# Patient Record
Sex: FEMALE | Race: WHITE | NOT HISPANIC OR LATINO | Employment: FULL TIME | URBAN - METROPOLITAN AREA
[De-identification: names, ages, dates, MRNs, and addresses within clinical notes are randomized per-mention and may not be internally consistent; named-entity substitution may affect disease eponyms.]

---

## 2017-01-09 ENCOUNTER — ANESTHESIA EVENT (OUTPATIENT)
Dept: GASTROENTEROLOGY | Facility: MEDICAL CENTER | Age: 22
End: 2017-01-09
Payer: COMMERCIAL

## 2017-01-09 ENCOUNTER — GENERIC CONVERSION - ENCOUNTER (OUTPATIENT)
Dept: OTHER | Facility: OTHER | Age: 22
End: 2017-01-09

## 2017-01-09 RX ORDER — ONDANSETRON 2 MG/ML
4 INJECTION INTRAMUSCULAR; INTRAVENOUS EVERY 6 HOURS PRN
Status: CANCELLED | OUTPATIENT
Start: 2017-01-09

## 2017-01-09 RX ORDER — SODIUM CHLORIDE 9 MG/ML
125 INJECTION, SOLUTION INTRAVENOUS CONTINUOUS
Status: CANCELLED | OUTPATIENT
Start: 2017-01-09

## 2017-01-10 ENCOUNTER — HOSPITAL ENCOUNTER (OUTPATIENT)
Facility: MEDICAL CENTER | Age: 22
Setting detail: OUTPATIENT SURGERY
Discharge: HOME | End: 2017-01-10
Attending: INTERNAL MEDICINE | Admitting: INTERNAL MEDICINE
Payer: COMMERCIAL

## 2017-01-10 ENCOUNTER — GENERIC CONVERSION - ENCOUNTER (OUTPATIENT)
Dept: GASTROENTEROLOGY | Facility: MEDICAL CENTER | Age: 22
End: 2017-01-10

## 2017-01-10 ENCOUNTER — ANESTHESIA (OUTPATIENT)
Dept: GASTROENTEROLOGY | Facility: MEDICAL CENTER | Age: 22
End: 2017-01-10
Payer: COMMERCIAL

## 2017-01-10 VITALS
HEART RATE: 71 BPM | RESPIRATION RATE: 16 BRPM | SYSTOLIC BLOOD PRESSURE: 102 MMHG | WEIGHT: 224 LBS | BODY MASS INDEX: 38.24 KG/M2 | OXYGEN SATURATION: 100 % | HEIGHT: 64 IN | TEMPERATURE: 98.9 F | DIASTOLIC BLOOD PRESSURE: 66 MMHG

## 2017-01-10 DIAGNOSIS — R11.0 NAUSEA: ICD-10-CM

## 2017-01-10 DIAGNOSIS — R10.13 EPIGASTRIC PAIN: ICD-10-CM

## 2017-01-10 DIAGNOSIS — D64.9 ANEMIA: ICD-10-CM

## 2017-01-10 DIAGNOSIS — R19.8 OTHER SPECIFIED SYMPTOMS AND SIGNS INVOLVING THE DIGESTIVE SYSTEM AND ABDOMEN: ICD-10-CM

## 2017-01-10 DIAGNOSIS — R63.4 ABNORMAL WEIGHT LOSS: ICD-10-CM

## 2017-01-10 LAB — EXT PREGNANCY TEST URINE: NEGATIVE

## 2017-01-10 PROCEDURE — 88312 SPECIAL STAINS GROUP 1: CPT | Performed by: INTERNAL MEDICINE

## 2017-01-10 PROCEDURE — 88305 TISSUE EXAM BY PATHOLOGIST: CPT | Performed by: INTERNAL MEDICINE

## 2017-01-10 RX ORDER — SODIUM CHLORIDE 9 MG/ML
125 INJECTION, SOLUTION INTRAVENOUS CONTINUOUS
Status: DISCONTINUED | OUTPATIENT
Start: 2017-01-10 | End: 2017-01-10 | Stop reason: HOSPADM

## 2017-01-10 RX ORDER — PROPOFOL 10 MG/ML
INJECTION, EMULSION INTRAVENOUS AS NEEDED
Status: DISCONTINUED | OUTPATIENT
Start: 2017-01-10 | End: 2017-01-10 | Stop reason: SURG

## 2017-01-10 RX ADMIN — LIDOCAINE HYDROCHLORIDE 50 MG: 20 INJECTION, SOLUTION INTRAVENOUS at 08:47

## 2017-01-10 RX ADMIN — PROPOFOL 100 MG: 10 INJECTION, EMULSION INTRAVENOUS at 08:56

## 2017-01-10 RX ADMIN — PROPOFOL 50 MG: 10 INJECTION, EMULSION INTRAVENOUS at 09:00

## 2017-01-10 RX ADMIN — PROPOFOL 100 MG: 10 INJECTION, EMULSION INTRAVENOUS at 08:47

## 2017-01-10 RX ADMIN — SODIUM CHLORIDE 125 ML/HR: 0.9 INJECTION, SOLUTION INTRAVENOUS at 08:38

## 2017-01-10 RX ADMIN — PROPOFOL 50 MG: 10 INJECTION, EMULSION INTRAVENOUS at 08:54

## 2017-01-10 RX ADMIN — PROPOFOL 50 MG: 10 INJECTION, EMULSION INTRAVENOUS at 08:51

## 2017-01-10 RX ADMIN — PROPOFOL 75 MG: 10 INJECTION, EMULSION INTRAVENOUS at 09:10

## 2017-01-10 RX ADMIN — PROPOFOL 25 MG: 10 INJECTION, EMULSION INTRAVENOUS at 09:04

## 2017-01-10 RX ADMIN — SODIUM CHLORIDE: 0.9 INJECTION, SOLUTION INTRAVENOUS at 08:34

## 2017-01-15 ENCOUNTER — GENERIC CONVERSION - ENCOUNTER (OUTPATIENT)
Dept: OTHER | Facility: OTHER | Age: 22
End: 2017-01-15

## 2017-01-17 ENCOUNTER — GENERIC CONVERSION - ENCOUNTER (OUTPATIENT)
Dept: OTHER | Facility: OTHER | Age: 22
End: 2017-01-17

## 2017-01-19 ENCOUNTER — GENERIC CONVERSION - ENCOUNTER (OUTPATIENT)
Dept: OTHER | Facility: OTHER | Age: 22
End: 2017-01-19

## 2017-01-25 ENCOUNTER — ALLSCRIPTS OFFICE VISIT (OUTPATIENT)
Dept: OTHER | Facility: OTHER | Age: 22
End: 2017-01-25

## 2017-01-25 DIAGNOSIS — R10.13 EPIGASTRIC PAIN: ICD-10-CM

## 2017-01-25 DIAGNOSIS — R63.5 ABNORMAL WEIGHT GAIN: ICD-10-CM

## 2017-01-25 DIAGNOSIS — D64.9 ANEMIA: ICD-10-CM

## 2017-01-25 DIAGNOSIS — R19.8 OTHER SPECIFIED SYMPTOMS AND SIGNS INVOLVING THE DIGESTIVE SYSTEM AND ABDOMEN: ICD-10-CM

## 2017-01-25 DIAGNOSIS — R11.0 NAUSEA: ICD-10-CM

## 2017-01-26 ENCOUNTER — GENERIC CONVERSION - ENCOUNTER (OUTPATIENT)
Dept: OTHER | Facility: OTHER | Age: 22
End: 2017-01-26

## 2017-01-31 ENCOUNTER — GENERIC CONVERSION - ENCOUNTER (OUTPATIENT)
Dept: OTHER | Facility: OTHER | Age: 22
End: 2017-01-31

## 2017-02-02 ENCOUNTER — ALLSCRIPTS OFFICE VISIT (OUTPATIENT)
Dept: OTHER | Facility: OTHER | Age: 22
End: 2017-02-02

## 2017-02-23 ENCOUNTER — GENERIC CONVERSION - ENCOUNTER (OUTPATIENT)
Dept: OTHER | Facility: OTHER | Age: 22
End: 2017-02-23

## 2017-03-02 ENCOUNTER — GENERIC CONVERSION - ENCOUNTER (OUTPATIENT)
Dept: OTHER | Facility: OTHER | Age: 22
End: 2017-03-02

## 2017-03-15 DIAGNOSIS — E55.9 VITAMIN D DEFICIENCY: ICD-10-CM

## 2017-03-15 DIAGNOSIS — R79.89 OTHER SPECIFIED ABNORMAL FINDINGS OF BLOOD CHEMISTRY: ICD-10-CM

## 2017-03-15 DIAGNOSIS — R94.6 ABNORMAL RESULTS OF THYROID FUNCTION STUDIES: ICD-10-CM

## 2017-03-15 DIAGNOSIS — E11.65 TYPE 2 DIABETES MELLITUS WITH HYPERGLYCEMIA (HCC): ICD-10-CM

## 2017-03-16 ENCOUNTER — GENERIC CONVERSION - ENCOUNTER (OUTPATIENT)
Dept: OTHER | Facility: OTHER | Age: 22
End: 2017-03-16

## 2017-03-22 ENCOUNTER — GENERIC CONVERSION - ENCOUNTER (OUTPATIENT)
Dept: OTHER | Facility: OTHER | Age: 22
End: 2017-03-22

## 2017-03-23 ENCOUNTER — GENERIC CONVERSION - ENCOUNTER (OUTPATIENT)
Dept: OTHER | Facility: OTHER | Age: 22
End: 2017-03-23

## 2017-03-23 ENCOUNTER — APPOINTMENT (OUTPATIENT)
Dept: LAB | Facility: HOSPITAL | Age: 22
End: 2017-03-23
Attending: INTERNAL MEDICINE
Payer: COMMERCIAL

## 2017-03-23 DIAGNOSIS — E55.9 VITAMIN D DEFICIENCY: ICD-10-CM

## 2017-03-23 DIAGNOSIS — R79.89 OTHER SPECIFIED ABNORMAL FINDINGS OF BLOOD CHEMISTRY: ICD-10-CM

## 2017-03-23 DIAGNOSIS — R94.6 ABNORMAL RESULTS OF THYROID FUNCTION STUDIES: ICD-10-CM

## 2017-03-23 DIAGNOSIS — E11.65 TYPE 2 DIABETES MELLITUS WITH HYPERGLYCEMIA (HCC): ICD-10-CM

## 2017-03-23 LAB
25(OH)D3 SERPL-MCNC: 18.8 NG/ML (ref 30–100)
ALBUMIN SERPL BCP-MCNC: 4.4 G/DL (ref 3.5–5)
ALP SERPL-CCNC: 110 U/L (ref 46–116)
ALT SERPL W P-5'-P-CCNC: 49 U/L (ref 12–78)
ANION GAP SERPL CALCULATED.3IONS-SCNC: 9 MMOL/L (ref 4–13)
AST SERPL W P-5'-P-CCNC: 23 U/L (ref 5–45)
BILIRUB SERPL-MCNC: 0.7 MG/DL (ref 0.2–1)
BUN SERPL-MCNC: 11 MG/DL (ref 5–25)
CALCIUM SERPL-MCNC: 9.9 MG/DL (ref 8.3–10.1)
CHLORIDE SERPL-SCNC: 101 MMOL/L (ref 100–108)
CO2 SERPL-SCNC: 28 MMOL/L (ref 21–32)
CREAT SERPL-MCNC: 0.69 MG/DL (ref 0.6–1.3)
EST. AVERAGE GLUCOSE BLD GHB EST-MCNC: 157 MG/DL
GFR SERPL CREATININE-BSD FRML MDRD: >60 ML/MIN/1.73SQ M
GLUCOSE P FAST SERPL-MCNC: 190 MG/DL (ref 65–99)
HBA1C MFR BLD: 7.1 % (ref 4.2–6.3)
POTASSIUM SERPL-SCNC: 4.4 MMOL/L (ref 3.5–5.3)
PROT SERPL-MCNC: 8.2 G/DL (ref 6.4–8.2)
SODIUM SERPL-SCNC: 138 MMOL/L (ref 136–145)
T4 FREE SERPL-MCNC: 1.01 NG/DL (ref 0.76–1.46)
TSH SERPL DL<=0.05 MIU/L-ACNC: 1.16 UIU/ML (ref 0.36–3.74)

## 2017-03-23 PROCEDURE — 84439 ASSAY OF FREE THYROXINE: CPT

## 2017-03-23 PROCEDURE — 83036 HEMOGLOBIN GLYCOSYLATED A1C: CPT

## 2017-03-23 PROCEDURE — 84443 ASSAY THYROID STIM HORMONE: CPT

## 2017-03-23 PROCEDURE — 36415 COLL VENOUS BLD VENIPUNCTURE: CPT

## 2017-03-23 PROCEDURE — 82306 VITAMIN D 25 HYDROXY: CPT

## 2017-03-23 PROCEDURE — 80053 COMPREHEN METABOLIC PANEL: CPT

## 2017-04-13 ENCOUNTER — HOSPITAL ENCOUNTER (EMERGENCY)
Facility: HOSPITAL | Age: 22
Discharge: HOME | End: 2017-04-13
Attending: EMERGENCY MEDICINE | Admitting: EMERGENCY MEDICINE
Payer: COMMERCIAL

## 2017-04-13 DIAGNOSIS — R10.9 RIGHT FLANK PAIN: ICD-10-CM
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
## 2017-04-13 LAB
BACTERIA UR QL AUTO: ABNORMAL /HPF
BILIRUB UR QL STRIP: NEGATIVE
CLARITY UR: CLEAR
COLOR UR: YELLOW
COLOR, POC: YELLOW
GLUCOSE UR STRIP-MCNC: ABNORMAL MG/DL
HCG UR QL: NORMAL
HGB UR QL STRIP.AUTO: ABNORMAL
KETONES UR STRIP-MCNC: NEGATIVE MG/DL
LEUKOCYTE ESTERASE UR QL STRIP: NEGATIVE
NITRITE UR QL STRIP: NEGATIVE
NON-SQ EPI CELLS URNS QL MICRO: ABNORMAL /HPF
PH UR STRIP.AUTO: 6.5 [PH] (ref 4.5–8)
PROT UR STRIP-MCNC: NEGATIVE MG/DL
RBC #/AREA URNS AUTO: ABNORMAL /HPF
SP GR UR STRIP.AUTO: 1.01 (ref 1–1.03)
UROBILINOGEN UR QL STRIP.AUTO: 0.2 E.U./DL
WBC #/AREA URNS AUTO: ABNORMAL /HPF

## 2017-04-13 PROCEDURE — 81001 URINALYSIS AUTO W/SCOPE: CPT

## 2017-04-13 PROCEDURE — 81025 URINE PREGNANCY TEST: CPT

## 2017-04-13 PROCEDURE — 87086 URINE CULTURE/COLONY COUNT: CPT

## 2017-04-13 PROCEDURE — 99284 EMERGENCY DEPT VISIT MOD MDM: CPT

## 2017-04-13 PROCEDURE — 81002 URINALYSIS NONAUTO W/O SCOPE: CPT

## 2017-04-14 LAB — BACTERIA UR CULT: NORMAL

## 2017-04-26 ENCOUNTER — ALLSCRIPTS OFFICE VISIT (OUTPATIENT)
Dept: OTHER | Facility: OTHER | Age: 22
End: 2017-04-26

## 2017-05-25 ENCOUNTER — TRANSCRIBE ORDERS (OUTPATIENT)
Dept: ADMINISTRATIVE | Facility: HOSPITAL | Age: 22
End: 2017-05-25

## 2017-05-26 ENCOUNTER — HOSPITAL ENCOUNTER (OUTPATIENT)
Dept: ULTRASOUND IMAGING | Facility: HOSPITAL | Age: 22
Discharge: HOME | End: 2017-05-26
Payer: COMMERCIAL

## 2017-05-26 DIAGNOSIS — E28.2 POLYCYSTIC OVARIAN SYNDROME: ICD-10-CM

## 2017-05-26 PROCEDURE — 76830 TRANSVAGINAL US NON-OB: CPT

## 2017-05-26 PROCEDURE — 76856 US EXAM PELVIC COMPLETE: CPT

## 2017-06-01 ENCOUNTER — GENERIC CONVERSION - ENCOUNTER (OUTPATIENT)
Dept: OTHER | Facility: OTHER | Age: 22
End: 2017-06-01

## 2017-06-05 ENCOUNTER — GENERIC CONVERSION - ENCOUNTER (OUTPATIENT)
Dept: OTHER | Facility: OTHER | Age: 22
End: 2017-06-05

## 2017-11-16 ENCOUNTER — TRANSCRIBE ORDERS (OUTPATIENT)
Dept: ADMINISTRATIVE | Facility: HOSPITAL | Age: 22
End: 2017-11-16

## 2017-11-16 ENCOUNTER — APPOINTMENT (OUTPATIENT)
Dept: LAB | Facility: MEDICAL CENTER | Age: 22
End: 2017-11-16
Payer: COMMERCIAL

## 2017-11-16 DIAGNOSIS — Z11.1 SCREENING EXAMINATION FOR PULMONARY TUBERCULOSIS: ICD-10-CM

## 2017-11-16 DIAGNOSIS — Z11.1 SCREENING EXAMINATION FOR PULMONARY TUBERCULOSIS: Primary | ICD-10-CM

## 2017-11-16 PROCEDURE — 86480 TB TEST CELL IMMUN MEASURE: CPT

## 2017-11-16 PROCEDURE — 36415 COLL VENOUS BLD VENIPUNCTURE: CPT

## 2017-11-18 LAB
ANNOTATION COMMENT IMP: NORMAL
GAMMA INTERFERON BACKGROUND BLD IA-ACNC: 0.07 IU/ML
M TB IFN-G BLD-IMP: NEGATIVE
M TB IFN-G CD4+ BCKGRND COR BLD-ACNC: 0 IU/ML
M TB IFN-G CD4+ T-CELLS BLD-ACNC: 0.07 IU/ML
MITOGEN IGNF BLD-ACNC: 9.75 IU/ML
QUANTIFERON-TB GOLD IN TUBE: NORMAL
SERVICE CMNT-IMP: NORMAL

## 2018-01-09 NOTE — MISCELLANEOUS
Message  pt had Mirena inserted 6/2016 had an episode of bad cramps Sat and then again Monday did get relief and has subsided now Spoke to Crystal Casas she wants her to observe for now called pt will observe      Active Problems    1  Abnormal glucose (790 29) (R73 09)   2  Abnormal liver function test (790 6) (R79 89)   3  Abnormal weight gain (783 1) (R63 5)   4  Alternating constipation and diarrhea (787 99) (R19 8)   5  Anemia (285 9) (D64 9)   6  Contraceptive, surveillance, intrauterine device (V25 42) (Z30 431)   7  Dysmenorrhea (625 3) (N94 6)   8  Elevated TSH (794 5) (R94 6)   9  Encounter for annual routine gynecological examination (V72 31) (Z01 419)   10  Encounter for gynecological examination with Papanicolaou smear of cervix    (V72 31,V76 2) (Z01 419,Z12 4)   11  Encounter for IUD insertion (V25 11) (Z30 430)   12  Encounter for screening examination for sexually transmitted disease (V74 5) (Z11 3)   13  Epigastric pain (789 06) (R10 13)   14  Fatigue (780 79) (R53 83)   15  Hyperglycemia (790 29) (R73 9)   16  Hypokalemia (276 8) (E87 6)   17  IUD contraception (V45 51) (Z97 5)   18  Kidney injury (866 00) (S37 009A)   19  Menorrhagia (626 2) (N92 0)   20  Myalgia (729 1) (M79 1)   21  Nausea (787 02) (R11 0)   22  Obesity (BMI 30-39 9) (278 00) (E66 9)   23  Recent weight loss (783 21) (R63 4)   24  Type 2 diabetes mellitus with hyperglycemia (250 00) (E11 65)   25  Vaginal candidiasis (112 1) (B37 3)   26  Vitamin D deficiency (268 9) (E55 9)    Current Meds   1  Mirena IUD; Therapy: (Recorded:17Aug2016) to Recorded   2  MoviPrep 100 GM Oral Solution Reconstituted; USE AS DIRECTED; Therapy: 23JZH8864 to (Last Rx:67Kfh2241)  Requested for: 86AOM4505 Ordered   3  Sudafed TABS; Therapy: (Recorded:98Yrt7628) to Recorded   4  Vitamin D (Ergocalciferol) 49791 UNIT Oral Capsule; TAKE 1 CAPSULE WEEKLY for 6   weeks;    Therapy: 79Ldh0001 to (Evaluate:18Feb2017)  Requested for: 79Vmy2351; Last Rx:13Lhs7402 Ordered    Allergies    1  Dilaudid   2   Bactrim TABS    Signatures   Electronically signed by : Kenney Miranda, ; Jan 17 2017  4:14PM EST                       (Author)

## 2018-01-09 NOTE — MISCELLANEOUS
Message  Pt called she is having vaginal itching again has been having some spotting and wearing panti liners a lot and has her period now will give her 1 more course of Diflucan if keeps happening will need OV      Active Problems    1  Anemia (285 9) (D64 9)   2  Contraceptive, surveillance, intrauterine device (V25 42) (Z30 431)   3  Dysmenorrhea (625 3) (N94 6)   4  Encounter for gynecological examination with Papanicolaou smear of cervix   (V72 31,V76 2) (Z01 419,Z12 4)   5  Encounter for IUD insertion (V25 11) (Z30 430)   6  Encounter for screening examination for sexually transmitted disease (V74 5) (Z11 3)   7  IUD contraception (V45 51) (Z97 5)   8  Menorrhagia (626 2) (N92 0)   9  Vaginal candidiasis (112 1) (B37 3)    Current Meds   1  Etodolac 400 MG Oral Tablet; Therapy: (Recorded:86Oks4166) to Recorded   2  Fluconazole 150 MG Oral Tablet; tAKE ONE TABLET NOW AND THEN REPEAT IN 3   DAYS; Therapy: 99FZY3937 to (Evaluate:05Prr7731)  Requested for: 64Waz7077; Last   Rx:14Cqo8190 Ordered   3  Hypercare 20 % SOLN;   Therapy: 58Yjg2292 to (Last Rx:78Tkl2536)  Requested for: 12Wss1310 Ordered   4  Mirena IUD; Therapy: (Recorded:42Bod6078) to Recorded    Allergies    1  Dilaudid   2   Bactrim TABS    Plan  Vaginal candidiasis    · Fluconazole 150 MG Oral Tablet; tAKE ONE TABLET NOW AND THEN REPEAT  IN 3 DAYS    Signatures   Electronically signed by : Rishi Magaña, ; Oct 11 2016  2:10PM EST                       (Author)
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
## 2018-01-11 NOTE — MISCELLANEOUS
Message  GI Reminder Recall [address] [place] [address] [address]:   Date: 03/22/2017   Dear Elizabeth Nieto:     Review of our records shows you are due for the following: Follow Up Visit  Please call the following office to schedule your appointment:   [address], [address], [place], [place], [address] [facility] [phone]  We look forward to hearing from you!      Sincerely,     [facility]      Signatures   Electronically signed by : Denzel Alvarado, ; Mar 22 2017  4:07PM EST                       (Author)

## 2018-01-11 NOTE — RESULT NOTES
Verified Results  * US PELVIS COMPLETE [facility] AND TRANSVAGINAL) [id number] 93:65WF Kalpana Guerrero Order Number: [id number]    - Patient Instructions: To schedule this appointment, please contact Central Scheduling at [phone]  Test Name Result Flag Reference   US PELVIS COMPLETE (TRANSABDOMINAL AND TRANSVAGINAL) (Report)     PELVIC ULTRASOUND, COMPLETE     INDICATION: 14-year-old female with intermittent right lower quadrant abdominal pain  Patient has a Mirena IUD since July 2016  LMP was about 3 weeks ago  COMPARISON: Abdomen and pelvic CT from 11/20/2016  TECHNIQUE:  Transabdominal pelvic ultrasound was performed in sagittal and transverse planes with a curvilinear transducer  Additional transvaginal imaging was performed to better evaluate the endometrium and ovaries  Imaging included volumetric    sweeps as well as traditional still imaging technique  FINDINGS:     UTERUS:   The uterus is anteverted in position, measuring 8 7 x 2 7 x 4 9 cm  Contour and echotexture appear normal      The cervix shows no suspicious abnormality  ENDOMETRIUM:    Normal caliber of 6 mm  Homogenous and normal in appearance  OVARIES/ADNEXA:   Right ovary: 2 5 x 2 8 x 1 9 cm  No suspicious right ovarian abnormality  Doppler flow within normal limits  Left ovary: 2 1 x 1 0 x 1 6 cm  No suspicious left ovarian abnormality  Doppler flow within normal limits  No suspicious adnexal mass or loculated collections  There is small amount of simple free fluid in the pelvis, likely physiologic in this premenopausal female  IMPRESSION:      Normal pelvic ultrasound, with IUD in proper position            Workstation performed: [id number]     Signed by:   Gregor Eisenmenger, MD   5/30/17
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
## 2018-01-12 VITALS
OXYGEN SATURATION: 95 % | DIASTOLIC BLOOD PRESSURE: 68 MMHG | HEART RATE: 102 BPM | SYSTOLIC BLOOD PRESSURE: 114 MMHG | TEMPERATURE: 98 F | BODY MASS INDEX: 38.28 KG/M2 | WEIGHT: 223 LBS

## 2018-01-12 VITALS — WEIGHT: 216.8 LBS | BODY MASS INDEX: 37.21 KG/M2
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
## 2018-01-12 NOTE — MISCELLANEOUS
Provider Comments  Provider Comments:   Dear Joe Michelle,    You missed your new patient appointment with Dr Francia Mxa on 12/12/2016; please contact our office to reschedule at [phone]  We understand that many situations arise that occasionally prevent patients from keeping scheduled appointments  It is the policy of [facility] that patients notify us 24 hours in advance if unable to keep a scheduled appointment  Missed appointments jeopardize strong physician-patient relationships  The appointment you missed could have easily been made available to another patient if you had contacted us to cancel  We like to accommodate all of our patients, but when patients miss an appointment it prevents us from being able to help everyone  In the future, we request at least 24 hours' notice of cancellation so we can make your appointment available to someone else in need       Sincerely,  The Physicians and Staff at [facility]        Signatures   Electronically signed by : Crystal Robertson, ; Dec 12 2016 11:41AM EST                       (Author)

## 2018-01-12 NOTE — RESULT NOTES
Verified Results  (1) TISSUE EXAM [id number] 08:54AM Radha Edwards     Test Name Result Flag Reference   LAB AP CASE REPORT (Report)     Surgical Pathology Report             Case: [id number]                   Authorizing Provider: Aneta Underwood MD      Collected:      01/10/2017 0854        Ordering Location:   [facility]    Received:      01/11/2017 [facility]                            Pathologist:      Aide Ly MD                              Specimens:  A) - Duodenum, Small bowel biopsy r/o celiac                              B) - Stomach, Gastric biopsy r/o H  pylori                               C) - Colon, Random colon polyp   LAB AP FINAL DIAGNOSIS (Report)     A  Small bowel, duodenum, biopsy:        - Small bowel mucosa with mildly increased intraepithelial   lymphocytes in a few villous tips  - No dysplasia or malignancy is identified  B  Stomach, biopsy:        - Antral and oxyntic mucosa with with chronic inactive   gastritis  - No Helicobacter pylori organisms are identified on the   Warthin-Starry special stain, performed with appropriate control         - No intestinal metaplasia, dysplasia or malignancy is   identified  C  Colon, random, polypectomy:        - Polypoid colonic mucosa with no significant pathologic   alteration         - No dysplasia or malignancy is identified  Interpretation performed at , [address]   Electronically signed by Aide Ly MD on 1/12/2017 at 4:23 PM   LAB AP NOTE      (Part A) Mildly increased intraepithelial lymphocytes are identified in a   few villous tips  This may be a nonspecific finding, but serologic studies   to rule out celiac disease are suggested clinically indicated  LAB AP SURGICAL ADDITIONAL INFORMATION (Report)     These tests were developed and their performance characteristics   determined by [facility] or [facility]  They may not be cleared or approved by the [place]  Food and   Drug Administration  The FDA has determined that such clearance or   approval is not necessary  These tests are used for clinical purposes  They should not be regarded as investigational or for research  This   laboratory has been approved by CLIA 88, designated as a high-complexity   laboratory and is qualified to perform these tests  LAB AP GROSS DESCRIPTION (Report)     A  The specimen is received in formalin, labeled with the patient's name   and hospital number, and is designated small bowel biopsy rule out   celiac, are two irregularly shaped fragments of tan soft tissue measuring   0 3 and 0 2 cm in greatest dimension  Entirely submitted  One cassette  B  The specimen is received in formalin, labeled with the patient's name   and hospital number, and is designated gastric biopsy rule out H    pylori, are three irregularly shaped fragments of tan soft tissue each   measuring 03 cm in greatest dimension  Entirely submitted  One cassette  C  The specimen is received in formalin, labeled with the patient's name   and hospital number, and is designated random colon polyp, are three   irregularly shaped fragments of tan soft tissue each measuring 0 3 cm in   greatest dimension  Entirely submitted  One cassette  Note: The estimated total formalin fixation time based upon information   provided by the submitting clinician and the standard processing schedule   is 28 75 hours        RLR   LAB AP CLINICAL INFORMATION      small bowel biopsy r/o celiac   small bowel biopsy r/o celiac
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
## 2018-01-13 VITALS — BODY MASS INDEX: 38.11 KG/M2 | WEIGHT: 222 LBS

## 2018-01-13 VITALS — WEIGHT: 214.4 LBS | BODY MASS INDEX: 36.8 KG/M2

## 2018-01-13 NOTE — RESULT NOTES
Verified Results  Urine HCG- POC 65KHC6996 18:45JG Katrin Salazar     Test Name Result Flag Reference   Urine HCG Negative       (1) CHLAMYDIA/GC AMPLIFIED DNA, PCR 41LHG3584 12:99DK Magalys Adam Order Number: [id number]     Test Name Result Flag Reference   CHLAMYDIA,AMPLIFIED DNA PROBE   C  trachomatis Amplified DNA Negative   C  trachomatis Amplified DNA Negative   For optimal microbe detection, urine samples should be a first catch specimen (20-60 ml of urine)  Patient should not have urinated for at least 1 hour prior to collection  A specimen not collected in this manner may have falsely negative results     N  GONORRHOEAE AMPLIFIED DNA   N  gonorrhoeae Amplified DNA Negative   N  gonorrhoeae Amplified DNA Negative

## 2018-01-13 NOTE — MISCELLANEOUS
Message  Ot called she was given 1 Diflucan from PCP for yeast infection from an antibiotic did not take symptoms away now has itching again, very irritated will give both pills Pt will call if no better   Joyce Clemons Active Problems    1  Anemia (285 9) (D64 9)   2  Dysmenorrhea (625 3) (N94 6)   3  Encounter for gynecological examination with Papanicolaou smear of cervix   (V72 31,V76 2) (Z01 419,Z12 4)   4  Menorrhagia (626 2) (N92 0)   5  Vaginal candidiasis (112 1) (B37 3)    Current Meds   1  Etodolac 400 MG Oral Tablet; Therapy: (Recorded:42Oun3527) to Recorded   2  Hypercare 20 % SOLN;   Therapy: 04Pso3017 to (Last Rx:63Phg7148)  Requested for: 52Tez8200 Ordered   3  Loestrin Fe 1 5/30 1 5-30 MG-MCG Oral Tablet; Take one tablet daily; Therapy: 59Plj5118 to (Evaluate:85Xto1048)  Requested for: 93Nfb8832; Last   Rx:99Wwa1342 Ordered    Allergies    1  Dilaudid   2   Bactrim TABS    Plan  Vaginal candidiasis    · Fluconazole 150 MG Oral Tablet; tAKE ONE TABLET NOW AND THEN REPEAT IN  3 DAYS    Signatures   Electronically signed by : Sergio Julio, ; Feb 24 2016  2:02PM EST                       (Author)
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
## 2018-01-14 VITALS
HEART RATE: 88 BPM | BODY MASS INDEX: 37.23 KG/M2 | HEIGHT: 65 IN | DIASTOLIC BLOOD PRESSURE: 80 MMHG | SYSTOLIC BLOOD PRESSURE: 134 MMHG | WEIGHT: 223.44 LBS

## 2018-01-14 VITALS — BODY MASS INDEX: 37.48 KG/M2 | WEIGHT: 221.8 LBS

## 2018-01-14 VITALS — WEIGHT: 225.6 LBS | BODY MASS INDEX: 38.72 KG/M2

## 2018-01-14 VITALS — WEIGHT: 214.6 LBS | BODY MASS INDEX: 36.27 KG/M2

## 2018-01-14 NOTE — MISCELLANEOUS
Message   Date: 28 Jun 2016 11:29 AM EST, Recorded By: Demi Salgado For: Lindsay Capellan   Caller: Jarrett Ross   Phone: [phone] (Home), [phone] (Work)   Reason: Medical Complaint   pt just finished antibiotic and now has yeast inf, wants diflucan sent to [facility]  Pt often gets yeast inf after taking meds  pt was told to call office if meds do not help or if symptoms get worse  Active Problems    1  Anemia (285 9) (D64 9)   2  Dysmenorrhea (625 3) (N94 6)   3  Encounter for gynecological examination with Papanicolaou smear of cervix   (V72 31,V76 2) (Z01 419,Z12 4)   4  Encounter for IUD insertion (V25 11) (Z30 430)   5  Encounter for screening examination for sexually transmitted disease (V74 5) (Z11 3)   6  IUD contraception (V45 51) (Z97 5)   7  Menorrhagia (626 2) (N92 0)   8  Vaginal candidiasis (112 1) (B37 3)    Current Meds   1  Etodolac 400 MG Oral Tablet; Therapy: (Recorded:53Foq2892) to Recorded   2  Fluconazole 150 MG Oral Tablet; tAKE ONE TABLET NOW AND THEN REPEAT IN 3   DAYS; Therapy: 57JCS5943 to (Evaluate:49Otl3006)  Requested for: 11Kls8688; Last   Rx:02Uuz8416 Ordered   3  Hypercare 20 % SOLN;   Therapy: 80Zgx4335 to (Last Rx:46Gah8521)  Requested for: 66Mrl0232 Ordered   4  Loestrin Fe 1 5/30 1 5-30 MG-MCG Oral Tablet; Take one tablet daily; Therapy: 79Pah1489 to (Evaluate:96Wdg6581)  Requested for: 62Jec4865; Last   Rx:09Vot0902 Ordered   5  Misoprostol 200 MCG Oral Tablet; TAKE 2 TABLETS 4-10 HOURS BEFORE MIRENA   INSERTION; Therapy: 43CLN5633 to (Last Rx:06Jun2016)  Requested for: 07Jun2016 Ordered    Allergies    1  Dilaudid   2   Bactrim TABS    Plan  Vaginal candidiasis    · Fluconazole 150 MG Oral Tablet (Diflucan); TAKE 1 TABLET Now AND ANOTHER  IN 3 DAYS    Signatures   Electronically signed by : Peg Gagnon OM; Jun 28 2016 11:30AM EST                       (Author)
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
## 2018-01-14 NOTE — MISCELLANEOUS
Message  Pt called she was diagnosed with PCOS wanted to know if we can order U/S to confirm l/m to call and set up in office if she can or if not we can order Pelvic U/S for her      Active Problems    1  Abnormal glucose (790 29) (R73 09)   2  Abnormal liver function test (790 6) (R79 89)   3  Abnormal weight gain (783 1) (R63 5)   4  Alternating constipation and diarrhea (787 99) (R19 8)   5  Anemia (285 9) (D64 9)   6  Contraceptive, surveillance, intrauterine device (V25 42) (Z30 431)   7  Dysmenorrhea (625 3) (N94 6)   8  Elevated TSH (794 5) (R94 6)   9  Encounter for annual routine gynecological examination (V72 31) (Z01 419)   10  Encounter for gynecological examination with Papanicolaou smear of cervix    (V72 31,V76 2) (Z01 419,Z12 4)   11  Encounter for IUD insertion (V25 11) (Z30 430)   12  Encounter for screening examination for sexually transmitted disease (V74 5) (Z11 3)   13  Epigastric pain (789 06) (R10 13)   14  Fatigue (780 79) (R53 83)   15  Hyperglycemia (790 29) (R73 9)   16  Hypokalemia (276 8) (E87 6)   17  IUD contraception (V45 51) (Z97 5)   18  Kidney injury (866 00) (S37 009A)   19  Menorrhagia (626 2) (N92 0)   20  Myalgia (729 1) (M79 1)   21  Nausea (787 02) (R11 0)   22  Obesity (BMI 30-39 9) (278 00) (E66 9)   23  Recent weight loss (783 21) (R63 4)   24  Type 2 diabetes mellitus with hyperglycemia (250 00) (E11 65)   25  Vaginal candidiasis (112 1) (B37 3)   26  Vitamin D deficiency (268 9) (E55 9)    Current Meds   1  Dexamethasone 1 MG Oral Tablet; take 1 tab at 11 pm the night before labs; Therapy: 86TDB5353 to (Evaluate:26Jan2017)  Requested for: 23NNF9813; Last   Rx:25Jan2017 Ordered   2  Mirena IUD; Therapy: (Recorded:17Aug2016) to Recorded   3  Vitamin D (Ergocalciferol) 88094 UNIT Oral Capsule; TAKE 1 CAPSULE WEEKLY for 6   weeks; Therapy: 14Vbw0839 to (Evaluate:18Feb2017)  Requested for: 91Ejv9323; Last   Rx:04Qrx6987 Ordered    Allergies    1  Dilaudid   2  Bactrim TABS    Signatures   Electronically signed by : Seth Cat, ; Jan 26 2017  9:10AM EST                       (Author)

## 2018-01-14 NOTE — MISCELLANEOUS
Message  Pt called she has 100 percent coverage for Mirena will order had STD tests done today has appt for Thurs for insertion will use spare if not arrived yet  did call in cytotec and advised Advil      Active Problems    1  Anemia (285 9) (D64 9)   2  Dysmenorrhea (625 3) (N94 6)   3  Encounter for gynecological examination with Papanicolaou smear of cervix   (V72 31,V76 2) (Z01 419,Z12 4)   4  Encounter for screening examination for sexually transmitted disease (V74 5) (Z11 3)   5  Menorrhagia (626 2) (N92 0)   6  Vaginal candidiasis (112 1) (B37 3)    Current Meds   1  Etodolac 400 MG Oral Tablet; Therapy: (Recorded:86Dzs8062) to Recorded   2  Fluconazole 150 MG Oral Tablet; tAKE ONE TABLET NOW AND THEN REPEAT IN 3   DAYS; Therapy: 25GAW2152 to (Evaluate:53Snk5714)  Requested for: 52Yyn2184; Last   Rx:62Ueh3252 Ordered   3  Hypercare 20 % SOLN;   Therapy: 17Fcq6059 to (Last Rx:78Pro5829)  Requested for: 32Xfx9964 Ordered   4  Loestrin Fe 1 5/30 1 5-30 MG-MCG Oral Tablet; Take one tablet daily; Therapy: 27Ifo6352 to (Evaluate:38Jsz8686)  Requested for: 12Tra8769; Last   Rx:29Eag7559 Ordered    Allergies    1  Dilaudid   2   Bactrim TABS    Plan  Menorrhagia    · Misoprostol 200 MCG Oral Tablet; TAKE 2 TABLETS 4-10 HOURS BEFORE  MIRENA INSERTION    Signatures   Electronically signed by : Nathalia Kebede, ; Jun 6 2016  8:36AM EST                       (Author)

## 2018-01-15 NOTE — MISCELLANEOUS
Provider Comments  Provider Comments:   PATIENT NO SHOWED FOR 4- APPT  Signatures   Electronically signed by : Mike Marroquin MA;  Apr 26 2017  9:15AM EST                       (Author)

## 2018-01-15 NOTE — MISCELLANEOUS
Message  GI Reminder Recall [address] [place] [address] [address]:   Date: 06/05/2017   Dear Howie Ruggiero:     Review of our records shows you are due for the following: Follow Up Visit  Please call the following office to schedule your appointment:   [address], [address], [place], [facility], [address] [facility] [phone]  We look forward to hearing from you!      Sincerely,     [address] Gastroenterology      Signatures   Electronically signed by : Berkley Falcon, ; Jun 5 2017 11:30AM EST                       (Author)

## 2018-01-15 NOTE — MISCELLANEOUS
Message  pt called was treated for strep last week starting with vaginal itching, irritation, feels sore no d/c Will treat with Flucanozole and she will call if no better [facility] is where Rx was sent      Active Problems    1  Anemia (285 9) (D64 9)   2  Contraceptive, surveillance, intrauterine device (V25 42) (Z30 431)   3  Dysmenorrhea (625 3) (N94 6)   4  Encounter for gynecological examination with Papanicolaou smear of cervix   (V72 31,V76 2) (Z01 419,Z12 4)   5  Encounter for IUD insertion (V25 11) (Z30 430)   6  Encounter for screening examination for sexually transmitted disease (V74 5) (Z11 3)   7  IUD contraception (V45 51) (Z97 5)   8  Menorrhagia (626 2) (N92 0)   9  Vaginal candidiasis (112 1) (B37 3)    Current Meds   1  Etodolac 400 MG Oral Tablet; Therapy: (Recorded:23Rax6011) to Recorded   2  Hypercare 20 % SOLN;   Therapy: 83Lok2487 to (Last Rx:12Apr2011)  Requested for: 06Kfz4500 Ordered   3  Mirena IUD; Therapy: (Recorded:15Odn2407) to Recorded    Allergies    1  Dilaudid   2   Bactrim TABS    Plan  Vaginal candidiasis    · Fluconazole 150 MG Oral Tablet; tAKE ONE TABLET NOW AND THEN REPEAT  IN 3 DAYS    Signatures   Electronically signed by : Deanne Ramirez, ; Sep 14 2016 11:14AM EST                       (Author)

## 2018-01-15 NOTE — RESULT NOTES
Message   A1C high 8 3 - consistent with type 2 DM - referal for HNT ,if she decidess not to go with weight managment program let us know so can refer to see nutritionist     low vitamin d start drisdol 50,000 iu once a week for 6 weeks and then take vitamin d3 5000 iu daily      Verified Results  (1) GLUTAMIC ACID DECARBOXYLASE 34Xmy5182 04:45PM Guerda Sin     Test Name Result Flag Reference   GLUTAMIC ACID DECARBOXYLASE <5 0 U/mL  0 0 - 5 0   Performed at:  [address]  [id number]  : Jose Duarte MD, Phone:  [phone]     (1) LIPID PANEL, FASTING 60HVT3810 04:43PM Rosa Garcia Order Number: [id number]     Test Name Result Flag Reference   CHOLESTEROL 192 mg/dL     HDL,DIRECT 45 mg/dL  40-60   Specimen collection should occur prior to Metamizole administration due to the potential for falsely depressed results  LDL CHOLESTEROL CALCULATED 106 mg/dL H 0-100   - Patient Instructions: This is a fasting blood test  Water,black tea or black  coffee only after 9:00pm the night before test   Drink 2 glasses of water the morning of test     - Patient Instructions: This is a fasting blood test  Water,black tea or black  coffee only after 9:00pm the night before test Drink 2 glasses of water the morning of test - Patient Instructions: This bloodwork is non-fasting  Please drink two glasses of   water morning of bloodwork  Triglyceride:         Normal              <150 mg/dl       Borderline High    150-199 mg/dl       High               200-499 mg/dl       Very High          >499 mg/dl  Cholesterol:         Desirable        <200 mg/dl      Borderline High  200-239 mg/dl      High             >239 mg/dl  HDL Cholesterol:        High    >59 mg/dL      Low     <41 mg/dL  LDL CALCULATED:    This screening LDL is a calculated result    It does not have the accuracy of the Direct Measured LDL in the monitoring of patients with hyperlipidemia and/or statin therapy  Direct Measure LDL (VLL499) must be ordered separately in these patients  TRIGLYCERIDES 203 mg/dL H <=150   Specimen collection should occur prior to N-Acetylcysteine or Metamizole administration due to the potential for falsely depressed results  (1) COMPREHENSIVE METABOLIC PANEL 98HGJ4214 83:54SU [employer] Order Number: [id number]     Test Name Result Flag Reference   GLUCOSE,RANDM 200 mg/dL H    If the patient is fasting, the ADA then defines impaired fasting glucose as > 100 mg/dL and diabetes as > or equal to 123 mg/dL  SODIUM 137 mmol/L  136-145   POTASSIUM 4 0 mmol/L  3 5-5 3   CHLORIDE 104 mmol/L  100-108   CARBON DIOXIDE 22 mmol/L  21-32   ANION GAP (CALC) 11 mmol/L  4-13   BLOOD UREA NITROGEN 11 mg/dL  5-25   CREATININE 0 62 mg/dL  0 60-1 30   Standardized to IDMS reference method   CALCIUM 9 3 mg/dL  8 3-10 1   BILI, TOTAL 0 56 mg/dL  0 20-1 00   ALK PHOSPHATAS 90 U/L     ALT (SGPT) 63 U/L  12-78   AST(SGOT) 25 U/L  5-45   ALBUMIN 4 1 g/dL  3 5-5 0   TOTAL PROTEIN 7 4 g/dL  6 4-8 2   eGFR Non-African American      >60 0 ml/min/1 73sq m   - Patient Instructions: This is a fasting blood test  Water,black tea or black  coffee only after 9:00pm the night before test Drink 2 glasses of water the morning of test - Patient Instructions: This bloodwork is non-fasting  Please drink two glasses of   water morning of bloodwork  National Kidney Disease Education Program recommendations are as follows:  GFR calculation is accurate only with a steady state creatinine  Chronic Kidney disease less than 60 ml/min/1 73 sq  meters  Kidney failure less than 15 ml/min/1 73 sq  meters  (1) HEMOGLOBIN A1C 64Ree1685 04:43PM [facility] Order Number: [id number]     Test Name Result Flag Reference   HEMOGLOBIN A1C 8 3 % H 4 2-6 3   EST  AVG   GLUCOSE 192 mg/dl       (1) C-PEPTIDE 84GIT1024 04:43PM [employer]   TW Order Number: [id number]     Test Name Result Flag Reference   C PEPTIDE 5 8 ng/mL H 1 1 - 4 4   C-Peptide reference interval is for fasting patients  Performed at:  [address] [place] [address]  [id number]  : Raisa Mendoza MD, Phone:  [phone]     (1) TSH 33YUI3008 04:43PM [employer] Order Number: [id number]     Test Name Result Flag Reference   TSH 2 090 uIU/mL  0 358-3 740   - Patient Instructions: This bloodwork is non-fasting  Please drink two glasses of water morning of bloodwork  - Patient Instructions: This is a fasting blood test  Water,black tea or black  coffee only after 9:00pm the night before test Drink 2 glasses of water the morning of test - Patient Instructions: This bloodwork is non-fasting  Please drink two glasses of   water morning of bloodwork  Patients undergoing fluorescein dye angiography may retain small amounts of fluorescein in the body for 48-72 hours post procedure  Samples containing fluorescein can produce falsely depressed TSH values  If the patient had this procedure,a specimen should be resubmitted post fluorescein clearance  The recommended reference ranges for TSH during pregnancy are as follows:  First trimester 0 1 to 2 5 uIU/mL  Second trimester  0 2 to 3 0 uIU/mL  Third trimester 0 3 to 3 0 uIU/m     (1) T4, FREE 84Isr2764 04:43PM Kualapuuzaheer Skinner    Order Number: [id number]     Test Name Result Flag Reference   T4,FREE 1 09 ng/dL  0 76-1 46   - Patient Instructions: This is a fasting blood test  Water,black tea or black  coffee only after 9:00pm the night before test Drink 2 glasses of water the morning of test - Patient Instructions: This bloodwork is non-fasting  Please drink two glasses of   water morning of bloodwork       (1) VITAMIN D 25-HYDROXY 91Sbs6107 04:43PM [employer] Order Number: [id number]     Test Name Result Flag Reference   VIT D 25-HYDROX 15 2 ng/mL L 30 0-100 0 This assay is a certified procedure of the CDC Vitamin D Standardization Certification Program (VDSCP)     Deficiency <20ng/ml   Insufficiency 20-30ng/ml   Sufficient  ng/ml     *Patients undergoing fluorescein dye angiography may retain small amounts of fluorescein in the body for 48-72 hours post procedure  Samples containing fluorescein can produce falsely elevated Vitamin D values  If the patient had this procedure, a specimen should be resubmitted post fluorescein clearance  (1) TESTOSTERONE, FREE (DIRECT) AND TOTAL 07Ibt2578 04:43PM Tanisha Grand Order Number: [id number]     Test Name Result Flag Reference   FREE TESTOSTERONE, DIRECT 1 2 pg/mL  0 0 - 4 2   TESTOSTERONE (TOTAL) 7 ng/dL L 8 - 48   Performed at:  [employer] [address]  [id number]  : Andrey De León MD, Phone:  [phone]       Plan  Obesity (BMI 30-39 9), Type 2 diabetes mellitus with hyperglycemia    · *1 - 73 Anisha LagunasLuzma Physician Referral  Consult Only: the  expectation is that the referring provider will communicate back to the patient on  treatment options  Evaluation and Treatment: the expectation is that the referred to  provider will communicate back to the patient on treatment options    Status: Hold For  - Scheduling  Requested for: 34OEG7956  Care Summary provided  : Yes

## 2018-01-17 NOTE — RESULT NOTES
Message   A1C improved from 8 3 to 7 1 - goal less than 6 5    vitamin d is low - how much is she taking - needs increase in dose      Verified Results  (1) COMPREHENSIVE METABOLIC PANEL 90HFY6035 78:47KQ [employer] Order Number: [id number]     Test Name Result Flag Reference   SODIUM 138 mmol/L  136-145   POTASSIUM 4 4 mmol/L  3 5-5 3   CHLORIDE 101 mmol/L  100-108   CARBON DIOXIDE 28 mmol/L  21-32   ANION GAP (CALC) 9 mmol/L  4-13   BLOOD UREA NITROGEN 11 mg/dL  5-25   CREATININE 0 69 mg/dL  0 60-1 30   Standardized to IDMS reference method   CALCIUM 9 9 mg/dL  8 3-10 1   BILI, TOTAL 0 70 mg/dL  0 20-1 00   ALK PHOSPHATAS 110 U/L     ALT (SGPT) 49 U/L  12-78   AST(SGOT) 23 U/L  5-45   ALBUMIN 4 4 g/dL  3 5-5 0   TOTAL PROTEIN 8 2 g/dL  6 4-8 2   eGFR Non-African American      >60 0 ml/min/1 73sq m   - Patient Instructions: This bloodwork is non-fasting  Please drink two glasses of water morning of bloodwork  National Kidney Disease Education Program recommendations are as follows:  GFR calculation is accurate only with a steady state creatinine  Chronic Kidney disease less than 60 ml/min/1 73 sq  meters  Kidney failure less than 15 ml/min/1 73 sq  meters  GLUCOSE FASTING 190 mg/dL H 65-99     (1) HEMOGLOBIN A1C 23Mar2017 05:06PM [employer] Order Number: [id number]     Test Name Result Flag Reference   HEMOGLOBIN A1C 7 1 % H 4 2-6 3   EST  AVG  GLUCOSE 157 mg/dl       (1) TSH 89JZI0560 05:06PM [employer] Order Number: [id number]     Test Name Result Flag Reference   TSH 1 160 uIU/mL  0 358-3 740   - Patient Instructions: This bloodwork is non-fasting  Please drink two glasses of water morning of bloodwork  - Patient Instructions: This bloodwork is non-fasting  Please drink two glasses of water morning of bloodwork  Patients undergoing fluorescein dye angiography may retain small amounts of fluorescein in the body for 48-72 hours post procedure  Samples containing fluorescein can produce falsely depressed TSH values  If the patient had this procedure,a specimen should be resubmitted post fluorescein clearance  The recommended reference ranges for TSH during pregnancy are as follows:  First trimester 0 1 to 2 5 uIU/mL  Second trimester  0 2 to 3 0 uIU/mL  Third trimester 0 3 to 3 0 uIU/m     (1) T4, FREE 23Mar2017 05:06PM Swetha Marquez    Order Number: [id number]     Test Name Result Flag Reference   T4,FREE 1 01 ng/dL  0 76-1 46   - Patient Instructions: This bloodwork is non-fasting  Please drink two glasses of water morning of bloodwork  (1) VITAMIN D 25-HYDROXY 42WER4914 05:06PM Swetha Marquez    Order Number: [id number]     Test Name Result Flag Reference   VIT D 25-HYDROX 18 8 ng/mL L 30 0-100 0   This assay is a certified procedure of the CDC Vitamin D Standardization Certification Program (VDSCP)     Deficiency <20ng/ml   Insufficiency 20-30ng/ml   Sufficient  ng/ml     *Patients undergoing fluorescein dye angiography may retain small amounts of fluorescein in the body for 48-72 hours post procedure  Samples containing fluorescein can produce falsely elevated Vitamin D values  If the patient had this procedure, a specimen should be resubmitted post fluorescein clearance
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
## 2018-01-17 NOTE — MISCELLANEOUS
Message  Message Free Text Note Form: I spoke to Sylwia Pandeyshayy today regarding the referral she received from Dr Ester Prather for the [facility]  She will attend the 12/28/16 information session  Active Problems    1  Abnormal glucose (790 29) (R73 09)   2  Abnormal liver function test (790 6) (R79 89)   3  Abnormal weight gain (783 1) (R63 5)   4  Alternating constipation and diarrhea (787 99) (R19 8)   5  Anemia (285 9) (D64 9)   6  Contraceptive, surveillance, intrauterine device (V25 42) (Z30 431)   7  Dysmenorrhea (625 3) (N94 6)   8  Elevated TSH (794 5) (R94 6)   9  Encounter for gynecological examination with Papanicolaou smear of cervix   (V72 31,V76 2) (Z01 419,Z12 4)   10  Encounter for IUD insertion (V25 11) (Z30 430)   11  Encounter for screening examination for sexually transmitted disease (V74 5) (Z11 3)   12  Epigastric pain (789 06) (R10 13)   13  Fatigue (780 79) (R53 83)   14  Hyperglycemia (790 29) (R73 9)   15  Hypokalemia (276 8) (E87 6)   16  IUD contraception (V45 51) (Z97 5)   17  Kidney injury (866 00) (S37 009A)   18  Menorrhagia (626 2) (N92 0)   19  Myalgia (729 1) (M79 1)   20  Nausea (787 02) (R11 0)   21  Obesity (BMI 30-39 9) (278 00) (E66 9)   22  Recent weight loss (783 21) (R63 4)   23  Type 2 diabetes mellitus with hyperglycemia (250 00) (E11 65)   24  Vaginal candidiasis (112 1) (B37 3)    Current Meds   1  Mirena IUD; Therapy: (Recorded:72Kyi2675) to Recorded   2  MoviPrep 100 GM Oral Solution Reconstituted; USE AS DIRECTED; Therapy: 17GFA0721 to (Last Rx:21Eyt3242)  Requested for: 87GHJ6337 Ordered   3  Sudafed TABS; Therapy: (Recorded:51Ecg9468) to Recorded    Allergies    1  Dilaudid   2   Bactrim TABS    Signatures   Electronically signed by : Boston Fine, ; Dec 20 2016  9:52AM EST                       (Author)
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
## 2018-02-01 DIAGNOSIS — B37.9 YEAST INFECTION: Primary | ICD-10-CM

## 2018-02-01 RX ORDER — OMEPRAZOLE 10 MG/1
1 CAPSULE, DELAYED RELEASE ORAL DAILY
COMMUNITY
Start: 2017-02-02 | End: 2018-04-05 | Stop reason: SDUPTHER

## 2018-02-01 RX ORDER — CYCLOBENZAPRINE HCL 10 MG
10 TABLET ORAL DAILY PRN
COMMUNITY
Start: 2016-11-29 | End: 2020-06-03

## 2018-02-01 RX ORDER — FLUCONAZOLE 150 MG/1
150 TABLET ORAL
Qty: 1 TABLET | Refills: 0 | Status: SHIPPED | OUTPATIENT
Start: 2018-02-01 | End: 2018-02-05

## 2018-02-01 RX ORDER — OMEPRAZOLE 10 MG/1
10 CAPSULE, DELAYED RELEASE ORAL
COMMUNITY
End: 2018-04-05 | Stop reason: SDUPTHER

## 2018-04-05 ENCOUNTER — APPOINTMENT (OUTPATIENT)
Dept: LAB | Facility: CLINIC | Age: 23
End: 2018-04-05
Payer: COMMERCIAL

## 2018-04-05 ENCOUNTER — OFFICE VISIT (OUTPATIENT)
Dept: GASTROENTEROLOGY | Facility: MEDICAL CENTER | Age: 23
End: 2018-04-05
Payer: COMMERCIAL

## 2018-04-05 ENCOUNTER — TRANSCRIBE ORDERS (OUTPATIENT)
Dept: LAB | Facility: CLINIC | Age: 23
End: 2018-04-05

## 2018-04-05 VITALS
WEIGHT: 227 LBS | TEMPERATURE: 97.6 F | HEIGHT: 64 IN | BODY MASS INDEX: 38.76 KG/M2 | HEART RATE: 79 BPM | DIASTOLIC BLOOD PRESSURE: 70 MMHG | SYSTOLIC BLOOD PRESSURE: 128 MMHG

## 2018-04-05 DIAGNOSIS — K21.00 GASTROESOPHAGEAL REFLUX DISEASE WITH ESOPHAGITIS: ICD-10-CM

## 2018-04-05 DIAGNOSIS — R19.7 DIARRHEA, UNSPECIFIED TYPE: Primary | ICD-10-CM

## 2018-04-05 DIAGNOSIS — K58.0 IRRITABLE BOWEL SYNDROME WITH DIARRHEA: ICD-10-CM

## 2018-04-05 DIAGNOSIS — R19.7 DIARRHEA, UNSPECIFIED TYPE: ICD-10-CM

## 2018-04-05 DIAGNOSIS — R11.0 NAUSEA: ICD-10-CM

## 2018-04-05 PROCEDURE — 83516 IMMUNOASSAY NONANTIBODY: CPT

## 2018-04-05 PROCEDURE — 99214 OFFICE O/P EST MOD 30 MIN: CPT | Performed by: PHYSICIAN ASSISTANT

## 2018-04-05 RX ORDER — ONDANSETRON 4 MG/1
4 TABLET, FILM COATED ORAL 4 TIMES DAILY PRN
Qty: 60 TABLET | Refills: 0 | Status: SHIPPED | OUTPATIENT
Start: 2018-04-05 | End: 2021-07-22 | Stop reason: ALTCHOICE

## 2018-04-05 RX ORDER — MONTELUKAST SODIUM 4 MG/1
1 TABLET, CHEWABLE ORAL 2 TIMES DAILY
Qty: 60 TABLET | Refills: 0 | Status: SHIPPED | OUTPATIENT
Start: 2018-04-05 | End: 2020-06-03

## 2018-04-05 RX ORDER — OMEPRAZOLE 10 MG/1
20 CAPSULE, DELAYED RELEASE ORAL 2 TIMES DAILY
Qty: 60 CAPSULE | Refills: 0 | Status: SHIPPED | OUTPATIENT
Start: 2018-04-05 | End: 2020-03-18 | Stop reason: ALTCHOICE

## 2018-04-05 RX ORDER — DICYCLOMINE HCL 20 MG
20 TABLET ORAL EVERY 6 HOURS
Qty: 60 TABLET | Refills: 2 | Status: SHIPPED | OUTPATIENT
Start: 2018-04-05 | End: 2020-06-03

## 2018-04-05 NOTE — PROGRESS NOTES
Assessment/Plan:     Diagnoses and all orders for this visit:    Gastroesophageal reflux disease with esophagitis/nausea  She has been complaining of increased nausea and periumbilical abdominal pain  She does have a history of esophagitis and small hiatal hernia on EGD done last year  This is likely related to her NSAID usage from her dental pain  I suggested increasing her omeprazole to twice a day for short period time to see if this helps get her symptoms under better control  I told her it is unlikely that she has an ulcer however if she did this would be the treatment  Hopefully now that she is off her NSAIDs her symptoms will improve  Will also try Zofran as needed  -     omeprazole (PriLOSEC) 10 mg delayed release capsule; Take 2 capsules (20 mg total) by mouth 2 (two) times a day  -     ondansetron (ZOFRAN) 4 mg tablet; Take 1 tablet (4 mg total) by mouth 4 (four) times a day as needed for nausea or vomiting    Irritable bowel syndrome with diarrhea  Her abdominal pain and diarrhea have recently worsened  This is likely related to irritable bowel as her colonoscopy last year was within normal limits  She has been under increased stress with her new job  Her diarrhea may also be related to the fact that she had her gallbladder removed  She also had increased lymphocytes on EGD and it was recommended that she have celiac testing  In the meantime would recommend trial of Bentyl and Colestid to see if this helps with her symptoms  We also discussed low FODMAP diet  -     Tissue Transglutaminase, IgG,IgA; Future  -     dicyclomine (BENTYL) 20 mg tablet; Take 1 tablet (20 mg total) by mouth every 6 (six) hours  -     colestipol (COLESTID) 1 g tablet; Take 1 tablet (1 g total) by mouth 2 (two) times a day    I asked her to call us in the next few weeks if her symptoms did not improve and we can discuss this further  Subjective:      Patient ID: Maxime Meeks is a 20 y o  female      HPI This is a follow-up for GERD, nausea, abdominal pain & irritable bowel  Patient was last seen 1 year ago  It was felt her symptoms are related to functional dyspepsia as well as irritable bowels  It was recommended that she have EGD and colonoscopy to rule out peptic ulcer disease and inflammatory bowel disease  EGD was done January 2017 which showed class a erosive reflux induced esophagitis, small sliding hiatal hernia  Biopsies were negative for H pylori  She did have some increased lymphocytes was recommended to have celiac testing  Her colonoscopy was within normal limits  She states she was feeling well up until about 2 weeks ago when she started with periumbilical abdominal pain, nausea and diarrhea  She states she was on NSAIDs for dental pain  She states she is concerned she may have an ulcer  She is taking a PPI daily  She was having nausea most days and felt she was unable to eat  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  She is status post cholecystectomy      Patient Active Problem List   Diagnosis    Acute right flank pain    Right lower quadrant abdominal pain    Musculoskeletal neck pain    Left-sided chest wall pain    Kidney injury    Irritable bowel syndrome with diarrhea     Allergies   Allergen Reactions    Bactrim [Sulfamethoxazole-Trimethoprim]     Dilaudid [Hydromorphone Hcl] Itching     Current Outpatient Prescriptions on File Prior to Visit   Medication Sig    cyclobenzaprine (FLEXERIL) 10 mg tablet Take 10 mg by mouth    levonorgestrel (MIRENA) 20 MCG/24HR IUD 1 each by Intrauterine route    levonorgestrel (MIRENA, 52 MG,) 20 MCG/24HR IUD 1 each by Intrauterine route once    [DISCONTINUED] omeprazole (PriLOSEC) 10 mg delayed release capsule Take 1 tablet by mouth daily    [DISCONTINUED] Cholecalciferol (VITAMIN D PO) Take by mouth    [DISCONTINUED] Nutritional Supplements (VITAMIN D MAINTENANCE PO) Take 1 capsule by mouth once a week    [DISCONTINUED] omeprazole (PriLOSEC) 10 mg delayed release capsule Take 10 mg by mouth     No current facility-administered medications on file prior to visit  No family history on file  Past Medical History:   Diagnosis Date    Anemia     Clostridium difficile infection     Hyperglycemia     Hypokalemia     Kidney injury     L2 vertebral fracture (HCC)      Social History     Social History    Marital status: Single     Spouse name: N/A    Number of children: N/A    Years of education: N/A     Social History Main Topics    Smoking status: Never Smoker    Smokeless tobacco: Never Used    Alcohol use Yes      Comment: occasional     Drug use: No    Sexual activity: Not Asked     Other Topics Concern    None     Social History Narrative    None     Past Surgical History:   Procedure Laterality Date    CHOLECYSTECTOMY      [place] COLONOSCOPY FLX DX W/COLLJ SPEC WHEN PFRMD N/A 1/10/2017    Procedure: EGD AND COLONOSCOPY;  Surgeon: Cory Gandhi MD;  Location: [facility]; Service: Gastroenterology         Review of Systems   All other systems reviewed and are negative  Objective:      /70 (BP Location: Right arm, Patient Position: Sitting, Cuff Size: Adult)   Pulse 79   Temp 97 6 °F (36 4 °C) (Tympanic)   Ht 5' 4" (1 626 m)   Wt 103 kg (227 lb)   BMI 38 96 kg/m²          Physical Exam   Constitutional: She is oriented to person, place, and time  She appears well-developed and well-nourished  No distress  Overweight   HENT:   Head: Normocephalic and atraumatic  Eyes: Conjunctivae and EOM are normal    Neck: Normal range of motion  Cardiovascular: Normal rate and regular rhythm  Pulmonary/Chest: Effort normal and breath sounds normal    Abdominal: Soft  Bowel sounds are normal  There is tenderness (Periumbilical)  Musculoskeletal: Normal range of motion  Neurological: She is alert and oriented to person, place, and time  Skin: Skin is warm and dry  Psychiatric: She has a normal mood and affect   Her behavior is normal

## 2018-04-05 NOTE — LETTER
April 5, 2018     Charles Wright, [address] [address]  [address] [place] [address]    Patient: Onesimo Richards   YOB: 1995   Date of Visit: 4/5/2018       Dear Dr Allyson Strauss: Thank you for referring Robert Barajas to me for evaluation  Below are my notes for this consultation  If you have questions, please do not hesitate to call me  I look forward to following your patient along with you  Sincerely,        Curt Ren PA-C        CC: No Recipients  Roseann Brock  4/5/2018  9:01 AM  Sign at close encounter  Assessment/Plan:     Diagnoses and all orders for this visit:    Gastroesophageal reflux disease with esophagitis/nausea  She has been complaining of increased nausea and periumbilical abdominal pain  She does have a history of esophagitis and small hiatal hernia on EGD done last year  This is likely related to her NSAID usage from her dental pain  I suggested increasing her omeprazole to twice a day for short period time to see if this helps get her symptoms under better control  I told her it is unlikely that she has an ulcer however if she did this would be the treatment  Hopefully now that she is off her NSAIDs her symptoms will improve  Will also try Zofran as needed  -     omeprazole (PriLOSEC) 10 mg delayed release capsule; Take 2 capsules (20 mg total) by mouth 2 (two) times a day  -     ondansetron (ZOFRAN) 4 mg tablet; Take 1 tablet (4 mg total) by mouth 4 (four) times a day as needed for nausea or vomiting    Irritable bowel syndrome with diarrhea  Her abdominal pain and diarrhea have recently worsened  This is likely related to irritable bowel as her colonoscopy last year was within normal limits  She has been under increased stress with her new job  Her diarrhea may also be related to the fact that she had her gallbladder removed  She also had increased lymphocytes on EGD and it was recommended that she have celiac testing    In the meantime would recommend trial of Bentyl and Colestid to see if this helps with her symptoms  We also discussed low FODMAP diet  -     Tissue Transglutaminase, IgG,IgA; Future  -     dicyclomine (BENTYL) 20 mg tablet; Take 1 tablet (20 mg total) by mouth every 6 (six) hours  -     colestipol (COLESTID) 1 g tablet; Take 1 tablet (1 g total) by mouth 2 (two) times a day    I asked her to call us in the next few weeks if her symptoms did not improve and we can discuss this further  Subjective:      Patient ID: Klaus Martinez is a 20 y o  female  HPI     This is a follow-up for GERD, nausea, abdominal pain & irritable bowel  Patient was last seen 1 year ago  It was felt her symptoms are related to functional dyspepsia as well as irritable bowels  It was recommended that she have EGD and colonoscopy to rule out peptic ulcer disease and inflammatory bowel disease  EGD was done January 2017 which showed class a erosive reflux induced esophagitis, small sliding hiatal hernia  Biopsies were negative for H pylori  She did have some increased lymphocytes was recommended to have celiac testing  Her colonoscopy was within normal limits  She states she was feeling well up until about 2 weeks ago when she started with periumbilical abdominal pain, nausea and diarrhea  She states she was on NSAIDs for dental pain  She states she is concerned she may have an ulcer  She is taking a PPI daily  She was having nausea most days and felt she was unable to eat  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  She is status post cholecystectomy      Patient Active Problem List   Diagnosis    Acute right flank pain    Right lower quadrant abdominal pain    Musculoskeletal neck pain    Left-sided chest wall pain    Kidney injury    Irritable bowel syndrome with diarrhea     Allergies   Allergen Reactions    Bactrim [Sulfamethoxazole-Trimethoprim]     Dilaudid [Hydromorphone Hcl] Itching     Current Outpatient Prescriptions on File Prior to Visit   Medication Sig    cyclobenzaprine (FLEXERIL) 10 mg tablet Take 10 mg by mouth    levonorgestrel (MIRENA) 20 MCG/24HR IUD 1 each by Intrauterine route    levonorgestrel (MIRENA, 52 MG,) 20 MCG/24HR IUD 1 each by Intrauterine route once    [DISCONTINUED] omeprazole (PriLOSEC) 10 mg delayed release capsule Take 1 tablet by mouth daily    [DISCONTINUED] Cholecalciferol (VITAMIN D PO) Take by mouth    [DISCONTINUED] Nutritional Supplements (VITAMIN D MAINTENANCE PO) Take 1 capsule by mouth once a week    [DISCONTINUED] omeprazole (PriLOSEC) 10 mg delayed release capsule Take 10 mg by mouth     No current facility-administered medications on file prior to visit  No family history on file  Past Medical History:   Diagnosis Date    Anemia     Clostridium difficile infection     Hyperglycemia     Hypokalemia     Kidney injury     L2 vertebral fracture (HCC)      Social History     Social History    Marital status: Single     Spouse name: N/A    Number of children: N/A    Years of education: N/A     Social History Main Topics    Smoking status: Never Smoker    Smokeless tobacco: Never Used    Alcohol use Yes      Comment: occasional     Drug use: No    Sexual activity: Not Asked     Other Topics Concern    None     Social History Narrative    None     Past Surgical History:   Procedure Laterality Date    CHOLECYSTECTOMY      [place] COLONOSCOPY FLX DX W/COLLJ SPEC WHEN PFRMD N/A 1/10/2017    Procedure: EGD AND COLONOSCOPY;  Surgeon: Roselyn Durham MD;  Location: [facility]; Service: Gastroenterology         Review of Systems   All other systems reviewed and are negative          Objective:      /70 (BP Location: Right arm, Patient Position: Sitting, Cuff Size: Adult)   Pulse 79   Temp 97 6 °F (36 4 °C) (Tympanic)   Ht 5' 4" (1 626 m)   Wt 103 kg (227 lb)   BMI 38 96 kg/m²           Physical Exam   Constitutional: She is oriented to person, place, and time  She appears well-developed and well-nourished  No distress  Overweight   HENT:   Head: Normocephalic and atraumatic  Eyes: Conjunctivae and EOM are normal    Neck: Normal range of motion  Cardiovascular: Normal rate and regular rhythm  Pulmonary/Chest: Effort normal and breath sounds normal    Abdominal: Soft  Bowel sounds are normal  There is tenderness (Periumbilical)  Musculoskeletal: Normal range of motion  Neurological: She is alert and oriented to person, place, and time  Skin: Skin is warm and dry  Psychiatric: She has a normal mood and affect   Her behavior is normal

## 2018-04-07 LAB
TTG IGA SER-ACNC: <2 U/ML (ref 0–3)
TTG IGG SER-ACNC: <2 U/ML (ref 0–5)

## 2018-04-10 ENCOUNTER — TELEPHONE (OUTPATIENT)
Dept: GASTROENTEROLOGY | Facility: MEDICAL CENTER | Age: 23
End: 2018-04-10

## 2018-08-15 ENCOUNTER — APPOINTMENT (OUTPATIENT)
Dept: LAB | Facility: CLINIC | Age: 23
End: 2018-08-15

## 2018-08-15 ENCOUNTER — TRANSCRIBE ORDERS (OUTPATIENT)
Dept: LAB | Facility: CLINIC | Age: 23
End: 2018-08-15

## 2018-08-15 DIAGNOSIS — Z00.8 HEALTH EXAMINATION IN POPULATION SURVEY: ICD-10-CM

## 2018-08-15 DIAGNOSIS — Z00.8 HEALTH EXAMINATION IN POPULATION SURVEY: Primary | ICD-10-CM

## 2018-08-15 LAB
CHOLEST SERPL-MCNC: 211 MG/DL (ref 50–200)
EST. AVERAGE GLUCOSE BLD GHB EST-MCNC: 171 MG/DL
HBA1C MFR BLD: 7.6 % (ref 4.2–6.3)
HDLC SERPL-MCNC: 48 MG/DL (ref 40–60)
LDLC SERPL CALC-MCNC: 111 MG/DL (ref 0–100)
NONHDLC SERPL-MCNC: 163 MG/DL
TRIGL SERPL-MCNC: 258 MG/DL

## 2018-08-15 PROCEDURE — 80061 LIPID PANEL: CPT

## 2018-08-15 PROCEDURE — 83036 HEMOGLOBIN GLYCOSYLATED A1C: CPT

## 2018-08-15 PROCEDURE — 36415 COLL VENOUS BLD VENIPUNCTURE: CPT

## 2018-10-19 ENCOUNTER — OFFICE VISIT (OUTPATIENT)
Dept: URGENT CARE | Age: 23
End: 2018-10-19
Payer: COMMERCIAL

## 2018-10-19 VITALS
TEMPERATURE: 97.9 F | DIASTOLIC BLOOD PRESSURE: 80 MMHG | HEIGHT: 64 IN | HEART RATE: 94 BPM | BODY MASS INDEX: 38.24 KG/M2 | OXYGEN SATURATION: 99 % | SYSTOLIC BLOOD PRESSURE: 144 MMHG | WEIGHT: 224 LBS | RESPIRATION RATE: 16 BRPM

## 2018-10-19 DIAGNOSIS — J20.8 ACUTE BRONCHITIS DUE TO OTHER SPECIFIED ORGANISMS: Primary | ICD-10-CM

## 2018-10-19 PROCEDURE — 99213 OFFICE O/P EST LOW 20 MIN: CPT | Performed by: PHYSICIAN ASSISTANT

## 2018-10-19 PROCEDURE — S9088 SERVICES PROVIDED IN URGENT: HCPCS | Performed by: PHYSICIAN ASSISTANT

## 2018-10-19 RX ORDER — AZITHROMYCIN 250 MG/1
TABLET, FILM COATED ORAL
Qty: 6 TABLET | Refills: 0 | Status: SHIPPED | OUTPATIENT
Start: 2018-10-19 | End: 2018-10-23

## 2018-10-19 RX ORDER — ALBUTEROL SULFATE 90 UG/1
2 AEROSOL, METERED RESPIRATORY (INHALATION) EVERY 6 HOURS PRN
Qty: 1 INHALER | Refills: 0 | Status: SHIPPED | OUTPATIENT
Start: 2018-10-19

## 2018-10-19 RX ORDER — BENZONATATE 100 MG/1
100 CAPSULE ORAL 3 TIMES DAILY PRN
Qty: 15 CAPSULE | Refills: 0 | Status: SHIPPED | OUTPATIENT
Start: 2018-10-19 | End: 2019-11-11 | Stop reason: ALTCHOICE

## 2018-10-19 NOTE — LETTER
October 19, 2018     Patient: Jefferson Nash   YOB: 1995   Date of Visit: 10/19/2018       To Whom it May Concern:    Sosa Charlton was seen in my clinic on 10/19/2018  She may return to work on 10/21/2018  If you have any questions or concerns, please don't hesitate to call  Sincerely,          Heaven Abel PA-C        CC: No Recipients

## 2018-10-19 NOTE — PATIENT INSTRUCTIONS
Continue using daily medications as directed  Motrin and/or Tylenol as needed for fever or pain control  Use medications as directed  Follow up with PCP in 3-5 days  Proceed to  ER if symptoms worsen  Acute Bronchitis   AMBULATORY CARE:   Acute bronchitis  is swelling and irritation in the air passages of your lungs  This irritation may cause you to cough or have other breathing problems  Acute bronchitis often starts because of another illness, such as a cold or the flu  The illness spreads from your nose and throat to your windpipe and airways  Bronchitis is often called a chest cold  Acute bronchitis lasts about 3 to 6 weeks and is usually not a serious illness  Your cough can last for several weeks  You may have any of the following symptoms:   · A cough with sputum that may be clear, yellow, or green    · Feeling more tired than usual, and body aches    · A fever and chills    · Wheezing when you breathe    · A tight chest or pain when you breathe or cough  Seek care immediately if:   · You cough up blood  · Your lips or fingernails turn blue  · You feel like you are not getting enough air when you breathe  Contact your healthcare provider if:   · You have a fever  · Your breathing problems do not go away or get worse  · Your cough does not get better within 4 weeks  · You have questions or concerns about your condition or care  Self-care:   · Get more rest   Rest helps your body to heal  Slowly start to do more each day  Rest when you feel it is needed  · Avoid irritants in the air  Avoid chemicals, fumes, and dust  Wear a face mask if you must work around dust or fumes  Stay inside on days when air pollution levels are high  If you have allergies, stay inside when pollen counts are high  Do not use aerosol products, such as spray-on deodorant, bug spray, and hair spray  · Do not smoke or be around others who smoke    Nicotine and other chemicals in cigarettes and cigars damages the cilia that move mucus out of your lungs  Ask your healthcare provider for information if you currently smoke and need help to quit  E-cigarettes or smokeless tobacco still contain nicotine  Talk to your healthcare provider before you use these products  · Drink liquids as directed  Liquids help keep your air passages moist and help you cough up mucus  You may need to drink more liquids when you have acute bronchitis  Ask how much liquid to drink each day and which liquids are best for you  · Use a humidifier or vaporizer  Use a cool mist humidifier or a vaporizer to increase air moisture in your home  This may make it easier for you to breathe and help decrease your cough  Prevent acute bronchitis by doing the following:   · Get the vaccinations you need  Ask your healthcare provider if you should get vaccinated against the flu or pneumonia  · Prevent the spread of germs  You can decrease your risk of acute bronchitis and other illnesses by doing the following:     [facility] your hands often with soap and water  Carry germ-killing hand lotion or gel with you  You can use the lotion or gel to clean your hands when soap and water are not available  ¨ Do not touch your eyes, nose, or mouth unless you have washed your hands first     ¨ Always cover your mouth when you cough to prevent the spread of germs  It is best to cough into a tissue or your shirt sleeve instead of into your hand  Ask those around you cover their mouths when they cough  ¨ Try to avoid people who have a cold or the flu  If you are sick, stay away from others as much as possible  Medicines: Your healthcare provider may  give you any of the following:  · Ibuprofen or acetaminophen  are medicines that help lower your fever  They are available without a doctor's order  Ask your healthcare provider which medicine is right for you  Ask how much to take and how often to take it  Follow directions   These medicines can cause stomach bleeding if not taken correctly  Ibuprofen can cause kidney damage  Do not take ibuprofen if you have kidney disease, an ulcer, or allergies to aspirin  Acetaminophen can cause liver damage  Do not take more than 4,000 milligrams in 24 hours  · Decongestants  help loosen mucus in your lungs and make it easier to cough up  This can help you breathe easier  · Cough suppressants  decrease your urge to cough  If your cough produces mucus, do not take a cough suppressant unless your healthcare provider tells you to  Your healthcare provider may suggest that you take a cough suppressant at night so you can rest     · Inhalers  may be given  Your healthcare provider may give you one or more inhalers to help you breathe easier and cough less  An inhaler gives your medicine to open your airways  Ask your healthcare provider to show you how to use your inhaler correctly  Follow up with your healthcare provider as directed:  Write down questions you have so you will remember to ask them during your follow-up visits  © 2017 [employer]  Information is for End User's use only and may not be sold, redistributed or otherwise used for commercial purposes  All illustrations and images included in CareNotes® are the copyrighted property of [employer]  or [employer]  The above information is an  only  It is not intended as medical advice for individual conditions or treatments  Talk to your doctor, nurse or pharmacist before following any medical regimen to see if it is safe and effective for you

## 2018-10-19 NOTE — PROGRESS NOTES
[facility]        NAME: Adria De Anda is a 24 y o  female  : 1995    MRN: [id number]  DATE: 2018  TIME: 7:48 PM    Assessment and Plan   Acute bronchitis due to other specified organisms [J20 8]  1  Acute bronchitis due to other specified organisms  albuterol (PROVENTIL HFA,VENTOLIN HFA) 90 mcg/act inhaler    benzonatate (TESSALON PERLES) 100 mg capsule    azithromycin (ZITHROMAX) 250 mg tablet         Patient Instructions     Continue using daily medications as directed  Motrin and/or Tylenol as needed for fever or pain control  Use medications as directed  Follow up with PCP in 3-5 days  Proceed to  ER if symptoms worsen  Chief Complaint     Chief Complaint   Patient presents with    Cough     Pt c/o sore throat, b/l ear pain, feeling hot/cold, post-nasal drip, cough for the past week  Pt has been taking Nyquil, Advil, & Zyzal for symptoms  History of Present Illness       25-year-old female presents with one-week history of URI symptoms  Patient reports that initially started off is some mild sore throat and runny nose but has progressively worsened over the course of the week where she now feels short of breath chest congestion coughing a lot time abdominal pain from all the coughing and 1 bout of vomiting reports she has been having some fevers that have been intermittent over the past week  Denies any diarrhea  Continues to have sore throat and runny nose      URI    This is a new problem  The current episode started in the past 7 days  The problem has been gradually worsening  Maximum temperature: Subjective  The fever has been present for 1 to 2 days  Associated symptoms include abdominal pain, congestion, coughing, rhinorrhea, a sore throat and vomiting  Pertinent negatives include no chest pain, diarrhea, dysuria, nausea, swollen glands or wheezing  She has tried antihistamine and increased fluids for the symptoms  The treatment provided no relief  Review of Systems   Review of Systems   Constitutional: Negative  HENT: Positive for congestion, rhinorrhea and sore throat  Eyes: Negative  Respiratory: Positive for cough  Negative for wheezing  Cardiovascular: Negative  Negative for chest pain  Gastrointestinal: Positive for abdominal pain and vomiting  Negative for diarrhea and nausea  Genitourinary: Negative for dysuria  Musculoskeletal: Negative  Skin: Negative  Neurological: Negative            Current Medications       Current Outpatient Prescriptions:     albuterol (PROVENTIL HFA,VENTOLIN HFA) 90 mcg/act inhaler, Inhale 2 puffs every 6 (six) hours as needed for wheezing, Disp: 1 Inhaler, Rfl: 0    azithromycin (ZITHROMAX) 250 mg tablet, Take 2 tablets today then 1 tablet daily x 4 days, Disp: 6 tablet, Rfl: 0    benzonatate (TESSALON PERLES) 100 mg capsule, Take 1 capsule (100 mg total) by mouth 3 (three) times a day as needed for cough, Disp: 15 capsule, Rfl: 0    colestipol (COLESTID) 1 g tablet, Take 1 tablet (1 g total) by mouth 2 (two) times a day, Disp: 60 tablet, Rfl: 0    cyclobenzaprine (FLEXERIL) 10 mg tablet, Take 10 mg by mouth, Disp: , Rfl:     dicyclomine (BENTYL) 20 mg tablet, Take 1 tablet (20 mg total) by mouth every 6 (six) hours, Disp: 60 tablet, Rfl: 2    levonorgestrel (MIRENA, 52 MG,) 20 MCG/24HR IUD, 1 each by Intrauterine route once, Disp: , Rfl:     omeprazole (PriLOSEC) 10 mg delayed release capsule, Take 2 capsules (20 mg total) by mouth 2 (two) times a day, Disp: 60 capsule, Rfl: 0    ondansetron (ZOFRAN) 4 mg tablet, Take 1 tablet (4 mg total) by mouth 4 (four) times a day as needed for nausea or vomiting, Disp: 60 tablet, Rfl: 0    Current Allergies     Allergies as of 10/19/2018 - Reviewed 10/19/2018   Allergen Reaction Noted    Bactrim [sulfamethoxazole-trimethoprim]  11/20/2016    Dilaudid [hydromorphone hcl] Itching 11/20/2016            The following portions of the patient's history were reviewed and updated as appropriate: allergies, current medications, past family history, past medical history, past social history, past surgical history and problem list      Past Medical History:   Diagnosis Date    Anemia     Clostridium difficile infection     Hyperglycemia     Hypokalemia     Kidney injury     L2 vertebral fracture ([address] )        Past Surgical History:   Procedure Laterality Date    CHOLECYSTECTOMY      [place] COLONOSCOPY FLX DX W/COLLJ SPEC WHEN PFRMD N/A 1/10/2017    Procedure: EGD AND COLONOSCOPY;  Surgeon: Dk Green MD;  Location: [facility]; Service: Gastroenterology       History reviewed  No pertinent family history  Medications have been verified  Objective   /80   Pulse 94   Temp 97 9 °F (36 6 °C)   Resp 16   Ht 5' 4" (1 626 m)   Wt 102 kg (224 lb)   SpO2 99%   BMI 38 45 kg/m²        Physical Exam     Physical Exam   Constitutional: She is oriented to person, place, and time  She appears well-developed and well-nourished  No distress  HENT:   Head: Normocephalic and atraumatic  Right Ear: External ear normal    Left Ear: External ear normal    Nose: Rhinorrhea (Clear) present  Mouth/Throat: Oropharynx is clear and moist  No oropharyngeal exudate  Eyes: Conjunctivae are normal  Right eye exhibits no discharge  Left eye exhibits no discharge  Neck: Normal range of motion  Neck supple  Cardiovascular: Normal rate, regular rhythm, normal heart sounds and intact distal pulses  No murmur heard  Pulmonary/Chest: Effort normal  No respiratory distress  She has decreased breath sounds  She has no wheezes  She has no rhonchi  She has no rales  Abdominal: Soft  Bowel sounds are normal  There is no tenderness  Musculoskeletal: Normal range of motion  Lymphadenopathy:     She has no cervical adenopathy  Neurological: She is alert and oriented to person, place, and time  Skin: Skin is warm and dry     Psychiatric: She has a normal mood and affect  Nursing note and vitals reviewed

## 2018-11-02 DIAGNOSIS — B37.9 CANDIDIASIS: Primary | ICD-10-CM

## 2018-11-02 RX ORDER — FLUCONAZOLE 150 MG/1
150 TABLET ORAL
Qty: 2 TABLET | Refills: 0 | Status: SHIPPED | OUTPATIENT
Start: 2018-11-02 | End: 2018-11-05

## 2018-11-28 ENCOUNTER — ANNUAL EXAM (OUTPATIENT)
Dept: GYNECOLOGY | Facility: CLINIC | Age: 23
End: 2018-11-28
Payer: COMMERCIAL

## 2018-11-28 VITALS
BODY MASS INDEX: 38.14 KG/M2 | HEIGHT: 64 IN | DIASTOLIC BLOOD PRESSURE: 68 MMHG | SYSTOLIC BLOOD PRESSURE: 118 MMHG | WEIGHT: 223.4 LBS

## 2018-11-28 DIAGNOSIS — Z12.4 ENCOUNTER FOR PAPANICOLAOU SMEAR FOR CERVICAL CANCER SCREENING: ICD-10-CM

## 2018-11-28 DIAGNOSIS — Z30.431 IUD CHECK UP: ICD-10-CM

## 2018-11-28 DIAGNOSIS — Z01.419 ENCOUNTER FOR ROUTINE GYNECOLOGICAL EXAMINATION WITH PAPANICOLAOU SMEAR OF CERVIX: Primary | ICD-10-CM

## 2018-11-28 PROCEDURE — 99395 PREV VISIT EST AGE 18-39: CPT | Performed by: NURSE PRACTITIONER

## 2018-11-28 PROCEDURE — G0145 SCR C/V CYTO,THINLAYER,RESCR: HCPCS | Performed by: NURSE PRACTITIONER

## 2018-11-28 NOTE — PROGRESS NOTES
Mallory Araujo is a 24 y o  female who presents for her annual exam  Periods are rare with occasion spotting due to the IUD use, lasting 1 days  Dysmenorrhea:none  Cyclic symptoms include none  No intermenstrual bleeding, spotting, or discharge  The pt  has had her IUD in place for about 2 1/2 yrs  now and overall is happy with this method  Current contraception: IUD  History of abnormal Pap smear: no  Family history of breast cancer: no  Past Medical History:   Diagnosis Date    Anemia     Clostridium difficile infection     Hyperglycemia     Hypokalemia     IUD (intrauterine device) in place     Kidney injury     L2 vertebral fracture ([facility] [address] )      Family History   Problem Relation Age of Onset    Diabetes Other     Thyroid disease Other      Past Surgical History:   Procedure Laterality Date    CHOLECYSTECTOMY      [place] COLONOSCOPY FLX DX W/COLLJ SPEC WHEN PFRMD N/A 1/10/2017    Procedure: EGD AND COLONOSCOPY;  Surgeon: Salas Bragg MD;  Location: [facility] GI [facility]; Service: Gastroenterology     Social History     Social History    Marital status: Single     Spouse name: N/A    Number of children: N/A    Years of education: N/A     Occupational History    Not on file       Social History Main Topics    Smoking status: Never Smoker    Smokeless tobacco: Never Used    Alcohol use Yes      Comment: occasional     Drug use: No    Sexual activity: Yes     Partners: Male     Birth control/ protection: IUD      Comment: iud     Other Topics Concern    Not on file     Social History Narrative    No narrative on file       Current Outpatient Prescriptions:     albuterol (PROVENTIL HFA,VENTOLIN HFA) 90 mcg/act inhaler, Inhale 2 puffs every 6 (six) hours as needed for wheezing, Disp: 1 Inhaler, Rfl: 0    colestipol (COLESTID) 1 g tablet, Take 1 tablet (1 g total) by mouth 2 (two) times a day, Disp: 60 tablet, Rfl: 0    cyclobenzaprine (FLEXERIL) 10 mg tablet, Take 10 mg by mouth, Disp: , Rfl:     dicyclomine (BENTYL) 20 mg tablet, Take 1 tablet (20 mg total) by mouth every 6 (six) hours, Disp: 60 tablet, Rfl: 2    levonorgestrel (MIRENA, 52 MG,) 20 MCG/24HR IUD, 1 each by Intrauterine route once, Disp: , Rfl:     omeprazole (PriLOSEC) 10 mg delayed release capsule, Take 2 capsules (20 mg total) by mouth 2 (two) times a day, Disp: 60 capsule, Rfl: 0    ondansetron (ZOFRAN) 4 mg tablet, Take 1 tablet (4 mg total) by mouth 4 (four) times a day as needed for nausea or vomiting, Disp: 60 tablet, Rfl: 0    benzonatate (TESSALON PERLES) 100 mg capsule, Take 1 capsule (100 mg total) by mouth 3 (three) times a day as needed for cough (Patient not taking: Reported on 11/28/2018 ), Disp: 15 capsule, Rfl: 0      Review of Systems  Constitutional :no fever, feels well, no tiredness, no recent weight gain or loss  Cardiovascular: no complaints of slow or fast heart beat, no chest pain, no palpitations  Respiratory: no complaints of shortness of shortness of breath, no MALONE  Breasts:no complaints of breast pain, breast lump, or nipple discharge  Gastrointestinal: no complaints of abdominal pain, constipation,nausea, vomiting, or diarrhea or bloody stools  Genitourinary : no complaints of dysuria, incontinence, pelvic pain, dysmenorrhea,vaginal discharge or abnormal vaginal bleeding  Integumentary: no complaints of skin rash or lesion,itching or dry skin  Neurological: no complaints of headache,numbness, tingling, dizziness or fainting       Objective      /68 (BP Location: Left arm)   Ht 5' 4" (1 626 m)   Wt 101 kg (223 lb 6 4 oz)   BMI 38 35 kg/m²     General:   appears stated age","cooperative","alert" normal mood and affect   Neck: Neck: normal, supple,trachea midline, no masses   Heart: regular rate and rhythm, S1, S2 normal, no murmur, click, rub or gallop   Lungs: clear to auscultation bilaterally   Breasts: Breast exam :normal, no dimpling or skin changes noted   Abdomen: soft, non-tender, without masses or organomegaly   Vulva: Normal , no lesions   Vagina: normal , no lesions or dryness   Urethra: normal   Cervix: Normal, no palpable masses A pap smear was done  Pt denies need for STD testing  IUD string noted at os  Uterus: Normal , non-tender,not enlarged,no palpable masses   Adnexa: Normal, non-tender without fullness or masses                         Assessment   Normal GYN exam  IUD contraception     Plan      All questions answered  Await pap smear results  Breast self exam technique reviewed and patient encouraged to perform self-exam monthly  Follow up as needed  Thin prep Pap smear

## 2018-12-04 LAB
LAB AP GYN PRIMARY INTERPRETATION: NORMAL
Lab: NORMAL

## 2018-12-28 ENCOUNTER — OFFICE VISIT (OUTPATIENT)
Dept: URGENT CARE | Age: 23
End: 2018-12-28
Payer: COMMERCIAL

## 2018-12-28 VITALS
SYSTOLIC BLOOD PRESSURE: 131 MMHG | HEART RATE: 99 BPM | DIASTOLIC BLOOD PRESSURE: 86 MMHG | WEIGHT: 220 LBS | HEIGHT: 64 IN | RESPIRATION RATE: 16 BRPM | OXYGEN SATURATION: 100 % | TEMPERATURE: 99.1 F | BODY MASS INDEX: 37.56 KG/M2

## 2018-12-28 DIAGNOSIS — J06.9 VIRAL URI: Primary | ICD-10-CM

## 2018-12-28 DIAGNOSIS — R19.7 DIARRHEA, UNSPECIFIED TYPE: ICD-10-CM

## 2018-12-28 PROCEDURE — 99213 OFFICE O/P EST LOW 20 MIN: CPT | Performed by: PHYSICIAN ASSISTANT

## 2018-12-28 PROCEDURE — S9088 SERVICES PROVIDED IN URGENT: HCPCS | Performed by: PHYSICIAN ASSISTANT

## 2018-12-28 RX ORDER — PREDNISONE 20 MG/1
40 TABLET ORAL DAILY
Qty: 10 TABLET | Refills: 0 | Status: SHIPPED | OUTPATIENT
Start: 2018-12-28 | End: 2019-01-02

## 2018-12-28 NOTE — LETTER
December 28, 2018     Patient: Jhon Rincon   YOB: 1995   Date of Visit: 12/28/2018       To Whom It May Concern: It is my medical opinion that [employer] Stroud may return to work on 12/30/2018  If you have any questions or concerns, please don't hesitate to call           Sincerely,        Bel Chandler PA-C    CC: No Recipients

## 2018-12-29 NOTE — PROGRESS NOTES
[facility]        NAME: Shad Alvarado is a 24 y o  female  : 1995    MRN: [id number]  DATE: 2018  TIME: 7:44 PM    Assessment and Plan   Viral URI [J06 9]  1  Viral URI  predniSONE 20 mg tablet   2  Diarrhea, unspecified type       Patient already has Zofran at home  Patient already has Tessalon Perles at home  Patient already has acute all at home  I will prescribe prednisone  Patient Instructions       Use Zofran, over-the-counter Robitussin, prednisone he has directed  Continue monitor symptoms  Drink plenty of fluids  If any new or concerning symptoms occur, go immediately to ER  Follow up with family doctor this week as needed  Chief Complaint     Chief Complaint   Patient presents with    Cold Like Symptoms     Pt states she woke up yesterday with productive, chest tightness, feeling SOB, headache, nausea, diarrhea, and fever  Pt has been taking ibuprofen  History of Present Illness       Three days ago patient developed diarrhea  For episodes of loose watery stools daily  Patient states that the diarrhea continues into today  Patient has no abdominal pain  Patient has nausea but no vomiting  Patient is able to tolerate p o  fluids  Patient has not tried p o  foods because whenever she puts anything in her stomach she feels sick  Patient states that 2 days ago she developed sinus congestion, runny nose, sore throat, nonproductive cough  Patient states that she is prone to bronchitis  Patient states she had bronchitis back in October  Patient states that her mom and her grandma both have a cold  Patient states that her brother has a stomach bug  They all resolved on their own  Patient states that she has Zofran at home from previous stomach issues  Patient has Tessalon Perles and albuterol at home from previous bronchitis  Patient has not been taking these medications  No fevers or chills  No other symptoms          Review of Systems Review of Systems   Constitutional: Negative  Negative for chills, diaphoresis, fatigue and fever  HENT: Positive for postnasal drip, sinus pain, sinus pressure, sneezing and sore throat  Negative for congestion, ear pain, rhinorrhea, trouble swallowing and voice change  Eyes: Negative  Respiratory: Positive for cough  Negative for chest tightness, shortness of breath and wheezing  Cardiovascular: Negative for chest pain and palpitations  Gastrointestinal: Positive for diarrhea and nausea  Negative for abdominal pain, blood in stool, constipation and vomiting  Endocrine: Negative  Genitourinary: Negative for dysuria, pelvic pain, vaginal bleeding, vaginal discharge and vaginal pain  Musculoskeletal: Negative for back pain, myalgias and neck pain  Skin: Negative for pallor and rash  Allergic/Immunologic: Negative  Neurological: Negative for dizziness, syncope, weakness, light-headedness and headaches  Hematological: Negative  Psychiatric/Behavioral: Negative            Current Medications       Current Outpatient Prescriptions:     albuterol (PROVENTIL HFA,VENTOLIN HFA) 90 mcg/act inhaler, Inhale 2 puffs every 6 (six) hours as needed for wheezing, Disp: 1 Inhaler, Rfl: 0    benzonatate (TESSALON PERLES) 100 mg capsule, Take 1 capsule (100 mg total) by mouth 3 (three) times a day as needed for cough (Patient not taking: Reported on 11/28/2018 ), Disp: 15 capsule, Rfl: 0    colestipol (COLESTID) 1 g tablet, Take 1 tablet (1 g total) by mouth 2 (two) times a day, Disp: 60 tablet, Rfl: 0    cyclobenzaprine (FLEXERIL) 10 mg tablet, Take 10 mg by mouth, Disp: , Rfl:     dicyclomine (BENTYL) 20 mg tablet, Take 1 tablet (20 mg total) by mouth every 6 (six) hours, Disp: 60 tablet, Rfl: 2    levonorgestrel (MIRENA, 52 MG,) 20 MCG/24HR IUD, 1 each by Intrauterine route once, Disp: , Rfl:     omeprazole (PriLOSEC) 10 mg delayed release capsule, Take 2 capsules (20 mg total) by mouth 2 (two) times a day, Disp: 60 capsule, Rfl: 0    ondansetron (ZOFRAN) 4 mg tablet, Take 1 tablet (4 mg total) by mouth 4 (four) times a day as needed for nausea or vomiting, Disp: 60 tablet, Rfl: 0    predniSONE 20 mg tablet, Take 2 tablets (40 mg total) by mouth daily for 5 days, Disp: 10 tablet, Rfl: 0    Current Allergies     Allergies as of 12/28/2018 - Reviewed 12/28/2018   Allergen Reaction Noted    Bactrim [sulfamethoxazole-trimethoprim]  11/20/2016    Dilaudid [hydromorphone hcl] Itching 11/20/2016            The following portions of the patient's history were reviewed and updated as appropriate: allergies, current medications, past family history, past medical history, past social history, past surgical history and problem list      Past Medical History:   Diagnosis Date    Anemia     Clostridium difficile infection     Hyperglycemia     Hypokalemia     IUD (intrauterine device) in place     Kidney injury     L2 vertebral fracture ([facility] [address] )        Past Surgical History:   Procedure Laterality Date    CHOLECYSTECTOMY      [place] COLONOSCOPY FLX DX W/COLLJ SPEC WHEN PFRMD N/A 1/10/2017    Procedure: EGD AND COLONOSCOPY;  Surgeon: Jason Perez MD;  Location: [facility]; Service: Gastroenterology       Family History   Problem Relation Age of Onset    Diabetes Other     Thyroid disease Other          Medications have been verified  Objective   /86   Pulse 99   Temp 99 1 °F (37 3 °C)   Resp 16   Ht 5' 4" (1 626 m)   Wt 99 8 kg (220 lb)   SpO2 100%   BMI 37 76 kg/m²        Physical Exam     Physical Exam   Constitutional: She appears well-developed and well-nourished  No distress  HENT:   Head: Normocephalic and atraumatic  Right Ear: External ear normal    Left Ear: External ear normal    TM intact and pearly bilaterally  Clear nasal discharge bilaterally  Swollen turbinates  Postnasal discharge and erythematous posterior pharynx       Eyes: Conjunctivae are normal  Right eye exhibits no discharge  Left eye exhibits no discharge  Neck: Normal range of motion  Neck supple  Cardiovascular: Normal rate, regular rhythm, normal heart sounds and intact distal pulses  Pulmonary/Chest: Effort normal and breath sounds normal  No respiratory distress  She has no wheezes  She has no rales  Abdominal: Soft  She exhibits no distension and no mass  There is no tenderness  There is no rebound and no guarding  Hyperactive bowel sounds   Lymphadenopathy:     She has no cervical adenopathy  Skin: Skin is warm  No rash noted  She is not diaphoretic  No pallor  Nursing note and vitals reviewed

## 2018-12-29 NOTE — PATIENT INSTRUCTIONS
Use Zofran, over-the-counter Robitussin, prednisone he has directed  Continue monitor symptoms  Drink plenty of fluids  If any new or concerning symptoms occur, go immediately to ER  Follow up with family doctor this week as needed  Upper Respiratory Infection   WHAT YOU NEED TO KNOW:   An upper respiratory infection is also called the common cold  It is an infection that can affect your nose, throat, ears, and sinuses  For healthy people, the common cold is usually not serious and does not need special treatment  Cold symptoms are usually worst for the first 3 to 5 days  Most people get better in 7 to 14 days  You may continue to cough for 2 to 3 weeks  Colds are caused by viruses and do not get better with antibiotics  DISCHARGE INSTRUCTIONS:   Return to the emergency department if:   · You have chest pain or trouble breathing  Contact your healthcare provider if:   · You have a fever over 102ºF (39°C)  · Your sore throat gets worse or you see white or yellow spots in your throat  · Your symptoms get worse after 3 to 5 days or your cold is not better in 14 days  · You have a rash anywhere on your skin  · You have large, tender lumps in your neck  · You have thick, green or yellow drainage from your nose  · You cough up thick yellow, green, or bloody mucus  · You have vomiting for more than 24 hours and cannot keep fluids down  · You have a bad earache  · You have questions or concerns about your condition or care  Medicines: You may need any of the following:  · Decongestants  help reduce nasal congestion and help you breathe more easily  If you take decongestant pills, they may make you feel restless or cause problems with your sleep  Do not use decongestant sprays for more than a few days  · Cough suppressants  help reduce coughing  Ask your healthcare provider which type of cough medicine is best for you       · NSAIDs , such as ibuprofen, help decrease swelling, pain, and fever  NSAIDs can cause stomach bleeding or kidney problems in certain people  If you take blood thinner medicine, always ask your healthcare provider if NSAIDs are safe for you  Always read the medicine label and follow directions  · Acetaminophen  decreases pain and fever  It is available without a doctor's order  Ask how much to take and how often to take it  Follow directions  Read the labels of all other medicines you are using to see if they also contain acetaminophen, or ask your doctor or pharmacist  Acetaminophen can cause liver damage if not taken correctly  Do not use more than 4 grams (4,000 milligrams) total of acetaminophen in one day  · Take your medicine as directed  Contact your healthcare provider if you think your medicine is not helping or if you have side effects  Tell him or her if you are allergic to any medicine  Keep a list of the medicines, vitamins, and herbs you take  Include the amounts, and when and why you take them  Bring the list or the pill bottles to follow-up visits  Carry your medicine list with you in case of an emergency  Follow up with your healthcare provider as directed:  Write down your questions so you remember to ask them during your visits  Self-care:   · Rest as much as possible  Slowly start to do more each day  · Drink more liquids as directed  Liquids will help thin and loosen mucus so you can cough it up  Liquids will also help prevent dehydration  Liquids that help prevent dehydration include water, fruit juice, and broth  Do not drink liquids that contain caffeine  Caffeine can increase your risk for dehydration  Ask your healthcare provider how much liquid to drink each day  · Soothe a sore throat  Gargle with warm salt water  This helps your sore throat feel better  Make salt water by dissolving ¼ teaspoon salt in 1 cup warm water  You may also suck on hard candy or throat lozenges  You may use a sore throat spray      · Use a humidifier or vaporizer  Use a cool mist humidifier or a vaporizer to increase air moisture in your home  This may make it easier for you to breathe and help decrease your cough  · Use saline nasal drops as directed  These help relieve congestion  · Apply petroleum-based jelly around the outside of your nostrils  This can decrease irritation from blowing your nose  · Do not smoke  Nicotine and other chemicals in cigarettes and cigars can make your symptoms worse  They can also cause infections such as bronchitis or pneumonia  Ask your healthcare provider for information if you currently smoke and need help to quit  E-cigarettes or smokeless tobacco still contain nicotine  Talk to your healthcare provider before you use these products  Prevent spreading your cold to others:   · Try to stay away from other people during the first 2 to 3 days of your cold when it is more easily spread  · Do not share food or drinks  · Do not share hand towels with household members  · Wash your hands often, especially after you blow your nose  Turn away from other people and cover your mouth and nose with a tissue when you sneeze or cough  © 2017 [address] [facility] Information is for End User's use only and may not be sold, redistributed or otherwise used for commercial purposes  All illustrations and images included in CareNotes® are the copyrighted property of [employer] M [employer]  or [employer]  The above information is an  only  It is not intended as medical advice for individual conditions or treatments  Talk to your doctor, nurse or pharmacist before following any medical regimen to see if it is safe and effective for you  Acute Diarrhea   WHAT YOU NEED TO KNOW:   Acute diarrhea starts quickly and lasts a short time, usually 1 to 3 days  It can last up to 2 weeks  You may not be able to control your diarrhea  Acute diarrhea usually stops on its own     DISCHARGE INSTRUCTIONS: Return to the emergency department if:   · You feel confused  · Your heartbeat is faster than normal      · Your eyes look deeply sunken, or you have no tears when you cry  · You urinate less than usual, or your urine is dark yellow  · You have blood or mucus in your stools  · You have severe abdominal pain  · You are unable to drink any liquids  Contact your healthcare provider if:   · Your symptoms do not get better with treatment  · You have a fever higher than 101 3°F (38 5°C)  · You have trouble eating and drinking because you are vomiting  · You are thirsty or have a dry mouth  · Your diarrhea does not get better in 7 days  · You have questions or concerns about your condition or care  Follow up with your healthcare provider as directed:  Write down your questions so you remember to ask them during your visits  Medicines:  · Diarrhea medicine  is an over-the-counter medicine that helps slow or stop your diarrhea  If you take other medicines, talk to your healthcare provider before you take diarrhea medicine  · Antibiotics  may be given to help treat an infection caused by bacteria  · Antiparasitics  may be given to treat an infection caused by parasites  · Take your medicine as directed  Contact your healthcare provider if you think your medicine is not helping or if you have side effects  Tell him of her if you are allergic to any medicine  Keep a list of the medicines, vitamins, and herbs you take  Include the amounts, and when and why you take them  Bring the list or the pill bottles to follow-up visits  Carry your medicine list with you in case of an emergency  Self-care:   · Drink liquids as directed  Liquids will help prevent dehydration caused by diarrhea  Ask your healthcare provider how much liquid to drink each day and which liquids are best for you  You may need to drink an oral rehydration solution (ORS)   An ORS has the right amounts of water, salts, and sugar you need to replace body fluids  You can buy an ORS at most grocery stores and pharmacies  · Eat foods that are easy to digest   Examples include rice, lentils, cereal, bananas, potatoes, and bread  It also includes some fruits (bananas, melon), well-cooked vegetables, and lean meats  Avoid foods high in fiber, fat, and sugar  Also avoid caffeine, alcohol, dairy, and red meat until your diarrhea is gone  Prevent acute diarrhea:   · Wash your hands often  Use soap and water  Wash your hands before you eat or prepare food  Also wash your hands after you use the bathroom  Use an alcohol-based hand gel when soap and water are not available  · Keep bathroom surfaces clean  This helps prevent the spread of germs that cause acute diarrhea  · Wash fruits and vegetables well before you eat them  This can help remove germs that cause diarrhea  If possible, remove the skin from fruits and vegetables, or cook them well before you eat them  · Cook meat as directed  ¨ Cook ground meat  to 160°F      ¨ Cook ground poultry, whole poultry, or cuts of poultry  to at least 165°F  Remove the meat from heat  Let it stand for 3 minutes before you eat it  ¨ Cook whole cuts of meat other than poultry  to at least 145°F  Remove the meat from heat  Let it stand for 3 minutes before you eat it  · Wash dishes that have touched raw meat with hot water and soap  This includes cutting boards, utensils, dishes, and serving containers  · Place raw or cooked meat in the refrigerator as soon as possible  Bacteria can grow in meat that is left at room temperature too long  · Do not eat raw or undercooked oysters, clams, or mussels  These foods may be contaminated and cause infection  · Drink filtered or treated water only when you travel  Do not put ice in your drinks  Drink bottled water whenever possible    © 2017 [employer] Information is for End User's use only and may not be sold, redistributed or otherwise used for commercial purposes  All illustrations and images included in CareNotes® are the copyrighted property of A D [employer] M [employer] Inc  or Arley Marquez  The above information is an  only  It is not intended as medical advice for individual conditions or treatments  Talk to your doctor, nurse or pharmacist before following any medical regimen to see if it is safe and effective for you

## 2019-07-18 DIAGNOSIS — B37.9 YEAST INFECTION: Primary | ICD-10-CM

## 2019-08-16 ENCOUNTER — OFFICE VISIT (OUTPATIENT)
Dept: URGENT CARE | Age: 24
End: 2019-08-16
Payer: COMMERCIAL

## 2019-08-16 ENCOUNTER — APPOINTMENT (OUTPATIENT)
Dept: RADIOLOGY | Age: 24
End: 2019-08-16
Payer: COMMERCIAL

## 2019-08-16 VITALS
BODY MASS INDEX: 38.76 KG/M2 | OXYGEN SATURATION: 100 % | WEIGHT: 227 LBS | SYSTOLIC BLOOD PRESSURE: 131 MMHG | DIASTOLIC BLOOD PRESSURE: 71 MMHG | HEART RATE: 74 BPM | HEIGHT: 64 IN | RESPIRATION RATE: 18 BRPM | TEMPERATURE: 99.3 F

## 2019-08-16 DIAGNOSIS — M79.672 LEFT FOOT PAIN: ICD-10-CM

## 2019-08-16 DIAGNOSIS — M79.672 LEFT FOOT PAIN: Primary | ICD-10-CM

## 2019-08-16 DIAGNOSIS — S92.512A CLOSED FRACTURE OF PROXIMAL PHALANX OF LESSER TOE OF LEFT FOOT, PHYSEAL INVOLVEMENT UNSPECIFIED, INITIAL ENCOUNTER: ICD-10-CM

## 2019-08-16 PROCEDURE — 99213 OFFICE O/P EST LOW 20 MIN: CPT | Performed by: FAMILY MEDICINE

## 2019-08-16 PROCEDURE — S9088 SERVICES PROVIDED IN URGENT: HCPCS | Performed by: FAMILY MEDICINE

## 2019-08-16 PROCEDURE — 73630 X-RAY EXAM OF FOOT: CPT

## 2019-08-16 NOTE — PATIENT INSTRUCTIONS
X-ray of left foot reveals fracture of the proximal left 5th phalanx  I buddy tape to 4th and 5th toe, patient placed in postop shoe  She was given crutches to ambulate  Also given outpatient orthopedic referral   Advised to apply ice as needed, consider NSAIDs as needed for pain and swelling  Toe Fracture   WHAT YOU NEED TO KNOW:   A toe fracture is a break in 1 or more of the bones in your toe  It is most commonly caused by a direct blow to the toe  The bones in your toe may just be broken, or they may be out of place or   DISCHARGE INSTRUCTIONS:   Return to the emergency department if:   · Blood soaks through your bandage  · You have severe pain in your toe  · Your toe is cold or numb  Contact your healthcare provider if:   · You have a fever  · Your pain does not go away, even after treatment  · Your toe continues to hurt even after it has healed  · You have questions or concerns about your condition or care  Medicines: You may need any of the following:  · NSAIDs , such as ibuprofen, help decrease swelling, pain, and fever  This medicine is available with or without a doctor's order  NSAIDs can cause stomach bleeding or kidney problems in certain people  If you take blood thinner medicine, always ask your healthcare provider if NSAIDs are safe for you  Always read the medicine label and follow directions  · Prescription pain medicine  may be given  Ask your healthcare provider how to take this medicine safely  Some prescription pain medicines contain acetaminophen  Do not take other medicines that contain acetaminophen without talking to your healthcare provider  Too much acetaminophen may cause liver damage  Prescription pain medicine may cause constipation  Ask your healthcare provider how to prevent or treat constipation  · Antibiotics  treat a bacterial infection  You may need antibiotics if you have an open wound  · Take your medicine as directed    Contact your healthcare provider if you think your medicine is not helping or if you have side effects  Tell him of her if you are allergic to any medicine  Keep a list of the medicines, vitamins, and herbs you take  Include the amounts, and when and why you take them  Bring the list or the pill bottles to follow-up visits  Carry your medicine list with you in case of an emergency  Self-care:   · Use vivienne tape, an elastic bandage, or a splint as directed  These help keep your toe in its correct position as it heals  Vivienne tape is when your fractured toe and the toe next to it are taped together  · Use support devices  including a cane, crutches, walking boot, or hard soled shoe as directed  These help protect your broken toe and limit movement so it can heal     · Rest  your toe so that it can heal  Return to normal activities as directed  · Apply ice  on your toe for 15 to 20 minutes every hour or as directed  Use an ice pack, or put crushed ice in a plastic bag  Cover it with a towel  Ice helps prevent tissue damage and decreases swelling and pain  · Elevate  your toe above the level of your heart as often as you can  This will help decrease swelling and pain  Prop your toe on pillows or blankets to keep it elevated comfortably  Follow up with your healthcare provider as directed: You may need to see a podiatrist in 1 to 2 weeks  Write down your questions so you remember to ask them during your visits  © 2017 [employer] Information is for End User's use only and may not be sold, redistributed or otherwise used for commercial purposes  All illustrations and images included in CareNotes® are the copyrighted property of [employer] , [employer]  or Arley Marquez  The above information is an  only  It is not intended as medical advice for individual conditions or treatments   Talk to your doctor, nurse or pharmacist before following any medical regimen to see if it is safe and effective for you
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
## 2019-08-16 NOTE — LETTER
August 16, 2019     Patient: Jude Marrero   YOB: 1995   Date of Visit: 8/16/2019       To Whom It May Concern: It is my medical opinion that Allyson Ramos may return to work on 8/19/2019  Abdulkadir Flores should be allowed to wear postop shoe for duration 6 weeks or as instructed by orthopedic specialist    If you have any questions or concerns, please don't hesitate to call           Sincerely,        Audra Sebastian MD    CC: No Recipients

## 2019-08-19 ENCOUNTER — OFFICE VISIT (OUTPATIENT)
Dept: OBGYN CLINIC | Facility: OTHER | Age: 24
End: 2019-08-19
Payer: COMMERCIAL

## 2019-08-19 VITALS
SYSTOLIC BLOOD PRESSURE: 131 MMHG | BODY MASS INDEX: 38.07 KG/M2 | HEART RATE: 102 BPM | HEIGHT: 64 IN | DIASTOLIC BLOOD PRESSURE: 88 MMHG | WEIGHT: 223 LBS

## 2019-08-19 DIAGNOSIS — S92.512A CLOSED FRACTURE OF PROXIMAL PHALANX OF LESSER TOE OF LEFT FOOT, PHYSEAL INVOLVEMENT UNSPECIFIED, INITIAL ENCOUNTER: ICD-10-CM

## 2019-08-19 PROCEDURE — 99203 OFFICE O/P NEW LOW 30 MIN: CPT | Performed by: ORTHOPAEDIC SURGERY
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
## 2019-08-19 NOTE — LETTER
August 19, 2019     Patient: Nikita Taveras   YOB: 1995   Date of Visit: 8/19/2019       To Whom it May Concern:    Mara Porter is under my professional care  She was seen in my office on 8/19/2019  She has a fracture of her left foot and is currently in a shoe with taping  She cannot stand or do her regular job for 2-3 weeks  I will re-evaluate her in 2 weeks with repeat x-ray and clinical exam and determine return to work date based on her clinical exam and x-ray findings at that time  If you have any questions or concerns, please don't hesitate to call           Sincerely,          Hortensia Castillo MD        CC: No Recipients

## 2019-08-19 NOTE — PATIENT INSTRUCTIONS
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

## 2019-09-04 ENCOUNTER — OFFICE VISIT (OUTPATIENT)
Dept: OBGYN CLINIC | Facility: MEDICAL CENTER | Age: 24
End: 2019-09-04
Payer: COMMERCIAL

## 2019-09-04 ENCOUNTER — APPOINTMENT (OUTPATIENT)
Dept: RADIOLOGY | Facility: MEDICAL CENTER | Age: 24
End: 2019-09-04
Payer: COMMERCIAL

## 2019-09-04 VITALS — WEIGHT: 223 LBS | HEIGHT: 64 IN | BODY MASS INDEX: 38.07 KG/M2

## 2019-09-04 DIAGNOSIS — S92.512A CLOSED NON-PHYSEAL FRACTURE OF PROXIMAL PHALANX OF LESSER TOE OF LEFT FOOT, INITIAL ENCOUNTER: ICD-10-CM

## 2019-09-04 DIAGNOSIS — S92.512D: Primary | ICD-10-CM

## 2019-09-04 PROCEDURE — 99213 OFFICE O/P EST LOW 20 MIN: CPT | Performed by: ORTHOPAEDIC SURGERY

## 2019-09-04 PROCEDURE — 73630 X-RAY EXAM OF FOOT: CPT

## 2019-09-04 NOTE — LETTER
September 4, 2019     Patient: Alyx Blake   YOB: 1995   Date of Visit: 9/4/2019       To Whom it May Concern:    Mary Main is under my professional care  She was seen in my office on 9/4/2019  She has sufficiently recovered from her broken left little toe that she can return to full work duties on 09/06/2019       If you have any questions or concerns, please don't hesitate to call           Sincerely,          Luis Alfredo Vnetura MD        CC: Alyx Blake
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
## 2019-09-04 NOTE — PATIENT INSTRUCTIONS
Plan:  I am pleased with her clinical improvement, but the x-rays showed the fracture is not yet totally healed  She is doing well enough that she can return to full work duties on 09/06/2019  She may continue with vivienne taping for comfort as desired  I did tell her that some soreness after standing or working for 10-12 hours can be expected and if this occurs, then I want to put ice on the area for 10 minutes and take Advil, Aleve, or Tylenol if needed for pain    I want to see her back again in 7 weeks for repeat x-ray of her foot and routine follow-up

## 2019-11-12 ENCOUNTER — APPOINTMENT (OUTPATIENT)
Dept: LAB | Facility: CLINIC | Age: 24
End: 2019-11-12
Payer: COMMERCIAL

## 2019-11-12 ENCOUNTER — OFFICE VISIT (OUTPATIENT)
Dept: ENDOCRINOLOGY | Facility: CLINIC | Age: 24
End: 2019-11-12
Payer: COMMERCIAL

## 2019-11-12 VITALS
DIASTOLIC BLOOD PRESSURE: 78 MMHG | WEIGHT: 221.2 LBS | HEIGHT: 64 IN | SYSTOLIC BLOOD PRESSURE: 112 MMHG | BODY MASS INDEX: 37.76 KG/M2 | HEART RATE: 98 BPM

## 2019-11-12 DIAGNOSIS — E11.9 TYPE 2 DIABETES MELLITUS WITHOUT COMPLICATION, WITHOUT LONG-TERM CURRENT USE OF INSULIN (HCC): Primary | ICD-10-CM

## 2019-11-12 DIAGNOSIS — E55.9 VITAMIN D DEFICIENCY: ICD-10-CM

## 2019-11-12 DIAGNOSIS — E11.9 TYPE 2 DIABETES MELLITUS WITHOUT COMPLICATION, WITHOUT LONG-TERM CURRENT USE OF INSULIN (HCC): ICD-10-CM

## 2019-11-12 DIAGNOSIS — R94.6 ABNORMAL THYROID FUNCTION TEST: ICD-10-CM

## 2019-11-12 PROBLEM — Z30.431 IUD CHECK UP: Status: RESOLVED | Noted: 2018-11-28 | Resolved: 2019-11-12

## 2019-11-12 LAB
25(OH)D3 SERPL-MCNC: 23.6 NG/ML (ref 30–100)
ALBUMIN SERPL BCP-MCNC: 4.4 G/DL (ref 3.5–5)
ALP SERPL-CCNC: 93 U/L (ref 46–116)
ALT SERPL W P-5'-P-CCNC: 86 U/L (ref 12–78)
ANION GAP SERPL CALCULATED.3IONS-SCNC: 11 MMOL/L (ref 4–13)
AST SERPL W P-5'-P-CCNC: 29 U/L (ref 5–45)
BILIRUB SERPL-MCNC: 0.41 MG/DL (ref 0.2–1)
BUN SERPL-MCNC: 9 MG/DL (ref 5–25)
CALCIUM SERPL-MCNC: 9.7 MG/DL (ref 8.3–10.1)
CHLORIDE SERPL-SCNC: 104 MMOL/L (ref 100–108)
CHOLEST SERPL-MCNC: 223 MG/DL (ref 50–200)
CO2 SERPL-SCNC: 23 MMOL/L (ref 21–32)
CREAT SERPL-MCNC: 0.62 MG/DL (ref 0.6–1.3)
CREAT UR-MCNC: 144 MG/DL
EST. AVERAGE GLUCOSE BLD GHB EST-MCNC: 174 MG/DL
GFR SERPL CREATININE-BSD FRML MDRD: 126 ML/MIN/1.73SQ M
GLUCOSE P FAST SERPL-MCNC: 207 MG/DL (ref 65–99)
HBA1C MFR BLD: 7.7 % (ref 4.2–6.3)
HDLC SERPL-MCNC: 44 MG/DL
LDLC SERPL CALC-MCNC: 139 MG/DL (ref 0–100)
MICROALBUMIN UR-MCNC: 29.3 MG/L (ref 0–20)
MICROALBUMIN/CREAT 24H UR: 20 MG/G CREATININE (ref 0–30)
POTASSIUM SERPL-SCNC: 4.5 MMOL/L (ref 3.5–5.3)
PROT SERPL-MCNC: 8.3 G/DL (ref 6.4–8.2)
SODIUM SERPL-SCNC: 138 MMOL/L (ref 136–145)
T4 FREE SERPL-MCNC: 0.91 NG/DL (ref 0.76–1.46)
TRIGL SERPL-MCNC: 200 MG/DL
TSH SERPL DL<=0.05 MIU/L-ACNC: 4.51 UIU/ML (ref 0.36–3.74)

## 2019-11-12 PROCEDURE — 80053 COMPREHEN METABOLIC PANEL: CPT

## 2019-11-12 PROCEDURE — 99214 OFFICE O/P EST MOD 30 MIN: CPT | Performed by: PHYSICIAN ASSISTANT

## 2019-11-12 PROCEDURE — 84443 ASSAY THYROID STIM HORMONE: CPT

## 2019-11-12 PROCEDURE — 82043 UR ALBUMIN QUANTITATIVE: CPT

## 2019-11-12 PROCEDURE — 36415 COLL VENOUS BLD VENIPUNCTURE: CPT

## 2019-11-12 PROCEDURE — 83036 HEMOGLOBIN GLYCOSYLATED A1C: CPT

## 2019-11-12 PROCEDURE — 84439 ASSAY OF FREE THYROXINE: CPT

## 2019-11-12 PROCEDURE — 82570 ASSAY OF URINE CREATININE: CPT

## 2019-11-12 PROCEDURE — 80061 LIPID PANEL: CPT

## 2019-11-12 PROCEDURE — 82306 VITAMIN D 25 HYDROXY: CPT

## 2019-11-12 RX ORDER — BLOOD-GLUCOSE METER
EACH MISCELLANEOUS
Qty: 1 KIT | Refills: 1 | Status: SHIPPED | OUTPATIENT
Start: 2019-11-12

## 2019-11-12 RX ORDER — BLOOD SUGAR DIAGNOSTIC
STRIP MISCELLANEOUS
Qty: 100 EACH | Refills: 1 | Status: SHIPPED | OUTPATIENT
Start: 2019-11-12 | End: 2019-12-06 | Stop reason: SDUPTHER

## 2019-11-12 NOTE — ASSESSMENT & PLAN NOTE
Control Uncertain with no recent lab testing or glucose monitoring  For now, complete lab testing as ordered and start monitoring glucose at least once per day  Refer to dietician for Medical nutrition therapy  Based on last A1C of 7 6 from 2018 Diabetes is poorly controlled- she should continue to use contraception until A1C <6 5  Depending on review of labs may need to start medication- will use either metformin or insulin as she is hoping to conceive in 2020  Will refer to  center for preconception visit to establish care

## 2019-11-12 NOTE — PROGRESS NOTES
Established Patient Progress Note       Chief Complaint   Patient presents with    Diabetes Type 2        History of Present Illness:     Chicho Carreon is a 21 y o  female with a history of Type 2 Diabetes diagnosed in 2016  Currently she is not taking medications and has never taken meds for Diabetes  She has difficulty losing weight despite diet and exercise-- weight has been stable over the past few years  She did HNT weight loss program in the past but meal replacements made her sick  She has a lot of GI issues so the idea of starting metformin made her nervous  She has had mild tsh abnormality in the past, was normal on repeat testing  Periods have been regular  Denies hirsutism/acne  She has gotten  and planning pregnancy later this winter in 2020 she is here to re-establish care prior to conceiving  There has been no prior pregnancy  Works night shift at [employer]  UTD with OBGYN, IUD has been removed but is currently using bnirth control  Needs eye exam- has an eye doctor  Foot exam today at visit  Hx vitamin D Deficiency- not on supplements       Patient Active Problem List   Diagnosis    Acute right flank pain    Right lower quadrant abdominal pain    Musculoskeletal neck pain    Left-sided chest wall pain    Kidney injury    Irritable bowel syndrome with diarrhea    Encounter for routine gynecological examination with Papanicolaou smear of cervix    Closed fracture of proximal phalanx of lesser toe of left foot    Type 2 diabetes mellitus without complication, without long-term current use of insulin (HCC)    Vitamin D deficiency    Abnormal thyroid function test      Past Medical History:   Diagnosis Date    Anemia     Clostridium difficile infection     Hyperglycemia     Hypokalemia     IUD (intrauterine device) in place     Kidney injury     L2 vertebral fracture (HCC)       Past Surgical History:   Procedure Laterality Date    CHOLECYSTECTOMY      FL COLONOSCOPY FLX DX W/COLLJ SPEC WHEN PFRMD N/A 1/10/2017    Procedure: EGD AND COLONOSCOPY;  Surgeon: Serina Agudelo MD;  Location: [facility];   Service: Gastroenterology      Family History   Problem Relation Age of Onset    Diabetes Other     Thyroid disease Other     Diabetes type II Mother     No Known Problems Father      Social History     Tobacco Use    Smoking status: Never Smoker    Smokeless tobacco: Never Used   Substance Use Topics    Alcohol use: Yes     Frequency: Monthly or less     Drinks per session: 1 or 2     Allergies   Allergen Reactions    Bactrim [Sulfamethoxazole-Trimethoprim] Hives    Dilaudid [Hydromorphone Hcl] Itching       Current Outpatient Medications:     albuterol (PROVENTIL HFA,VENTOLIN HFA) 90 mcg/act inhaler, Inhale 2 puffs every 6 (six) hours as needed for wheezing, Disp: 1 Inhaler, Rfl: 0    colestipol (COLESTID) 1 g tablet, Take 1 tablet (1 g total) by mouth 2 (two) times a day (Patient taking differently: Take 1 g by mouth 2 (two) times a day as needed ), Disp: 60 tablet, Rfl: 0    cyclobenzaprine (FLEXERIL) 10 mg tablet, Take 10 mg by mouth daily as needed , Disp: , Rfl:     dicyclomine (BENTYL) 20 mg tablet, Take 1 tablet (20 mg total) by mouth every 6 (six) hours (Patient taking differently: Take 20 mg by mouth every 6 (six) hours as needed ), Disp: 60 tablet, Rfl: 2    omeprazole (PriLOSEC) 10 mg delayed release capsule, Take 2 capsules (20 mg total) by mouth 2 (two) times a day (Patient taking differently: Take 20 mg by mouth 2 (two) times a day as needed ), Disp: 60 capsule, Rfl: 0    ondansetron (ZOFRAN) 4 mg tablet, Take 1 tablet (4 mg total) by mouth 4 (four) times a day as needed for nausea or vomiting, Disp: 60 tablet, Rfl: 0    Blood Glucose Monitoring Suppl (ONETOUCH VERIO) w/Device KIT, Disp 1 meter, Disp: 1 kit, Rfl: 1    ONETOUCH DELICA LANCETS FINE MISC, Check BG daily, Disp: 100 each, Rfl: 1    ONETOUCH VERIO test strip, Check BG daily, Disp: 100 each, Rfl: 1    Review of Systems   Constitutional: Negative for activity change, appetite change, chills, diaphoresis, fatigue, fever and unexpected weight change  HENT: Negative for trouble swallowing and voice change  Eyes: Negative for visual disturbance  Respiratory: Negative for shortness of breath  Cardiovascular: Negative for chest pain and palpitations  Gastrointestinal: Negative for abdominal pain, constipation and diarrhea  Endocrine: Negative for cold intolerance, heat intolerance, polydipsia, polyphagia and polyuria  Genitourinary: Negative for frequency and menstrual problem  Musculoskeletal: Negative for arthralgias and myalgias  Skin: Negative for rash  Allergic/Immunologic: Negative for food allergies  Neurological: Negative for dizziness and tremors  Hematological: Negative for adenopathy  Psychiatric/Behavioral: Negative for sleep disturbance  All other systems reviewed and are negative  Physical Exam:  Body mass index is 37 97 kg/m²  /78 (BP Location: Left arm, Patient Position: Sitting, Cuff Size: Large)   Pulse 98   Ht 5' 4" (1 626 m)   Wt 100 kg (221 lb 3 2 oz)   BMI 37 97 kg/m²    Wt Readings from Last 3 Encounters:   11/12/19 100 kg (221 lb 3 2 oz)   09/04/19 101 kg (223 lb)   08/19/19 101 kg (223 lb)       Physical Exam   Constitutional: She is oriented to person, place, and time  She appears well-developed and well-nourished  No distress  HENT:   Head: Normocephalic and atraumatic  Eyes: Pupils are equal, round, and reactive to light  Conjunctivae are normal    Neck: Normal range of motion  Neck supple  No thyromegaly present  Cardiovascular: Normal rate, regular rhythm and normal heart sounds  Pulmonary/Chest: Effort normal and breath sounds normal  No respiratory distress  She has no wheezes  She has no rales  Abdominal: Soft  Bowel sounds are normal  She exhibits no distension  There is no tenderness     Musculoskeletal: Normal range of motion  She exhibits no edema  Neurological: She is alert and oriented to person, place, and time  Skin: Skin is warm and dry  Psychiatric: She has a normal mood and affect  Vitals reviewed        Labs:     Component      Latest Ref Rng & Units 12/13/2016 3/23/2017 8/15/2018   Sodium      136 - 145 mmol/L 137 138    Potassium      3 5 - 5 3 mmol/L 4 0 4 4    Chloride      100 - 108 mmol/L 104 101    CO2      21 - 32 mmol/L 22 28    Anion Gap      4 - 13 mmol/L 11 9    BUN      5 - 25 mg/dL 11 11    Creatinine      0 60 - 1 30 mg/dL 0 62 0 69    Glucose, Random      65 - 140 mg/dL 200 (H)     Calcium      8 3 - 10 1 mg/dL 9 3 9 9    AST      5 - 45 U/L 25 23    ALT      12 - 78 U/L 63 49    Alkaline Phosphatase      46 - 116 U/L 90 110    Total Protein      6 4 - 8 2 g/dL 7 4 8 2    Albumin      3 5 - 5 0 g/dL 4 1 4 4    TOTAL BILIRUBIN      0 20 - 1 00 mg/dL 0 56 0 70    eGFR      ml/min/1 73sq m >60 0 >60 0    GLUCOSE FASTING      65 - 99 mg/dL  190 (H)    Cholesterol      50 - 200 mg/dL 192  211 (H)   Triglycerides      <=150 mg/dL 203 (H)  258 (H)   HDL      40 - 60 mg/dL 45  48   LDL Direct      0 - 100 mg/dL 106 (H)  111 (H)   Non-HDL Cholesterol      mg/dl   163   Hemoglobin A1C      4 2 - 6 3 % 8 3 (H) 7 1 (H) 7 6 (H)   EAG      mg/dl 192 157 171   TESTOSTERONE FREE      0 0 - 4 2 pg/mL 1 2     Testosterone, Total, LC/MS      8 - 48 ng/dL 7 (L)     C-PEPTIDE      1 1 - 4 4 ng/mL 5 8 (H)     TSH 3RD GENERATON      0 358 - 3 740 uIU/mL 2 090 1 160    Free T4      0 76 - 1 46 ng/dL 1 09 1 01    Vit D, 25-Hydroxy      30 0 - 100 0 ng/mL 15 2 (L) 18 8 (L)    Glutaminc Acid Decarboxylase 65 Ab      0 0 - 5 0 U/mL <5 0         Impression & Plan:    Problem List Items Addressed This Visit        Endocrine    Type 2 diabetes mellitus without complication, without long-term current use of insulin (HCC) - Primary     Control Uncertain with no recent lab testing or glucose monitoring  For now, complete lab testing as ordered and start monitoring glucose at least once per day  Refer to dietician for Medical nutrition therapy  Based on last A1C of 7 6 from 2018 Diabetes is poorly controlled- she should continue to use contraception until A1C <6 5  Depending on review of labs may need to start medication- will use either metformin or insulin as she is hoping to conceive in   Will refer to  center for preconception visit to establish care  Relevant Medications    Blood Glucose Monitoring Suppl (Katerin Salazar) w/Device KIT    ONETOUCH VERIO test strip    ONETOUCH DELICA LANCETS FINE MISC    Other Relevant Orders    Hemoglobin A1C    Comprehensive metabolic panel    Lipid Panel with Direct LDL reflex    Microalbumin / creatinine urine ratio    Ambulatory referral to medical nutrition therapy for diabetes    Ambulatory Referral to Maternal Fetal Medicine       Other    Vitamin D deficiency     Check Vitamin D level          Relevant Orders    Vitamin D 25 hydroxy    Abnormal thyroid function test     Check TSH/Free T4  Relevant Orders    TSH, 3rd generation    T4, free          Orders Placed This Encounter   Procedures    TSH, 3rd generation     This is a patient instruction: This test is non-fasting  Please drink two glasses of water morning of bloodwork  Standing Status:   Future     Number of Occurrences:   1     Standing Expiration Date:   2020    T4, free     Standing Status:   Future     Number of Occurrences:   1     Standing Expiration Date:   2020    Vitamin D 25 hydroxy     Standing Status:   Future     Number of Occurrences:   1     Standing Expiration Date:   2020    Hemoglobin A1C     Standing Status:   Future     Number of Occurrences:   1     Standing Expiration Date:   2020    Comprehensive metabolic panel     This is a patient instruction: Patient fasting for 8 hours or longer recommended       Standing Status:   Future     Number of Occurrences:   1     Standing Expiration Date:   5/12/2020    Lipid Panel with Direct LDL reflex     This is a patient instruction: This test requires patient fasting for 10-12 hours or longer  Drinking of black coffee or black tea is acceptable  Standing Status:   Future     Number of Occurrences:   1     Standing Expiration Date:   5/12/2020    Microalbumin / creatinine urine ratio     Standing Status:   Future     Number of Occurrences:   1     Standing Expiration Date:   5/12/2020    Ambulatory referral to medical nutrition therapy for diabetes     Standing Status:   Future     Standing Expiration Date:   11/12/2020     Referral Priority:   Routine     Referral Type:   Consult - AMB     Referral Reason:   Specialty Services Required     Requested Specialty:   Endocrinology     Number of Visits Requested:   1     Expiration Date:   11/12/2020    Ambulatory Referral to Maternal Fetal Medicine     Standing Status:   Future     Standing Expiration Date:   11/12/2020     Referral Priority:   Routine     Referral Type:   Consult - AMB     Referral Reason:   Specialty Services Required     Requested Specialty:   Maternal and Fetal Medicine     Number of Visits Requested:   1     Expiration Date:   11/12/2020       There are no Patient Instructions on file for this visit  Discussed with the patient and all questioned fully answered  She will call me if any problems arise  Follow-up appointment in 1-2 months       Counseled patient on diagnostic results, prognosis, risk and benefit of treatment options, instruction for management, importance of treatment compliance, Risk  factor reduction and impressions      Buddy Goodwin PA-C

## 2019-11-14 DIAGNOSIS — R94.6 ABNORMAL THYROID FUNCTION TEST: Primary | ICD-10-CM

## 2019-11-14 DIAGNOSIS — E11.9 TYPE 2 DIABETES MELLITUS WITHOUT COMPLICATION, WITHOUT LONG-TERM CURRENT USE OF INSULIN (HCC): ICD-10-CM

## 2019-11-14 RX ORDER — CHOLECALCIFEROL (VITAMIN D3) 50 MCG
2000 TABLET ORAL DAILY
COMMUNITY

## 2019-11-14 RX ORDER — METFORMIN HYDROCHLORIDE 500 MG/1
TABLET, EXTENDED RELEASE ORAL
Qty: 120 TABLET | Refills: 5 | Status: SHIPPED | OUTPATIENT
Start: 2019-11-14 | End: 2020-05-07

## 2019-11-14 RX ORDER — LEVOTHYROXINE SODIUM 0.03 MG/1
25 TABLET ORAL DAILY
Qty: 30 TABLET | Refills: 5 | Status: SHIPPED | OUTPATIENT
Start: 2019-11-14 | End: 2020-02-21 | Stop reason: DRUGHIGH

## 2019-11-18 ENCOUNTER — OFFICE VISIT (OUTPATIENT)
Dept: DIABETES SERVICES | Facility: CLINIC | Age: 24
End: 2019-11-18
Payer: COMMERCIAL

## 2019-11-18 VITALS — BODY MASS INDEX: 37.8 KG/M2 | WEIGHT: 220.2 LBS

## 2019-11-18 DIAGNOSIS — E11.9 TYPE 2 DIABETES MELLITUS WITHOUT COMPLICATION, WITHOUT LONG-TERM CURRENT USE OF INSULIN (HCC): Primary | ICD-10-CM

## 2019-11-18 PROCEDURE — 97802 MEDICAL NUTRITION INDIV IN: CPT | Performed by: DIETITIAN, REGISTERED

## 2019-11-18 NOTE — PATIENT INSTRUCTIONS
1   Consume 3 meals a day about 6 hours apart with between snack  2   45 grams of carbs per meal and 15 grams per between meal snack  3   Increase aerobic exercise to 150 minutes a week

## 2019-11-18 NOTE — PROGRESS NOTES
Medical Nutrition Therapy        Assessment    Visit Type: Initial visit  Chief complaint/Medical Diagnosis/reason for visit E11 9 (I3IS without complications, without insulin)    HPI Shankar Stinson was seen today for her initial MNT visit  Patient reports weighing 150 pounds at age 24  She states, "I broke my back when I was 17 and my weight went up  I haven't been able to get it to come back down"  Shankar Stinson admits to having an eating disorder in her teens (anorexia) and describes her self as obsessive-compulsive  Patient works night shift  Problems identified in food recall include inconsistent carbohydrate intake at meals, meal skipping, poorly timed meals, low intake of fruit, low fat dairy and whole grains and general lack of balance  Provided patient with a 1600 calorie meal plan to assist with consistency, balance and portion control  Encouraged the consumption of regular meals at regular times about 5 hours apart  Advised patient to keep carbohydrate intake to 45 grams per meal and 15 grams per snack to assist with glycemic control  Suggested keeping protein intake to 6 ounces a day and fat to 5 servings daily to assist with lipid management and calorie control  Shankar Stinson would benefit from increasing aerobic exercise to 150 minutes a week  Patient agreed to keep daily food logs  Will remain available for further dietary questions/concerns      Ht Readings from Last 1 Encounters:   11/12/19 5' 4" (1 626 m)     Wt Readings from Last 3 Encounters:   11/18/19 99 9 kg (220 lb 3 2 oz)   11/12/19 100 kg (221 lb 3 2 oz)   09/04/19 101 kg (223 lb)     Weight Change: No (weight was 13 at 24years old prior to breaking back)    Barriers to Learning: no barriers    Do you follow any special diet presently?: Yes - higher protein with veggies, no processed food, lots of water  Who shops: patient and spouse   Who cooks: spouse    Food Log: Completed via the method of food recall    Breakfast:5:00PM 1 liter, 3 eggs, 3 slices turkey britt, 3/6-0 whole avocado OR 4 5 oz chicken with non-starchy veggies (cooked or raw with Ranch dressing) or beef stew with vegetables OR salad mix and adds 4 5 oz protein  Morning Snack: 2 days a week: small piece of fruit or dehydrated green beans or 1/2-1 cup goldfish crackers or light yogurt  Lunch:1:00AM 4 5 oz chicken with non-starchy veggies (cooked or raw with Ranch dressing) or beef stew with vegetables OR salad mix and adds 4 5 oz protein  Afternoon Snack: none  Dinner:none  Evening Snack:none  Beverages: water  Eating out/Take out:1-2 times a week  Exercise nothing since broke toe in July; recently joined a gym and is doing station work 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--nothing regular    Calorie needs 1600 kcals/day Carbs: 45g/meal, 15g/snack     Fat: 5 servings/day    Protein:6 ounces/day    Nutrition Diagnosis:  Food and nutrition related knowledge deficit  related to Lack of prior exposure to accurate nutrition related information as evidenced by No prior knowledge of need for food and nutrition related recommendations    Intervention: label reading, behavior modification strategies, carbohydrate counting, increased plant based foods, meal timing, meal planning, individualized meal plan, exercise guidelines and food diary     Treatment Goals: Patient will consume 3 meals a day, Patient will count carbohydrates and Patient will exercise    Monitoring and evaluation:    Term code indicator  FH 1 3 2 Food Intake Criteria: Consume 3 meals a day about 6 hours apart with between snack  Term code indicator  FH 1 6 3 Carbohydrate Intake Criteria: 45 grams of carbs per meal and 15 grams per between meal snack    Term code indicator  CH 2 2 Treatments/Therapy/Alternative Medicine Criteria: Increase aerobic exercise to 150 minutes a week    Materials Provided: Portions Booklet, individualized meal plan, food logs    Patients Response to Instruction:  Comprehensiongood  Motivationgood  Expected Compliancegood    Start- Stop: 8:45-9:45  Total Minutes: 60 Minutes  Group or Individual Instruction: MNT-I  Other: Lissa Sena PA-C    Thank you for coming to the [facility] for education today  Please feel free to call with any questions or concerns      Lennox Gloss  [address] [facility] [place] [address]

## 2019-12-04 PROCEDURE — G0145 SCR C/V CYTO,THINLAYER,RESCR: HCPCS | Performed by: OBSTETRICS & GYNECOLOGY

## 2019-12-05 ENCOUNTER — LAB REQUISITION (OUTPATIENT)
Dept: LAB | Facility: HOSPITAL | Age: 24
End: 2019-12-05
Payer: COMMERCIAL

## 2019-12-05 DIAGNOSIS — Z01.419 ENCOUNTER FOR GYNECOLOGICAL EXAMINATION (GENERAL) (ROUTINE) WITHOUT ABNORMAL FINDINGS: ICD-10-CM

## 2019-12-06 DIAGNOSIS — E11.9 TYPE 2 DIABETES MELLITUS WITHOUT COMPLICATION, WITHOUT LONG-TERM CURRENT USE OF INSULIN (HCC): ICD-10-CM

## 2019-12-06 RX ORDER — BLOOD SUGAR DIAGNOSTIC
1 STRIP MISCELLANEOUS 2 TIMES DAILY
Qty: 200 EACH | Refills: 1 | Status: SHIPPED | OUTPATIENT
Start: 2019-12-06 | End: 2019-12-18 | Stop reason: SDUPTHER

## 2019-12-10 ENCOUNTER — OFFICE VISIT (OUTPATIENT)
Dept: ENDOCRINOLOGY | Facility: CLINIC | Age: 24
End: 2019-12-10
Payer: COMMERCIAL

## 2019-12-10 VITALS
DIASTOLIC BLOOD PRESSURE: 90 MMHG | HEART RATE: 77 BPM | SYSTOLIC BLOOD PRESSURE: 120 MMHG | BODY MASS INDEX: 36.91 KG/M2 | HEIGHT: 64 IN | WEIGHT: 216.2 LBS

## 2019-12-10 DIAGNOSIS — E03.9 HYPOTHYROIDISM, UNSPECIFIED TYPE: ICD-10-CM

## 2019-12-10 DIAGNOSIS — E11.65 TYPE 2 DIABETES MELLITUS WITH HYPERGLYCEMIA, WITHOUT LONG-TERM CURRENT USE OF INSULIN (HCC): Primary | ICD-10-CM

## 2019-12-10 DIAGNOSIS — E78.5 HYPERLIPIDEMIA, UNSPECIFIED HYPERLIPIDEMIA TYPE: ICD-10-CM

## 2019-12-10 DIAGNOSIS — E55.9 VITAMIN D DEFICIENCY: ICD-10-CM

## 2019-12-10 DIAGNOSIS — E11.9 TYPE 2 DIABETES MELLITUS WITHOUT COMPLICATION, WITHOUT LONG-TERM CURRENT USE OF INSULIN (HCC): ICD-10-CM

## 2019-12-10 PROCEDURE — 99214 OFFICE O/P EST MOD 30 MIN: CPT | Performed by: INTERNAL MEDICINE

## 2019-12-10 NOTE — PROGRESS NOTES
Shala Robertson 21 y o  female MRN: [id number]    Encounter: [id number]      Assessment/Plan     Problem List Items Addressed This Visit        Endocrine    Hypothyroidism     Continue levothyroxine and check thyroid function tests          Relevant Orders    TSH, 3rd generation Lab Collect    T4, free Lab Collect    Type 2 diabetes mellitus with hyperglycemia, without long-term current use of insulin (HCC) - Primary       Lab Results   Component Value Date    HGBA1C 7 7 (H) 11/12/2019   improving , continue metformin at current dose , watch diet         Relevant Orders    Comprehensive metabolic panel Lab Collect    HEMOGLOBIN A1C W/ EAG ESTIMATION Lab Collect    Type 2 diabetes mellitus without complication, without long-term current use of insulin (HCC)       Other    Vitamin D deficiency     Continue vitamin D3 2000 iu daily            Other Visit Diagnoses     Hyperlipidemia, unspecified hyperlipidemia type        Relevant Orders    Lipid Panel with Direct LDL reflex Lab Collect        CC: Diabetes    History of Present Illness     HPI:  24-year-old female with  type 2 diabetes here for follow-up, vitamin D deficiency  And hypothyroidism here for follow up  Metformin 2 tabs twice a day with meals -  Checks f s 3/day - fasting 90-100s  2 hours post breakfast - 130-150s   Bedtime -110-130s    Some GI upset     Lost 7 lbs since last visit     For hypothyroidism she is on LT4 25 mcg daily and has been taking it regularly     IUD removed last month       Last eye exam - last month -    C/o blurry vision , no polyuria , polydipsia , no numbness and tingling in feet          Review of Systems   Constitutional: Positive for fatigue  Negative for unexpected weight change  Eyes: Positive for visual disturbance  Respiratory: Negative for cough and shortness of breath  Cardiovascular: Negative for palpitations and leg swelling  Gastrointestinal: Negative for constipation, nausea and vomiting     Endocrine: Negative for polydipsia and polyuria  Musculoskeletal: Negative for gait problem  Skin: Negative for pallor, rash and wound  Psychiatric/Behavioral: Negative for sleep disturbance  All other systems reviewed and are negative  Historical Information   Past Medical History:   Diagnosis Date    Anemia     Clostridium difficile infection     Hyperglycemia     Hypokalemia     IUD (intrauterine device) in place     Kidney injury     L2 vertebral fracture (HCC)      Past Surgical History:   Procedure Laterality Date    CHOLECYSTECTOMY      NC COLONOSCOPY FLX DX W/COLLJ SPEC WHEN PFRMD N/A 1/10/2017    Procedure: EGD AND COLONOSCOPY;  Surgeon: Doris Keyes MD;  Location: [facility];   Service: Gastroenterology     Social History   Social History     Substance and Sexual Activity   Alcohol Use Yes    Frequency: Monthly or less    Drinks per session: 1 or 2     Social History     Substance and Sexual Activity   Drug Use No     Social History     Tobacco Use   Smoking Status Never Smoker   Smokeless Tobacco Never Used     Family History:   Family History   Problem Relation Age of Onset    Diabetes Other     Thyroid disease Other     Diabetes type II Mother     No Known Problems Father        Meds/Allergies   Current Outpatient Medications   Medication Sig Dispense Refill    albuterol (PROVENTIL HFA,VENTOLIN HFA) 90 mcg/act inhaler Inhale 2 puffs every 6 (six) hours as needed for wheezing 1 Inhaler 0    Blood Glucose Monitoring Suppl (ONETOUCH VERIO) w/Device KIT Disp 1 meter 1 kit 1    Cholecalciferol (VITAMIN D) 50 MCG (2000 UT) tablet Take 2,000 Units by mouth daily      colestipol (COLESTID) 1 g tablet Take 1 tablet (1 g total) by mouth 2 (two) times a day (Patient taking differently: Take 1 g by mouth 2 (two) times a day as needed ) 60 tablet 0    cyclobenzaprine (FLEXERIL) 10 mg tablet Take 10 mg by mouth daily as needed       dicyclomine (BENTYL) 20 mg tablet Take 1 tablet (20 mg total) by mouth every 6 (six) hours (Patient taking differently: Take 20 mg by mouth every 6 (six) hours as needed ) 60 tablet 2    levothyroxine 25 mcg tablet Take 1 tablet (25 mcg total) by mouth daily 30 tablet 5    metFORMIN (GLUCOPHAGE-XR) 500 mg 24 hr tablet Titrate as directed to 4 tabs per day with food 120 tablet 5    omeprazole (PriLOSEC) 10 mg delayed release capsule Take 2 capsules (20 mg total) by mouth 2 (two) times a day (Patient taking differently: Take 20 mg by mouth 2 (two) times a day as needed ) 60 capsule 0    ondansetron (ZOFRAN) 4 mg tablet Take 1 tablet (4 mg total) by mouth 4 (four) times a day as needed for nausea or vomiting 60 tablet 0    ONETOUCH DELICA LANCETS FINE MISC Check BG daily 100 each 1    ONETOUCH VERIO test strip 1 each by Other route 2 (two) times a day Check BG daily 200 each 1    Prenatal Vit-Iron Carbonyl-FA (PRENATAL MULTIVITAMIN) TABS Take 1 tablet by mouth daily       No current facility-administered medications for this visit  Allergies   Allergen Reactions    Bactrim [Sulfamethoxazole-Trimethoprim] Hives    Dilaudid [Hydromorphone Hcl] Itching       Objective   Vitals: Blood pressure 120/90, pulse 77, height 5' 4" (1 626 m), weight 98 1 kg (216 lb 3 2 oz)  Physical Exam   Constitutional: She is oriented to person, place, and time  She appears well-developed and well-nourished  No distress  HENT:   Head: Normocephalic and atraumatic  Eyes: EOM are normal    Neck: Normal range of motion  Neck supple  No thyromegaly present  Cardiovascular: Normal rate, regular rhythm and normal heart sounds  No murmur heard  Pulmonary/Chest: Effort normal and breath sounds normal  No respiratory distress  Abdominal: Soft  Bowel sounds are normal  She exhibits no distension and no mass  There is no tenderness  Musculoskeletal: Normal range of motion  She exhibits no edema or deformity  Neurological: She is alert and oriented to person, place, and time  Skin: Skin is warm and dry  Psychiatric: She has a normal mood and affect  Her behavior is normal  Judgment and thought content normal    Vitals reviewed  The history was obtained from the review of the chart, patient  Lab Results:   Lab Results   Component Value Date/Time    Hemoglobin A1C 7 7 (H) 11/12/2019 12:24 PM    BUN 9 11/12/2019 12:24 PM    Potassium 4 5 11/12/2019 12:24 PM    Chloride 104 11/12/2019 12:24 PM    CO2 23 11/12/2019 12:24 PM    Creatinine 0 62 11/12/2019 12:24 PM    AST 29 11/12/2019 12:24 PM    ALT 86 (H) 11/12/2019 12:24 PM    Albumin 4 4 11/12/2019 12:24 PM    HDL, Direct 44 11/12/2019 12:24 PM    Triglycerides 200 (H) 11/12/2019 12:24 PM         Portions of the record may have been created with voice recognition software  Occasional wrong word or "sound a like" substitutions may have occurred due to the inherent limitations of voice recognition software  Read the chart carefully and recognize, using context, where substitutions have occurred

## 2019-12-11 NOTE — ASSESSMENT & PLAN NOTE
Lab Results   Component Value Date    HGBA1C 7 7 (H) 11/12/2019   improving , continue metformin at current dose , watch diet

## 2019-12-12 LAB
LAB AP GYN PRIMARY INTERPRETATION: NORMAL
LAB AP LMP: NORMAL
Lab: NORMAL
PATH INTERP SPEC-IMP: NORMAL

## 2019-12-18 DIAGNOSIS — E11.9 TYPE 2 DIABETES MELLITUS WITHOUT COMPLICATION, WITHOUT LONG-TERM CURRENT USE OF INSULIN (HCC): ICD-10-CM

## 2019-12-18 RX ORDER — BLOOD SUGAR DIAGNOSTIC
1 STRIP MISCELLANEOUS 3 TIMES DAILY
Qty: 270 EACH | Refills: 1 | Status: SHIPPED | OUTPATIENT
Start: 2019-12-18 | End: 2020-01-13 | Stop reason: SDUPTHER

## 2019-12-27 ENCOUNTER — APPOINTMENT (OUTPATIENT)
Dept: LAB | Age: 24
End: 2019-12-27
Payer: COMMERCIAL

## 2019-12-27 DIAGNOSIS — R94.6 ABNORMAL THYROID FUNCTION TEST: ICD-10-CM

## 2019-12-27 LAB
T4 FREE SERPL-MCNC: 0.97 NG/DL (ref 0.76–1.46)
TSH SERPL DL<=0.05 MIU/L-ACNC: 1.55 UIU/ML (ref 0.36–3.74)

## 2019-12-27 PROCEDURE — 84443 ASSAY THYROID STIM HORMONE: CPT

## 2019-12-27 PROCEDURE — 86376 MICROSOMAL ANTIBODY EACH: CPT

## 2019-12-27 PROCEDURE — 86800 THYROGLOBULIN ANTIBODY: CPT

## 2019-12-27 PROCEDURE — 84439 ASSAY OF FREE THYROXINE: CPT

## 2019-12-27 PROCEDURE — 36415 COLL VENOUS BLD VENIPUNCTURE: CPT

## 2019-12-29 LAB — THYROPEROXIDASE AB SERPL-ACNC: 7 IU/ML (ref 0–34)

## 2019-12-31 LAB — THYROGLOB AB SERPL-ACNC: <1 IU/ML (ref 0–0.9)

## 2020-01-10 ENCOUNTER — OFFICE VISIT (OUTPATIENT)
Dept: PERINATAL CARE | Facility: CLINIC | Age: 25
End: 2020-01-10

## 2020-01-10 ENCOUNTER — CONSULT (OUTPATIENT)
Dept: PERINATAL CARE | Facility: CLINIC | Age: 25
End: 2020-01-10
Payer: COMMERCIAL

## 2020-01-10 VITALS
DIASTOLIC BLOOD PRESSURE: 79 MMHG | BODY MASS INDEX: 36.4 KG/M2 | HEIGHT: 64 IN | HEART RATE: 86 BPM | SYSTOLIC BLOOD PRESSURE: 133 MMHG | WEIGHT: 213.2 LBS

## 2020-01-10 DIAGNOSIS — E11.65 TYPE 2 DIABETES MELLITUS WITH HYPERGLYCEMIA, WITHOUT LONG-TERM CURRENT USE OF INSULIN (HCC): Primary | ICD-10-CM

## 2020-01-10 DIAGNOSIS — E11.9 TYPE 2 DIABETES MELLITUS WITHOUT COMPLICATION, WITHOUT LONG-TERM CURRENT USE OF INSULIN (HCC): ICD-10-CM

## 2020-01-10 DIAGNOSIS — E55.9 VITAMIN D DEFICIENCY: ICD-10-CM

## 2020-01-10 DIAGNOSIS — Z31.69 PRE-CONCEPTION COUNSELING: ICD-10-CM

## 2020-01-10 DIAGNOSIS — E03.9 HYPOTHYROIDISM, UNSPECIFIED TYPE: ICD-10-CM

## 2020-01-10 DIAGNOSIS — E11.9 TYPE 2 DIABETES MELLITUS WITH HEMOGLOBIN A1C GOAL OF LESS THAN 7.0% (HCC): ICD-10-CM

## 2020-01-10 PROBLEM — R94.6 ABNORMAL THYROID FUNCTION TEST: Status: RESOLVED | Noted: 2019-11-12 | Resolved: 2020-01-10

## 2020-01-10 PROBLEM — Z01.419 ENCOUNTER FOR ROUTINE GYNECOLOGICAL EXAMINATION WITH PAPANICOLAOU SMEAR OF CERVIX: Status: RESOLVED | Noted: 2018-11-28 | Resolved: 2020-01-10

## 2020-01-10 PROBLEM — S92.512A: Status: RESOLVED | Noted: 2019-08-19 | Resolved: 2020-01-10

## 2020-01-10 PROCEDURE — 99243 OFF/OP CNSLTJ NEW/EST LOW 30: CPT | Performed by: OBSTETRICS & GYNECOLOGY

## 2020-01-10 PROCEDURE — NC001 PR NO CHARGE: Performed by: NURSE PRACTITIONER

## 2020-01-10 RX ORDER — INSULIN GLARGINE 100 [IU]/ML
INJECTION, SOLUTION SUBCUTANEOUS
Qty: 15 ML | Refills: 2 | Status: SHIPPED | OUTPATIENT
Start: 2020-01-10 | End: 2020-03-18 | Stop reason: SDUPTHER

## 2020-01-10 NOTE — PATIENT INSTRUCTIONS
1  Due to FBS>100, start Lantus 20 units at 4 PM daily  To be titrated to goal   2  Continue Metformin  3  Continue diet 3 meals and 2 snacks with recommended with combination of carbohydrates, protein and fat per meal/snack  Follow up with dietitian as scheduled  4  Self monitor blood glucose fasting, 2 hours post meal and with hypoglycemia  5  Report via [employer] glucose flowsheet, send message on Tuesday, 1/14/20 to review glucose readings for possible insulin adjustments  Then report as recommended and please always document regimen with current Lantus dose  5  Glucose goals fasting 90 or less, 1 hour post start of meal 135 or less and 2 hours post start of meal 120 or less  6  Continue exercise regimen and gradually increase to 5 times a week  7  Always have glucose for hypoglycemia, use 15 by 15 rule  8  A1c goal prior to pregnancy 6 5% or less  Repeat A1c    9  Continue vitamin D3, prenatal vitamin and make sure to intake folic acid 1 mg daily  10  Last TSH at goal, continue Levothyroxine and follow up with Dr Juliana Villa  11  Use contraception until A1c at goal   12  Schedule follow up with diabetes and pregnancy program once pregnant

## 2020-01-10 NOTE — PROGRESS NOTES
Assessment/Plan:     Diagnoses and all orders for this visit:    Type 2 diabetes mellitus with hyperglycemia, without long-term current use of insulin (HCC)  -     LANTUS SOLOSTAR 100 units/mL injection pen; Inject SC 22 units at 4 PM daily  To be titrated  -     Insulin Pen Needle 31G X 5 MM MISC; Inject under the skin daily at bedtime Use one a day or as directed  -     Hemoglobin A1C; Standing    Hypothyroidism, unspecified type  -     LANTUS SOLOSTAR 100 units/mL injection pen; Inject SC 22 units at 4 PM daily  To be titrated  -     Insulin Pen Needle 31G X 5 MM MISC; Inject under the skin daily at bedtime Use one a day or as directed  -     Hemoglobin A1C; Standing    Vitamin D deficiency  -     LANTUS SOLOSTAR 100 units/mL injection pen; Inject SC 22 units at 4 PM daily  To be titrated  -     Insulin Pen Needle 31G X 5 MM MISC; Inject under the skin daily at bedtime Use one a day or as directed  -     Hemoglobin A1C; Standing    Type 2 diabetes mellitus with hemoglobin A1c goal of less than 7 0% (HCC)        1  Due to FBS>100, start Lantus 20 units at 4 PM daily  To be titrated to goal   2  Continue Metformin  3  Continue diet 3 meals and 2 snacks with recommended with combination of carbohydrates, protein and fat per meal/snack  Follow up with dietitian as scheduled  4  Self monitor blood glucose fasting, 2 hours post meal and with hypoglycemia  5  Report via [employer] glucose flowsheet, send message on Tuesday, 1/14/20 to review glucose readings for possible insulin adjustments  Then report as recommended and please always document regimen with current Lantus dose  5  Glucose goals fasting 90 or less, 1 hour post start of meal 135 or less and 2 hours post start of meal 120 or less  6  Continue exercise regimen and gradually increase to 5 times a week  7  Always have glucose for hypoglycemia, use 15 by 15 rule  8  A1c goal prior to pregnancy 6 5% or less   Repeat A1c    9  Continue vitamin D3, prenatal vitamin and make sure to intake folic acid 1 mg daily  10  Last TSH at goal, continue Levothyroxine and follow up with Dr Jese Hollingsworth  11  Use contraception until A1c at goal   12  Schedule follow up with diabetes and pregnancy program once pregnant  Insulin pen education with returned demonstration without difficulty  · Insulin administration times, insulin action  · Hypoglycemia signs, symptoms and treatment          Subjective:      Patient ID: Salima Meeks is a [place] 38 y o  female    Newly diagnosed T2DM 2 months ago with A1c 7 7%, on Metformin XR 1000 mg twice a day with meals  Testing fasting, 2 hours post breakfast and at bedtime  Fasting ranging from 92 to 137, 2 hours post breakfast 107 to 204 and bedtime 93 to 160  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  Seen today by Dr Farrah Calhoun for pre-conception counseling  Due to elevated glucose readings and desire for pregnancy, Dr Farrah Calhoun would like Rebeca Fe to start on basal insulin  Has had DM diet education and on 1600 calories with 11 lbs weight loss over 2 months, has follow up scheduled with dietitian  Started on Levothyroxine, takes correctly and repeat TSH normal  Low vitamin D level and on supplements  Taking prenatal vitamin         The following portions of the patient's history were reviewed and updated as appropriate: allergies, current medications, past family history, past medical history, past social history, past surgical history and problem list     Allergies   Allergen Reactions    Bactrim [Sulfamethoxazole-Trimethoprim] Hives    Dilaudid [Hydromorphone Hcl] Itching     Current Outpatient Medications on File Prior to Visit   Medication Sig Dispense Refill    albuterol (PROVENTIL HFA,VENTOLIN HFA) 90 mcg/act inhaler Inhale 2 puffs every 6 (six) hours as needed for wheezing 1 Inhaler 0    Blood Glucose Monitoring Suppl (ONETOUCH VERIO) w/Device KIT Disp 1 meter 1 kit 1    Cholecalciferol (VITAMIN D) 50 MCG ( UT) tablet Take 2,000 Units by mouth daily      colestipol (COLESTID) 1 g tablet Take 1 tablet (1 g total) by mouth 2 (two) times a day (Patient taking differently: Take 1 g by mouth 2 (two) times a day as needed ) 60 tablet 0    cyclobenzaprine (FLEXERIL) 10 mg tablet Take 10 mg by mouth daily as needed       dicyclomine (BENTYL) 20 mg tablet Take 1 tablet (20 mg total) by mouth every 6 (six) hours (Patient taking differently: Take 20 mg by mouth every 6 (six) hours as needed ) 60 tablet 2    levothyroxine 25 mcg tablet Take 1 tablet (25 mcg total) by mouth daily 30 tablet 5    metFORMIN (GLUCOPHAGE-XR) 500 mg 24 hr tablet Titrate as directed to 4 tabs per day with food 120 tablet 5    ONETOUCH DELICA LANCETS FINE MISC Check BG daily 100 each 1    ONETOUCH VERIO test strip 1 each by Other route 3 (three) times a day Check BG daily 270 each 1    Prenatal Vit-Iron Carbonyl-FA (PRENATAL MULTIVITAMIN) TABS Take 1 tablet by mouth daily      omeprazole (PriLOSEC) 10 mg delayed release capsule Take 2 capsules (20 mg total) by mouth 2 (two) times a day (Patient not taking: Reported on 1/10/2020) 60 capsule 0    ondansetron (ZOFRAN) 4 mg tablet Take 1 tablet (4 mg total) by mouth 4 (four) times a day as needed for nausea or vomiting (Patient not taking: Reported on 1/10/2020) 60 tablet 0     No current facility-administered medications on file prior to visit  Review of Systems not completed  Objective:        Component Value Date/Time    HGBA1C 7 7 (H) 11/12/2019 1224    HGBA1C 7 6 (H) 08/15/2018 1151      /79 (BP Location: Right arm, Patient Position: Sitting, Cuff Size: Standard)   Pulse 86   Ht 5' 4" (1 626 m)   Wt 96 7 kg (213 lb 3 2 oz)   BMI 36 60 kg/m²        Physical Exam  not completed       Time in:2:20 PM  Time out:2:20 PM

## 2020-01-10 NOTE — PROGRESS NOTES
PRECONCEPTION CONSULTATION: MATERNAL-FETAL MEDICINE    Referring physician:   Ledy Cadena, [address], [place] [id number]  [address] [place] [address], [address]    Reason for consultation: No chief complaint on file  Thank you for referring patient Geoff Connors for preconception Maternal-Fetal Medicine consultation regarding type 2 diabetes with hemoglobin A1c of 7 7%  As you know, Ms Luca Haskins is a 21y o  year-old para   Her history is as follows:    Past Medical History:   Diagnosis Date    Anemia     Clostridium difficile infection     Hyperglycemia     Hypokalemia     IUD (intrauterine device) in place     Kidney injury     L2 vertebral fracture ([facility] [address] )      Past Surgical History:   Procedure Laterality Date    CHOLECYSTECTOMY      CT COLONOSCOPY FLX DX W/COLLJ SPEC WHEN PFRMD N/A 1/10/2017    Procedure: EGD AND COLONOSCOPY;  Surgeon: Juan Bacon MD;  Location: [facility] GI LAB; Service: Gastroenterology       OB History    Para Term  AB Living   1             SAB TAB Ectopic Multiple Live Births                  # Outcome Date GA Lbr Raymundo/2nd Weight Sex Delivery Anes PTL Lv   1 Current                Gynecologic history: No LMP recorded  Patient is pregnant  Patient is not pregnant and has never had a pregnancy      Social History     Socioeconomic History    Marital status: /Civil Union     Spouse name: Not on file    Number of children: Not on file    Years of education: Not on file    Highest education level: Not on file   Occupational History    Not on file   Social Needs    Financial resource strain: Not on file    Food insecurity:     Worry: Not on file     Inability: Not on file    Transportation needs:     Medical: Not on file     Non-medical: Not on file   Tobacco Use    Smoking status: Never Smoker    Smokeless tobacco: Never Used   Substance and Sexual Activity    Alcohol use: Yes     Frequency: Monthly or less     Drinks per session: 1 or 2    Drug use: No    Sexual activity: Yes     Partners: Male     Birth control/protection: IUD     Comment: iud   Lifestyle    Physical activity:     Days per week: Not on file     Minutes per session: Not on file    Stress: Not on file   Relationships    Social connections:     Talks on phone: Not on file     Gets together: Not on file     Attends Christianity service: Not on file     Active member of club or organization: Not on file     Attends meetings of clubs or organizations: Not on file     Relationship status: Not on file    Intimate partner violence:     Fear of current or ex partner: Not on file     Emotionally abused: Not on file     Physically abused: Not on file     Forced sexual activity: Not on file   Other Topics Concern    Not on file   Social History Narrative    Not on file     Family History   Problem Relation Age of Onset    Diabetes Other     Thyroid disease Other     Diabetes type II Mother     No Known Problems Father      Family history is negative for Ashkenazi Christianity ancestry, birth defects, blindness or deafness from birth, blood clot in legs or lungs, early-onset breast ovarian or colon cancer; death of child within first year of life, diabetes, Down syndrome or other chromosome problem, genetic condition or carrier state for cystic fibrosis, sickle cell, thalassemia, Jw-Sachs, or spinal muscular atrophy; hemophilia or von Willebrand's disease; preeclampsia or hypertension, intellectual disabilities, mental illness, opiate use disorder/addiction or overdose, and stroke        Current Outpatient Medications:     albuterol (PROVENTIL HFA,VENTOLIN HFA) 90 mcg/act inhaler, Inhale 2 puffs every 6 (six) hours as needed for wheezing, Disp: 1 Inhaler, Rfl: 0    Blood Glucose Monitoring Suppl (ONETOUCH VERIO) w/Device KIT, Disp 1 meter, Disp: 1 kit, Rfl: 1    Cholecalciferol (VITAMIN D) 50 MCG (2000 UT) tablet, Take 2,000 Units by mouth daily, Disp: , Rfl:     colestipol (COLESTID) 1 g tablet, Take 1 tablet (1 g total) by mouth 2 (two) times a day (Patient taking differently: Take 1 g by mouth 2 (two) times a day as needed ), Disp: 60 tablet, Rfl: 0    cyclobenzaprine (FLEXERIL) 10 mg tablet, Take 10 mg by mouth daily as needed , Disp: , Rfl:     dicyclomine (BENTYL) 20 mg tablet, Take 1 tablet (20 mg total) by mouth every 6 (six) hours (Patient taking differently: Take 20 mg by mouth every 6 (six) hours as needed ), Disp: 60 tablet, Rfl: 2    levothyroxine 25 mcg tablet, Take 1 tablet (25 mcg total) by mouth daily, Disp: 30 tablet, Rfl: 5    metFORMIN (GLUCOPHAGE-XR) 500 mg 24 hr tablet, Titrate as directed to 4 tabs per day with food, Disp: 120 tablet, Rfl: 5    omeprazole (PriLOSEC) 10 mg delayed release capsule, Take 2 capsules (20 mg total) by mouth 2 (two) times a day (Patient taking differently: Take 20 mg by mouth 2 (two) times a day as needed ), Disp: 60 capsule, Rfl: 0    ondansetron (ZOFRAN) 4 mg tablet, Take 1 tablet (4 mg total) by mouth 4 (four) times a day as needed for nausea or vomiting, Disp: 60 tablet, Rfl: 0    ONETOUCH DELICA LANCETS FINE MISC, Check BG daily, Disp: 100 each, Rfl: 1    ONETOUCH VERIO test strip, 1 each by Other route 3 (three) times a day Check BG daily, Disp: 270 each, Rfl: 1    Prenatal Vit-Iron Carbonyl-FA (PRENATAL MULTIVITAMIN) TABS, Take 1 tablet by mouth daily, Disp: , Rfl:     Allergies   Allergen Reactions    Bactrim [Sulfamethoxazole-Trimethoprim] Hives    Dilaudid [Hydromorphone Hcl] Itching       A comprehensive review of systems was negative  Exam:  Vitals: There were no vitals taken for this visit  No exam performed today, Talking only     General appearance: alert, well appearing, and in no distress  The remainder of her physical examination was deferred as she was here today for consultation and discussion  Assessment and Plan: Ms Laura Woodall is a 21y o  year-old para  here for preconception counseling    By issue:    Problem List Items Addressed This Visit        Endocrine    Type 2 diabetes mellitus without complication, without long-term current use of insulin (HCC)    Type 2 diabetes mellitus with hyperglycemia, without long-term current use of insulin (HCC) - Primary    Relevant Orders    [facility] glucose flowsheet            We discussed that pregnancies complicated by DM are associated with increased  and maternal morbidity  Fetal risks include structural birth defects, macrosomia, shoulder dystocia, birth injuries, hypoglycemia, hyperbilirubinemia, stillbirth, and potential long-term sequelae such as obesity and impaired intellectual achievement  Maternal risks include preeclampsia and hypertensive disorders of pregnancy, and operative delivery  Aggressive treatment of DM with diet, exercise, and medications including metformin and insulin have been shown to decrease fetal and  morbidity and mortality  We have a diabetes in pregnancy program where we follow our patients closely to help achieve optimal maternal and  outcomes  Recommend the patient be referred to this program for management  Summary of Recommendations:  1  Start insulin today to achieve a hemoglobin A1c of 6 less than 6 5%  2  Enter the diabetes in pregnancy program in order to make adjustments to insulin in order to achieve goal of A1c of less than 6 5% and ideally less than 6%  3  Patient is taking prenatal vitamins  Will provide additional folic acid if not taking 1 mg daily  4  Discussed options for Universal carrier screening for genetic conditions  Patient not interested  5  Discussed continuing weight loss to achieve healthier weight  6  Discussed importance of utilizing contraception until hemoglobin A1c of less than 6 5%    7  After today's visit, the patient met with Zaki Phoenix from our diabetes in pregnancy program to initiate insulin and enter our program   I provided the patient with an order for my chart glucose monitoring  A total of 40 minutes were spent in this encounter with >50% of the time spent in face-to-face counseling and in coordination of care  At the conclusion of today's encounter, all questions were answered to her satisfaction  Thank you very much for this kind referral and please do not hesitate to contact me with any further questions or concerns      Sincerely,    Ca Rutherford MD  Attending Physician, [facility]

## 2020-01-13 DIAGNOSIS — E11.9 TYPE 2 DIABETES MELLITUS WITHOUT COMPLICATION, WITHOUT LONG-TERM CURRENT USE OF INSULIN (HCC): ICD-10-CM

## 2020-01-13 RX ORDER — BLOOD SUGAR DIAGNOSTIC
STRIP MISCELLANEOUS
Qty: 400 EACH | Refills: 1 | Status: SHIPPED | OUTPATIENT
Start: 2020-01-13 | End: 2020-04-03 | Stop reason: SDUPTHER

## 2020-01-15 ENCOUNTER — APPOINTMENT (OUTPATIENT)
Dept: LAB | Age: 25
End: 2020-01-15
Payer: COMMERCIAL

## 2020-01-15 DIAGNOSIS — E11.65 TYPE 2 DIABETES MELLITUS WITH HYPERGLYCEMIA, WITHOUT LONG-TERM CURRENT USE OF INSULIN (HCC): ICD-10-CM

## 2020-01-15 DIAGNOSIS — E55.9 VITAMIN D DEFICIENCY: ICD-10-CM

## 2020-01-15 DIAGNOSIS — E03.9 HYPOTHYROIDISM, UNSPECIFIED TYPE: ICD-10-CM

## 2020-01-15 LAB
EST. AVERAGE GLUCOSE BLD GHB EST-MCNC: 140 MG/DL
HBA1C MFR BLD: 6.5 % (ref 4.2–6.3)

## 2020-01-15 PROCEDURE — 36415 COLL VENOUS BLD VENIPUNCTURE: CPT

## 2020-01-15 PROCEDURE — 83036 HEMOGLOBIN GLYCOSYLATED A1C: CPT

## 2020-01-31 ENCOUNTER — DOCUMENTATION (OUTPATIENT)
Dept: PERINATAL CARE | Facility: CLINIC | Age: 25
End: 2020-01-31

## 2020-01-31 DIAGNOSIS — E03.9 HYPOTHYROIDISM, UNSPECIFIED TYPE: ICD-10-CM

## 2020-01-31 DIAGNOSIS — E11.65 TYPE 2 DIABETES MELLITUS WITH HYPERGLYCEMIA, WITHOUT LONG-TERM CURRENT USE OF INSULIN (HCC): Primary | ICD-10-CM

## 2020-01-31 DIAGNOSIS — E55.9 VITAMIN D DEFICIENCY: ICD-10-CM

## 2020-01-31 RX ORDER — INSULIN ASPART 100 [IU]/ML
INJECTION, SOLUTION INTRAVENOUS; SUBCUTANEOUS
Qty: 5 PEN | Refills: 0 | Status: SHIPPED | OUTPATIENT
Start: 2020-01-31 | End: 2020-05-12

## 2020-01-31 NOTE — PROGRESS NOTES
Date:  20  RE: Biju Magallanes    : 1995  Pre-conception     Am- 98, meal 1- 196, meal 2-152    Am-98, meal 1-203, meal 2-86    Am-101, meal 1- 174, meal 2- 124    Am- 101, meal 1- 171, meal 2- 138    Am- 110, meal 1-192, meal 2- 121      Current regimen:  Lantus 22 units at 4 PM daily  Works night shift  Metformin XR 1000 mg with meals twice a day  Followed by dietitian  Plan:  Increase Lantus from 22 to 26 units at 4 PM daily  Add Novolog 4 units before meal 1 and meal 2, be sure carbohydrates and protein included in meal  Try to eat 3 meals a day and 2 snacks a day as recommended by your dietitian  Continue Metformin twice a day with meals  Be sure to be testing fasting and 2 hours after start of each meal  Try to inject Novolog 10 to 15 minutes before eating and remember it will last for up to 4 hours post injection  Always have glucose available to treat hypoglycemia, use 15 by 15 rule  1/15/20 A1c 6 5%  Continue follow up with endocrinology  Date due to report next:  Monday, 2/3/20       CLAUDINE Ross, CDE  Diabetes Educator   Diabetes and Pregnancy Program

## 2020-02-04 ENCOUNTER — DOCUMENTATION (OUTPATIENT)
Dept: PERINATAL CARE | Facility: CLINIC | Age: 25
End: 2020-02-04

## 2020-02-04 DIAGNOSIS — E11.65 TYPE 2 DIABETES MELLITUS WITH HYPERGLYCEMIA, WITHOUT LONG-TERM CURRENT USE OF INSULIN (HCC): Primary | ICD-10-CM

## 2020-02-04 DIAGNOSIS — E03.9 HYPOTHYROIDISM, UNSPECIFIED TYPE: ICD-10-CM

## 2020-02-04 NOTE — PROGRESS NOTES
Date:  20  RE: Jonnathan Jackson    : 1995  Pre-conception     Am-96, post meal 183    AM - 98, meal 1- 116, meal 2- 144   2 AAm- 100, meal 1- 149, meal 2- 146   2/3 AM- 109       Mychart       Current regimen:  Lantus 26 units at 4 PM daily  Works night shift  Currently not interested in starting bolus insulin before meals but requested a sliding scale for post meals  Metformin XR 1000 mg with meals twice a day  Followed by dietitian  Plan:  Informed Ralph Colbert that insulin sliding scale for post meal is not advisable, best to try to mimic what the body does  Encouraged to keep a 3 day diet log and follow up with endocrinology to discuss treatment  Continue Lantus 26 units at 4 PM daily  Try to eat 3 meals a day and 2 snacks a day as recommended by your dietitian  Continue Metformin twice a day with meals  Always have glucose available to treat hypoglycemia, use 15 by 15 rule  1/15/20 A1c 6 5%  Date due to report next:  Follow up with endocrinology       CLAUDINE Barriga, CDE  Diabetes Educator   Diabetes and Pregnancy Program

## 2020-02-10 ENCOUNTER — APPOINTMENT (OUTPATIENT)
Dept: LAB | Age: 25
End: 2020-02-10
Payer: COMMERCIAL

## 2020-02-10 DIAGNOSIS — E03.9 HYPOTHYROIDISM, UNSPECIFIED TYPE: ICD-10-CM

## 2020-02-10 DIAGNOSIS — E55.9 VITAMIN D DEFICIENCY: ICD-10-CM

## 2020-02-10 DIAGNOSIS — E11.65 TYPE 2 DIABETES MELLITUS WITH HYPERGLYCEMIA, WITHOUT LONG-TERM CURRENT USE OF INSULIN (HCC): ICD-10-CM

## 2020-02-10 DIAGNOSIS — E78.5 HYPERLIPIDEMIA, UNSPECIFIED HYPERLIPIDEMIA TYPE: ICD-10-CM

## 2020-02-10 LAB
ALBUMIN SERPL BCP-MCNC: 4.2 G/DL (ref 3.5–5)
ALP SERPL-CCNC: 61 U/L (ref 46–116)
ALT SERPL W P-5'-P-CCNC: 32 U/L (ref 12–78)
ANION GAP SERPL CALCULATED.3IONS-SCNC: 5 MMOL/L (ref 4–13)
AST SERPL W P-5'-P-CCNC: 13 U/L (ref 5–45)
BILIRUB SERPL-MCNC: 0.48 MG/DL (ref 0.2–1)
BUN SERPL-MCNC: 12 MG/DL (ref 5–25)
CALCIUM SERPL-MCNC: 9.4 MG/DL (ref 8.3–10.1)
CHLORIDE SERPL-SCNC: 106 MMOL/L (ref 100–108)
CHOLEST SERPL-MCNC: 184 MG/DL (ref 50–200)
CO2 SERPL-SCNC: 26 MMOL/L (ref 21–32)
CREAT SERPL-MCNC: 0.59 MG/DL (ref 0.6–1.3)
EST. AVERAGE GLUCOSE BLD GHB EST-MCNC: 131 MG/DL
GFR SERPL CREATININE-BSD FRML MDRD: 128 ML/MIN/1.73SQ M
GLUCOSE P FAST SERPL-MCNC: 97 MG/DL (ref 65–99)
HBA1C MFR BLD: 6.2 % (ref 4.2–6.3)
HDLC SERPL-MCNC: 37 MG/DL
LDLC SERPL CALC-MCNC: 120 MG/DL (ref 0–100)
POTASSIUM SERPL-SCNC: 3.6 MMOL/L (ref 3.5–5.3)
PROT SERPL-MCNC: 7.3 G/DL (ref 6.4–8.2)
SODIUM SERPL-SCNC: 137 MMOL/L (ref 136–145)
T4 FREE SERPL-MCNC: 1.02 NG/DL (ref 0.76–1.46)
TRIGL SERPL-MCNC: 137 MG/DL
TSH SERPL DL<=0.05 MIU/L-ACNC: 4.13 UIU/ML (ref 0.36–3.74)

## 2020-02-10 PROCEDURE — 36415 COLL VENOUS BLD VENIPUNCTURE: CPT

## 2020-02-10 PROCEDURE — 80061 LIPID PANEL: CPT

## 2020-02-10 PROCEDURE — 80053 COMPREHEN METABOLIC PANEL: CPT

## 2020-02-10 PROCEDURE — 84443 ASSAY THYROID STIM HORMONE: CPT

## 2020-02-10 PROCEDURE — 83036 HEMOGLOBIN GLYCOSYLATED A1C: CPT

## 2020-02-10 PROCEDURE — 84439 ASSAY OF FREE THYROXINE: CPT

## 2020-02-13 ENCOUNTER — DOCUMENTATION (OUTPATIENT)
Dept: PERINATAL CARE | Facility: CLINIC | Age: 25
End: 2020-02-13

## 2020-02-13 DIAGNOSIS — E11.65 TYPE 2 DIABETES MELLITUS WITH HYPERGLYCEMIA, WITHOUT LONG-TERM CURRENT USE OF INSULIN (HCC): Primary | ICD-10-CM

## 2020-02-13 DIAGNOSIS — E03.9 HYPOTHYROIDISM, UNSPECIFIED TYPE: ICD-10-CM

## 2020-02-13 NOTE — PROGRESS NOTES
Date:  20  RE: Kalina Wahl    : 1995  Pre-conception    Here are my blood sugars (morning, 1st meal, 2nd meal) after starting 2 units of humalog with 2 meals    - 111, 169, 142   - 97, 118, 114   - 96, 147   - 91, 123, 109   - 100, 145, 102(forgot to take humalog)   2/10- 95, 157   - 92, 152   - 102, 108, 101     2/10 and  I had the stomach flu and could barely eat so I only checked twice   Fransisca Luke       Current regimen:  Lantus 26 units at 4 PM daily  Works night shift  Humalog 2 units before meal 1 and 2 per Dr Cindy Danielson recommendation  Metformin XR 1000 mg with meals twice a day  Followed by dietitian  Self monitoring fasting and 2 hours after start of 2 meals  Plan:  Increase Lantus to 28 units at 4 PM daily  Continue Humalog and hold if not eating  Try to eat 3 meals a day and 2 snacks a day as recommended by your dietitian  Continue Metformin twice a day with meals  Always have glucose available to treat hypoglycemia, use 15 by 15 rule  2/10/20 A1c 6 2%, goal is <6% prior to pregnancy  Follow up with endocrinology  Date due to report next:  Monday, 20        CLAUDINE Joseph, CDE  Diabetes Educator   Diabetes and Pregnancy Program

## 2020-02-21 DIAGNOSIS — E03.9 HYPOTHYROIDISM, UNSPECIFIED TYPE: Primary | ICD-10-CM

## 2020-02-21 RX ORDER — LEVOTHYROXINE SODIUM 0.05 MG/1
50 TABLET ORAL DAILY
Qty: 30 TABLET | Refills: 2 | Status: SHIPPED | OUTPATIENT
Start: 2020-02-21 | End: 2020-02-28 | Stop reason: SDUPTHER

## 2020-02-21 NOTE — TELEPHONE ENCOUNTER
----- Message from Vasyl Ferreira sent at 2/21/2020  2:11 AM EST -----  Regarding: Prescription Question  Contact: [phone]  Hi,  I started taking the 50mcg of levothyroxine  I'm almost out since I have to take 2 pills at a time  Could you please call in a script with the new dosage  The pharmacy will not fill more  I do not have enough to get me through until I see you in a week     Thank you,  Adele Kohler

## 2020-02-27 ENCOUNTER — TELEPHONE (OUTPATIENT)
Dept: ENDOCRINOLOGY | Facility: CLINIC | Age: 25
End: 2020-02-27

## 2020-02-27 NOTE — TELEPHONE ENCOUNTER
Needs refill [facility]othyroxine [facility] mcg qd [facility]  Last seen 12- and fu 3-     Thank you

## 2020-02-28 DIAGNOSIS — E03.9 HYPOTHYROIDISM, UNSPECIFIED TYPE: ICD-10-CM

## 2020-02-28 RX ORDER — LEVOTHYROXINE SODIUM 0.05 MG/1
50 TABLET ORAL DAILY
Qty: 30 TABLET | Refills: 2 | Status: SHIPPED | OUTPATIENT
Start: 2020-02-28 | End: 2020-07-27

## 2020-03-02 ENCOUNTER — DOCUMENTATION (OUTPATIENT)
Dept: PERINATAL CARE | Facility: CLINIC | Age: 25
End: 2020-03-02

## 2020-03-02 DIAGNOSIS — E11.65 TYPE 2 DIABETES MELLITUS WITH HYPERGLYCEMIA, WITHOUT LONG-TERM CURRENT USE OF INSULIN (HCC): Primary | ICD-10-CM

## 2020-03-02 DIAGNOSIS — E03.9 HYPOTHYROIDISM, UNSPECIFIED TYPE: ICD-10-CM

## 2020-03-02 NOTE — PROGRESS NOTES
Date:  20  RE: Tyrell Fuchs    : 1995  Pre-conception    - 95, 114, 129   - 109, 159, 85   - 138, 118   - 101, 135, 148   - 92, 130, 148   - 106, 145, 131   - 91, 157, 143   - 99, 175   - 104, 112   3/1- 89, 109, 97       Mychart       Current regimen:  Lantus 28 units at 4 PM daily  Works night shift  Humalog 2 units before meal 1 and 2 per Dr Philip Graham recommendation  Metformin XR 1000 mg with meals twice a day  Followed by dietitian  Self monitoring fasting and 2 hours after start of 2 meals  Plan:  Continue Lantus 28 units at 4 PM daily  Increase Humalog to 4 units before meal 1 and 2, hold if not eating  Try to eat 3 meals a day and 2 snacks a day as recommended by your dietitian  Continue Metformin twice a day with meals and continue testing  Always have glucose available to treat hypoglycemia, use 15 by 15 rule  2/10/20 A1c 6 2%, goal is <6% prior to pregnancy per Dr Nathen Yu  Follow up with endocrinology  Date due to report next:  Monday, 3/9/20        CLAUDINE Alba, CDE, BC-ADM  Diabetes Educator   Diabetes and Pregnancy Program

## 2020-03-11 ENCOUNTER — DOCUMENTATION (OUTPATIENT)
Dept: PERINATAL CARE | Facility: CLINIC | Age: 25
End: 2020-03-11

## 2020-03-11 NOTE — PROGRESS NOTES
Date:  20  RE: Fidel Dangelo    : 1995  Pre-conception    3/1- 89, 109, 97   3- 86, 123, 105   3/3- 81, 103, 118   3/4- 88, 144, 108   3/5- 82, 141, 94   3/6- 91, 124   3/7- 105, 155, 141   3/8- 94, 144, 129   3/9- 95, 115   3/10- 90, 142, 132    Mychart       Current regimen:  Lantus 28 units at 4 PM daily  Works night shift  Humalog 2 units before meal 1 and 2 per Dr Samm Ortega recommendation  Reports not increasing to 4 units before meals  Metformin XR 1000 mg with meals twice a day  Followed by dietitian  Self monitoring fasting and 2 hours after start of 2 meals  Plan:  Continue Lantus 28 units at 4 PM daily  Increase Humalog to 4 units before meal 1 and 2, hold if not eating  Try to eat 3 meals a day and 2 snacks a day as recommended by your dietitian  Continue Metformin twice a day with meals  Always have glucose available to treat hypoglycemia, use 15 by 15 rule  2/10/20 A1c 6 2%, goal is <6% prior to pregnancy  Follow up with endocrinology  Date due to report next:  Monday, 3/16/20        CLAUDINE Onofre, CDE, BC-ADM  Diabetes Educator   Diabetes and Pregnancy Program

## 2020-03-15 DIAGNOSIS — E11.9 TYPE 2 DIABETES MELLITUS WITHOUT COMPLICATION, WITHOUT LONG-TERM CURRENT USE OF INSULIN (HCC): ICD-10-CM

## 2020-03-16 RX ORDER — LANCETS 30 GAUGE
EACH MISCELLANEOUS
Qty: 100 EACH | Refills: 0 | Status: SHIPPED | OUTPATIENT
Start: 2020-03-16 | End: 2020-03-18 | Stop reason: SDUPTHER

## 2020-03-18 ENCOUNTER — TELEMEDICINE (OUTPATIENT)
Dept: ENDOCRINOLOGY | Facility: CLINIC | Age: 25
End: 2020-03-18
Payer: COMMERCIAL

## 2020-03-18 DIAGNOSIS — E55.9 VITAMIN D DEFICIENCY: ICD-10-CM

## 2020-03-18 DIAGNOSIS — E03.9 HYPOTHYROIDISM, UNSPECIFIED TYPE: ICD-10-CM

## 2020-03-18 DIAGNOSIS — E11.65 TYPE 2 DIABETES MELLITUS WITH HYPERGLYCEMIA, WITHOUT LONG-TERM CURRENT USE OF INSULIN (HCC): ICD-10-CM

## 2020-03-18 DIAGNOSIS — E11.9 TYPE 2 DIABETES MELLITUS WITHOUT COMPLICATION, WITHOUT LONG-TERM CURRENT USE OF INSULIN (HCC): ICD-10-CM

## 2020-03-18 DIAGNOSIS — R53.83 FATIGUE, UNSPECIFIED TYPE: Primary | ICD-10-CM

## 2020-03-18 PROCEDURE — 99214 OFFICE O/P EST MOD 30 MIN: CPT | Performed by: PHYSICIAN ASSISTANT

## 2020-03-18 RX ORDER — INSULIN GLARGINE 100 [IU]/ML
INJECTION, SOLUTION SUBCUTANEOUS
Qty: 15 ML | Refills: 2
Start: 2020-03-18 | End: 2020-06-15 | Stop reason: SDUPTHER

## 2020-03-18 RX ORDER — LANCETS 30 GAUGE
EACH MISCELLANEOUS
Qty: 400 EACH | Refills: 1 | Status: SHIPPED | OUTPATIENT
Start: 2020-03-18 | End: 2020-11-03 | Stop reason: ALTCHOICE

## 2020-03-18 NOTE — PROGRESS NOTES
Virtual Brief Visit    Reason for visit is Type 2 Diabetes, Hypothyroidism    This virtual check-in was done via telephone  Encounter provider Mart Mendez PA-C    Provider located at [address] [place] [id number]      Recent Visits  No visits were found meeting these conditions  Showing recent visits within past 7 days and meeting all other requirements     Future Appointments  No visits were found meeting these conditions  Showing future appointments within next 150 days and meeting all other requirements        Patient agrees to participate in a virtual check in via telephone or video visit instead of presenting to the office to address urgent/immediate medical needs  Patient is aware this is a billable service  After connecting through telephone, the patient was identified by name and date of birth  Meghan Mckenzie was informed that this was a telemedicine visit and that the exam was being conducted confidentially over secure lines  My office door was closed  No one else was in the room  She acknowledged consent and understanding of privacy and security of the virtual check-in visit  I informed the patient that I have reviewed her record in Epic and presented the opportunity for her to ask any questions regarding the visit today  The patient initiated communication and agreed to participate  Isaiah De La Rosa is a 21 y o  female with Type 2 Diabetes ,Hypothyroidism, and Vitamin D Deficiency  For the Type 2 diabetes, she has seen the  center for pre-conception visit for Type 2 Diabetes  She has been started on basal bolus regimen and is taking lantus 28 units daily and Novolog 4 units before two meals per day    Blood sugars were doing great 80s fasting and 120-130 post meal however over the past week have been a little "wonky" with no change in diet/activity level  Following dietician's dietary plan  For hypothyroidism, taking levothyroxine 50mcg daily which was increased after feb labs  For Vitamin D Deficiency, taking supplements  Was feeling great after first starting on levothyroxine but now reports more fatigue overall- feels similar to many years ago when she had iron deficiency  No chance of pregnancy- just finished her period  Past Medical History:   Diagnosis Date    Anemia     Clostridium difficile infection     Hyperglycemia     Hypokalemia     IUD (intrauterine device) in place     Kidney injury     L2 vertebral fracture ([address] )        Past Surgical History:   Procedure Laterality Date    CHOLECYSTECTOMY      [place] COLONOSCOPY FLX DX W/COLLJ SPEC WHEN PFRMD N/A 1/10/2017    Procedure: EGD AND COLONOSCOPY;  Surgeon: Domonique Diaz MD;  Location: [facility] GI LAB; Service: Gastroenterology       Current Outpatient Medications   Medication Sig Dispense Refill    albuterol (PROVENTIL HFA,VENTOLIN HFA) 90 mcg/act inhaler Inhale 2 puffs every 6 (six) hours as needed for wheezing 1 Inhaler 0    Blood Glucose Monitoring Suppl (ONETOUCH VERIO) w/Device KIT Disp 1 meter 1 kit 1    Cholecalciferol (VITAMIN D) 50 MCG (2000 UT) tablet Take 2,000 Units by mouth daily      colestipol (COLESTID) 1 g tablet Take 1 tablet (1 g total) by mouth 2 (two) times a day (Patient taking differently: Take 1 g by mouth 2 (two) times a day as needed ) 60 tablet 0    cyclobenzaprine (FLEXERIL) 10 mg tablet Take 10 mg by mouth daily as needed       dicyclomine (BENTYL) 20 mg tablet Take 1 tablet (20 mg total) by mouth every 6 (six) hours (Patient taking differently: Take 20 mg by mouth every 6 (six) hours as needed ) 60 tablet 2    Insulin Aspart FlexPen (NOVOLOG FLEXPEN) 100 UNIT/ML SOPN Inject SC 4 units before meal 1 and meal 2  5 pen 0    Insulin Pen Needle 31G X 5 MM MISC Inject under the skin daily at bedtime Use one a day or as directed   100 each 1    LANTUS SOLOSTAR 100 units/mL injection pen Inject SC 28 units at 4 PM daily  To be titrated  15 mL 2    levothyroxine 50 mcg tablet Take 1 tablet (50 mcg total) by mouth daily 30 tablet 2    metFORMIN (GLUCOPHAGE-XR) 500 mg 24 hr tablet Titrate as directed to 4 tabs per day with food 120 tablet 5    ondansetron (ZOFRAN) 4 mg tablet Take 1 tablet (4 mg total) by mouth 4 (four) times a day as needed for nausea or vomiting (Patient not taking: Reported on 1/10/2020) 60 tablet 0    OneTouch Delica Lancets 40M MISC CHECK BLOOD GLUCOSE 4x per day 400 each 1    ONETOUCH VERIO test strip Tests 4 times a day or as directed  90 day supply  400 each 1    Prenatal Vit-Iron Carbonyl-FA (PRENATAL MULTIVITAMIN) TABS Take 1 tablet by mouth daily       No current facility-administered medications for this visit  Allergies   Allergen Reactions    Bactrim [Sulfamethoxazole-Trimethoprim] Hives    Dilaudid [Hydromorphone Hcl] Itching       Assessment    Rabia telephone assessment is stable, but TSH/A1C not at goal for pregnancy   Disposition:    Continue current Diabetes regimen- send BG log for review and adjustment if needed  Continue lifestyle modifications  Discussed option of DexCom G6 CGM but she declines, will reconsider in future  Reviewed  notes, Goal A1C <6 prior to conception  Levothyroxine has been increased after last TSH above goal  Check TSH/Free T4 next week goal TSH<2 5 prior to pregnancy  Complete labs as ordered  Follow up in 3 months  I spent 10 minutes with the patient during this virtual check-in visit

## 2020-03-26 ENCOUNTER — APPOINTMENT (OUTPATIENT)
Dept: LAB | Age: 25
End: 2020-03-26
Payer: COMMERCIAL

## 2020-03-26 DIAGNOSIS — E55.9 VITAMIN D DEFICIENCY: ICD-10-CM

## 2020-03-26 DIAGNOSIS — R53.83 FATIGUE, UNSPECIFIED TYPE: ICD-10-CM

## 2020-03-26 DIAGNOSIS — E11.65 TYPE 2 DIABETES MELLITUS WITH HYPERGLYCEMIA, WITHOUT LONG-TERM CURRENT USE OF INSULIN (HCC): Primary | ICD-10-CM

## 2020-03-26 DIAGNOSIS — E03.9 HYPOTHYROIDISM, UNSPECIFIED TYPE: ICD-10-CM

## 2020-03-26 LAB
25(OH)D3 SERPL-MCNC: 30.7 NG/ML (ref 30–100)
BASOPHILS # BLD AUTO: 0.04 THOUSANDS/ΜL (ref 0–0.1)
BASOPHILS NFR BLD AUTO: 1 % (ref 0–1)
EOSINOPHIL # BLD AUTO: 0.17 THOUSAND/ΜL (ref 0–0.61)
EOSINOPHIL NFR BLD AUTO: 2 % (ref 0–6)
ERYTHROCYTE [DISTWIDTH] IN BLOOD BY AUTOMATED COUNT: 13.1 % (ref 11.6–15.1)
FERRITIN SERPL-MCNC: 46 NG/ML (ref 8–388)
HCT VFR BLD AUTO: 35.8 % (ref 34.8–46.1)
HGB BLD-MCNC: 11.5 G/DL (ref 11.5–15.4)
IMM GRANULOCYTES # BLD AUTO: 0.03 THOUSAND/UL (ref 0–0.2)
IMM GRANULOCYTES NFR BLD AUTO: 0 % (ref 0–2)
LYMPHOCYTES # BLD AUTO: 2.86 THOUSANDS/ΜL (ref 0.6–4.47)
LYMPHOCYTES NFR BLD AUTO: 37 % (ref 14–44)
MCH RBC QN AUTO: 26.7 PG (ref 26.8–34.3)
MCHC RBC AUTO-ENTMCNC: 32.1 G/DL (ref 31.4–37.4)
MCV RBC AUTO: 83 FL (ref 82–98)
MONOCYTES # BLD AUTO: 0.54 THOUSAND/ΜL (ref 0.17–1.22)
MONOCYTES NFR BLD AUTO: 7 % (ref 4–12)
NEUTROPHILS # BLD AUTO: 4.15 THOUSANDS/ΜL (ref 1.85–7.62)
NEUTS SEG NFR BLD AUTO: 53 % (ref 43–75)
NRBC BLD AUTO-RTO: 0 /100 WBCS
PLATELET # BLD AUTO: 321 THOUSANDS/UL (ref 149–390)
PMV BLD AUTO: 10.4 FL (ref 8.9–12.7)
RBC # BLD AUTO: 4.31 MILLION/UL (ref 3.81–5.12)
T4 FREE SERPL-MCNC: 1.14 NG/DL (ref 0.76–1.46)
TSH SERPL DL<=0.05 MIU/L-ACNC: 1.37 UIU/ML (ref 0.36–3.74)
WBC # BLD AUTO: 7.79 THOUSAND/UL (ref 4.31–10.16)

## 2020-03-26 PROCEDURE — 84443 ASSAY THYROID STIM HORMONE: CPT

## 2020-03-26 PROCEDURE — 36415 COLL VENOUS BLD VENIPUNCTURE: CPT

## 2020-03-26 PROCEDURE — 82306 VITAMIN D 25 HYDROXY: CPT

## 2020-03-26 PROCEDURE — 85025 COMPLETE CBC W/AUTO DIFF WBC: CPT

## 2020-03-26 PROCEDURE — 82728 ASSAY OF FERRITIN: CPT

## 2020-03-26 PROCEDURE — 84439 ASSAY OF FREE THYROXINE: CPT

## 2020-03-26 RX ORDER — GLUCOSAMINE HCL/CHONDROITIN SU 500-400 MG
CAPSULE ORAL
Qty: 120 EACH | Refills: 5 | Status: SHIPPED | OUTPATIENT
Start: 2020-03-26

## 2020-04-03 DIAGNOSIS — E11.9 TYPE 2 DIABETES MELLITUS WITHOUT COMPLICATION, WITHOUT LONG-TERM CURRENT USE OF INSULIN (HCC): ICD-10-CM

## 2020-04-03 RX ORDER — BLOOD SUGAR DIAGNOSTIC
STRIP MISCELLANEOUS
Qty: 400 EACH | Refills: 3 | Status: SHIPPED | OUTPATIENT
Start: 2020-04-03 | End: 2020-04-06

## 2020-04-06 DIAGNOSIS — E11.9 TYPE 2 DIABETES MELLITUS WITHOUT COMPLICATION, WITHOUT LONG-TERM CURRENT USE OF INSULIN (HCC): ICD-10-CM

## 2020-04-06 RX ORDER — BLOOD SUGAR DIAGNOSTIC
STRIP MISCELLANEOUS
Qty: 400 EACH | Refills: 3 | Status: SHIPPED | OUTPATIENT
Start: 2020-04-06 | End: 2020-10-05 | Stop reason: SDUPTHER

## 2020-05-04 DIAGNOSIS — E55.9 VITAMIN D DEFICIENCY: ICD-10-CM

## 2020-05-04 DIAGNOSIS — E11.65 TYPE 2 DIABETES MELLITUS WITH HYPERGLYCEMIA, WITHOUT LONG-TERM CURRENT USE OF INSULIN (HCC): ICD-10-CM

## 2020-05-04 DIAGNOSIS — E03.9 HYPOTHYROIDISM, UNSPECIFIED TYPE: ICD-10-CM

## 2020-05-07 DIAGNOSIS — E11.9 TYPE 2 DIABETES MELLITUS WITHOUT COMPLICATION, WITHOUT LONG-TERM CURRENT USE OF INSULIN (HCC): ICD-10-CM

## 2020-05-07 RX ORDER — METFORMIN HYDROCHLORIDE 500 MG/1
TABLET, EXTENDED RELEASE ORAL
Qty: 120 TABLET | Refills: 3 | Status: SHIPPED | OUTPATIENT
Start: 2020-05-07 | End: 2020-09-08

## 2020-05-12 DIAGNOSIS — E03.9 HYPOTHYROIDISM, UNSPECIFIED TYPE: ICD-10-CM

## 2020-05-12 DIAGNOSIS — E55.9 VITAMIN D DEFICIENCY: ICD-10-CM

## 2020-05-12 DIAGNOSIS — E11.65 TYPE 2 DIABETES MELLITUS WITH HYPERGLYCEMIA, WITHOUT LONG-TERM CURRENT USE OF INSULIN (HCC): ICD-10-CM

## 2020-05-12 RX ORDER — INSULIN ASPART 100 [IU]/ML
INJECTION, SOLUTION INTRAVENOUS; SUBCUTANEOUS
Qty: 15 PEN | Refills: 0 | Status: SHIPPED | OUTPATIENT
Start: 2020-05-12 | End: 2020-12-21

## 2020-05-14 DIAGNOSIS — E11.65 TYPE 2 DIABETES MELLITUS WITH HYPERGLYCEMIA, WITHOUT LONG-TERM CURRENT USE OF INSULIN (HCC): Primary | ICD-10-CM

## 2020-05-14 RX ORDER — BLOOD-GLUCOSE SENSOR
EACH MISCELLANEOUS
Qty: 1 EACH | Refills: 12 | Status: SHIPPED | OUTPATIENT
Start: 2020-05-14 | End: 2021-06-22

## 2020-05-14 RX ORDER — BLOOD-GLUCOSE,RECEIVER,CONT
EACH MISCELLANEOUS
Qty: 1 DEVICE | Refills: 0 | Status: SHIPPED | OUTPATIENT
Start: 2020-05-14 | End: 2021-06-22

## 2020-05-14 RX ORDER — BLOOD-GLUCOSE TRANSMITTER
EACH MISCELLANEOUS
Qty: 1 EACH | Refills: 3 | Status: SHIPPED | OUTPATIENT
Start: 2020-05-14 | End: 2021-06-22

## 2020-05-21 ENCOUNTER — APPOINTMENT (OUTPATIENT)
Dept: LAB | Age: 25
End: 2020-05-21
Payer: COMMERCIAL

## 2020-05-21 ENCOUNTER — TRANSCRIBE ORDERS (OUTPATIENT)
Dept: ADMINISTRATIVE | Facility: HOSPITAL | Age: 25
End: 2020-05-21

## 2020-05-21 DIAGNOSIS — O24.419 GESTATIONAL DIABETES MELLITUS (GDM): ICD-10-CM

## 2020-05-21 DIAGNOSIS — E03.9 HYPOTHYROIDISM, UNSPECIFIED TYPE: ICD-10-CM

## 2020-05-21 DIAGNOSIS — E11.65 TYPE 2 DIABETES MELLITUS WITH HYPERGLYCEMIA, WITHOUT LONG-TERM CURRENT USE OF INSULIN (HCC): ICD-10-CM

## 2020-05-21 DIAGNOSIS — E03.9 HYPOTHYROIDISM, UNSPECIFIED TYPE: Primary | ICD-10-CM

## 2020-05-21 DIAGNOSIS — O09.899 SUPERVISION OF OTHER HIGH RISK PREGNANCIES, UNSPECIFIED TRIMESTER: Primary | ICD-10-CM

## 2020-05-21 DIAGNOSIS — Z32.01 PREGNANCY EXAMINATION OR TEST, POSITIVE RESULT: ICD-10-CM

## 2020-05-21 LAB
ABO GROUP BLD: NORMAL
B-HCG SERPL-ACNC: 2315 MIU/ML
BLD GP AB SCN SERPL QL: NEGATIVE
EST. AVERAGE GLUCOSE BLD GHB EST-MCNC: 117 MG/DL
HBA1C MFR BLD: 5.7 %
RH BLD: POSITIVE
SPECIMEN EXPIRATION DATE: NORMAL
T4 FREE SERPL-MCNC: 1.18 NG/DL (ref 0.76–1.46)
TSH SERPL DL<=0.05 MIU/L-ACNC: 0.47 UIU/ML (ref 0.36–3.74)

## 2020-05-21 PROCEDURE — 84443 ASSAY THYROID STIM HORMONE: CPT

## 2020-05-21 PROCEDURE — 83036 HEMOGLOBIN GLYCOSYLATED A1C: CPT

## 2020-05-21 PROCEDURE — 86850 RBC ANTIBODY SCREEN: CPT

## 2020-05-21 PROCEDURE — 86901 BLOOD TYPING SEROLOGIC RH(D): CPT

## 2020-05-21 PROCEDURE — 36415 COLL VENOUS BLD VENIPUNCTURE: CPT

## 2020-05-21 PROCEDURE — 84439 ASSAY OF FREE THYROXINE: CPT

## 2020-05-21 PROCEDURE — 84702 CHORIONIC GONADOTROPIN TEST: CPT

## 2020-05-21 PROCEDURE — 86900 BLOOD TYPING SEROLOGIC ABO: CPT

## 2020-05-27 ENCOUNTER — TELEPHONE (OUTPATIENT)
Dept: PERINATAL CARE | Facility: CLINIC | Age: 25
End: 2020-05-27

## 2020-05-27 ENCOUNTER — DOCUMENTATION (OUTPATIENT)
Dept: PERINATAL CARE | Facility: CLINIC | Age: 25
End: 2020-05-27

## 2020-06-01 ENCOUNTER — DOCUMENTATION (OUTPATIENT)
Dept: PERINATAL CARE | Facility: CLINIC | Age: 25
End: 2020-06-01

## 2020-06-02 ENCOUNTER — TELEMEDICINE (OUTPATIENT)
Dept: DIABETES SERVICES | Facility: CLINIC | Age: 25
End: 2020-06-02
Payer: COMMERCIAL

## 2020-06-02 DIAGNOSIS — E11.9 TYPE 2 DIABETES MELLITUS WITHOUT COMPLICATION, WITHOUT LONG-TERM CURRENT USE OF INSULIN (HCC): Primary | ICD-10-CM

## 2020-06-02 PROCEDURE — 97803 MED NUTRITION INDIV SUBSEQ: CPT | Performed by: DIETITIAN, REGISTERED

## 2020-06-03 ENCOUNTER — TELEMEDICINE (OUTPATIENT)
Dept: PERINATAL CARE | Facility: CLINIC | Age: 25
End: 2020-06-03
Payer: COMMERCIAL

## 2020-06-03 VITALS — HEIGHT: 64 IN | BODY MASS INDEX: 35 KG/M2 | WEIGHT: 205 LBS

## 2020-06-03 DIAGNOSIS — E11.9 TYPE 2 DIABETES MELLITUS WITH HEMOGLOBIN A1C GOAL OF LESS THAN 7.0% (HCC): ICD-10-CM

## 2020-06-03 DIAGNOSIS — O99.211 OBESITY AFFECTING PREGNANCY IN FIRST TRIMESTER: ICD-10-CM

## 2020-06-03 DIAGNOSIS — O24.111 PRE-EXISTING TYPE 2 DIABETES MELLITUS IN PREGNANCY IN FIRST TRIMESTER: Primary | ICD-10-CM

## 2020-06-03 DIAGNOSIS — E03.9 HYPOTHYROIDISM, UNSPECIFIED TYPE: ICD-10-CM

## 2020-06-03 DIAGNOSIS — Z3A.01 LESS THAN 8 WEEKS GESTATION OF PREGNANCY: ICD-10-CM

## 2020-06-03 DIAGNOSIS — E55.9 VITAMIN D DEFICIENCY: ICD-10-CM

## 2020-06-03 PROCEDURE — 99215 OFFICE O/P EST HI 40 MIN: CPT | Performed by: NURSE PRACTITIONER

## 2020-06-03 RX ORDER — LANOLIN ALCOHOL/MO/W.PET/CERES
400 CREAM (GRAM) TOPICAL DAILY
COMMUNITY
End: 2021-07-22

## 2020-06-05 ENCOUNTER — TELEMEDICINE (OUTPATIENT)
Dept: PERINATAL CARE | Facility: CLINIC | Age: 25
End: 2020-06-05
Payer: COMMERCIAL

## 2020-06-05 DIAGNOSIS — Z3A.08 8 WEEKS GESTATION OF PREGNANCY: ICD-10-CM

## 2020-06-05 DIAGNOSIS — O24.111 PRE-EXISTING TYPE 2 DIABETES MELLITUS IN PREGNANCY IN FIRST TRIMESTER: Primary | ICD-10-CM

## 2020-06-05 PROCEDURE — 99213 OFFICE O/P EST LOW 20 MIN: CPT | Performed by: DIETITIAN, REGISTERED

## 2020-06-10 ENCOUNTER — DOCUMENTATION (OUTPATIENT)
Dept: PERINATAL CARE | Facility: CLINIC | Age: 25
End: 2020-06-10

## 2020-06-15 ENCOUNTER — TELEPHONE (OUTPATIENT)
Dept: OTHER | Facility: OTHER | Age: 25
End: 2020-06-15

## 2020-06-15 DIAGNOSIS — E03.9 HYPOTHYROIDISM, UNSPECIFIED TYPE: ICD-10-CM

## 2020-06-15 DIAGNOSIS — E11.65 TYPE 2 DIABETES MELLITUS WITH HYPERGLYCEMIA, WITHOUT LONG-TERM CURRENT USE OF INSULIN (HCC): ICD-10-CM

## 2020-06-15 DIAGNOSIS — E55.9 VITAMIN D DEFICIENCY: ICD-10-CM

## 2020-06-15 RX ORDER — INSULIN GLARGINE 100 [IU]/ML
INJECTION, SOLUTION SUBCUTANEOUS
Qty: 15 ML | Refills: 6
Start: 2020-06-15 | End: 2020-06-16

## 2020-06-16 DIAGNOSIS — E11.65 TYPE 2 DIABETES MELLITUS WITH HYPERGLYCEMIA, WITHOUT LONG-TERM CURRENT USE OF INSULIN (HCC): ICD-10-CM

## 2020-06-16 DIAGNOSIS — E55.9 VITAMIN D DEFICIENCY: ICD-10-CM

## 2020-06-16 DIAGNOSIS — E03.9 HYPOTHYROIDISM, UNSPECIFIED TYPE: ICD-10-CM

## 2020-06-16 RX ORDER — INSULIN GLARGINE 100 [IU]/ML
INJECTION, SOLUTION SUBCUTANEOUS
Qty: 15 ML | Refills: 5 | OUTPATIENT
Start: 2020-06-16

## 2020-06-16 RX ORDER — INSULIN GLARGINE 100 [IU]/ML
30 INJECTION, SOLUTION SUBCUTANEOUS
Qty: 15 ML | Refills: 1 | Status: SHIPPED | OUTPATIENT
Start: 2020-06-16 | End: 2020-09-09 | Stop reason: SDUPTHER

## 2020-06-17 ENCOUNTER — DOCUMENTATION (OUTPATIENT)
Dept: PERINATAL CARE | Facility: CLINIC | Age: 25
End: 2020-06-17

## 2020-06-17 DIAGNOSIS — O24.111 PRE-EXISTING TYPE 2 DIABETES MELLITUS IN PREGNANCY IN FIRST TRIMESTER: Primary | ICD-10-CM

## 2020-06-18 ENCOUNTER — TELEPHONE (OUTPATIENT)
Dept: PERINATAL CARE | Facility: CLINIC | Age: 25
End: 2020-06-18

## 2020-06-18 NOTE — TELEPHONE ENCOUNTER
----- Message from Shama Redmond sent at 6/17/2020  3:22 PM EDT -----  Jhon Rivas,   Can one of you reach out to this patient to schedule 30 minute f/u with dietitian to review 3 day food log either this week sometime or next, thanks

## 2020-06-22 ENCOUNTER — TELEPHONE (OUTPATIENT)
Dept: PERINATAL CARE | Facility: CLINIC | Age: 25
End: 2020-06-22

## 2020-06-26 ENCOUNTER — DOCUMENTATION (OUTPATIENT)
Dept: PERINATAL CARE | Facility: CLINIC | Age: 25
End: 2020-06-26

## 2020-07-01 ENCOUNTER — APPOINTMENT (OUTPATIENT)
Dept: LAB | Age: 25
End: 2020-07-01
Payer: COMMERCIAL

## 2020-07-01 ENCOUNTER — DOCUMENTATION (OUTPATIENT)
Dept: PERINATAL CARE | Facility: CLINIC | Age: 25
End: 2020-07-01

## 2020-07-01 DIAGNOSIS — O24.111 PRE-EXISTING TYPE 2 DIABETES MELLITUS DURING PREGNANCY IN FIRST TRIMESTER: Primary | ICD-10-CM

## 2020-07-01 DIAGNOSIS — E03.9 HYPOTHYROIDISM, UNSPECIFIED TYPE: ICD-10-CM

## 2020-07-01 LAB
T4 FREE SERPL-MCNC: 1.31 NG/DL (ref 0.76–1.46)
TSH SERPL DL<=0.05 MIU/L-ACNC: 0.09 UIU/ML (ref 0.36–3.74)

## 2020-07-01 PROCEDURE — 84439 ASSAY OF FREE THYROXINE: CPT

## 2020-07-01 PROCEDURE — 84443 ASSAY THYROID STIM HORMONE: CPT

## 2020-07-01 PROCEDURE — 36415 COLL VENOUS BLD VENIPUNCTURE: CPT

## 2020-07-01 NOTE — PROGRESS NOTES
Date:  20  RE: Sandy England    : 1995   OB: Edgarthania [employer]  Pre-existing T2DM, first pregnancy      82  88     123  120     118  112     70     127  150     75  104   BG values copied from [employer]  Current regimen:  Lantus 30 units daily at 4 PM    Humalog 5 units before 2 meals a day, holds if not eating  Recommended 1800 calorie meal plan with 3 meals/snacks including balance of carbohydrate, protein and fat as tolerated  Increase to 2100 calories for 2nd and 3rd trimester  Self monitoring blood glucose with One Touch Verio meter, fasting and after meals she is able to tolerate related to nausea  Does not test after breakfast due to going to bed right after eating after working night shift  Plan:  Continue current regimen but advised patient, since pregnant, to attempt to follow it closely eating 3 meals/snacks and testing fasting and 2 hours after start of each meal  Advised to set an alarm to test during sleep so BG may be evaluated after first meal   Advised to schedule second diabetes education appointment to complete initial education during pregnancy, contact info provided  Patient did not increase Humalog dose as previously recommended on   US scheduled for 20 HgA1C: 5 7%, reordered with request for patient to complete this week or next at her convenience  Class 1 education completed on 6/3/20 still needs class 2      Date due to report next:  Tuesday, 20      Valeria Ibarra RN BS CDE  [facility]  Diabetes and Pregnancy Program

## 2020-07-02 ENCOUNTER — TELEPHONE (OUTPATIENT)
Dept: PERINATAL CARE | Facility: CLINIC | Age: 25
End: 2020-07-02

## 2020-07-02 ENCOUNTER — TELEPHONE (OUTPATIENT)
Dept: ENDOCRINOLOGY | Facility: CLINIC | Age: 25
End: 2020-07-02

## 2020-07-02 DIAGNOSIS — E03.9 HYPOTHYROIDISM, UNSPECIFIED TYPE: ICD-10-CM

## 2020-07-02 DIAGNOSIS — O24.111 PRE-EXISTING TYPE 2 DIABETES MELLITUS IN PREGNANCY IN FIRST TRIMESTER: Primary | ICD-10-CM

## 2020-07-02 DIAGNOSIS — O24.111 PRE-EXISTING TYPE 2 DIABETES MELLITUS IN PREGNANCY IN FIRST TRIMESTER: ICD-10-CM

## 2020-07-02 DIAGNOSIS — O99.211 OBESITY AFFECTING PREGNANCY IN FIRST TRIMESTER: ICD-10-CM

## 2020-07-02 DIAGNOSIS — K58.0 IRRITABLE BOWEL SYNDROME WITH DIARRHEA: ICD-10-CM

## 2020-07-02 DIAGNOSIS — E55.9 VITAMIN D DEFICIENCY: Primary | ICD-10-CM

## 2020-07-02 NOTE — TELEPHONE ENCOUNTER
----- Message from Lubna Jones PA-C sent at 7/2/2020  9:23 AM EDT -----  TSH is slightly below goal for first trimester of pregnancy (Lower limit of normal is 0 1, hers is 0 091)  Since free T4 level is normal will Continue current dose of levothyroxine and repeat lab testing in 1 month

## 2020-07-02 NOTE — TELEPHONE ENCOUNTER
Flaquita Gaspar called nurse line with questions  Requesting information on nuchal appt and blood work- reviewed Sequential screen parts 1 and 2, fingerstick for part 1 and blood draw part 2, reviewed collections dates and what was does at ultrasound  Patient will bring support person- reviewed covid screening and office protocols  Patient requesting Vitamin D script- message to Elsi Lora  Patient verbalized understanding of all information

## 2020-07-02 NOTE — TELEPHONE ENCOUNTER
Spoke with pt, to con't current dose of Levothyroxine, to repeat labs in 1 mo   Labs ordered and mailed to pt at her request

## 2020-07-07 ENCOUNTER — TELEPHONE (OUTPATIENT)
Dept: PERINATAL CARE | Facility: OTHER | Age: 25
End: 2020-07-07

## 2020-07-07 DIAGNOSIS — E03.9 HYPOTHYROIDISM, UNSPECIFIED TYPE: ICD-10-CM

## 2020-07-07 DIAGNOSIS — E11.65 TYPE 2 DIABETES MELLITUS WITH HYPERGLYCEMIA, WITHOUT LONG-TERM CURRENT USE OF INSULIN (HCC): ICD-10-CM

## 2020-07-07 DIAGNOSIS — E55.9 VITAMIN D DEFICIENCY: ICD-10-CM

## 2020-07-07 NOTE — TELEPHONE ENCOUNTER
Attempted to reach patient by phone and left voicemail to confirm appointment for MFM ultrasound  1 support person ( must be over the age of 15) may accompany you for your appointment  You must wear a mask (covering nose and mouth) during your visit  You and your support person will be screened upon arrival   IF not feeling well- cough, fever, shortness of breath or any flu like symptoms contact your primary care physician or [id number] Briana Simons  Please call our office prior to entering the building  Check in and rooming questions will be done via phone  Inside office # provided:    Abimael line:  [phone]    [facility] does not allow cell phone use, recording device or streaming during ultrasound     Any questions with these instructions please call Maternal Fetal Medicine nurse line today @ # [phone]

## 2020-07-09 ENCOUNTER — DOCUMENTATION (OUTPATIENT)
Dept: PERINATAL CARE | Facility: CLINIC | Age: 25
End: 2020-07-09

## 2020-07-09 ENCOUNTER — APPOINTMENT (OUTPATIENT)
Dept: PERINATAL CARE | Facility: CLINIC | Age: 25
End: 2020-07-09
Payer: COMMERCIAL

## 2020-07-09 ENCOUNTER — APPOINTMENT (OUTPATIENT)
Dept: LAB | Age: 25
End: 2020-07-09
Payer: COMMERCIAL

## 2020-07-09 ENCOUNTER — TELEMEDICINE (OUTPATIENT)
Dept: PERINATAL CARE | Facility: CLINIC | Age: 25
End: 2020-07-09
Payer: COMMERCIAL

## 2020-07-09 ENCOUNTER — ROUTINE PRENATAL (OUTPATIENT)
Dept: PERINATAL CARE | Facility: CLINIC | Age: 25
End: 2020-07-09
Payer: COMMERCIAL

## 2020-07-09 VITALS
TEMPERATURE: 98.5 F | WEIGHT: 201.8 LBS | SYSTOLIC BLOOD PRESSURE: 134 MMHG | BODY MASS INDEX: 34.45 KG/M2 | HEART RATE: 89 BPM | DIASTOLIC BLOOD PRESSURE: 84 MMHG | HEIGHT: 64 IN

## 2020-07-09 DIAGNOSIS — E55.9 VITAMIN D DEFICIENCY: ICD-10-CM

## 2020-07-09 DIAGNOSIS — O24.111 PRE-EXISTING TYPE 2 DIABETES MELLITUS DURING PREGNANCY IN FIRST TRIMESTER: ICD-10-CM

## 2020-07-09 DIAGNOSIS — Z3A.12 12 WEEKS GESTATION OF PREGNANCY: ICD-10-CM

## 2020-07-09 DIAGNOSIS — O99.211 OBESITY AFFECTING PREGNANCY IN FIRST TRIMESTER: ICD-10-CM

## 2020-07-09 DIAGNOSIS — Z36.82 ENCOUNTER FOR NUCHAL TRANSLUCENCY TESTING: ICD-10-CM

## 2020-07-09 DIAGNOSIS — O24.111 PRE-EXISTING TYPE 2 DIABETES MELLITUS IN PREGNANCY IN FIRST TRIMESTER: Primary | ICD-10-CM

## 2020-07-09 DIAGNOSIS — E11.9 TYPE 2 DIABETES MELLITUS WITHOUT COMPLICATION, WITHOUT LONG-TERM CURRENT USE OF INSULIN (HCC): ICD-10-CM

## 2020-07-09 DIAGNOSIS — E03.9 HYPOTHYROIDISM, UNSPECIFIED TYPE: ICD-10-CM

## 2020-07-09 DIAGNOSIS — K58.0 IRRITABLE BOWEL SYNDROME WITH DIARRHEA: ICD-10-CM

## 2020-07-09 DIAGNOSIS — O09.899 SUPERVISION OF OTHER HIGH RISK PREGNANCIES, UNSPECIFIED TRIMESTER: ICD-10-CM

## 2020-07-09 DIAGNOSIS — O24.111 PRE-EXISTING TYPE 2 DIABETES MELLITUS IN PREGNANCY IN FIRST TRIMESTER: ICD-10-CM

## 2020-07-09 LAB
25(OH)D3 SERPL-MCNC: 24.1 NG/ML (ref 30–100)
EST. AVERAGE GLUCOSE BLD GHB EST-MCNC: 103 MG/DL
HBA1C MFR BLD: 5.2 %

## 2020-07-09 PROCEDURE — 76801 OB US < 14 WKS SINGLE FETUS: CPT | Performed by: OBSTETRICS & GYNECOLOGY

## 2020-07-09 PROCEDURE — 82306 VITAMIN D 25 HYDROXY: CPT

## 2020-07-09 PROCEDURE — 36415 COLL VENOUS BLD VENIPUNCTURE: CPT

## 2020-07-09 PROCEDURE — G0108 DIAB MANAGE TRN  PER INDIV: HCPCS | Performed by: DIETITIAN, REGISTERED

## 2020-07-09 PROCEDURE — 99213 OFFICE O/P EST LOW 20 MIN: CPT | Performed by: OBSTETRICS & GYNECOLOGY

## 2020-07-09 PROCEDURE — 76813 OB US NUCHAL MEAS 1 GEST: CPT | Performed by: OBSTETRICS & GYNECOLOGY

## 2020-07-09 PROCEDURE — 83036 HEMOGLOBIN GLYCOSYLATED A1C: CPT

## 2020-07-09 RX ORDER — ASPIRIN 81 MG/1
81 TABLET, CHEWABLE ORAL DAILY
COMMUNITY
End: 2021-01-09 | Stop reason: HOSPADM

## 2020-07-09 NOTE — PROGRESS NOTES
Date:  20  RE: Yan Ramirez    : 1995   RACHEL: 1/15/21  EGA: 12 6/7 weeks  OB: [facility]  Pre-existing T2DM, first pregnancy      118     70  90   7/3- 88  127  150     75  109     95  92     95  90     115  100     mychart     Current regimen:  Lantus 30 units daily at 4 PM    Humalog 5 units before 2 meals a day, holds if not eating  1800 calorie meal plan with 3 meals/snacks for 1st trimester; Yoselyn Lazaro Increase to 2100 calories for 2nd and 3rd trimester  Self monitoring blood glucose with One Touch Verio meter, fasting and after meals unless sheshe is able to tolerate related to nausea  Does not test after breakfast due to going to bed right after eating after working night shift  Follows a night shift lifestye on days off  Active job as an RN; 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  Plan:  Continue current regimen  24 hour recall at [facility] appointment today indicated she is following the diet closely now since her appetite has improved  Satisfied with 1800 calories at present  Uses many products that contain extra protein added & uses Premier shakes as par of a meal or snack when working if unable to eat a meal or a snack  20 ultrasound today  indicates growth appropriate for gestational age    20 HgA1C: 5 7%, encouraged to complete soon       Date due to report next:  Thursday, 20      Lady Sanchez, MS, RD, CDE  [facility]  Diabetes and Pregnancy Program

## 2020-07-09 NOTE — PROGRESS NOTES
Virtual Brief Visit    Assessment/Plan:    Problem List Items Addressed This Visit        Endocrine    Type 2 diabetes mellitus without complication, without long-term current use of insulin ([address] )    Pre-existing type 2 diabetes mellitus in pregnancy in first trimester - Primary       Other    12 weeks gestation of pregnancy    Obesity affecting pregnancy in first trimester                Reason for visit is   Chief Complaint   Patient presents with    Diabetes Type 2    Patient Education    Virtual Regular Visit    Virtual Brief Visit        Encounter provider Ana Cranker    Provider located at [address] [place] [id number] [phone]    Recent Visits  Date Type Provider Dept   07/02/20 Telephone Aidan Owusu, [facility] recent visits within past 7 days and meeting all other requirements     Today's Visits  Date Type Provider Dept   07/09/20 [address] [place] [address]   Showing today's visits and meeting all other requirements     Future Appointments  Date Type Provider Dept   07/09/20 [address] [place] [address]   Showing future appointments within next 150 days and meeting all other requirements        After connecting through telephone, the patient was identified by name and date of birth  Clementina Cummings was informed that this is a telemedicine visit and that the visit is being conducted through telephone  My office door was closed  No one else was in the room  She acknowledged consent and understanding of privacy and security of the platform  The patient has agreed to participate and understands she can discontinue the visit at any time  Patient is aware this is a billable service  Subjective    Clementina Cummings is a 31 y o  female pregnant patient    HPI     Past Medical History:   Diagnosis Date    Anemia     Clostridium difficile infection  Hyperglycemia     Hypokalemia     Hypothyroidism 12/2019    IBS (irritable bowel syndrome)     IUD (intrauterine device) in place     Kidney injury     L2 [facility] fracture [facility]     Type 2 diabetes mellitus ([facility] ) 12/2019    Vitamin D deficiency        Past Surgical History:   Procedure Laterality Date    CHOLECYSTECTOMY      GA COLONOSCOPY FLX DX W/COLLJ SPEC WHEN PFRMD N/A 1/10/2017    Procedure: EGD AND COLONOSCOPY;  Surgeon: Yanci Miguel MD;  Location: [facility] GI LAB; Service: Gastroenterology    WISDOM TOOTH EXTRACTION         Current Outpatient Medications   Medication Sig Dispense Refill    albuterol (PROVENTIL HFA,VENTOLIN HFA) 90 mcg/act inhaler Inhale 2 puffs every 6 (six) hours as needed for wheezing (Patient not taking: Reported on 6/3/2020) 1 Inhaler 0    Alcohol Swabs 70 % PADS Use to cleanse injection site tid 120 each 5    aspirin 81 mg chewable tablet Chew 81 mg daily      Blood Glucose Monitoring Suppl (ONETOUCH VERIO) w/Device KIT Disp 1 meter 1 kit 1    Cholecalciferol (VITAMIN D) 50 MCG (2000 UT) tablet Take 2,000 Units by mouth daily      Continuous Blood Gluc  (DEXCOM G6 ) SARTHAK Disp 1 Kit, use as directed 1 Device 0    Continuous Blood Gluc Sensor (DEXCOM G6 SENSOR) MISC Disp 1 box of 3 sensors with 12 refills 1 each 12    Continuous Blood Gluc Transmit (DEXCOM G6 TRANSMITTER) MISC Dispense 1 kit, change every 90 days 1 each 3    folic acid (FOLVITE) 540 mcg tablet Take 400 mcg by mouth daily      Insulin Aspart FlexPen 100 UNIT/ML SOPN INJECT UNDER THE SKIN 4 UNITS BEFORE MEAL 1 AND MEAL 2  15 pen 0    Insulin Pen Needle 31G X 5 MM MISC Inject under the skin 4 (four) times a day (before meals and at bedtime) Use one a day or as directed   150 each 1    LANTUS SOLOSTAR 100 units/mL injection pen Inject 30 Units under the skin daily at bedtime INJECT 22 UNITS BY SUBCUTANEOUS ROUTE AT 4 PM DAILY (TO BE TITRATED) 15 mL 1    levothyroxine 50 mcg tablet Take 1 tablet (50 mcg total) by mouth daily 30 tablet 2    metFORMIN (GLUCOPHAGE-XR) 500 mg 24 hr tablet TITRATE AS DIRECTED UP TO 4 TABLETS PER DAY  TAKE WITH FOOD 120 tablet 3    ondansetron (ZOFRAN) 4 mg tablet Take 1 tablet (4 mg total) by mouth 4 (four) times a day as needed for nausea or vomiting (Patient taking differently: Take 4 mg by mouth 4 (four) times a day as needed for nausea or vomiting ) 60 tablet 0    OneTouch Delica Lancets 33R MISC CHECK BLOOD GLUCOSE 4x per day 400 each 1    ONETOUCH VERIO test strip Check BG 4x per day 400 each 3    Prenatal Vit-Iron Carbonyl-FA (PRENATAL MULTIVITAMIN) TABS Take 1 tablet by mouth daily       No current facility-administered medications for this visit  Allergies   Allergen Reactions    Bactrim [Sulfamethoxazole-Trimethoprim] Hives    Dilaudid [Hydromorphone Hcl] Itching       Review of Systems--not completed    There were no vitals filed for this visit  Time spent with the patient: 30 minutes  VIRTUAL VISIT DISCLAIMER    Gokul Doan acknowledges that she has consented to an online visit or consultation  She understands that the online visit is based solely on information provided by her, and that, in the absence of a face-to-face physical evaluation by the physician, the diagnosis she receives is both limited and provisional in terms of accuracy and completeness  This is not intended to replace a full medical face-to-face evaluation by the physician  Gokul Doan understands and accepts these terms  DATE: 20   RE: Gokul Doan   : 1995  RACHEL: Estimated Date of Delivery: 1/15/21  EGA:  12w6d    Dear Dr Irma Ayala at [facility]:     Thank you for referring your patient to the Diabetes and Pregnancy Program at [address] [place] [address]    The patient was seen today for medical nutrition therapy re-evaluation  for the treatment ofType 2  diabetes during pregnancy via telephone (unalbe to connect for virtual video)  In addition to diabetes, the nutrition status is complicated by obesity  The following was reviewed with the patient:      Weight gain during in pregnancy  Based on the patients height of 64   inches, pre-pregnancy weight of 200 pounds (BMI 34 3), we would recommend a total weight gain of 11-20 pounds for the pregnancy  o The patients current weight is205   pounds, and her weight gain to date is 5 pounds  Based on this, we are recommending the patient gain no more than 15 pounds more for the remainder of the pregnancy   Basic review of macronutrients   Meal pattern should consist of three small meals and three snacks daily  Does eat 3 meals & 2-3 snacks depending if working or not   Carbohydrate gram amounts per meal    Instructions on how to read a food label  Reads labels for CHO & PRO grams  Uses several foods that have extra protein added such as high protein waffles, high protein soup, Greek yogurt  Drinks Premier shakes often as between meal snacks if not able to eat a snack or eat a meal while working   Appropriate serving size of foods   Incorporating protein at each meal and snack in the importance of protein in relationship to blood glucose control   Individualized meal plan: 1800 calorie for the 1st trimester & 2100 calories for the the 2nd/3rd trimesters gestational diabetes diet  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[address] maintains a night shift schedule on her days off  Reports her appetite has returned & is now eating the same amounts as before pregnancy  Satisfied with the amounts of foods eaten  24 hour diet recall indicates she is now following the meal plan closely   Use of food diary to maintain a meal plan   Report blood glucose levels to [address] weekly or as directed  o Phone: [phone]   If no response in 24 hours, call [phone]   o Fax: [phone]  o Email: [email or web address]   Follow up: As needed    Diabetes Self Management Support Plan outside of ongoing care: Spouse/Family    Thank you for the opportunity to participate in the care of this patient  I can be reached at [phone] should you have any questions  Time spent with patient 10-10:26 AM; time spent face to face counseling greater than 50% of the appointment      Sincerely,     Noah Nunes  Diabetes Educator  Diabetes and Pregnancy Program

## 2020-07-09 NOTE — PROGRESS NOTES
Date:  20  RE: Chelsy Billy    : 1995   OB: [facility]  Pre-existing T2DM, first pregnancy      118     70  90   7/3- 88  127  150     75  109     95  92     95  90     115  100     mychart     Current regimen:  Lantus 30 units daily at 4 PM    Humalog 5 units before 2 meals a day, holds if not eating  Recommended 1800 calorie meal plan with 3 meals/snacks including balance of carbohydrate, protein and fat as tolerated  Increase to 2100 calories for 2nd and 3rd trimester  Self monitoring blood glucose with One Touch Verio meter, fasting and after meals she is able to tolerate related to nausea  Does not test after breakfast due to going to bed right after eating after working night shift  Plan:  Continue current regimen but advised patient, since pregnant, to attempt to follow it closely eating 3 meals/snacks and testing fasting and 2 hours after start of each meal  Advised to set an alarm to test during sleep so BG may be evaluated after first meal   Advised to schedule second diabetes education appointment to complete initial education during pregnancy, contact info provided  Patient did not increase Humalog dose as previously recommended on   US scheduled for 20 HgA1C: 5 7%, reordered with request for patient to complete this week or next at her convenience  Class 1 education completed on 6/3/20 still needs class 2      Date due to report next:  Tuesday, 20        [facility] Maternal Fetal Medicine  Diabetes and Pregnancy Program

## 2020-07-09 NOTE — PROGRESS NOTES
Alisia Sands is well known to me  She had a pre conception consultation with me in January of this past year  She has known type 2 diabetes who initially had a hemoglobin A1c of 7 7% back in January  She has been aggressively treated with insulin through both our diabetes in pregnancy program and endocrinology and had a 1st trimester early hemoglobin A1c of 5 7% which is excellent  Our goal was less than 6% prior to achieving pregnancy and she achieve that goal   She has a history of hypothyroidism as well  She is managed by Endocrinology currently on levothyroxine 50 micro g daily  Her TSH level was 0 09  She has a TSH level pending most recently  She currently takes Lantus and aspart insulin  She is currently taking Lantus 30 units at 4:00 p m  And 5 units of aspart before 2 meals a day  She denies the current use of tobacco, alcohol, or drugs  She has an allergy to Bactrim  There is a strong family history of diabetes and hypertension  A review of systems is otherwise negative  We discussed the options for genetic screening, including but not limited to first trimester screening, second trimester screening, combined first and second trimester screening, noninvasive prenatal testing (NIPT) for patients at high risk and diagnostic screening through the use of CVS and amniocentesis  We discussed the risks and benefits of each approach including the sensitivities and false positive rates as well as the difference between a screening test and a diagnostic test   At the conclusion of our discussion the patient elected Sequential Screening to delineate her risk for fetal aneuploidy  A maternal blood sample was obtained on the day of the ultrasound    The first trimester portion of the screening results, encompassing age, nuchal translucency, and biochemistry should be available within one week of testing and will be reported from [facility]   The second stage of sequential screening should be completed between the 15th and 21st week of pregnancy (ideally between 16-18 weeks)  We will call the patient with any and all results and the results should be available in the EMR  We had previously discussed diabetes in pregnancy  We reiterated the importance of euglycemia during pregnancy  She is doing a great job of controlling her blood sugars and I would expect her to do well throughout the pregnancy  She will continue to follow closely with our diabetes in pregnancy program   We discussed serial growth ultrasounds in the 3rd trimester and  surveillance as well as a fetal echocardiogram in addition to her detailed level 2 ultrasound which was scheduled today  Thank you very much for allowing us to participate in the care of this very nice patient  Should you have any questions, please do not hesitate to contact our office  Please note, in addition to the time spent discussing the results of the ultrasound, I spent approximately 15 minutes of face-to-face time with the patient, greater than 50% of which was spent in counseling and the coordination of care for this patient  Portions of the record may have been created with voice recognition software  Occasional wrong word or "sound a like" substitutions may have occurred due to the inherent limitations of voice recognition software  Read the chart carefully and recognize, using context, where substitutions have occurred

## 2020-07-10 ENCOUNTER — LAB REQUISITION (OUTPATIENT)
Dept: LAB | Facility: HOSPITAL | Age: 25
End: 2020-07-10
Payer: COMMERCIAL

## 2020-07-10 DIAGNOSIS — O09.91 SUPERVISION OF HIGH RISK PREGNANCY, UNSPECIFIED, FIRST TRIMESTER: ICD-10-CM

## 2020-07-10 PROCEDURE — 87491 CHLMYD TRACH DNA AMP PROBE: CPT | Performed by: OBSTETRICS & GYNECOLOGY

## 2020-07-10 PROCEDURE — 87591 N.GONORRHOEAE DNA AMP PROB: CPT | Performed by: OBSTETRICS & GYNECOLOGY

## 2020-07-11 LAB
C TRACH DNA SPEC QL NAA+PROBE: NEGATIVE
N GONORRHOEA DNA SPEC QL NAA+PROBE: NEGATIVE

## 2020-07-13 ENCOUNTER — APPOINTMENT (OUTPATIENT)
Dept: LAB | Age: 25
End: 2020-07-13
Payer: COMMERCIAL

## 2020-07-13 ENCOUNTER — TRANSCRIBE ORDERS (OUTPATIENT)
Dept: ADMINISTRATIVE | Facility: HOSPITAL | Age: 25
End: 2020-07-13

## 2020-07-13 DIAGNOSIS — O09.891 MEDICATION EXPOSURE DURING FIRST TRIMESTER OF PREGNANCY: ICD-10-CM

## 2020-07-13 DIAGNOSIS — O09.891 MEDICATION EXPOSURE DURING FIRST TRIMESTER OF PREGNANCY: Primary | ICD-10-CM

## 2020-07-13 LAB
BACTERIA UR QL AUTO: ABNORMAL /HPF
BASOPHILS # BLD AUTO: 0.02 THOUSANDS/ΜL (ref 0–0.1)
BASOPHILS NFR BLD AUTO: 0 % (ref 0–1)
BILIRUB UR QL STRIP: NEGATIVE
BLD GP AB SCN SERPL QL: NEGATIVE
CAOX CRY URNS QL MICRO: ABNORMAL /HPF
CLARITY UR: ABNORMAL
COLOR UR: YELLOW
EOSINOPHIL # BLD AUTO: 0.08 THOUSAND/ΜL (ref 0–0.61)
EOSINOPHIL NFR BLD AUTO: 1 % (ref 0–6)
ERYTHROCYTE [DISTWIDTH] IN BLOOD BY AUTOMATED COUNT: 14.2 % (ref 11.6–15.1)
GLUCOSE UR STRIP-MCNC: NEGATIVE MG/DL
HBV SURFACE AG SER QL: NORMAL
HCT VFR BLD AUTO: 34.6 % (ref 34.8–46.1)
HGB BLD-MCNC: 11.3 G/DL (ref 11.5–15.4)
HGB UR QL STRIP.AUTO: NEGATIVE
IMM GRANULOCYTES # BLD AUTO: 0.03 THOUSAND/UL (ref 0–0.2)
IMM GRANULOCYTES NFR BLD AUTO: 0 % (ref 0–2)
KETONES UR STRIP-MCNC: NEGATIVE MG/DL
LEUKOCYTE ESTERASE UR QL STRIP: ABNORMAL
LYMPHOCYTES # BLD AUTO: 1.56 THOUSANDS/ΜL (ref 0.6–4.47)
LYMPHOCYTES NFR BLD AUTO: 20 % (ref 14–44)
MCH RBC QN AUTO: 27.1 PG (ref 26.8–34.3)
MCHC RBC AUTO-ENTMCNC: 32.7 G/DL (ref 31.4–37.4)
MCV RBC AUTO: 83 FL (ref 82–98)
MONOCYTES # BLD AUTO: 0.52 THOUSAND/ΜL (ref 0.17–1.22)
MONOCYTES NFR BLD AUTO: 7 % (ref 4–12)
NEUTROPHILS # BLD AUTO: 5.75 THOUSANDS/ΜL (ref 1.85–7.62)
NEUTS SEG NFR BLD AUTO: 72 % (ref 43–75)
NITRITE UR QL STRIP: NEGATIVE
NON-SQ EPI CELLS URNS QL MICRO: ABNORMAL /HPF
NRBC BLD AUTO-RTO: 0 /100 WBCS
PH UR STRIP.AUTO: 6 [PH]
PLATELET # BLD AUTO: 269 THOUSANDS/UL (ref 149–390)
PMV BLD AUTO: 10.8 FL (ref 8.9–12.7)
PROT UR STRIP-MCNC: NEGATIVE MG/DL
RBC # BLD AUTO: 4.17 MILLION/UL (ref 3.81–5.12)
RBC #/AREA URNS AUTO: ABNORMAL /HPF
RUBV IGG SERPL IA-ACNC: 9.5 IU/ML
SP GR UR STRIP.AUTO: 1.02 (ref 1–1.03)
UROBILINOGEN UR QL STRIP.AUTO: 0.2 E.U./DL
WBC # BLD AUTO: 7.96 THOUSAND/UL (ref 4.31–10.16)
WBC #/AREA URNS AUTO: ABNORMAL /HPF

## 2020-07-13 PROCEDURE — 85025 COMPLETE CBC W/AUTO DIFF WBC: CPT

## 2020-07-13 PROCEDURE — 87086 URINE CULTURE/COLONY COUNT: CPT | Performed by: OBSTETRICS & GYNECOLOGY

## 2020-07-13 PROCEDURE — 87389 HIV-1 AG W/HIV-1&-2 AB AG IA: CPT

## 2020-07-13 PROCEDURE — 87147 CULTURE TYPE IMMUNOLOGIC: CPT | Performed by: OBSTETRICS & GYNECOLOGY

## 2020-07-13 PROCEDURE — 36415 COLL VENOUS BLD VENIPUNCTURE: CPT

## 2020-07-13 PROCEDURE — 86850 RBC ANTIBODY SCREEN: CPT

## 2020-07-13 PROCEDURE — 86787 VARICELLA-ZOSTER ANTIBODY: CPT

## 2020-07-13 PROCEDURE — 81001 URINALYSIS AUTO W/SCOPE: CPT | Performed by: OBSTETRICS & GYNECOLOGY

## 2020-07-13 PROCEDURE — 86762 RUBELLA ANTIBODY: CPT

## 2020-07-13 PROCEDURE — 86592 SYPHILIS TEST NON-TREP QUAL: CPT

## 2020-07-13 PROCEDURE — 87340 HEPATITIS B SURFACE AG IA: CPT

## 2020-07-14 ENCOUNTER — TELEPHONE (OUTPATIENT)
Dept: PERINATAL CARE | Facility: CLINIC | Age: 25
End: 2020-07-14

## 2020-07-14 LAB
HIV 1+2 AB+HIV1 P24 AG SERPL QL IA: NORMAL
RPR SER QL: NORMAL

## 2020-07-15 LAB
BACTERIA UR CULT: ABNORMAL
BACTERIA UR CULT: ABNORMAL

## 2020-07-16 LAB — VZV IGG SER IA-ACNC: ABNORMAL

## 2020-07-17 ENCOUNTER — DOCUMENTATION (OUTPATIENT)
Dept: PERINATAL CARE | Facility: CLINIC | Age: 25
End: 2020-07-17

## 2020-07-17 NOTE — PROGRESS NOTES
Date:  20  RE: Jania Stanford    : 1995   RACHEL: 1/15/21  EGA: 14w0d  OB: [facility]  Pre-existing T2DM, first pregnancy      116  120     96  76   7/10- 83  135  115     135  125     120  90     105  130     140  110             mychart     Current regimen:  Lantus 30 units daily at 4 PM    Humalog 5 units before 2 meals a day, holds if not eating  Recommended 1800 calorie meal plan with 3 meals/snacks including balance of carbohydrate, protein and fat as tolerated  Increase to 2100 calories for 2nd and 3rd trimester  Self monitoring blood glucose with One Touch Verio meter, fasting and after meals she is able to tolerate related to nausea  Does not test after breakfast due to going to bed right after eating after working night shift  Plan:  Continue current regimen but advised patient, since pregnant, to attempt to follow it closely eating 3 meals/snacks and testing fasting and 2 hours after start of each meal  Advised to set an alarm to test during sleep so BG may be evaluated after first meal  Suggested increasing calories to 2100 calories  Advised to schedule second diabetes education appointment to complete initial education during pregnancy, contact info provided  Patient did not increase Humalog dose as previously recommended on 20 US impressions: fetal growth and REBECCA appeared normal  Next US  HgA1C: 5 2%, previous 20 A1c 5 7%  Class 1 education completed on 6/3/20 still needs class 2  In-basket message sent to ROSALVA Gomez D&P  to schedule follow up appointment in the next 2 weeks      Date due to report next:  Tuesday, 20      [facility]  Diabetes and Pregnancy Program

## 2020-07-20 ENCOUNTER — TELEPHONE (OUTPATIENT)
Dept: PERINATAL CARE | Facility: CLINIC | Age: 25
End: 2020-07-20

## 2020-07-20 NOTE — LETTER
07/20/20  Thresa   1995    Thank you for completing Part 1 of your Sequential Screen  To obtain a complete test result, please complete blood work for Part 2 Sequential Screen between the weeks of 8/5/20 to 8/19/20  Based on your insurance coverage, please use one of the following locations  Call our office for any questions at [phone]      [address] [place] [address] Avenue  [facility], [facility], [address]   [address] [facility], [facility], [address]  Phone:  [phone]     Phone:  [phone]    [facility]  [facility] [address], [address]  Phone: [phone]      Phone: [phone] Elzbieta Hughes for lab)    [facility]  [facility], [facility], [facility]   [facility] [place] [address] [address]  Phone: [phone]      Phone:  [phone]    [address]  [address] [facility] 6   [facility] [address]  Phone: [phone]      Phone:  [facility],[address]  207 [facility], [facility], [address]    [address] [facility], David TODDgaandie   Phone: [phone]      Phone: [phone]    [address] [place] [address]     [facility]  14[address] [facility], [facility], [address], [place], [address] Darcie [address]  Phone:  [phone]     Phone: [phone]    [address] [place] [address] AN AFFILIATE OF [facility] 34  Phone: [phone]    Sincerely,    Janes Wilkes RN

## 2020-07-20 NOTE — TELEPHONE ENCOUNTER
I left a message to notify Jhoan Nguyen to notify her of the low risk result of her part 1 sequential screen  I also notified her of the optimal dating to get part 2 drawn as well as where to go for this  TRF mailed for part 2  Nurse line phone number left for any questions  Retinal detachment warnings given.

## 2020-07-20 NOTE — TELEPHONE ENCOUNTER
----- Message from Kirstie Shaw MD sent at 7/17/2020  9:14 AM EDT -----  I have reviewed the results which are low risk  Please call patient and notify her of reassuring results if she has not viewed on [employer]  Thank you

## 2020-07-26 DIAGNOSIS — E03.9 HYPOTHYROIDISM, UNSPECIFIED TYPE: ICD-10-CM

## 2020-07-27 ENCOUNTER — DOCUMENTATION (OUTPATIENT)
Dept: PERINATAL CARE | Facility: CLINIC | Age: 25
End: 2020-07-27

## 2020-07-27 DIAGNOSIS — O99.212 OBESITY AFFECTING PREGNANCY IN SECOND TRIMESTER: ICD-10-CM

## 2020-07-27 DIAGNOSIS — Z3A.15 15 WEEKS GESTATION OF PREGNANCY: Primary | ICD-10-CM

## 2020-07-27 DIAGNOSIS — E55.9 VITAMIN D DEFICIENCY: ICD-10-CM

## 2020-07-27 PROBLEM — O24.112: Status: ACTIVE | Noted: 2020-06-03

## 2020-07-27 RX ORDER — LEVOTHYROXINE SODIUM 0.05 MG/1
TABLET ORAL
Qty: 90 TABLET | Refills: 0 | Status: SHIPPED | OUTPATIENT
Start: 2020-07-27 | End: 2020-08-17 | Stop reason: DRUGHIGH

## 2020-07-27 NOTE — PROGRESS NOTES
Date:  20  RE: Sandy England    : 1995   RACHEL: 1/15/21  EGA: 15w3d  OB: [facility]  Pre-existing T2DM, first pregnancy    7/15- 79  105  120     102     101  95     110  120     102  86     110  95             mychart     Current regimen:  Lantus 30 units daily at 4 PM    Humalog 5 units before 2 meals a day, holds if not eating  Recommended 1800 calorie meal plan with 3 meals/snacks including balance of carbohydrate, protein and fat as tolerated  Increase to 2100 calories for 2nd and 3rd trimester  Self monitoring blood glucose with One Touch Verio meter, fasting and after meals she is able to tolerate related to nausea  Does not test after breakfast due to going to bed right after eating after working night shift  Plan:  Continue current regimen but advised patient, since pregnant, to attempt to follow it closely eating 3 meals/snacks and testing fasting and 2 hours after start of each meal  Advised to set an alarm to test during sleep so BG may be evaluated after first meal  Suggested increasing calories to 2100 calories  Advised to schedule second diabetes education appointment to complete initial education during pregnancy, contact info provided  Patient did not increase Humalog dose as previously recommended on 20 US impressions: fetal growth and REBECCA appeared normal  Next US  HgA1C: 5 2%, previous 20 A1c 5 7%  Class 1 education completed on 6/3/20 still needs class 2  In-basket message sent to CAMMIE Rosales&P  to schedule follow up appointment in the next 2 weeks      Date due to report next:  Tuesday, 20        [facility] Maternal Fetal Medicine  Diabetes and Pregnancy Program

## 2020-07-27 NOTE — PROGRESS NOTES
Date:  20  RE: Francisca Mock    : 1995   RACHEL: 1/15/21  EGA: 15w3d  OB: [facility]  Pre-existing T2DM, first pregnancy    7/15- 79  105  120     102     101  95     110  120     102  86     110  95       mychart     Current regimen:  Lantus 30 units daily at 4 PM    Humalog 5 units before 2 meals a day, holds if not eating  Recommended 1800 calorie meal plan with 3 meals/snacks including balance of carbohydrate, protein and fat as tolerated  Increase to 2100 calories for 2nd and 3rd trimester  Self monitoring blood glucose with One Touch Verio meter, fasting and after meals she is able to tolerate related to nausea  Does not test after breakfast due to going to bed right after eating after working night shift  Plan:  Encouraged to continue current regimen except if 2 hours PP>120 to add another unit of Humalog before meal  To eat 3 meals and 3 snacks a day, pending response if calories increased to 2100  Request for a new Dexcom share code since last one , pending response to review post meal glucose reading     20 US impressions: fetal growth and REBECCA appeared normal  Next US 20 HgA1C: 5 2%, previous 20 A1c 5 7%      Date due to report next:  Monday, 8/3/20      CLAUDINE Beasley, CDE, BC-ADM  [facility] Maternal Fetal Medicine  Diabetes and Pregnancy Program

## 2020-07-27 NOTE — ASSESSMENT & PLAN NOTE
Lantus 30 units daily at 4 PM    Humalog 5 units before 2 meals a day  On Dexcom G6 and SMBG     Lab Results   Component Value Date    HGBA1C 5 2 07/09/2020

## 2020-08-13 ENCOUNTER — DOCUMENTATION (OUTPATIENT)
Dept: PERINATAL CARE | Facility: CLINIC | Age: 25
End: 2020-08-13

## 2020-08-13 DIAGNOSIS — E03.9 HYPOTHYROIDISM, UNSPECIFIED TYPE: ICD-10-CM

## 2020-08-13 DIAGNOSIS — Z3A.17 17 WEEKS GESTATION OF PREGNANCY: ICD-10-CM

## 2020-08-13 DIAGNOSIS — O24.112 PRE-EXISTING TYPE 2 DIABETES MELLITUS, IN PREGNANCY, SECOND TRIMESTER: Primary | ICD-10-CM

## 2020-08-13 NOTE — PROGRESS NOTES
Date:  20  RE: Jasmyn Duty    : 1995   RACHEL: 1/15/21  EGA: 17w6d  OB: [facility]  Pre-existing T2DM, first pregnancy      87  100    81  90    125  128  8/10- 90  110  105    90  108    140  85      mychart     Current regimen:  Lantus 30 units daily at 4 PM    Humalog 5 units before 2 meals a day, holds if not eating  Recommended 1800 calorie meal plan with 3 meals/snacks including balance of carbohydrate, protein and fat as tolerated  Increase to 2100 calories for 2nd and 3rd trimester  Self monitoring blood glucose with One Touch Verio meter, fasting and after meals she is able to tolerate related to nausea  Does not test after breakfast due to going to bed right after eating after working night shift  Plan:  Continue Lantus 30 units at 4 PM daily  Continue Humalog 5 units before 2 meals a day  Continue GDM diet and testing  Pending new Dexcom transmitter and new share code  20 US impressions: fetal growth and REBECCA appeared normal  Next US 20 HgA1C: 5 2%, previous 20 A1c 5 7%  repeat after 20         Date due to report next:  Thursday, 20      Ned Verdugo, MSN, CRNP, CDE, BC-ADM  [facility] Maternal Fetal Medicine  Diabetes and Pregnancy Program

## 2020-08-13 NOTE — PROGRESS NOTES
Date:  20  RE: Brendan Bojorquez    : 1995   RACHEL: 1/15/21  EGA: 17w6d  OB: [facility]  Pre-existing T2DM, first pregnancy      87  100    81  90    125  128  8/10- 90  110  105    90  108    140  85      mychart     Current regimen:  Lantus 30 units daily at 4 PM    Humalog 5 units before 2 meals a day, holds if not eating  Recommended 1800 calorie meal plan with 3 meals/snacks including balance of carbohydrate, protein and fat as tolerated  Increase to 2100 calories for 2nd and 3rd trimester  Self monitoring blood glucose with One Touch Verio meter, fasting and after meals she is able to tolerate related to nausea  Does not test after breakfast due to going to bed right after eating after working night shift  Plan:  Encouraged to continue current regimen except if 2 hours PP>120 to add another unit of Humalog before meal  To eat 3 meals and 3 snacks a day, pending response if calories increased to 2100  Request for a new Dexcom share code since last one , pending response to review post meal glucose reading     20 US impressions: fetal growth and REBECCA appeared normal  Next US 20 HgA1C: 5 2%, previous 20 A1c 5 7%      Date due to report next:  Monday, 8/3/20        [facility] Maternal Fetal Medicine  Diabetes and Pregnancy Program

## 2020-08-15 ENCOUNTER — APPOINTMENT (OUTPATIENT)
Dept: LAB | Facility: HOSPITAL | Age: 25
End: 2020-08-15
Payer: COMMERCIAL

## 2020-08-15 ENCOUNTER — TRANSCRIBE ORDERS (OUTPATIENT)
Dept: ADMINISTRATIVE | Facility: HOSPITAL | Age: 25
End: 2020-08-15

## 2020-08-15 DIAGNOSIS — Z36.9 UNSPECIFIED ANTENATAL SCREENING: ICD-10-CM

## 2020-08-15 DIAGNOSIS — Z33.1 PREGNANT STATE, INCIDENTAL: ICD-10-CM

## 2020-08-15 DIAGNOSIS — O24.111 PRE-EXISTING TYPE 2 DIABETES MELLITUS IN PREGNANCY IN FIRST TRIMESTER: ICD-10-CM

## 2020-08-15 DIAGNOSIS — Z33.1 PREGNANT STATE, INCIDENTAL: Primary | ICD-10-CM

## 2020-08-15 LAB
EST. AVERAGE GLUCOSE BLD GHB EST-MCNC: 100 MG/DL
HBA1C MFR BLD: 5.1 %
T4 FREE SERPL-MCNC: 1.04 NG/DL (ref 0.76–1.46)
TSH SERPL DL<=0.05 MIU/L-ACNC: 3.03 UIU/ML (ref 0.36–3.74)

## 2020-08-15 PROCEDURE — 84443 ASSAY THYROID STIM HORMONE: CPT

## 2020-08-15 PROCEDURE — 36415 COLL VENOUS BLD VENIPUNCTURE: CPT | Performed by: NURSE PRACTITIONER

## 2020-08-15 PROCEDURE — 83036 HEMOGLOBIN GLYCOSYLATED A1C: CPT | Performed by: NURSE PRACTITIONER

## 2020-08-15 PROCEDURE — 84439 ASSAY OF FREE THYROXINE: CPT

## 2020-08-16 LAB — SCAN RESULT: NORMAL

## 2020-08-17 DIAGNOSIS — E03.9 HYPOTHYROIDISM, UNSPECIFIED TYPE: Primary | ICD-10-CM

## 2020-08-17 RX ORDER — LEVOTHYROXINE SODIUM 0.07 MG/1
75 TABLET ORAL DAILY
Qty: 30 TABLET | Refills: 0 | Status: SHIPPED | OUTPATIENT
Start: 2020-08-17 | End: 2020-09-08

## 2020-08-20 ENCOUNTER — DOCUMENTATION (OUTPATIENT)
Dept: PERINATAL CARE | Facility: CLINIC | Age: 25
End: 2020-08-20

## 2020-08-20 DIAGNOSIS — O24.112 PRE-EXISTING TYPE 2 DIABETES MELLITUS, IN PREGNANCY, SECOND TRIMESTER: Primary | ICD-10-CM

## 2020-08-20 DIAGNOSIS — Z3A.19 19 WEEKS GESTATION OF PREGNANCY: ICD-10-CM

## 2020-08-20 NOTE — PROGRESS NOTES
Date:  20  RE: Franklin Zamora    : 1995   RACHEL: 1/15/21  EGA: 18w6d  OB: [facility]  Pre-existing T2DM, first pregnancy      87  107    97  120  8/15- 85  95  120    112    85  90    100  88    100- 115        mychart     Current regimen:  Lantus 30 units daily at 4 PM    Humalog 5 units before 2 meals a day, holds if not eating  Recommended 1800 calorie meal plan with 3 meals/snacks including balance of carbohydrate, protein and fat as tolerated  Increase to 2100 calories for 2nd and 3rd trimester  Self monitoring blood glucose with One Touch Verio meter, fasting and after meals she is able to tolerate related to nausea  Does not test after breakfast due to going to bed right after eating after working night shift  Plan:  Continue Lantus 30 units at 4 PM daily  Continue Humalog 5 units before 2 meals a day  Continue GDM diet and testing  Pending new Dexcom transmitter and new share code  20 US impressions: fetal growth and REBECCA appeared normal  Next US 20    7 HgA1C: 5 2%, previous 20 A1c 5 7%  repeat after 20         Date due to report next:  Thursday, 20        [facility] Maternal Fetal Medicine  Diabetes and Pregnancy Program

## 2020-08-21 ENCOUNTER — TELEPHONE (OUTPATIENT)
Dept: PERINATAL CARE | Facility: OTHER | Age: 25
End: 2020-08-21

## 2020-08-21 PROBLEM — Z3A.19 19 WEEKS GESTATION OF PREGNANCY: Status: ACTIVE | Noted: 2020-06-03

## 2020-08-21 NOTE — TELEPHONE ENCOUNTER
----- Message from Danitza Jenkins MD sent at 8/21/2020 11:16 AM EDT -----  I reviewed the lab study today and the results are normal

## 2020-08-21 NOTE — TELEPHONE ENCOUNTER
I called the patient and left a message, per her communication consent, with the results of her part 2 sequential screen  I left the nurse line phone number ( [phone]) for her to call with any questions

## 2020-08-21 NOTE — PROGRESS NOTES
Date:  20  RE: Candace Gao    : 1995   RACHEL: 1/15/21  EGA: 19w0d  OB: [facility]  Pre-existing T2DM, first pregnancy      87  107    97  120  8/15- 85  95  120    112    85  90    100  88    100- 115        mychart     Current regimen:  Lantus 30 units daily at 4 PM    Humalog 5 units before 2 meals a day, holds if not eating  Recommended 1800 calorie meal plan with 3 meals/snacks including balance of carbohydrate, protein and fat as tolerated  Increase to 2100 calories for 2nd and 3rd trimester  Self monitoring blood glucose with One Touch Verio meter, fasting and after meals she is able to tolerate related to nausea  Does not test after breakfast due to going to bed right after eating after working night shift  Plan:  Continue Lantus 30 units at 4 PM daily  Continue Humalog 5 units before 2 meals a day  Continue GDM diet and testing  Pending new Dexcom transmitter and new share code  20 US impressions: fetal growth and REBECCA appeared normal  Next US 8/28/20    8/15/20 HgA1C: 5 1%, repeat after 9/15/20         Date due to report next:  Thursday, 20      CLAUDINE Lawrence, JUANE, BC-ADM  [facility] Maternal Fetal Medicine  Diabetes and Pregnancy Program

## 2020-08-27 ENCOUNTER — TELEPHONE (OUTPATIENT)
Dept: PERINATAL CARE | Facility: CLINIC | Age: 25
End: 2020-08-27

## 2020-08-27 NOTE — TELEPHONE ENCOUNTER
-------------------------------------------------------------    Attempted to reach patient by phone and left voicemail to confirm appointment for MFM ultrasound  1 support person ( must be over the age of 15) may accompany you for your appointment  If you or your support person have traveled outside the state in the past 2 weeks, please call and notify our office today #[phone]  You and your support person must wear a mask ,covering nose and mouth,during your entire visit  You and your support person will have temperature screened upon arrival     To minimize your exposure in our waiting room, please call our office prior to entering the building  Check in and rooming questions will be done via phone  We will give you directions when to enter for your appointment  Inside office # provided:    [place] line:  [phone]      IF you are not feeling well- cough, fever, shortness of breath or any flu like symptoms, contact your primary care physician or [id number] [place]    Any questions with these instructions please call Maternal Fetal Medicine nurse line today @ # [phone]

## 2020-08-28 ENCOUNTER — ROUTINE PRENATAL (OUTPATIENT)
Dept: PERINATAL CARE | Facility: CLINIC | Age: 25
End: 2020-08-28
Payer: COMMERCIAL

## 2020-08-28 ENCOUNTER — DOCUMENTATION (OUTPATIENT)
Dept: PERINATAL CARE | Facility: CLINIC | Age: 25
End: 2020-08-28

## 2020-08-28 VITALS
WEIGHT: 205.4 LBS | BODY MASS INDEX: 35.07 KG/M2 | HEIGHT: 64 IN | SYSTOLIC BLOOD PRESSURE: 119 MMHG | HEART RATE: 80 BPM | DIASTOLIC BLOOD PRESSURE: 66 MMHG | TEMPERATURE: 98.1 F

## 2020-08-28 DIAGNOSIS — Z3A.20 20 WEEKS GESTATION OF PREGNANCY: ICD-10-CM

## 2020-08-28 DIAGNOSIS — O24.112 PRE-EXISTING TYPE 2 DIABETES MELLITUS, IN PREGNANCY, SECOND TRIMESTER: Primary | ICD-10-CM

## 2020-08-28 DIAGNOSIS — O99.212 OBESITY AFFECTING PREGNANCY IN SECOND TRIMESTER: ICD-10-CM

## 2020-08-28 DIAGNOSIS — Z36.86 ENCOUNTER FOR ANTENATAL SCREENING FOR CERVICAL LENGTH: ICD-10-CM

## 2020-08-28 PROCEDURE — 99212 OFFICE O/P EST SF 10 MIN: CPT | Performed by: OBSTETRICS & GYNECOLOGY

## 2020-08-28 PROCEDURE — 76817 TRANSVAGINAL US OBSTETRIC: CPT | Performed by: OBSTETRICS & GYNECOLOGY

## 2020-08-28 PROCEDURE — 76811 OB US DETAILED SNGL FETUS: CPT | Performed by: OBSTETRICS & GYNECOLOGY

## 2020-08-28 NOTE — PROGRESS NOTES
A transvaginal ultrasound was performed  Sonographer note on use of High Level Disinfection process (Trophon) for transvaginal probe #1 used, serial number [id number]   Radha Farley RDMS

## 2020-08-28 NOTE — PROGRESS NOTES
Date:  20  RE: Daija Johnson    : 1995   RACHEL: 1/15/21  EGA: Mimi Cool  OB: [facility]  Pre-existing T2DM, first pregnancy      120  115    100  65    99  125    84  97    85    110  120    79  102      mychart   Patient reported  after dinner she had gone for a walk with resulting 2 hr pp measurement 65  Current regimen:  Lantus 30 units daily at 4 PM    Humalog 5 units before 2 meals a day, holds if not eating  Recommended 1800 calorie meal plan with 3 meals/snacks including balance of carbohydrate, protein and fat as tolerated  Increase to 2100 calories for 2nd and 3rd trimester  Self monitoring blood glucose with One Touch Verio meter, fasting and after meals she is able to tolerate related to nausea  Does not test after breakfast due to going to bed right after eating after working night shift  Plan:  Continue Lantus 30 units at 4 PM daily  Continue Humalog 5 units before 2 meals a day  Continue GDM diet and testing  Pending new Dexcom transmitter and new share code  Advised to pair CHO with adequate protein at all meals and snacks  Reviewed hypoglycemic s/s and treatment  30 US impressions: fetal growth and REBECCA appeared normal  Next US 8/28/20    8/15/20 HgA1C: 5 1%, repeat after 9/15/20         Date due to report next:  Thursday, 20; sooner for any problems      Marina Kat,RD,LDN,CDE  [facility] Maternal Fetal Medicine  Diabetes and Pregnancy Program

## 2020-08-28 NOTE — PROGRESS NOTES
The patient was seen today for an ultrasound  Please see ultrasound report (located under Ob Procedures) for additional details  Thank you very much for allowing us to participate in the care of this very nice patient  Should you have any questions, please do not hesitate to contact me  Dean Kumar MD [address] [facility]  Attending Physician, Herb

## 2020-09-04 ENCOUNTER — DOCUMENTATION (OUTPATIENT)
Dept: PERINATAL CARE | Facility: CLINIC | Age: 25
End: 2020-09-04

## 2020-09-04 DIAGNOSIS — E11.9 TYPE 2 DIABETES MELLITUS WITHOUT COMPLICATION, WITHOUT LONG-TERM CURRENT USE OF INSULIN (HCC): ICD-10-CM

## 2020-09-04 NOTE — PROGRESS NOTES
Date:  20  RE: Chelsy Billy    : 1995   RACHEL: 1/15/21  EGA: Maurizio Thorne  OB: [facility]  Pre-existing T2DM, first pregnancy            mychart     Current regimen:  Lantus 30 units daily at 4 PM    Humalog 5 units before 2 meals a day, holds if not eating  Recommended 1800 calorie meal plan with 3 meals/snacks including balance of carbohydrate, protein and fat as tolerated  Increase to 2100 calories for 2nd and 3rd trimester  Self monitoring blood glucose with One Touch Verio meter, fasting and after meals she is able to tolerate related to nausea  Does not test after breakfast due to going to bed right after eating after working night shift  Plan:  Continue Lantus 30 units at 4 PM daily  Continue Humalog 5 units before 2 meals a day  Continue GDM diet and testing    20 US impressions: fetal growth and REBECCA appeared normal    8/15/20 HgA1C: 5 1%, repeat after 9/15/20         Date due to report next:  Tuesday, 20; sooner for any problems      Nila Pablo RD, LDN  [facility] Maternal Fetal Medicine  Diabetes and Pregnancy Program

## 2020-09-08 DIAGNOSIS — E03.9 HYPOTHYROIDISM, UNSPECIFIED TYPE: ICD-10-CM

## 2020-09-08 RX ORDER — LEVOTHYROXINE SODIUM 0.07 MG/1
TABLET ORAL
Qty: 30 TABLET | Refills: 0 | Status: SHIPPED | OUTPATIENT
Start: 2020-09-08 | End: 2020-10-06 | Stop reason: SDUPTHER

## 2020-09-08 RX ORDER — METFORMIN HYDROCHLORIDE 500 MG/1
TABLET, EXTENDED RELEASE ORAL
Qty: 120 TABLET | Refills: 3 | Status: SHIPPED | OUTPATIENT
Start: 2020-09-08 | End: 2020-09-17 | Stop reason: SDUPTHER

## 2020-09-08 NOTE — TELEPHONE ENCOUNTER
I think I signed this RX for you once, but she is not my patient  I think you are the last one who has seen her

## 2020-09-09 DIAGNOSIS — E03.9 HYPOTHYROIDISM, UNSPECIFIED TYPE: ICD-10-CM

## 2020-09-09 DIAGNOSIS — E11.65 TYPE 2 DIABETES MELLITUS WITH HYPERGLYCEMIA, WITHOUT LONG-TERM CURRENT USE OF INSULIN (HCC): ICD-10-CM

## 2020-09-09 DIAGNOSIS — E55.9 VITAMIN D DEFICIENCY: ICD-10-CM

## 2020-09-09 RX ORDER — INSULIN GLARGINE 100 [IU]/ML
30 INJECTION, SOLUTION SUBCUTANEOUS
Qty: 15 ML | Refills: 1 | Status: SHIPPED | OUTPATIENT
Start: 2020-09-09 | End: 2020-12-01

## 2020-09-10 ENCOUNTER — TELEPHONE (OUTPATIENT)
Dept: PERINATAL CARE | Facility: OTHER | Age: 25
End: 2020-09-10

## 2020-09-10 NOTE — TELEPHONE ENCOUNTER
Attempted to reach patient by phone and left voicemail to confirm appointment for MFM ultrasound  1 support person ( must be over the age of 15) may accompany you for your appointment  If you or your support person have traveled outside the state in the past 2 weeks, please call and notify our office today #[phone]  You and your support person must wear a mask ,covering nose and mouth,during your entire visit  You and your support person will have temperature screened upon arrival     To minimize your exposure in our waiting room, please call our office prior to entering the building  Check in and rooming questions will be done via phone  We will give you directions when to enter for your appointment  Inside office # provided:    [place] line:  [phone]      IF you are not feeling well- cough, fever, shortness of breath or any flu like symptoms, contact your primary care physician or [address] [place] [address] [place]    Any questions with these instructions please call Maternal Fetal Medicine nurse line today @ # [phone]

## 2020-09-11 ENCOUNTER — ROUTINE PRENATAL (OUTPATIENT)
Dept: PERINATAL CARE | Facility: CLINIC | Age: 25
End: 2020-09-11
Payer: COMMERCIAL

## 2020-09-11 VITALS — TEMPERATURE: 98.1 F | WEIGHT: 206.4 LBS | HEIGHT: 64 IN | BODY MASS INDEX: 35.24 KG/M2

## 2020-09-11 DIAGNOSIS — O24.112 PRE-EXISTING TYPE 2 DIABETES MELLITUS, IN PREGNANCY, SECOND TRIMESTER: Primary | ICD-10-CM

## 2020-09-11 DIAGNOSIS — Z3A.22 22 WEEKS GESTATION OF PREGNANCY: ICD-10-CM

## 2020-09-11 PROCEDURE — 93325 DOPPLER ECHO COLOR FLOW MAPG: CPT | Performed by: OBSTETRICS & GYNECOLOGY

## 2020-09-11 PROCEDURE — 76827 ECHO EXAM OF FETAL HEART: CPT | Performed by: OBSTETRICS & GYNECOLOGY

## 2020-09-11 PROCEDURE — 99213 OFFICE O/P EST LOW 20 MIN: CPT | Performed by: OBSTETRICS & GYNECOLOGY

## 2020-09-11 PROCEDURE — 76825 ECHO EXAM OF FETAL HEART: CPT | Performed by: OBSTETRICS & GYNECOLOGY

## 2020-09-11 PROCEDURE — 76816 OB US FOLLOW-UP PER FETUS: CPT | Performed by: OBSTETRICS & GYNECOLOGY

## 2020-09-11 RX ORDER — LORATADINE 10 MG/1
10 TABLET ORAL AS NEEDED
COMMUNITY

## 2020-09-11 RX ORDER — FLUTICASONE PROPIONATE 50 MCG
1 SPRAY, SUSPENSION (ML) NASAL AS NEEDED
COMMUNITY

## 2020-09-11 RX ORDER — DIPHENHYDRAMINE HCL 25 MG
25 TABLET ORAL
COMMUNITY
End: 2021-07-22

## 2020-09-11 NOTE — PROGRESS NOTES
Shad Alvarado  has no complaints today  She reports fetal movements  She is here today for a fetal echo secondary to her diagnosis of type 2 IDDM diagnosed for about 1 year  Problem list:  1  Type 2 IDDM with a baseline hemoglobin A1c of 5 7 in early pregnancy  She has a glucose sensor and is on Lantus 30 units at 4:00 p m  (she works night shift so technically this is her a m  dose)  She is also on 6 units of NovoLog with meals  Her blood sugars are under good control with fastings less than 90 and her 2 hour post prandials as of 9/8 required some titration of her Novolog up to the 6 u with each meal that she is on now  2  Hypothyroidism currently on levothyroxine 75 micro grams daily     Ultrasound findings: The ultrasound today shows a normal fetal echo and normal views of the right hand  Her prior ultrasound was also limited in views of the left hand and placental cord insertion  We were able to detect the left hand but the fingers were in a fist   The placental cord insertion also was not seen well today secondary to fetal position  Pregnancy ultrasound has limitations and is unable to detect all forms of fetal congenital abnormalities  Follow up: She has a follow-up ultrasound for growth in 4 weeks  Will review the missed anatomy at that time  Recommend she start twice weekly NSTs and weekly REBECCA 's at 32 weeks which can be done locally  The majority of time ( greater than 50 percent) was spent on counseling and coordination of care with the patient and her family member  Approximately physician face-to-face time was 15 minutes       Brittney Araiza MD

## 2020-09-11 NOTE — LETTER
September 11, 2020     Tony Mckinney 58    Patient: Aida Hein   YOB: 1995   Date of Visit: 9/11/2020       Dear Dr Royce Mcgill: Thank you for referring Abad Bryant to me for evaluation  Below are my notes for this consultation  If you have questions, please do not hesitate to call me  I look forward to following your patient along with you  Sincerely,        Cordell Spears MD        CC: No Recipients  Cordell Spears MD  9/11/2020  7:43 PM  Sign when Signing Visit  Jefferson Nash  has no complaints today  She reports fetal movements  She is here today for a fetal echo secondary to her diagnosis of type 2 IDDM diagnosed for about 1 year  Problem list:  1  Type 2 IDDM with a baseline hemoglobin A1c of 5 7 in early pregnancy  She has a glucose sensor and is on Lantus 30 units at 4:00 p m  (she works night shift so technically this is her a m  dose)  She is also on 6 units of NovoLog with meals  Her blood sugars are under good control with fastings less than 90 and her 2 hour post prandials as of 9/8 required some titration of her Novolog up to the 6 u with each meal that she is on now  2  Hypothyroidism currently on levothyroxine 75 micro grams daily     Ultrasound findings: The ultrasound today shows a normal fetal echo and normal views of the right hand  Her prior ultrasound was also limited in views of the left hand and placental cord insertion  We were able to detect the left hand but the fingers were in a fist   The placental cord insertion also was not seen well today secondary to fetal position  Pregnancy ultrasound has limitations and is unable to detect all forms of fetal congenital abnormalities  Follow up: She has a follow-up ultrasound for growth in 4 weeks  Will review the missed anatomy at that time      Recommend she start twice weekly NSTs and weekly REBECCA 's at 32 weeks which can be done locally  The majority of time ( greater than 50 percent) was spent on counseling and coordination of care with the patient and her family member  Approximately physician face-to-face time was 15 minutes       María Elena Allan MD

## 2020-09-17 DIAGNOSIS — E11.9 TYPE 2 DIABETES MELLITUS WITHOUT COMPLICATION, WITHOUT LONG-TERM CURRENT USE OF INSULIN (HCC): ICD-10-CM

## 2020-09-17 RX ORDER — METFORMIN HYDROCHLORIDE 500 MG/1
TABLET, EXTENDED RELEASE ORAL
Qty: 120 TABLET | Refills: 3 | Status: SHIPPED | OUTPATIENT
Start: 2020-09-17 | End: 2021-03-26

## 2020-09-17 NOTE — TELEPHONE ENCOUNTER
----- Message from Gabrielle Macias sent at 9/17/2020  8:05 AM EDT -----  Regarding: Prescription Question  Contact: [phone]  Good morning! My pharmacy is saying they no longer have a script for my Metformin  Could you please send refills to [facility]  Also my name finally changed to my  name on my insurance  It changed from [address]  [facility] to [place]  I know they have the account merged at the pharmacy but I just want to make sure they get the script  Thank you so much!

## 2020-09-17 NOTE — TELEPHONE ENCOUNTER
Should this refill go through us or Alphonza Jeans since it seems she is now seeing      Please advise

## 2020-09-17 NOTE — TELEPHONE ENCOUNTER
----- Message from Holguin Danita sent at 9/17/2020  8:05 AM EDT -----  Regarding: Prescription Question  Contact: [phone]  Good morning! My pharmacy is saying they no longer have a script for my Metformin  Could you please send refills to [facility] at [facility]  Also my name finally changed to my  name on my insurance  It changed from [address]  [facility] to [place]  I know they have the account merged at the pharmacy but I just want to make sure they get the script  Thank you so much!

## 2020-09-17 NOTE — TELEPHONE ENCOUNTER
I sent to [facility] last week and it looks like she approved it-  Will resend to nisa in case it didn't go through

## 2020-09-18 ENCOUNTER — DOCUMENTATION (OUTPATIENT)
Dept: PERINATAL CARE | Facility: CLINIC | Age: 25
End: 2020-09-18

## 2020-09-18 ENCOUNTER — PATIENT MESSAGE (OUTPATIENT)
Dept: PERINATAL CARE | Facility: CLINIC | Age: 25
End: 2020-09-18

## 2020-09-18 NOTE — PROGRESS NOTES
Date:  20  RE: Galilea Led    : 1995   RACHEL: 1/15/21  EGA: Marky Yuegraciela  OB: [facility]  Pre-existing T2DM, first pregnancy        mychart     Current regimen:  Lantus 30 units daily at 4 PM    Humalog--4 units at breakfast, &  6 units before 2nd & 3rd meals a day, holds if not eating  Recommended 1800 calorie meal plan with 3 meals/snacks including balance of carbohydrate, protein and fat as tolerated  Increase to 2100 calories for 2nd and 3rd trimester  Self monitoring blood glucose with One Touch Verio meter, fasting and after meals she is able to tolerate related to nausea  Does not test after breakfast due to going to bed right after eating after working night shift  Plan:  Continue Lantus 30 units at 4 PM daily  Continue Humalog in above doses  Was advised by CLAUDINE Gomez, CDE on 9/10/20 to increase to 8 units Humalog if consistently >120 after a meal   Continue GDM diet and testing    20 US impressions: fetal growth and REBECCA appeared normal    8/15/20 HgA1C: 5 1%, HbA1c was reordered on 20; encouraged patient to complete test soon        Date due to report next:  Thursday, 20; sooner for any problems      Verner Patches, MS, RD, CDE  [facility] Maternal Fetal Medicine  Diabetes and Pregnancy Program

## 2020-09-24 ENCOUNTER — APPOINTMENT (OUTPATIENT)
Dept: LAB | Age: 25
End: 2020-09-24
Payer: COMMERCIAL

## 2020-09-24 ENCOUNTER — CLINICAL SUPPORT (OUTPATIENT)
Dept: URGENT CARE | Age: 25
End: 2020-09-24
Payer: COMMERCIAL

## 2020-09-24 DIAGNOSIS — E03.9 HYPOTHYROIDISM, UNSPECIFIED TYPE: ICD-10-CM

## 2020-09-24 LAB
T4 FREE SERPL-MCNC: 1.02 NG/DL (ref 0.76–1.46)
TSH SERPL DL<=0.05 MIU/L-ACNC: 0.55 UIU/ML (ref 0.36–3.74)

## 2020-09-24 PROCEDURE — 84439 ASSAY OF FREE THYROXINE: CPT

## 2020-09-24 PROCEDURE — 36415 COLL VENOUS BLD VENIPUNCTURE: CPT

## 2020-09-24 PROCEDURE — 84443 ASSAY THYROID STIM HORMONE: CPT

## 2020-09-28 ENCOUNTER — DOCUMENTATION (OUTPATIENT)
Dept: PERINATAL CARE | Facility: CLINIC | Age: 25
End: 2020-09-28

## 2020-09-28 NOTE — PROGRESS NOTES
Date:  20  RE: Sujey Moreno    : 1995   RACHEL: 1/15/21  EGA: 24w3d  OB: [facility]  Pre-existing T2DM, first pregnancy      BG log copied from [employer]  Current regimen:  Lantus 30 units daily at 4 PM   Humalog-  hold if not eating  Breakfast- 4 units   2nd and 3rd meals of the day - 6 units  2100 calorie meal plan with 3 meals/snacks including balance of carbohydrate, protein and fat as tolerated  Self monitoring blood glucose with One Touch Verio meter, fasting and after meals she is able to tolerate related to nausea  Does not test after breakfast due to going to bed right after eating after working night shift  Plan:  Continue Lantus, diet and SMBG but advised to set alarm to test 2 hours after breakfast to ensure breakfast dose of Humalog is working effectively  Humalog-  Breakfast continue 4 units  2nd meal of day- increase to 7 units  3rd meal of day- continue 6 units    20 US impressions: fetal growth and REBECCA appeared normal    8/15/20 HgA1C: 5 1%, HbA1c was reordered on 20; encouraged patient to complete this week as able        Date due to report next:  Wednesday, 20 with up to date BG values    Oliver Luque RN BS CDE  Diabetes Educator    [facility] Maternal Fetal Medicine  Diabetes and Pregnancy Program

## 2020-09-30 ENCOUNTER — DOCUMENTATION (OUTPATIENT)
Dept: PERINATAL CARE | Facility: CLINIC | Age: 25
End: 2020-09-30

## 2020-09-30 NOTE — PROGRESS NOTES
Date:  20  RE: Az Wheeler    : 1995   RACHEL: 1/15/21  EGA: 24w5d  OB: [facility]  Pre-existing T2DM, first pregnancy      BG log copied from [employer]  Current regimen:  Lantus 30 units daily at 4 PM   Humalog-  hold if not eating  Breakfast- 4 units   2nd meal of day- increase to 7 units  3rd meal of day- continue 6 units    2100 calorie meal plan with 3 meals/snacks including balance of carbohydrate, protein and fat as tolerated  Self monitoring blood glucose with One Touch Verio meter, fasting and 2 hours after meals she is able to tolerate related to nausea  Does not test after breakfast due to going to bed right after eating after working night shift  Plan:  Continue current regimen again advised to set alarm to test either 1 or 2 hours after breakfast to ensure breakfast dose of Humalog is working effectively  Patient expressed desire to increase Lantus "I noticed my fasting is increasing I've been consistently in the 70's  Can we increase my Lantus? Maybe that will help with my post meals " Advised fasting values continue in target range, though some are not in the 70"s, and reviewed basal/bolus insulin actions  20 US impressions: fetal growth and REBECCA appeared normal    8/15/20 HgA1C: 5 1%, HbA1c was reordered on 20; encouraged patient to complete this week as able        Date due to report next:  Wednesday, 10-7-20     Kane Tong, RN BS CDE  Diabetes Educator  [facility] Maternal Fetal Medicine  Diabetes and Pregnancy Program

## 2020-10-05 ENCOUNTER — TELEPHONE (OUTPATIENT)
Dept: PERINATAL CARE | Facility: CLINIC | Age: 25
End: 2020-10-05

## 2020-10-05 ENCOUNTER — APPOINTMENT (OUTPATIENT)
Dept: LAB | Age: 25
End: 2020-10-05
Payer: COMMERCIAL

## 2020-10-05 DIAGNOSIS — E11.9 TYPE 2 DIABETES MELLITUS WITHOUT COMPLICATION, WITHOUT LONG-TERM CURRENT USE OF INSULIN (HCC): ICD-10-CM

## 2020-10-05 RX ORDER — BLOOD SUGAR DIAGNOSTIC
STRIP MISCELLANEOUS
Qty: 100 EACH | Refills: 5 | Status: SHIPPED | OUTPATIENT
Start: 2020-10-05 | End: 2020-11-03 | Stop reason: ALTCHOICE

## 2020-10-06 ENCOUNTER — OFFICE VISIT (OUTPATIENT)
Dept: DERMATOLOGY | Facility: CLINIC | Age: 25
End: 2020-10-06
Payer: COMMERCIAL

## 2020-10-06 VITALS — BODY MASS INDEX: 35.68 KG/M2 | WEIGHT: 209 LBS | HEIGHT: 64 IN | TEMPERATURE: 97.3 F

## 2020-10-06 DIAGNOSIS — D22.60 MULTIPLE BENIGN MELANOCYTIC NEVI OF UPPER EXTREMITY, LOWER EXTREMITY, AND TRUNK: ICD-10-CM

## 2020-10-06 DIAGNOSIS — D22.5 MULTIPLE BENIGN MELANOCYTIC NEVI OF UPPER EXTREMITY, LOWER EXTREMITY, AND TRUNK: ICD-10-CM

## 2020-10-06 DIAGNOSIS — D22.70 MULTIPLE BENIGN MELANOCYTIC NEVI OF UPPER EXTREMITY, LOWER EXTREMITY, AND TRUNK: ICD-10-CM

## 2020-10-06 DIAGNOSIS — E03.9 HYPOTHYROIDISM, UNSPECIFIED TYPE: ICD-10-CM

## 2020-10-06 DIAGNOSIS — L72.0 EPIDERMAL CYST: ICD-10-CM

## 2020-10-06 DIAGNOSIS — B35.1 ONYCHOMYCOSIS: Primary | ICD-10-CM

## 2020-10-06 PROCEDURE — 99204 OFFICE O/P NEW MOD 45 MIN: CPT | Performed by: DERMATOLOGY

## 2020-10-06 RX ORDER — LEVOTHYROXINE SODIUM 0.07 MG/1
75 TABLET ORAL DAILY
Qty: 30 TABLET | Refills: 2 | Status: SHIPPED | OUTPATIENT
Start: 2020-10-06 | End: 2020-12-31 | Stop reason: SDUPTHER

## 2020-10-06 RX ORDER — CICLOPIROX OLAMINE 7.7 MG/100ML
1 SUSPENSION TOPICAL DAILY
Qty: 60 ML | Refills: 2 | Status: SHIPPED | OUTPATIENT
Start: 2020-10-06 | End: 2021-07-22

## 2020-10-06 RX ORDER — CICLOPIROX OLAMINE 7.7 MG/100ML
1 SUSPENSION TOPICAL DAILY
Qty: 60 ML | Refills: 2 | Status: SHIPPED | OUTPATIENT
Start: 2020-10-06 | End: 2020-10-06

## 2020-10-08 ENCOUNTER — TELEPHONE (OUTPATIENT)
Dept: PERINATAL CARE | Facility: OTHER | Age: 25
End: 2020-10-08

## 2020-10-09 ENCOUNTER — ULTRASOUND (OUTPATIENT)
Dept: PERINATAL CARE | Facility: CLINIC | Age: 25
End: 2020-10-09
Payer: COMMERCIAL

## 2020-10-09 VITALS
TEMPERATURE: 98.1 F | DIASTOLIC BLOOD PRESSURE: 60 MMHG | HEIGHT: 64 IN | HEART RATE: 79 BPM | SYSTOLIC BLOOD PRESSURE: 98 MMHG | WEIGHT: 210.6 LBS | BODY MASS INDEX: 35.96 KG/M2

## 2020-10-09 DIAGNOSIS — O24.112 PRE-EXISTING TYPE 2 DIABETES MELLITUS, IN PREGNANCY, SECOND TRIMESTER: Primary | ICD-10-CM

## 2020-10-09 DIAGNOSIS — O99.212 OBESITY AFFECTING PREGNANCY IN SECOND TRIMESTER: ICD-10-CM

## 2020-10-09 DIAGNOSIS — Z3A.26 26 WEEKS GESTATION OF PREGNANCY: ICD-10-CM

## 2020-10-09 PROCEDURE — 76816 OB US FOLLOW-UP PER FETUS: CPT | Performed by: OBSTETRICS & GYNECOLOGY

## 2020-10-09 PROCEDURE — 99212 OFFICE O/P EST SF 10 MIN: CPT | Performed by: OBSTETRICS & GYNECOLOGY

## 2020-10-14 ENCOUNTER — DOCUMENTATION (OUTPATIENT)
Dept: PERINATAL CARE | Facility: CLINIC | Age: 25
End: 2020-10-14

## 2020-10-19 ENCOUNTER — PATIENT MESSAGE (OUTPATIENT)
Dept: PERINATAL CARE | Facility: CLINIC | Age: 25
End: 2020-10-19

## 2020-10-19 ENCOUNTER — DOCUMENTATION (OUTPATIENT)
Dept: PERINATAL CARE | Facility: CLINIC | Age: 25
End: 2020-10-19

## 2020-10-30 ENCOUNTER — LAB REQUISITION (OUTPATIENT)
Dept: LAB | Facility: HOSPITAL | Age: 25
End: 2020-10-30
Payer: COMMERCIAL

## 2020-10-30 DIAGNOSIS — O09.892 SUPERVISION OF OTHER HIGH RISK PREGNANCIES, SECOND TRIMESTER: ICD-10-CM

## 2020-10-30 LAB
BASOPHILS # BLD AUTO: 0.04 THOUSANDS/ΜL (ref 0–0.1)
BASOPHILS NFR BLD AUTO: 0 % (ref 0–1)
EOSINOPHIL # BLD AUTO: 0.13 THOUSAND/ΜL (ref 0–0.61)
EOSINOPHIL NFR BLD AUTO: 1 % (ref 0–6)
ERYTHROCYTE [DISTWIDTH] IN BLOOD BY AUTOMATED COUNT: 14.8 % (ref 11.6–15.1)
HCT VFR BLD AUTO: 34.4 % (ref 34.8–46.1)
HGB BLD-MCNC: 11 G/DL (ref 11.5–15.4)
IMM GRANULOCYTES # BLD AUTO: 0.06 THOUSAND/UL (ref 0–0.2)
IMM GRANULOCYTES NFR BLD AUTO: 1 % (ref 0–2)
LYMPHOCYTES # BLD AUTO: 1.79 THOUSANDS/ΜL (ref 0.6–4.47)
LYMPHOCYTES NFR BLD AUTO: 20 % (ref 14–44)
MCH RBC QN AUTO: 27.9 PG (ref 26.8–34.3)
MCHC RBC AUTO-ENTMCNC: 32 G/DL (ref 31.4–37.4)
MCV RBC AUTO: 87 FL (ref 82–98)
MONOCYTES # BLD AUTO: 0.62 THOUSAND/ΜL (ref 0.17–1.22)
MONOCYTES NFR BLD AUTO: 7 % (ref 4–12)
NEUTROPHILS # BLD AUTO: 6.45 THOUSANDS/ΜL (ref 1.85–7.62)
NEUTS SEG NFR BLD AUTO: 71 % (ref 43–75)
NRBC BLD AUTO-RTO: 0 /100 WBCS
PLATELET # BLD AUTO: 211 THOUSANDS/UL (ref 149–390)
PMV BLD AUTO: 11.2 FL (ref 8.9–12.7)
RBC # BLD AUTO: 3.94 MILLION/UL (ref 3.81–5.12)
WBC # BLD AUTO: 9.09 THOUSAND/UL (ref 4.31–10.16)

## 2020-10-30 PROCEDURE — 86592 SYPHILIS TEST NON-TREP QUAL: CPT | Performed by: OBSTETRICS & GYNECOLOGY

## 2020-10-30 PROCEDURE — 85025 COMPLETE CBC W/AUTO DIFF WBC: CPT | Performed by: OBSTETRICS & GYNECOLOGY

## 2020-11-02 LAB — RPR SER QL: NORMAL

## 2020-11-03 ENCOUNTER — OFFICE VISIT (OUTPATIENT)
Dept: FAMILY MEDICINE CLINIC | Facility: CLINIC | Age: 25
End: 2020-11-03
Payer: COMMERCIAL

## 2020-11-03 VITALS
SYSTOLIC BLOOD PRESSURE: 104 MMHG | TEMPERATURE: 97.9 F | DIASTOLIC BLOOD PRESSURE: 66 MMHG | HEIGHT: 64 IN | RESPIRATION RATE: 18 BRPM | WEIGHT: 211.9 LBS | OXYGEN SATURATION: 98 % | HEART RATE: 93 BPM | BODY MASS INDEX: 36.18 KG/M2

## 2020-11-03 DIAGNOSIS — Z00.00 ANNUAL PHYSICAL EXAM: Primary | ICD-10-CM

## 2020-11-03 DIAGNOSIS — E11.9 TYPE 2 DIABETES MELLITUS WITHOUT COMPLICATION, WITHOUT LONG-TERM CURRENT USE OF INSULIN (HCC): Primary | ICD-10-CM

## 2020-11-03 DIAGNOSIS — E11.9 TYPE 2 DIABETES MELLITUS WITH HEMOGLOBIN A1C GOAL OF LESS THAN 7.0% (HCC): ICD-10-CM

## 2020-11-03 PROCEDURE — 99385 PREV VISIT NEW AGE 18-39: CPT | Performed by: NURSE PRACTITIONER

## 2020-11-03 RX ORDER — LANCETS 30 GAUGE
4 EACH MISCELLANEOUS 4 TIMES DAILY
Qty: 400 EACH | Refills: 5 | Status: CANCELLED | OUTPATIENT
Start: 2020-11-03

## 2020-11-03 RX ORDER — BLOOD SUGAR DIAGNOSTIC
STRIP MISCELLANEOUS
Qty: 100 EACH | Refills: 5 | Status: CANCELLED | OUTPATIENT
Start: 2020-11-03

## 2020-11-03 RX ORDER — FERROUS SULFATE 325(65) MG
325 TABLET ORAL
COMMUNITY
Start: 2020-11-03 | End: 2021-07-22

## 2020-11-04 ENCOUNTER — TELEPHONE (OUTPATIENT)
Dept: PERINATAL CARE | Facility: CLINIC | Age: 25
End: 2020-11-04

## 2020-11-05 ENCOUNTER — ULTRASOUND (OUTPATIENT)
Dept: PERINATAL CARE | Facility: CLINIC | Age: 25
End: 2020-11-05
Payer: COMMERCIAL

## 2020-11-05 VITALS
BODY MASS INDEX: 37.01 KG/M2 | HEART RATE: 95 BPM | SYSTOLIC BLOOD PRESSURE: 129 MMHG | WEIGHT: 216.8 LBS | DIASTOLIC BLOOD PRESSURE: 76 MMHG | TEMPERATURE: 97.8 F | HEIGHT: 64 IN

## 2020-11-05 DIAGNOSIS — O24.112 PRE-EXISTING TYPE 2 DIABETES MELLITUS, IN PREGNANCY, SECOND TRIMESTER: Primary | ICD-10-CM

## 2020-11-05 DIAGNOSIS — Z3A.29 29 WEEKS GESTATION OF PREGNANCY: ICD-10-CM

## 2020-11-05 PROCEDURE — 76816 OB US FOLLOW-UP PER FETUS: CPT | Performed by: OBSTETRICS & GYNECOLOGY

## 2020-11-05 PROCEDURE — 99212 OFFICE O/P EST SF 10 MIN: CPT | Performed by: OBSTETRICS & GYNECOLOGY

## 2020-11-10 ENCOUNTER — DOCUMENTATION (OUTPATIENT)
Dept: PERINATAL CARE | Facility: CLINIC | Age: 25
End: 2020-11-10

## 2020-11-10 PROBLEM — O24.113 PRE-EXISTING TYPE 2 DIABETES MELLITUS IN PREGNANCY IN THIRD TRIMESTER: Status: ACTIVE | Noted: 2020-06-03

## 2020-11-11 DIAGNOSIS — E03.9 HYPOTHYROIDISM, UNSPECIFIED TYPE: Primary | ICD-10-CM

## 2020-11-13 DIAGNOSIS — O24.119 PRE-EXISTING TYPE 2 DIABETES AFFECTING PREGNANCY, ANTEPARTUM: Primary | ICD-10-CM

## 2020-11-17 ENCOUNTER — LAB (OUTPATIENT)
Dept: LAB | Age: 25
End: 2020-11-17
Payer: COMMERCIAL

## 2020-11-17 DIAGNOSIS — O24.119 PRE-EXISTING TYPE 2 DIABETES AFFECTING PREGNANCY, ANTEPARTUM: ICD-10-CM

## 2020-11-17 DIAGNOSIS — E03.9 HYPOTHYROIDISM, UNSPECIFIED TYPE: ICD-10-CM

## 2020-11-17 LAB
T4 FREE SERPL-MCNC: 1.03 NG/DL (ref 0.76–1.46)
TSH SERPL DL<=0.05 MIU/L-ACNC: 1.62 UIU/ML (ref 0.36–3.74)

## 2020-11-17 PROCEDURE — 83036 HEMOGLOBIN GLYCOSYLATED A1C: CPT

## 2020-11-17 PROCEDURE — 84439 ASSAY OF FREE THYROXINE: CPT

## 2020-11-17 PROCEDURE — 36415 COLL VENOUS BLD VENIPUNCTURE: CPT

## 2020-11-17 PROCEDURE — 84443 ASSAY THYROID STIM HORMONE: CPT

## 2020-11-18 LAB
EST. AVERAGE GLUCOSE BLD GHB EST-MCNC: 100 MG/DL
HBA1C MFR BLD: 5.1 %

## 2020-11-19 ENCOUNTER — DOCUMENTATION (OUTPATIENT)
Dept: PERINATAL CARE | Facility: CLINIC | Age: 25
End: 2020-11-19

## 2020-11-19 DIAGNOSIS — O99.213 OBESITY AFFECTING PREGNANCY IN THIRD TRIMESTER: ICD-10-CM

## 2020-11-19 DIAGNOSIS — Z3A.31 31 WEEKS GESTATION OF PREGNANCY: ICD-10-CM

## 2020-11-19 DIAGNOSIS — O24.113 PRE-EXISTING TYPE 2 DIABETES MELLITUS IN PREGNANCY IN THIRD TRIMESTER: Primary | ICD-10-CM

## 2020-11-20 DIAGNOSIS — E03.9 HYPOTHYROIDISM, UNSPECIFIED TYPE: Primary | ICD-10-CM

## 2020-11-25 ENCOUNTER — TELEPHONE (OUTPATIENT)
Dept: PERINATAL CARE | Facility: OTHER | Age: 25
End: 2020-11-25

## 2020-11-25 ENCOUNTER — TELEPHONE (OUTPATIENT)
Dept: PERINATAL CARE | Facility: CLINIC | Age: 25
End: 2020-11-25

## 2020-11-30 ENCOUNTER — ULTRASOUND (OUTPATIENT)
Dept: PERINATAL CARE | Facility: CLINIC | Age: 25
End: 2020-11-30
Payer: COMMERCIAL

## 2020-11-30 ENCOUNTER — ROUTINE PRENATAL (OUTPATIENT)
Dept: PERINATAL CARE | Facility: CLINIC | Age: 25
End: 2020-11-30
Payer: COMMERCIAL

## 2020-11-30 VITALS
HEART RATE: 79 BPM | WEIGHT: 214.4 LBS | DIASTOLIC BLOOD PRESSURE: 62 MMHG | SYSTOLIC BLOOD PRESSURE: 117 MMHG | BODY MASS INDEX: 36.6 KG/M2 | HEIGHT: 64 IN

## 2020-11-30 DIAGNOSIS — O99.213 OBESITY AFFECTING PREGNANCY IN THIRD TRIMESTER: ICD-10-CM

## 2020-11-30 DIAGNOSIS — O24.113 PRE-EXISTING TYPE 2 DIABETES MELLITUS IN PREGNANCY IN THIRD TRIMESTER: Primary | ICD-10-CM

## 2020-11-30 DIAGNOSIS — Z36.9 ENCOUNTER FOR ANTENATAL SCREENING OF MOTHER: Primary | ICD-10-CM

## 2020-11-30 DIAGNOSIS — Z03.74 SUSPECTED PROBLEM WITH FETAL GROWTH NOT FOUND: ICD-10-CM

## 2020-11-30 DIAGNOSIS — Z3A.33 33 WEEKS GESTATION OF PREGNANCY: ICD-10-CM

## 2020-11-30 PROCEDURE — 59025 FETAL NON-STRESS TEST: CPT | Performed by: OBSTETRICS & GYNECOLOGY

## 2020-11-30 PROCEDURE — NC001 PR NO CHARGE

## 2020-11-30 PROCEDURE — 76816 OB US FOLLOW-UP PER FETUS: CPT | Performed by: OBSTETRICS & GYNECOLOGY

## 2020-11-30 PROCEDURE — 99212 OFFICE O/P EST SF 10 MIN: CPT | Performed by: OBSTETRICS & GYNECOLOGY

## 2020-12-01 DIAGNOSIS — E03.9 HYPOTHYROIDISM, UNSPECIFIED TYPE: ICD-10-CM

## 2020-12-01 DIAGNOSIS — E11.65 TYPE 2 DIABETES MELLITUS WITH HYPERGLYCEMIA, WITHOUT LONG-TERM CURRENT USE OF INSULIN (HCC): ICD-10-CM

## 2020-12-01 DIAGNOSIS — E55.9 VITAMIN D DEFICIENCY: ICD-10-CM

## 2020-12-01 RX ORDER — INSULIN GLARGINE 100 [IU]/ML
34 INJECTION, SOLUTION SUBCUTANEOUS DAILY
Qty: 15 ML | Refills: 1 | Status: SHIPPED | OUTPATIENT
Start: 2020-12-01 | End: 2021-06-22

## 2020-12-02 ENCOUNTER — DOCUMENTATION (OUTPATIENT)
Dept: PERINATAL CARE | Facility: CLINIC | Age: 25
End: 2020-12-02

## 2020-12-02 DIAGNOSIS — Z3A.33 33 WEEKS GESTATION OF PREGNANCY: ICD-10-CM

## 2020-12-02 DIAGNOSIS — O24.113 PRE-EXISTING TYPE 2 DIABETES MELLITUS IN PREGNANCY IN THIRD TRIMESTER: Primary | ICD-10-CM

## 2020-12-02 DIAGNOSIS — O99.213 OBESITY AFFECTING PREGNANCY IN THIRD TRIMESTER: ICD-10-CM

## 2020-12-05 LAB
LEFT EYE DIABETIC RETINOPATHY: NORMAL
RIGHT EYE DIABETIC RETINOPATHY: NORMAL

## 2020-12-11 ENCOUNTER — DOCUMENTATION (OUTPATIENT)
Dept: PERINATAL CARE | Facility: CLINIC | Age: 25
End: 2020-12-11

## 2020-12-21 DIAGNOSIS — E03.9 HYPOTHYROIDISM, UNSPECIFIED TYPE: ICD-10-CM

## 2020-12-21 DIAGNOSIS — E55.9 VITAMIN D DEFICIENCY: ICD-10-CM

## 2020-12-21 DIAGNOSIS — E11.65 TYPE 2 DIABETES MELLITUS WITH HYPERGLYCEMIA, WITHOUT LONG-TERM CURRENT USE OF INSULIN (HCC): ICD-10-CM

## 2020-12-21 RX ORDER — INSULIN ASPART 100 [IU]/ML
INJECTION, SOLUTION INTRAVENOUS; SUBCUTANEOUS
Qty: 15 ML | Refills: 0 | Status: SHIPPED | OUTPATIENT
Start: 2020-12-21 | End: 2021-01-09 | Stop reason: HOSPADM

## 2020-12-24 ENCOUNTER — HOSPITAL ENCOUNTER (OUTPATIENT)
Dept: LABOR AND DELIVERY | Facility: HOSPITAL | Age: 25
Discharge: HOME | End: 2020-12-24
Payer: COMMERCIAL

## 2020-12-24 ENCOUNTER — HOSPITAL ENCOUNTER (OUTPATIENT)
Facility: HOSPITAL | Age: 25
Discharge: HOME | End: 2020-12-24
Attending: OBSTETRICS & GYNECOLOGY | Admitting: OBSTETRICS & GYNECOLOGY
Payer: COMMERCIAL

## 2020-12-24 PROCEDURE — 76815 OB US LIMITED FETUS(S): CPT | Performed by: OBSTETRICS & GYNECOLOGY

## 2020-12-24 PROCEDURE — 99202 OFFICE O/P NEW SF 15 MIN: CPT

## 2020-12-24 PROCEDURE — NC001 PR NO CHARGE: Performed by: OBSTETRICS & GYNECOLOGY

## 2020-12-29 ENCOUNTER — TELEPHONE (OUTPATIENT)
Dept: PERINATAL CARE | Facility: CLINIC | Age: 25
End: 2020-12-29

## 2020-12-30 ENCOUNTER — ULTRASOUND (OUTPATIENT)
Dept: PERINATAL CARE | Facility: CLINIC | Age: 25
End: 2020-12-30
Payer: COMMERCIAL

## 2020-12-30 ENCOUNTER — ROUTINE PRENATAL (OUTPATIENT)
Dept: PERINATAL CARE | Facility: CLINIC | Age: 25
End: 2020-12-30
Payer: COMMERCIAL

## 2020-12-30 ENCOUNTER — DOCUMENTATION (OUTPATIENT)
Dept: PERINATAL CARE | Facility: CLINIC | Age: 25
End: 2020-12-30

## 2020-12-30 VITALS
SYSTOLIC BLOOD PRESSURE: 136 MMHG | BODY MASS INDEX: 37.28 KG/M2 | WEIGHT: 218.4 LBS | HEIGHT: 64 IN | DIASTOLIC BLOOD PRESSURE: 74 MMHG | HEART RATE: 84 BPM

## 2020-12-30 DIAGNOSIS — Z3A.37 37 WEEKS GESTATION OF PREGNANCY: Primary | ICD-10-CM

## 2020-12-30 DIAGNOSIS — Z36.89 ENCOUNTER FOR ULTRASOUND TO CHECK FETAL GROWTH: ICD-10-CM

## 2020-12-30 DIAGNOSIS — Z3A.37 37 WEEKS GESTATION OF PREGNANCY: ICD-10-CM

## 2020-12-30 DIAGNOSIS — O24.113 PRE-EXISTING TYPE 2 DIABETES MELLITUS IN PREGNANCY IN THIRD TRIMESTER: Primary | ICD-10-CM

## 2020-12-30 DIAGNOSIS — O36.5930 POOR FETAL GROWTH AFFECTING MANAGEMENT OF MOTHER IN THIRD TRIMESTER, SINGLE OR UNSPECIFIED FETUS: ICD-10-CM

## 2020-12-30 PROCEDURE — 76816 OB US FOLLOW-UP PER FETUS: CPT | Performed by: OBSTETRICS & GYNECOLOGY

## 2020-12-30 PROCEDURE — 76820 UMBILICAL ARTERY ECHO: CPT | Performed by: OBSTETRICS & GYNECOLOGY

## 2020-12-30 PROCEDURE — 59025 FETAL NON-STRESS TEST: CPT | Performed by: OBSTETRICS & GYNECOLOGY

## 2020-12-30 PROCEDURE — NC001 PR NO CHARGE

## 2020-12-30 PROCEDURE — 99213 OFFICE O/P EST LOW 20 MIN: CPT | Performed by: OBSTETRICS & GYNECOLOGY

## 2020-12-31 DIAGNOSIS — E03.9 HYPOTHYROIDISM, UNSPECIFIED TYPE: Primary | ICD-10-CM

## 2020-12-31 RX ORDER — LEVOTHYROXINE SODIUM 0.07 MG/1
75 TABLET ORAL DAILY
Qty: 30 TABLET | Refills: 2 | Status: SHIPPED | OUTPATIENT
Start: 2020-12-31 | End: 2021-02-01 | Stop reason: SDUPTHER

## 2021-01-01 ENCOUNTER — HOSPITAL ENCOUNTER (OUTPATIENT)
Dept: LABOR AND DELIVERY | Facility: HOSPITAL | Age: 26
Discharge: HOME | DRG: 832 | End: 2021-01-01
Payer: COMMERCIAL

## 2021-01-01 ENCOUNTER — HOSPITAL ENCOUNTER (INPATIENT)
Facility: HOSPITAL | Age: 26
LOS: 3 days | Discharge: HOME | DRG: 832 | End: 2021-01-04
Attending: OBSTETRICS & GYNECOLOGY | Admitting: OBSTETRICS & GYNECOLOGY
Payer: COMMERCIAL

## 2021-01-01 DIAGNOSIS — O36.5930 POOR FETAL GROWTH AFFECTING MANAGEMENT OF MOTHER IN THIRD TRIMESTER, SINGLE OR UNSPECIFIED FETUS: Primary | ICD-10-CM

## 2021-01-01 LAB
ABO GROUP BLD: NORMAL
BLD GP AB SCN SERPL QL: NEGATIVE
ERYTHROCYTE [DISTWIDTH] IN BLOOD BY AUTOMATED COUNT: 14.6 % (ref 11.6–15.1)
GLUCOSE SERPL-MCNC: 125 MG/DL (ref 65–140)
GLUCOSE SERPL-MCNC: 57 MG/DL (ref 65–140)
HCT VFR BLD AUTO: 31.4 % (ref 34.8–46.1)
HGB BLD-MCNC: 10.4 G/DL (ref 11.5–15.4)
MCH RBC QN AUTO: 28.6 PG (ref 26.8–34.3)
MCHC RBC AUTO-ENTMCNC: 33.1 G/DL (ref 31.4–37.4)
MCV RBC AUTO: 86 FL (ref 82–98)
PLATELET # BLD AUTO: 203 THOUSANDS/UL (ref 149–390)
PMV BLD AUTO: 10.8 FL (ref 8.9–12.7)
RBC # BLD AUTO: 3.64 MILLION/UL (ref 3.81–5.12)
RH BLD: POSITIVE
SPECIMEN EXPIRATION DATE: NORMAL
WBC # BLD AUTO: 6.73 THOUSAND/UL (ref 4.31–10.16)

## 2021-01-01 PROCEDURE — 86850 RBC ANTIBODY SCREEN: CPT | Performed by: OBSTETRICS & GYNECOLOGY

## 2021-01-01 PROCEDURE — 86901 BLOOD TYPING SEROLOGIC RH(D): CPT | Performed by: OBSTETRICS & GYNECOLOGY

## 2021-01-01 PROCEDURE — NC001 PR NO CHARGE: Performed by: OBSTETRICS & GYNECOLOGY

## 2021-01-01 PROCEDURE — 86900 BLOOD TYPING SEROLOGIC ABO: CPT | Performed by: OBSTETRICS & GYNECOLOGY

## 2021-01-01 PROCEDURE — 4A1HXCZ MONITORING OF PRODUCTS OF CONCEPTION, CARDIAC RATE, EXTERNAL APPROACH: ICD-10-PCS | Performed by: OBSTETRICS & GYNECOLOGY

## 2021-01-01 PROCEDURE — 85027 COMPLETE CBC AUTOMATED: CPT | Performed by: OBSTETRICS & GYNECOLOGY

## 2021-01-01 PROCEDURE — 86592 SYPHILIS TEST NON-TREP QUAL: CPT | Performed by: OBSTETRICS & GYNECOLOGY

## 2021-01-01 PROCEDURE — 82948 REAGENT STRIP/BLOOD GLUCOSE: CPT

## 2021-01-01 RX ORDER — SODIUM CHLORIDE 9 MG/ML
125 INJECTION, SOLUTION INTRAVENOUS CONTINUOUS
Status: DISCONTINUED | OUTPATIENT
Start: 2021-01-01 | End: 2021-01-04 | Stop reason: HOSPADM

## 2021-01-01 RX ORDER — INSULIN GLARGINE 100 [IU]/ML
12 INJECTION, SOLUTION SUBCUTANEOUS ONCE
Status: DISCONTINUED | OUTPATIENT
Start: 2021-01-01 | End: 2021-01-04 | Stop reason: HOSPADM

## 2021-01-01 RX ORDER — ONDANSETRON 2 MG/ML
4 INJECTION INTRAMUSCULAR; INTRAVENOUS EVERY 6 HOURS PRN
Status: DISCONTINUED | OUTPATIENT
Start: 2021-01-01 | End: 2021-01-04 | Stop reason: HOSPADM

## 2021-01-01 RX ADMIN — SODIUM CHLORIDE 125 ML/HR: 0.9 INJECTION, SOLUTION INTRAVENOUS at 22:14

## 2021-01-01 RX ADMIN — MISOPROSTOL 25 MCG: 100 TABLET ORAL at 22:13

## 2021-01-01 RX ADMIN — SODIUM CHLORIDE 5 MILLION UNITS: 0.9 INJECTION, SOLUTION INTRAVENOUS at 22:15

## 2021-01-02 PROBLEM — Z3A.38 38 WEEKS GESTATION OF PREGNANCY: Status: ACTIVE | Noted: 2020-06-03

## 2021-01-02 LAB
GLUCOSE SERPL-MCNC: 108 MG/DL (ref 65–140)
GLUCOSE SERPL-MCNC: 55 MG/DL (ref 65–140)
GLUCOSE SERPL-MCNC: 62 MG/DL (ref 65–140)
GLUCOSE SERPL-MCNC: 65 MG/DL (ref 65–140)
GLUCOSE SERPL-MCNC: 66 MG/DL (ref 65–140)
GLUCOSE SERPL-MCNC: 68 MG/DL (ref 65–140)
GLUCOSE SERPL-MCNC: 74 MG/DL (ref 65–140)
GLUCOSE SERPL-MCNC: 74 MG/DL (ref 65–140)
GLUCOSE SERPL-MCNC: 79 MG/DL (ref 65–140)
GLUCOSE SERPL-MCNC: 84 MG/DL (ref 65–140)

## 2021-01-02 PROCEDURE — 82948 REAGENT STRIP/BLOOD GLUCOSE: CPT

## 2021-01-02 PROCEDURE — 3E0D7GC INTRODUCTION OF OTHER THERAPEUTIC SUBSTANCE INTO MOUTH AND PHARYNX, VIA NATURAL OR ARTIFICIAL OPENING: ICD-10-PCS | Performed by: OBSTETRICS & GYNECOLOGY

## 2021-01-02 PROCEDURE — 3E033VJ INTRODUCTION OF OTHER HORMONE INTO PERIPHERAL VEIN, PERCUTANEOUS APPROACH: ICD-10-PCS | Performed by: OBSTETRICS & GYNECOLOGY

## 2021-01-02 RX ORDER — OXYTOCIN/RINGER'S LACTATE 30/500 ML
1-30 PLASTIC BAG, INJECTION (ML) INTRAVENOUS
Status: DISCONTINUED | OUTPATIENT
Start: 2021-01-02 | End: 2021-01-03

## 2021-01-02 RX ADMIN — Medication 2 MILLI-UNITS/MIN: at 08:09

## 2021-01-02 RX ADMIN — SODIUM CHLORIDE 2.5 MILLION UNITS: 9 INJECTION, SOLUTION INTRAVENOUS at 02:21

## 2021-01-02 RX ADMIN — SODIUM CHLORIDE 2.5 MILLION UNITS: 9 INJECTION, SOLUTION INTRAVENOUS at 18:40

## 2021-01-02 RX ADMIN — SODIUM CHLORIDE 2.5 MILLION UNITS: 9 INJECTION, SOLUTION INTRAVENOUS at 23:07

## 2021-01-02 RX ADMIN — LEVOTHYROXINE SODIUM 75 MCG: 25 TABLET ORAL at 06:33

## 2021-01-02 RX ADMIN — SODIUM CHLORIDE 2.5 MILLION UNITS: 9 INJECTION, SOLUTION INTRAVENOUS at 10:46

## 2021-01-02 RX ADMIN — SODIUM CHLORIDE 2.5 MILLION UNITS: 9 INJECTION, SOLUTION INTRAVENOUS at 06:31

## 2021-01-02 RX ADMIN — MISOPROSTOL 25 MCG: 100 TABLET ORAL at 01:41

## 2021-01-02 RX ADMIN — SODIUM CHLORIDE 2.5 MILLION UNITS: 9 INJECTION, SOLUTION INTRAVENOUS at 14:28

## 2021-01-02 RX ADMIN — SODIUM CHLORIDE 125 ML/HR: 0.9 INJECTION, SOLUTION INTRAVENOUS at 10:47

## 2021-01-02 NOTE — PROGRESS NOTES
Blood sugar checked  55  Juice given  Pt denies any symptoms  States this is usually what happens at night  Will re-check in 1 hour   Dr Mendez Hummel aware

## 2021-01-02 NOTE — PROGRESS NOTES
Pt blood sugar 57  Pt denies any symptoms  Juice given  Blood sugar checked 1 hour later- 74   Dr Tiffany Barksdale aware

## 2021-01-02 NOTE — OB LABOR/OXYTOCIN SAFETY PROGRESS
Oxytocin Safety Progress Check Note - Lynn Bai 31 y o  female MRN: [id number]    Unit/Bed#: L&D 325-01 Encounter: [id number]    Dose (carley-units/min) Oxytocin: 2 carley-units/min  Contraction Frequency (minutes): 1-8  Contraction Quality: Mild  Tachysystole: No   Cervical Dilation: 2-3        Cervical Effacement: 60  Fetal Station: -2  Baseline Rate: 115 bpm     FHR Category: Category I               Vital Signs:   Vitals:    01/02/21 0315   BP:    Pulse:    Resp:    Temp: 98 5 °F (36 9 °C)           Notes/comments:      Cervical exam as above  Laboring comfortably on ball  Does not desire pain control at this time  On the Novii  Pitocin started at 0809  Penicillin running  Dr Waldemar Bourgeois notified  Preston Gregory DO 1/2/2021 8:59 AM

## 2021-01-02 NOTE — OB LABOR/OXYTOCIN SAFETY PROGRESS
Oxytocin Safety Progress Check Note - Loli Members 31 y o  female MRN: [id number]    Unit/Bed#: L&D 325-01 Encounter: [id number]    Dose (carley-units/min) Oxytocin: 30 carley-units/min  Contraction Frequency (minutes): 2-2 5  Contraction Quality: Mild  Tachysystole: No   Cervical Dilation: 2-3        Cervical Effacement: 60  Fetal Station: -2  Baseline Rate: 120 bpm     FHR Category: Category I  Oxytocin Safety Progress Check: Safety check completed            Vital Signs:   Vitals:    01/02/21 1518   BP: 127/84   Pulse: 71   Resp:    Temp:            Notes/comments:      Late entry due to active patient care on floor  Pitocin at 30 and cervical exam unchanged  Performed membrane stripping to stimulate labor  She will continue labor for another hour, then eat dinner and take a Pit break  Plan to restart Pitocin at 16 milliunits afterwards  D/w Dr Renae Ocampo, DO 1/2/2021 4:40 PM

## 2021-01-02 NOTE — OB LABOR/OXYTOCIN SAFETY PROGRESS
Labor Progress Note - Arnav Asencio 31 y o  female MRN: [id number]    Unit/Bed#: L&D 325-01 Encounter: [id number]       Contraction Frequency (minutes): 0         Cervical Dilation: 1        Cervical Effacement: 60  Fetal Station: -3  Baseline Rate: 130 bpm                     Vital Signs:  Vitals:    01/02/21 0142   BP: 114/68   Pulse: 88   Resp:    Temp:            Notes/comments:   Patient unchanged at this cervical check, 2nd dose of Cytotec has been placed  Fetal heart tracing category 1  No evidence of regular contractions       Daniel Mckenna MD 1/2/2021 2:18 AM

## 2021-01-02 NOTE — OB LABOR/OXYTOCIN SAFETY PROGRESS
Oxytocin Safety Progress Check Note - Arnav Asencio 31 y o  female MRN: [id number]    Unit/Bed#: L&D 325-01 Encounter: [id number]    Dose (carley-units/min) Oxytocin: 16 carley-units/min  Contraction Frequency (minutes): 1 5-10  Contraction Quality: Mild  Tachysystole: No   Cervical Dilation: 2-3        Cervical Effacement: 60  Fetal Station: -2  Baseline Rate: 130 bpm     FHR Category: Category I               Vital Signs:   Vitals:    01/02/21 1125   BP: 118/79   Pulse: 81   Resp:    Temp: 98 5 °F (36 9 °C)           Notes/comments:     Laboring comfortably on ball  SVE unchanged from last check  Continue pitocin titration  Dr Chelsea Torres notified via tiger text    Shelbie Browne DO 1/2/2021 11:28 AM

## 2021-01-02 NOTE — PROGRESS NOTES
IV fluids and Pitocin infusion stopped per dr Betzy Weeks 92  Pt to eat dinner and then have pitocin restarted in an hour

## 2021-01-02 NOTE — PLAN OF CARE
Problem: Knowledge Deficit  Goal: Verbalizes understanding of labor plan  Description: Assess patient/family/caregiver's baseline knowledge level and ability to understand information  Provide education via patient/family/caregiver's preferred learning method at appropriate level of understanding  1  Provide teaching at level of understanding  2  Provide teaching via preferred learning method(s)  Outcome: Progressing     Problem: Labor & Delivery  Goal: Manages discomfort  Description: Assess and monitor for signs and symptoms of discomfort  Assess patient's pain level regularly and per hospital policy  Administer medications as ordered  Support use of nonpharmacological methods to help control pain such as distraction, imagery, relaxation, and application of heat and cold  Collaborate with interdisciplinary team and patient to determine appropriate pain management plan  1  Include patient in decisions related to comfort  2  Offer non-pharmacological pain management interventions  3  Report ineffective pain management to physician  Outcome: Progressing  Goal: Patient vital signs are stable  Description: 1  Assess vital signs - vaginal delivery    Outcome: Progressing     Problem: DISCHARGE PLANNING  Goal: Discharge to home or other facility with appropriate resources  Description: INTERVENTIONS:  - Identify barriers to discharge w/patient and caregiver  - Arrange for needed discharge resources and transportation as appropriate  - Identify discharge learning needs (meds, wound care, etc )  - Arrange for interpretive services to assist at discharge as needed  - Refer to Case Management Department for coordinating discharge planning if the patient needs post-hospital services based on physician/advanced practitioner order or complex needs related to functional status, cognitive ability, or social support system  Outcome: Progressing

## 2021-01-02 NOTE — H&P
H&P Exam - Obstetrics   Raffaele Cabrera 31 y o  female MRN: [id number]  Unit/Bed#: L&D 325-01 Encounter: [id number]      History of Present Illness     Chief Complaint: Induction of labor    HPI:  Raffaele Cabrera is a 31 y o   female with an RACHEL of 1/15/2021, by Last Menstrual Period at 38w0d weeks gestation who is being admitted for induction of labor for fetal growth restriction with normal dopplers  This pregnancy is complicated by BMI 37, type 2 diabetes on Lantus 34 units at 4:00 p m , and humalog 5 units at breakfast 11 units at lunch and 9 units at dinner, last A1C (20) 5 1  Hypothyroidism on levothyroxine 75 mcg day  Last estimated fetal weight 2020 8th percentile, 5 lb 14 oz, abdominal circumference percentile 15 , femur longitude less than 5 percentile, normal Dopplers  In addition there is evidence of fetal abdominal circumference lag  GBS positive  Rh positive      Contractions: no  Loss of fluid: no  Vaginal bleeding: no  Fetal movement: yes    She is SOLO patient  PREGNANCY COMPLICATIONS:   1) Type 2 diabetes  2) Fetal growth restriction with normal Dopplers  3) Hypothyroidism  4) BMI 40    OB History    Para Term  AB Living   2             SAB TAB Ectopic Multiple Live Births                  # Outcome Date GA Lbr Raymundo/2nd Weight Sex Delivery Anes PTL Lv   2 Current            1                 Baby complications/comments:  Last estimated fetal weight 2020 8 percentile, 5 lb 14 oz, abdominal circumference percentile 15 , femur longitude less than 5 percentile, normal Dopplers  In addition there is evidence of fetal abdominal circumference lag  Review of Systems   Constitutional: Negative  HENT: Negative  Eyes: Negative  Respiratory: Negative  Cardiovascular: Negative  Gastrointestinal: Negative  Endocrine: Negative  Genitourinary: Negative  Musculoskeletal: Negative  Allergic/Immunologic: Negative      Neurological: Negative  Hematological: Negative  Psychiatric/Behavioral: Negative  Historical Information   Past Medical History:   Diagnosis Date    Anemia     Clostridium difficile infection     Hyperglycemia     Hypokalemia     Hypothyroidism 12/2019    IBS (irritable bowel syndrome)     IUD (intrauterine device) in place     Kidney injury     L2 vertebral fracture [facility] [address] [facility]     Type 2 diabetes mellitus ([facility] [address] ) 12/2019    Vitamin D deficiency      Past Surgical History:   Procedure Laterality Date    CHOLECYSTECTOMY      TX COLONOSCOPY FLX DX W/COLLJ SPEC WHEN PFRMD N/A 1/10/2017    Procedure: EGD AND COLONOSCOPY;  Surgeon: Steven Santiago MD;  Location: [facility] GI [facility];   Service: Gastroenterology    WISDOM TOOTH EXTRACTION       Social History   Social History     Substance and Sexual Activity   Alcohol Use Not Currently    Frequency: Monthly or less    Drinks per session: 1 or 2     Social History     Substance and Sexual Activity   Drug Use No     Social History     Tobacco Use   Smoking Status Never Smoker   Smokeless Tobacco Never Used     Family History: non-contributory    Meds/Allergies      Medications Prior to Admission   Medication    albuterol (PROVENTIL HFA,VENTOLIN HFA) 90 mcg/act inhaler    Alcohol Swabs 70 % PADS    aspirin 81 mg chewable tablet    Blood Glucose Monitoring Suppl (ONETOUCH VERIO) w/Device KIT    Cholecalciferol (VITAMIN D) 50 MCG (2000 UT) tablet    ciclopirox (LOPROX) 0 77 % SUSP    Continuous Blood Gluc  (DEXCOM G6 ) SARTHAK    Continuous Blood Gluc Sensor (DEXCOM G6 SENSOR) MISC    Continuous Blood Gluc Transmit (DEXCOM G6 TRANSMITTER) MISC    diphenhydrAMINE (BENADRYL) 25 mg tablet    ferrous sulfate 325 (65 Fe) mg tablet    fluticasone (FLONASE) 50 mcg/act nasal spray    folic acid (FOLVITE) 043 mcg tablet    insulin aspart (NovoLOG FlexPen) 100 UNIT/ML injection pen    Insulin Pen Needle 31G X 5 MM MISC    Lantus SoloStar 100 units/mL injection pen    levothyroxine 75 mcg tablet    loratadine (CLARITIN) 10 mg tablet    metFORMIN (GLUCOPHAGE-XR) 500 mg 24 hr tablet    ondansetron (ZOFRAN) 4 mg tablet    Prenatal Vit-Iron Carbonyl-FA (PRENATAL MULTIVITAMIN) TABS        Allergies   Allergen Reactions    Bactrim [Sulfamethoxazole-Trimethoprim] Hives    Dilaudid [Hydromorphone Hcl] Itching       OBJECTIVE:    Vitals: Last menstrual period 04/10/2020, unknown if currently breastfeeding  There is no height or weight on file to calculate BMI  Physical Exam  Constitutional:       Appearance: She is well-developed  HENT:      Head: Normocephalic and atraumatic  Eyes:      Conjunctiva/sclera: Conjunctivae normal       Pupils: Pupils are equal, round, and reactive to light  Neck:      Musculoskeletal: Normal range of motion and neck supple  Cardiovascular:      Rate and Rhythm: Normal rate and regular rhythm  Heart sounds: Normal heart sounds  Pulmonary:      Effort: Pulmonary effort is normal       Breath sounds: Normal breath sounds  Abdominal:      General: Bowel sounds are normal       Palpations: Abdomen is soft  Genitourinary:     Vagina: Normal    Musculoskeletal: Normal range of motion  Skin:     General: Skin is warm  Neurological:      Mental Status: She is alert and oriented to person, place, and time         Cervix:  Cervical Dilation: 1  Cervical Effacement: 50  Fetal Station: -3  Presentation: Vertex  Method: Manual         Fetal heart rate: 135, moderate variability positive accelerations no decelerations       Saltese: uterine irritability       EFW: 6    GBS:  Positive    Prenatal Labs:   Blood Type:   Lab Results   Component Value Date/Time    ABO Grouping A 05/21/2020 07:18 AM     , D (Rh type):   Lab Results   Component Value Date/Time    Rh Factor Positive 05/21/2020 07:18 AM     , , HCT/HGB:   Lab Results   Component Value Date/Time    Hematocrit 31 4 (L) 01/01/2021 09:34 PM    Hemoglobin 10 4 (L) 01/01/2021 09:34 PM      , MCV:   Lab Results   Component Value Date/Time    MCV 86 2021 09:34 PM      , Platelets:   Lab Results   Component Value Date/Time    Platelets 178  09:34 PM      , , Rubella:   Lab Results   Component Value Date/Time    RUBELLA IGG QUANTITATION 11 2 2015 12:52 PM    Rubella IgG Quant 9 5 (L) 2020 07:22 AM        , VDRL/RPR:   Lab Results   Component Value Date/Time    RPR Non-Reactive 10/30/2020 02:25 PM      , Hep B:   Lab Results   Component Value Date/Time    Hepatitis B Surface Ag Non-reactive 2020 07:22 AM     , HIV:   Lab Results   Component Value Date/Time    HIV-1/HIV-2 Ab Non-Reactive 2020 07:22 AM     , Gonorrhea:   Lab Results   Component Value Date/Time    N gonorrhoeae, DNA Probe Negative 07/10/2020 10:00 AM    N gonorrhoeae, DNA Probe N  gonorrhoeae Amplified DNA Negative 2016 08:21 AM     , Group B Strep:  Bacteriuria     Invasive Devices     None                   Assessment/Plan     ASSESSMENT:  26yo  at 38w0d weeks gestation who is being admitted for IOL FGR  Pregnancy complicated by:  See above  SVE: /-3  FHT: 135  Clinical EFW: 6 ; Vertex confirmed by U/S  GBS status:  positive  Rh:  Positive    PLAN:    - Admit  - CBC, RPR, Blood Type, fingersticks every hour  - Start with Cytotec  - GBS positive status:  PCN for prophylaxis   - Analgesia and/or epidural at patient request  - Anticipate     Discussed with Dr Kevin Braun      This patient will be an INPATIENT  and I certify the anticipated length of stay is >2 Midnights  Alexi Lorenzo MD  2021  9:52 PM    I discussed the patient in detail with the resident  I have reviewed the notes & assessments performed by Dr Chery Wayne, I concur with her documented findings and plan for [facility]

## 2021-01-02 NOTE — OB LABOR/OXYTOCIN SAFETY PROGRESS
Oxytocin Safety Progress Check Note - Ruiz Ray 31 y o  female MRN: [id number]    Unit/Bed#: L&D 325-01 Encounter: [id number]    Dose (carley-units/min) Oxytocin: 22 carley-units/min  Contraction Frequency (minutes): 2-6  Contraction Quality: Mild  Tachysystole: No   Cervical Dilation: 2-3        Cervical Effacement: 60  Fetal Station: -2  Baseline Rate: 120 bpm     FHR Category: Category I  Oxytocin Safety Progress Check: Safety check completed            Vital Signs:   Vitals:    01/02/21 1145   BP:    Pulse:    Resp: 16   Temp:            Notes/comments:      Glucose 79 mg/dL    Comfortable, using birthing ball  Feels ctxn have returned  Pitocin titration cotinues  Exam unchanged, Dr Estephania Jesus aware  Will continue to monitor and titrate pitocin     Michelle Chamorro MD 1/2/2021 1:29 PM

## 2021-01-03 LAB
GLUCOSE SERPL-MCNC: 108 MG/DL (ref 65–140)
GLUCOSE SERPL-MCNC: 109 MG/DL (ref 65–140)
GLUCOSE SERPL-MCNC: 143 MG/DL (ref 65–140)
GLUCOSE SERPL-MCNC: 63 MG/DL (ref 65–140)
GLUCOSE SERPL-MCNC: 69 MG/DL (ref 65–140)
GLUCOSE SERPL-MCNC: 69 MG/DL (ref 65–140)
GLUCOSE SERPL-MCNC: 70 MG/DL (ref 65–140)
GLUCOSE SERPL-MCNC: 71 MG/DL (ref 65–140)
GLUCOSE SERPL-MCNC: 71 MG/DL (ref 65–140)
GLUCOSE SERPL-MCNC: 75 MG/DL (ref 65–140)
GLUCOSE SERPL-MCNC: 83 MG/DL (ref 65–140)

## 2021-01-03 PROCEDURE — 82948 REAGENT STRIP/BLOOD GLUCOSE: CPT

## 2021-01-03 RX ORDER — OXYTOCIN/RINGER'S LACTATE 30/500 ML
1-30 PLASTIC BAG, INJECTION (ML) INTRAVENOUS
Status: DISCONTINUED | OUTPATIENT
Start: 2021-01-03 | End: 2021-01-04 | Stop reason: HOSPADM

## 2021-01-03 RX ADMIN — SODIUM CHLORIDE 2.5 MILLION UNITS: 9 INJECTION, SOLUTION INTRAVENOUS at 07:33

## 2021-01-03 RX ADMIN — MISOPROSTOL 25 MCG: 100 TABLET ORAL at 00:46

## 2021-01-03 RX ADMIN — MISOPROSTOL 25 MCG: 100 TABLET ORAL at 05:48

## 2021-01-03 RX ADMIN — ONDANSETRON 4 MG: 2 INJECTION INTRAMUSCULAR; INTRAVENOUS at 03:41

## 2021-01-03 RX ADMIN — LEVOTHYROXINE SODIUM 75 MCG: 25 TABLET ORAL at 06:32

## 2021-01-03 RX ADMIN — SODIUM CHLORIDE 125 ML/HR: 0.9 INJECTION, SOLUTION INTRAVENOUS at 02:55

## 2021-01-03 RX ADMIN — SODIUM CHLORIDE 2.5 MILLION UNITS: 9 INJECTION, SOLUTION INTRAVENOUS at 02:55

## 2021-01-03 RX ADMIN — SODIUM CHLORIDE 2.5 MILLION UNITS: 9 INJECTION, SOLUTION INTRAVENOUS at 11:27

## 2021-01-03 RX ADMIN — SODIUM CHLORIDE 2.5 MILLION UNITS: 9 INJECTION, SOLUTION INTRAVENOUS at 15:49

## 2021-01-03 RX ADMIN — Medication 2 MILLI-UNITS/MIN: at 10:39

## 2021-01-03 RX ADMIN — SODIUM CHLORIDE 125 ML/HR: 0.9 INJECTION, SOLUTION INTRAVENOUS at 17:55

## 2021-01-03 NOTE — OB LABOR/OXYTOCIN SAFETY PROGRESS
Oxytocin Safety Progress Check Note - Nadya Coyne 31 y o  female MRN: [id number]    Unit/Bed#: L&D 325-01 Encounter: [id number]    Dose (carley-units/min) Oxytocin: 0 carley-units/min  Contraction Frequency (minutes): 0  Contraction Quality: Mild  Tachysystole: No   Cervical Dilation: 2-3        Cervical Effacement: 60  Fetal Station: -3  Baseline Rate: 130 bpm     FHR Category: Category I  Oxytocin Safety Progress Check: Safety check completed            Vital Signs:   Vitals:    01/03/21 0629   BP: 114/56   Pulse: 65   Resp:    Temp:            Notes/comments:    SVE unchanged  Reactive category I FHT  Irritability on toco  Will re-start pitocin titration   Discussed with Dr Jeremy Carrera MD 1/3/2021 10:34 AM

## 2021-01-03 NOTE — OB LABOR/OXYTOCIN SAFETY PROGRESS
Oxytocin Safety Progress Check Note - Logan Grider 31 y o  female MRN: [id number]    Unit/Bed#: L&D 325-01 Encounter: [id number]    Dose (carley-units/min) Oxytocin: 14 carley-units/min  Contraction Frequency (minutes): irregular  Contraction Quality: Mild  Tachysystole: No   Cervical Dilation: 3        Cervical Effacement: 60  Fetal Station: -3  Baseline Rate: 120 bpm     FHR Category: Category I  Oxytocin Safety Progress Check: Safety check completed            Vital Signs:   Vitals:    01/03/21 1219   BP:    Pulse:    Resp:    Temp: 98 1 °F (36 7 °C)           Notes/comments:    Patient starting to feel contractions more but overall comfortable  SVE with minimal cervical change  Category I FHT with irregular contractions  Continue pitocin titration  Discussed with Dr Susy Gunner Birdie Lefort, MD 1/3/2021 2:30 PM

## 2021-01-03 NOTE — OB LABOR/OXYTOCIN SAFETY PROGRESS
Oxytocin Safety Progress Check Note - Ivanna Muniz 31 y o  female MRN: [id number]    Unit/Bed#: L&D 325-01 Encounter: [id number]    Dose (carley-units/min) Oxytocin: 12 carley-units/min  Contraction Frequency (minutes): occasioinal   Contraction Quality: Mild  Tachysystole: No   Cervical Dilation: 2-3        Cervical Effacement: 60  Fetal Station: -2  Baseline Rate: 120 bpm     FHR Category: Category I  Oxytocin Safety Progress Check: Safety check completed            Vital Signs:   Vitals:    01/02/21 2005   BP: 145/80   Pulse: 86   Resp:    Temp:            Notes/comments:     Patient given pit break  Ate dinner  Back on pitocin  SVE still unchanged  Membrane sweep performed  Cervix firm  Dr Ralph Olivier notified via tiger text      Lakisha Turner DO 1/2/2021 8:46 PM

## 2021-01-03 NOTE — PROGRESS NOTES
novi pads noticed  to be loose  Difficult to  fetal heart tones  From 3636-3766, question variable decel  Dr Rachel Crawford in room  Fetal heart tones checked with external monitor, noted to be 120's  At 0401  novi pads replaced, novi test done  novi monitoring  Continued at 0409

## 2021-01-03 NOTE — OB LABOR/OXYTOCIN SAFETY PROGRESS
Oxytocin Safety Progress Check Note - Logan Grider 31 y o  female MRN: [id number]    Unit/Bed#: L&D 325-01 Encounter: [id number]    Dose (carley-units/min) Oxytocin: 0 carley-units/min  Contraction Frequency (minutes): 1-4  Contraction Quality: Mild  Tachysystole: No   Cervical Dilation: 2-3        Cervical Effacement: 60  Fetal Station: -2  Baseline Rate: 120 bpm     FHR Category: Category I  Oxytocin Safety Progress Check: Safety check completed            Vital Signs:   Vitals:    01/02/21 2329   BP: 122/75   Pulse: 82   Resp:    Temp:            Notes/comments:     Cytotec placed to help cervix soften  Patient tolerated well  In good spirits  Dr Emma Ron notified     Nellie Pabon DO 1/3/2021 12:48 AM

## 2021-01-03 NOTE — OB LABOR/OXYTOCIN SAFETY PROGRESS
Oxytocin Safety Progress Check Note - Kala Morales 31 y o  female MRN: [id number]    Unit/Bed#: L&D 325-01 Encounter: [id number]    Dose (carley-units/min) Oxytocin: 0 carley-units/min  Contraction Frequency (minutes): 1-5  Contraction Quality: Mild  Tachysystole: No   Cervical Dilation: 2-3        Cervical Effacement: 60  Fetal Station: -2  Baseline Rate: 120 bpm     FHR Category: Category I  Oxytocin Safety Progress Check: Safety check completed            Vital Signs:   Vitals:    01/02/21 2207   BP: 138/66   Pulse: 85   Resp:    Temp:            Notes/comments:    SVE still unchanged  Not a laboring cervix    I sat down with Ralph Colbert and her RN Zafar Guajardo after talking to Dr Varun Finch on the phone to see how we could regroup to try to kick her into labor  She does not seem to be responding to the Pitocin, she is feeling some of her contractions but is not in any significant amount of pain even with her Pitocin at 30  She did make change with the Cytotec  She still declines Ashley balloon  We offered her the opportunity to go home and come back in a few days when her cervix is more favorable as MFM stated that she could be induced between 38 and 39 weeks  She also declines this  Plan to turn off the Pitocin, let it washout, and start back over with Cytotec  All in agreement       Dr Varun Hill LibertyDO 1/2/2021 11:39 PM

## 2021-01-03 NOTE — OB LABOR/OXYTOCIN SAFETY PROGRESS
Oxytocin Safety Progress Check Note - Ankit Villalpando 31 y o  female MRN: [id number]    Unit/Bed#: L&D 325-01 Encounter: [id number]    Dose (carley-units/min) Oxytocin: 0 carley-units/min  Contraction Frequency (minutes): 2-4  Contraction Quality: Mild  Tachysystole: No   Cervical Dilation: 2-3        Cervical Effacement: 60  Fetal Station: -2  Baseline Rate: 120 bpm     FHR Category: Category I  Oxytocin Safety Progress Check: Safety check completed            Vital Signs:   Vitals:    01/03/21 0148   BP: 106/60   Pulse: 71   Resp:    Temp:            Notes/comments:      Check deferred  Tolerating cytotec well [facility], DO 1/3/2021 2:33 AM

## 2021-01-03 NOTE — OB LABOR/OXYTOCIN SAFETY PROGRESS
Labor Progress Note - Roxy Beck 31 y o  female MRN: [id number]    Unit/Bed#: L&D 325-01 Encounter: [id number]    Dose (carley-units/min) Oxytocin: 2 carley-units/min  Contraction Frequency (minutes): 0  Contraction Quality: Mild  Tachysystole: No   Cervical Dilation: 2-3        Cervical Effacement: 60  Fetal Station: -3  Baseline Rate: 130 bpm     FHR Category: Category I  Oxytocin Safety Progress Check: Safety check completed        Vital Signs:   Vitals:    01/03/21 0629   BP: 114/56   Pulse: 65   Resp:    Temp:      Notes/comments:    Pt comfortable  Ate breakfast and showered this morning  Reviewed management options with her  Discussed options of pitocin augmentation, going home and returning in a few days to retry induction since baby's FHTs have been reassuring during this admission, alejandra bulb, and 1LTCS  She continues to decline alejandra bulb  She continues to desire staying in hospital for induction        Fadumo Wilder DO 1/3/2021 10:53 AM

## 2021-01-03 NOTE — OB LABOR/OXYTOCIN SAFETY PROGRESS
Oxytocin Safety Progress Check Note - Lala Curry 31 y o  female MRN: [id number]    Unit/Bed#: L&D 325-01 Encounter: [id number]    Dose (carley-units/min) Oxytocin: 24 carley-units/min  Contraction Frequency (minutes): 1-7  Contraction Quality: Mild  Tachysystole: No   Cervical Dilation: 3        Cervical Effacement: 60  Fetal Station: -3  Baseline Rate: 125 bpm     FHR Category: Category I  Oxytocin Safety Progress Check: Safety check completed            Vital Signs:   Vitals:    21 1744   BP: 126/69   Pulse: 69   Resp:    Temp:            Notes/comments:    Evaluated patient after 2 hours  Starting to feel contractions more  Found to be minimally changed on exam  Pitocin now at 24  Irregular contractions  Discussed situation with patient and partner again  Discussed that at this time, her body does not appear to be responding to induction medications and is making minimal change  Discussed that at this time, her induction does not appear to be progressing  Discussed options with patient and partner, including option to stop induction and return in a few days to determine if her body will respond better at that time, option to continue pitocin titration, option to discontinue pitocin, and restart induction with cervical ripening, or option for  delivery  At this time, patient is still strongly motivated to continue with induction, declining to go home and declining  delivery  She agrees that the pitocin appears to be making minimal change this time  Given minimal progress since the morning, she is amenable to a break, a meal, and restarting with cervical ripening  She is amenable to restarting with buccal cytotec and alejandra balloon to attempt to restart induction process  Will therefore discontinue pitocin titration at this time      Discussed with Dr Olayinka Grande MD 1/3/2021 5:50 PM

## 2021-01-03 NOTE — OB LABOR/OXYTOCIN SAFETY PROGRESS
Oxytocin Safety Progress Check Note - Ladoris Cons 31 y o  female MRN: [id number]    Unit/Bed#: L&D 325-01 Encounter: [id number]    Dose (carley-units/min) Oxytocin: 4 carley-units/min  Contraction Frequency (minutes): 0  Contraction Quality: Mild  Tachysystole: No   Cervical Dilation: 2-3        Cervical Effacement: 60  Fetal Station: -3  Baseline Rate: 130 bpm     FHR Category: Category I  Oxytocin Safety Progress Check: Safety check completed            Vital Signs:   Vitals:    01/03/21 1125   BP: 110/70   Pulse:    Resp:    Temp: 98 1 °F (36 7 °C)           Notes/comments:    Patient comfortable, cherry every 10 minutes, exam deferred at this time  FHR baseline 135 bpm, moderate variability, 15x15 accels, no decels  Continue pitocin titration      Luc Cuellar MD 1/3/2021 11:50 AM

## 2021-01-03 NOTE — OB LABOR/OXYTOCIN SAFETY PROGRESS
Labor Progress Note - Surjit Solis 31 y o  female MRN: [id number]    Unit/Bed#: L&D 325-01 Encounter: [id number]    Dose (carley-units/min) Oxytocin: 0 carley-units/min  Contraction Frequency (minutes): 1-7  Contraction Quality: Mild  Tachysystole: No   Cervical Dilation: 2-3        Cervical Effacement: 60  Fetal Station: -2  Baseline Rate: 120 bpm     FHR Category: Category I  Oxytocin Safety Progress Check: Safety check completed            Vital Signs:   Vitals:    01/03/21 0256   BP: 109/62   Pulse: 76   Resp:    Temp:            Notes/comments:      Responded well to cytotec  Cervix becoming softer  Had what looked like a deceleration on the monitor, when I evaluated bedside, it was mom, the Novii had stopped working and her nurses were able to immediately get her on the external monitor  Good fetal tones were heard throughout    Novii replaced and started working again  [place] for cytotec #4 at [place], DO 1/3/2021 4:30 AM

## 2021-01-04 VITALS
RESPIRATION RATE: 18 BRPM | BODY MASS INDEX: 36.88 KG/M2 | HEIGHT: 64 IN | TEMPERATURE: 98.4 F | SYSTOLIC BLOOD PRESSURE: 98 MMHG | WEIGHT: 216 LBS | DIASTOLIC BLOOD PRESSURE: 62 MMHG | HEART RATE: 75 BPM

## 2021-01-04 LAB
GLUCOSE SERPL-MCNC: 89 MG/DL (ref 65–140)
RPR SER QL: NORMAL

## 2021-01-04 PROCEDURE — 82948 REAGENT STRIP/BLOOD GLUCOSE: CPT

## 2021-01-04 PROCEDURE — NC001 PR NO CHARGE: Performed by: OBSTETRICS & GYNECOLOGY

## 2021-01-04 RX ADMIN — LEVOTHYROXINE SODIUM 75 MCG: 25 TABLET ORAL at 05:58

## 2021-01-04 NOTE — PROGRESS NOTES
I met with Lalo Stewart and her  this morning  She got some rest overnight while still being continuously monitored  I reviewed the baby's last growth US with her, the indication for IOL between 45 0/7-39 0/7wks, her baby's reassuring tracing throughout her ripening, and her emotional state right now  I offered her the option of continuing our attempts to move toward delivery  or the option of going home, getting rest in her own bed, eating her own food, and returning for IOL on Wednesday  She recognizes that she is very tired right now and could benefit from rest at home especially after days in the hospital with fetal reassurance  She is now comfortable with going home with plans to return for IOL on Wed 01/06 at 1pm  She will follow up at [facility] for NST later today and tomorrow afternoon  Labor s/sx and signs of fetal reassurance were explained to her  Questions answered  Cat 1 NST  Good FM  Occasional cramping      Angélica Mitchell,   01/04/21  9:33 AM

## 2021-01-04 NOTE — DISCHARGE SUMMARY
Discharge Summary - OB/GYN  Francisco Javier Huizar 31 y o  female MRN: [id number]  Unit/Bed#: L&D 281-08 Encounter: [id number]    Admission Date: 2021     Discharge Date: 21    Admitting Attending: Jamal Teixeira DO    Discharging Attending: Dr Ba Stevens    Principal Diagnosis: Pregnancy at 38w3d    Secondary Diagnosis:   1  Fetal growth restriction with normal Dopplers  2  Type 2 diabetes, on insulin  3  Hypothyroidism  4  GBS bacteriuria  5  Obesity (BMI 37)    Hospital course: Francisco Javier Huizar is a 31 y o   at 38w3d who was initially admitted for IOL for FGR with normal dopplers  Pregnancy also complicated by type 2 diabetes, hypothyroidism, GBS bacteria, and obesity (BMI 37)  Her last estimated fetal weight 2020 was 8%ile, AC 15%ile  She was initially 1/60/-3, with FHT B/L 135, pos accels, neg decels, moderate variability, and uterine irritability on toco       During her induction course, she received penicillin for GBS bacteriuria and was given 2 doses of vaginal Cytotec  She made change to 2/60/-3, and was started on pit  She was on a pit of 30 for the day and did not make change  She had pit washout and was given 2 additional doses of Cytotec  It was restarted the following day and she was noted to make change to 3/60/-3  She proceeded to be on pit of 30 for the remainder of the day, and made no change; she got a pit break overnight  On day of discharge, she was unchanged, 3/60/-3, and the decision was made to discharge patient to home, where she could eat and sleep  She will [facility] to [facility] L&D on Wednesday, 21, at 1:00pm for her induction  Complications: none apparent    Condition at discharge: good     Discharge instructions/Information to patient and family:   See after visit summary for information provided to patient and family  Provisions for Follow-Up Care:  See after visit summary for information related to follow-up care and any pertinent home health orders  Disposition: See After Visit Summary for discharge disposition information      Planned Readmission: María Angulo, PGY1

## 2021-01-04 NOTE — PROGRESS NOTES
After further discussion with patient and with nurse, Padmini Levy, patient amenable to break from induction  Patient has now not slept well for two nights of induction and has been on clear liquid diet for most of 48 hours  Will hold on continuing induction at this time, allow patient to sleep overnight uninterrupted, and re-address in the AM  Patient desires fetal monitoring overnight to ensure fetal well-being  Continues to be Category I  Will intervene as needed for FHR abnormalities      Discussed with Dr Brad Huang MD  Ob/Gyn R-2  01/03/21 8:55 PM

## 2021-01-04 NOTE — PROGRESS NOTES
Pt transferred to room 327   Will rest for night  novi  Restarted  per pt request  No c/o of pain at this time

## 2021-01-04 NOTE — DISCHARGE INSTRUCTIONS
Pregnancy at 28 to [address]:   You are considered full term at the beginning of 37 weeks  Your breathing may be easier if your baby has moved down into a head-down position  You may need to urinate more often because the baby may be pressing on your bladder  You may also feel more discomfort and get tired easily  DISCHARGE INSTRUCTIONS:   Return to the emergency department if:   · You develop a severe headache that does not go away  · You have new or increased vision changes, such as blurred or spotted vision  · You have new or increased swelling in your face or hands  · You have vaginal spotting or bleeding  · Your water broke or you feel warm water gushing or trickling from your vagina  Contact your healthcare provider if:   · You have more than 5 contractions in 1 hour  · You notice any changes in your baby's movements  · You have abdominal cramps, pressure, or tightening  · You have a change in vaginal discharge  · You have chills or a fever  · You have vaginal itching, burning, or pain  · You have yellow, green, white, or foul-smelling vaginal discharge  · You have pain or burning when you urinate, less urine than usual, or pink or bloody urine  · You have questions or concerns about your condition or care  How to care for yourself at this stage of your pregnancy:   · Eat a variety of healthy foods  Healthy foods include fruits, vegetables, whole-grain breads, low-fat dairy foods, beans, lean meats, and fish  Drink liquids as directed  Ask how much liquid to drink each day and which liquids are best for you  Limit caffeine to less than 200 milligrams each day  Limit your intake of fish to 2 servings each week  Choose fish low in mercury such as canned light tuna, shrimp, salmon, cod, or tilapia  Do not  eat fish high in mercury such as swordfish, tilefish, fransisca mackerel, and shark  · Take prenatal vitamins as directed    Your need for certain vitamins and minerals, such as folic acid, increases during pregnancy  Prenatal vitamins provide some of the extra vitamins and minerals you need  Prenatal vitamins may also help to decrease the risk of certain birth defects  · Rest as needed  Put your feet up if you have swelling in your ankles and feet  · Do not smoke  Smoking increases your risk of a miscarriage and other health problems during your pregnancy  Smoking can cause your baby to be born early or weigh less at birth  Ask your healthcare provider for information if you need help quitting  · Do not drink alcohol  Alcohol passes from your body to your baby through the placenta  It can affect your baby's brain development and cause fetal alcohol syndrome (FAS)  FAS is a group of conditions that causes mental, behavior, and growth problems  · Talk to your healthcare provider before you take any medicines  Many medicines may harm your baby if you take them when you are pregnant  Do not take any medicines, vitamins, herbs, or supplements without first talking to your healthcare provider  Never use illegal or street drugs (such as marijuana or cocaine) while you are pregnant  · Talk to your healthcare provider before you travel  You may not be able to travel in an airplane after 36 weeks  He may also recommend that you avoid long road trips  Safety tips:   · Avoid hot tubs and saunas  Do not use a hot tub or sauna while you are pregnant, especially during your first trimester  Hot tubs and saunas may raise your baby's temperature and increase the risk of birth defects  · Avoid toxoplasmosis  This is an infection caused by eating raw meat or being around infected cat feces  It can cause birth defects, miscarriages, and other problems  Wash your hands after you touch raw meat  Make sure any meat is well-cooked before you eat it  Avoid raw eggs and unpasteurized milk   Use gloves or ask someone else to clean your cat's litter box while you are pregnant  · Ask your healthcare provider about travel  The most comfortable time to travel is during the second trimester  Ask your healthcare provider if you can travel after 36 weeks  You may not be able to travel in an airplane after 36 weeks  He may also recommend that you avoid long road trips  Changes that are happening with your baby:  By 38 weeks, your baby may weigh between 6 and 9 pounds  Your baby may be about 14 inches long from the top of the head to the rump (baby's bottom)  Your baby hears well enough to know your voice  As your baby gets larger, you may feel fewer kicks and more stretching and rolling  Your baby may move into a head-down position  Your baby will also rest lower in your abdomen  What you need to know about prenatal care: Your healthcare provider will check your blood pressure and weight  You may also need the following:  · A urine test  may also be done to check for sugar and protein  These can be signs of gestational diabetes or infection  Protein in your urine may also be a sign of preeclampsia  Preeclampsia is a condition that can develop during week 20 or later of your pregnancy  It causes high blood pressure, and it can cause problems with your kidneys and other organs  · A blood test  may be done to check for anemia (low iron level)  · A Tdap vaccine  may be recommended by your healthcare provider  · A group B strep test  is a test that is done to check for group B strep infection  Group B strep is a type of bacteria that may be found in the vagina or rectum  It can be passed to your baby during delivery if you have it  Your healthcare provider will take swab your vagina or rectum and send the sample to the lab for tests  · Fundal height  is a measurement of your uterus to check your baby's growth  This number is usually the same as the number of weeks that you have been pregnant   Your healthcare provider may also check your baby's position  · Your baby's heart rate  will be checked  © Copyright [address] Information is for End User's use only and may not be sold, redistributed or otherwise used for commercial purposes  All illustrations and images included in CareNotes® are the copyrighted property of A D A M , Inc  or [facility] Sohail Martínez   The above information is an  only  It is not intended as medical advice for individual conditions or treatments  Talk to your doctor, nurse or pharmacist before following any medical regimen to see if it is safe and effective for you

## 2021-01-05 NOTE — UTILIZATION REVIEW
Notification of Maternity/Delivery & [place] Birth Information for Admission   Notification of Maternity/Delivery for Admission to our facility [address]  Be advised that this patient was admitted to our facility under Inpatient Status  Contact Sharif Becerril at [phone] for additional admission information  Noy Perrin PARENT/CHILD HEALTH UR DEPT  DEDICATED [phone]  Mother &  Information   Patient Name: Kala Morales YOB: 1995   Delivering clinician: This patient has no babies on file  OB History        1    Para        Term                AB        Living           SAB        TAB        Ectopic        Multiple        Live Births                    Name & MRN:   This patient has no babies on file   Birth Information: 31 y o  female MRN: [id number] Unit/Bed#: L&D 327-01 Estimated Date of Delivery: 1/15/21  Birthweight: No birth weight on file  Gestational Age: <None> Delivery Type: This patient has no babies on file  APGARS  One minute Five minutes Ten minutes   Totals:                 [address]   [address]:   [address] [facility]  Tax ID: [id number]  NPI: [id number] Attending Provider/NPI:  Phone:  Address: Jennifer Ibarra [[id number]]  [phone]  Same as KONSTANTIN/Toshia Julio 1106 of Service Code: 24 Place of Service Name: [address] [place] [address]   Start Date: 21   Discharge Date & Time: 2021  9:45 AM    Type of Admission: Inpatient Status Discharge Disposition (if discharged): PRA - Home   Patient Diagnoses:   Encounter for full-term uncomplicated delivery [I25]  The encounter diagnosis was Poor fetal growth affecting management of mother in third trimester, single or unspecified fetus     1  Poor fetal growth affecting management of mother in third trimester, single or unspecified fetus       Orders: Admission Orders (From admission, onward) Ordered        01/01/21 2125  Inpatient Admission  Once                    Assigned Utilization Review Contact: Clemente Perez Utilization Review Department  Phone: [phone]; Fax [phone]  Email: [email or web address]

## 2021-01-05 NOTE — UTILIZATION REVIEW
Initial Clinical Review    Admission: Date/Time/Statement:   Admission Orders (From admission, onward)     Ordered        01/01/21 2125  Inpatient Admission  Once                   Orders Placed This Encounter   Procedures    Inpatient Admission     Standing Status:   Standing     Number of Occurrences:   1     Order Specific Question:   Admitting Physician     Answer:   Emmy Emanuel [[id number]]     Order Specific Question:   Level of Care     Answer:   Med Surg [[id number]]     Order Specific Question:   Estimated length of stay     Answer:   More than 2 Midnights     Order Specific Question:   Certification     Answer:   I certify that inpatient services are medically necessary for this patient for a duration of greater than two midnights  See H&P and MD Progress Notes for additional information about the patient's course of treatment  Chief Complaint   Patient presents with    Scheduled Induction     iugr     Assessment/Plan:  30 yo G 2 P 0 @ 38 wk presented to [place] triage as inpatient admission for IOL for fetal growth restriction with normal dopplers    This pregnancy is complicated by BMI 37, type 2 diabetes   Cervix:  Cervical Dilation: 1  Cervical Effacement: 50  Fetal Station: -3  Presentation: Vertex  Method: Manual     Fetal heart rate: 135, moderate variability positive accelerations no decelerations  [place]: uterine irritability  Plan IVF  Vaginal cytotec q 3-4 hrs as needed  Blood sugar q 1 hr  Continuous external monitoring    01-02-21-@ 0141  Cervical Dilation: 1  Cervical Effacement: 60  Fetal Station: -3  Baseline Rate: 130 bpm       @ 0900  IVF   IV Pitocin started  Continuous external fetal monitoring  Cervical Dilation: 2-3  Cervical Effacement: 60  Fetal Station: -2  Baseline Rate: 115 bpm  FHR Category: Category       @ 2046   Oxytocin: 12 carley-units/min  Cervical Dilation: 2-3  Cervical Effacement: 60  Fetal Station: -2  Baseline Rate: 120 bpm  FHR Category: Category I        01-02-21 @ 2330   turn off the Pitocin, let it washout, and start back over with Cytotec        21@ 0230  Tachysystole: No   Cervical Dilation: 2-3  Cervical Effacement: 60  Fetal Station: -2  Baseline Rate: 120 bpm  FHR Category: Category I  iVF  Continuous external feta monitoring  Cytotec inserted     21 @ 1030   Contraction Frequency (minutes): 0  Contraction Quality: Mild  Tachysystole: No   Cervical Dilation: 2-3  Cervical Effacement: 60  Fetal Station: -3  Baseline Rate: 130 bpm  IV Pitocin restarted  IVF  Continuous external fetal monitoring    @ 1750   Dose (carley-units/min) Oxytocin: 24 carley-units/min  Contraction Frequency (minutes): 1-7  Contraction Quality: Mild  Tachysystole: No   Cervical Dilation: 3  Cervical Effacement: 60  Fetal Station: -3  Baseline Rate: 125 bpm  FHR Category: Category I    Admission Date: 2021      Discharge Date: 21     Admitting Attending: Cl Michelle DO     Discharging Attending: Dr Ann-Marie Espinal     Principal Diagnosis: Pregnancy at 38w3d     Secondary Diagnosis:   1  Fetal growth restriction with normal Dopplers  2  Type 2 diabetes, on insulin  3  Hypothyroidism  4  GBS bacteriuria  5  Obesity (BMI 37)     Hospital course: Surjit Solis is a 31 y o   at 38w3d who was initially admitted for IOL for FGR with normal dopplers  Pregnancy also complicated by type 2 diabetes, hypothyroidism, GBS bacteria, and obesity (BMI 37)  Her last estimated fetal weight 2020 was 8%ile, AC 15%ile      She was initially 160/-3, with FHT B/L 135, pos accels, neg decels, moderate variability, and uterine irritability on toco        During her induction course, she received penicillin for GBS bacteriuria and was given 2 doses of vaginal Cytotec  She made change to 60/-3, and was started on pit  She was on a pit of 30 for the day and did not make change  She had pit washout and was given 2 additional doses of Cytotec    It was restarted the following day and she was noted to make change to 3/60/-3  She proceeded to be on pit of 30 for the remainder of the day, and made no change; she got a pit break overnight  On day of discharge, she was unchanged, 3/60/-3, and the decision was made to discharge patient to home, where she could eat and sleep  She will return to [facility] L&D on Wednesday, 1/6/21, at 1:00pm for her induction      Complications: none apparent     Condition at discharge: good            OB  Triage Vitals [01/01/21 2125]   Temperature Pulse Respirations Blood Pressure SpO2   99 °F (37 2 °C) 103 18 131/84 --      Temp Source Heart Rate Source Patient Position - Orthostatic VS BP Location FiO2 (%)   Oral Monitor -- Left arm --      Pain Score       No Pain          Wt Readings from Last 1 Encounters:   01/01/21 98 kg (216 lb)   Pertinent Labs/Diagnostic Test Results:       Results from last 7 days   Lab Units 01/01/21  2134   WBC Thousand/uL 6 73   HEMOGLOBIN g/dL 10 4*   HEMATOCRIT % 31 4*   PLATELETS Thousands/uL 203     Results from last 7 days   Lab Units 01/04/21  0600 01/03/21  2108 01/03/21  1808 01/03/21  1553 01/03/21  1408 01/03/21  1156 01/03/21  1016 01/03/21  0730 01/03/21  0515 01/03/21  0258 01/03/21  0048 01/03/21  0012   POC GLUCOSE mg/dl 89 143* 69 83 71 71 108 63* 70 109 75 69         Past Medical History:   Diagnosis Date    Anemia     Clostridium difficile infection     Hyperglycemia     Hypokalemia     Hypothyroidism 12/2019    IBS (irritable bowel syndrome)     IUD (intrauterine device) in place     Kidney injury     L2 vertebral fracture (HCC)     Type 2 diabetes mellitus ([facility] [address] ) 12/2019    Vitamin D deficiency      Present on Admission:  **None**      Admitting Diagnosis: Encounter for full-term uncomplicated delivery [S95]  Age/Sex: 31 y o  female    Network Utilization Review Department  ATTENTION: Please call with any questions or concerns to [phone] and carefully listen to the prompts so that you are directed to the right person   All voicemails are confidential   Jesusita Genoveva all requests for admission clinical reviews, approved or denied determinations and any other requests to dedicated fax number below belonging to the campus where the patient is receiving treatment   List of dedicated fax numbers for the Facilities:  [address] DENIALS (Administrative/Medical Necessity) [phone]   [address] (Maternity/NICU/Pediatrics) [phone]   [address] [place] [facility] [address] Mimi Lamas [phone] (  Chava Crooks [facility] "[facility]" 103) [address] [place] [address] Betzy Maharaj [address] [place] [address] [phone]

## 2021-01-06 ENCOUNTER — HOSPITAL ENCOUNTER (INPATIENT)
Dept: LABOR AND DELIVERY | Facility: HOSPITAL | Age: 26
Discharge: HOME | End: 2021-01-06
Payer: COMMERCIAL

## 2021-01-06 ENCOUNTER — HOSPITAL ENCOUNTER (INPATIENT)
Facility: HOSPITAL | Age: 26
LOS: 3 days | Discharge: HOME | End: 2021-01-09
Attending: OBSTETRICS & GYNECOLOGY | Admitting: OBSTETRICS & GYNECOLOGY
Payer: COMMERCIAL

## 2021-01-06 DIAGNOSIS — Z3A.38 38 WEEKS GESTATION OF PREGNANCY: Primary | ICD-10-CM

## 2021-01-06 LAB
ABO GROUP BLD: NORMAL
BLD GP AB SCN SERPL QL: NEGATIVE
ERYTHROCYTE [DISTWIDTH] IN BLOOD BY AUTOMATED COUNT: 14.5 % (ref 11.6–15.1)
GLUCOSE SERPL-MCNC: 54 MG/DL (ref 65–140)
GLUCOSE SERPL-MCNC: 70 MG/DL (ref 65–140)
HCT VFR BLD AUTO: 34.8 % (ref 34.8–46.1)
HGB BLD-MCNC: 11.6 G/DL (ref 11.5–15.4)
MCH RBC QN AUTO: 28.5 PG (ref 26.8–34.3)
MCHC RBC AUTO-ENTMCNC: 33.3 G/DL (ref 31.4–37.4)
MCV RBC AUTO: 86 FL (ref 82–98)
PLATELET # BLD AUTO: 224 THOUSANDS/UL (ref 149–390)
PMV BLD AUTO: 10.8 FL (ref 8.9–12.7)
RBC # BLD AUTO: 4.07 MILLION/UL (ref 3.81–5.12)
RH BLD: POSITIVE
SPECIMEN EXPIRATION DATE: NORMAL
WBC # BLD AUTO: 7.64 THOUSAND/UL (ref 4.31–10.16)

## 2021-01-06 PROCEDURE — 85027 COMPLETE CBC AUTOMATED: CPT | Performed by: OBSTETRICS & GYNECOLOGY

## 2021-01-06 PROCEDURE — 86592 SYPHILIS TEST NON-TREP QUAL: CPT | Performed by: OBSTETRICS & GYNECOLOGY

## 2021-01-06 PROCEDURE — 86900 BLOOD TYPING SEROLOGIC ABO: CPT | Performed by: OBSTETRICS & GYNECOLOGY

## 2021-01-06 PROCEDURE — 82948 REAGENT STRIP/BLOOD GLUCOSE: CPT

## 2021-01-06 PROCEDURE — 4A1HXCZ MONITORING OF PRODUCTS OF CONCEPTION, CARDIAC RATE, EXTERNAL APPROACH: ICD-10-PCS | Performed by: OBSTETRICS & GYNECOLOGY

## 2021-01-06 PROCEDURE — 86901 BLOOD TYPING SEROLOGIC RH(D): CPT | Performed by: OBSTETRICS & GYNECOLOGY

## 2021-01-06 PROCEDURE — 86850 RBC ANTIBODY SCREEN: CPT | Performed by: OBSTETRICS & GYNECOLOGY

## 2021-01-06 PROCEDURE — 3E033VJ INTRODUCTION OF OTHER HORMONE INTO PERIPHERAL VEIN, PERCUTANEOUS APPROACH: ICD-10-PCS | Performed by: OBSTETRICS & GYNECOLOGY

## 2021-01-06 PROCEDURE — NC001 PR NO CHARGE: Performed by: OBSTETRICS & GYNECOLOGY

## 2021-01-06 RX ORDER — ONDANSETRON 2 MG/ML
4 INJECTION INTRAMUSCULAR; INTRAVENOUS EVERY 6 HOURS PRN
Status: DISCONTINUED | OUTPATIENT
Start: 2021-01-06 | End: 2021-01-08

## 2021-01-06 RX ORDER — SODIUM CHLORIDE 9 MG/ML
125 INJECTION, SOLUTION INTRAVENOUS CONTINUOUS
Status: DISCONTINUED | OUTPATIENT
Start: 2021-01-06 | End: 2021-01-08

## 2021-01-06 RX ORDER — ALBUTEROL SULFATE 90 UG/1
2 AEROSOL, METERED RESPIRATORY (INHALATION) EVERY 6 HOURS PRN
Status: DISCONTINUED | OUTPATIENT
Start: 2021-01-06 | End: 2021-01-09 | Stop reason: HOSPADM

## 2021-01-06 RX ORDER — SODIUM CHLORIDE, SODIUM LACTATE, POTASSIUM CHLORIDE, CALCIUM CHLORIDE 600; 310; 30; 20 MG/100ML; MG/100ML; MG/100ML; MG/100ML
125 INJECTION, SOLUTION INTRAVENOUS CONTINUOUS
Status: DISCONTINUED | OUTPATIENT
Start: 2021-01-06 | End: 2021-01-06

## 2021-01-06 RX ORDER — OXYTOCIN/RINGER'S LACTATE 30/500 ML
1-30 PLASTIC BAG, INJECTION (ML) INTRAVENOUS
Status: DISCONTINUED | OUTPATIENT
Start: 2021-01-06 | End: 2021-01-08

## 2021-01-06 RX ORDER — INSULIN GLARGINE 100 [IU]/ML
17 INJECTION, SOLUTION SUBCUTANEOUS
Status: ACTIVE | OUTPATIENT
Start: 2021-01-06 | End: 2021-01-07

## 2021-01-06 RX ADMIN — Medication 2 MILLI-UNITS/MIN: at 23:19

## 2021-01-06 RX ADMIN — SODIUM CHLORIDE 125 ML/HR: 0.9 INJECTION, SOLUTION INTRAVENOUS at 21:44

## 2021-01-06 RX ADMIN — SODIUM CHLORIDE 5 MILLION UNITS: 0.9 INJECTION, SOLUTION INTRAVENOUS at 21:44

## 2021-01-07 ENCOUNTER — ANESTHESIA EVENT (INPATIENT)
Dept: ANESTHESIOLOGY | Facility: HOSPITAL | Age: 26
End: 2021-01-07
Payer: COMMERCIAL

## 2021-01-07 ENCOUNTER — ANESTHESIA (INPATIENT)
Dept: ANESTHESIOLOGY | Facility: HOSPITAL | Age: 26
End: 2021-01-07
Payer: COMMERCIAL

## 2021-01-07 LAB
EXTERNAL GROUP B STREP ANTIGEN: POSITIVE
GLUCOSE SERPL-MCNC: 61 MG/DL (ref 65–140)
GLUCOSE SERPL-MCNC: 61 MG/DL (ref 65–140)
GLUCOSE SERPL-MCNC: 67 MG/DL (ref 65–140)
GLUCOSE SERPL-MCNC: 70 MG/DL (ref 65–140)
GLUCOSE SERPL-MCNC: 71 MG/DL (ref 65–140)
GLUCOSE SERPL-MCNC: 74 MG/DL (ref 65–140)
GLUCOSE SERPL-MCNC: 75 MG/DL (ref 65–140)
GLUCOSE SERPL-MCNC: 94 MG/DL (ref 65–140)
GLUCOSE SERPL-MCNC: 98 MG/DL (ref 65–140)
RPR SER QL: NORMAL

## 2021-01-07 PROCEDURE — 82948 REAGENT STRIP/BLOOD GLUCOSE: CPT

## 2021-01-07 PROCEDURE — 3E0D7GC INTRODUCTION OF OTHER THERAPEUTIC SUBSTANCE INTO MOUTH AND PHARYNX, VIA NATURAL OR ARTIFICIAL OPENING: ICD-10-PCS | Performed by: OBSTETRICS & GYNECOLOGY

## 2021-01-07 PROCEDURE — 10H07YZ INSERTION OF OTHER DEVICE INTO PRODUCTS OF CONCEPTION, VIA NATURAL OR ARTIFICIAL OPENING: ICD-10-PCS | Performed by: OBSTETRICS & GYNECOLOGY

## 2021-01-07 PROCEDURE — 10907ZC DRAINAGE OF AMNIOTIC FLUID, THERAPEUTIC FROM PRODUCTS OF CONCEPTION, VIA NATURAL OR ARTIFICIAL OPENING: ICD-10-PCS | Performed by: OBSTETRICS & GYNECOLOGY

## 2021-01-07 RX ORDER — ROPIVACAINE HYDROCHLORIDE 5 MG/ML
INJECTION, SOLUTION EPIDURAL; INFILTRATION; PERINEURAL AS NEEDED
Status: DISCONTINUED | OUTPATIENT
Start: 2021-01-07 | End: 2021-01-08 | Stop reason: HOSPADM

## 2021-01-07 RX ORDER — LIDOCAINE HYDROCHLORIDE AND EPINEPHRINE 15; 5 MG/ML; UG/ML
INJECTION, SOLUTION EPIDURAL AS NEEDED
Status: DISCONTINUED | OUTPATIENT
Start: 2021-01-07 | End: 2021-01-08 | Stop reason: HOSPADM

## 2021-01-07 RX ORDER — ROPIVACAINE HYDROCHLORIDE 2 MG/ML
INJECTION, SOLUTION EPIDURAL; INFILTRATION; PERINEURAL
Status: COMPLETED
Start: 2021-01-07 | End: 2021-01-07

## 2021-01-07 RX ORDER — ROPIVACAINE HYDROCHLORIDE 2 MG/ML
INJECTION, SOLUTION EPIDURAL; INFILTRATION; PERINEURAL CONTINUOUS PRN
Status: DISCONTINUED | OUTPATIENT
Start: 2021-01-07 | End: 2021-01-08 | Stop reason: HOSPADM

## 2021-01-07 RX ORDER — OXYTOCIN/RINGER'S LACTATE 30/500 ML
1-30 PLASTIC BAG, INJECTION (ML) INTRAVENOUS
Status: DISCONTINUED | OUTPATIENT
Start: 2021-01-07 | End: 2021-01-08

## 2021-01-07 RX ADMIN — ROPIVACAINE HYDROCHLORIDE 4 ML: 5 INJECTION, SOLUTION EPIDURAL; INFILTRATION; PERINEURAL at 21:15

## 2021-01-07 RX ADMIN — ROPIVACAINE HYDROCHLORIDE 10 ML/HR: 2 INJECTION, SOLUTION EPIDURAL; INFILTRATION at 21:16

## 2021-01-07 RX ADMIN — SODIUM CHLORIDE 2.5 MILLION UNITS: 9 INJECTION, SOLUTION INTRAVENOUS at 14:22

## 2021-01-07 RX ADMIN — ROPIVACAINE HYDROCHLORIDE: 2 INJECTION, SOLUTION EPIDURAL; INFILTRATION at 21:20

## 2021-01-07 RX ADMIN — SODIUM CHLORIDE 2.5 MILLION UNITS: 9 INJECTION, SOLUTION INTRAVENOUS at 02:03

## 2021-01-07 RX ADMIN — SODIUM CHLORIDE 2.5 MILLION UNITS: 9 INJECTION, SOLUTION INTRAVENOUS at 17:57

## 2021-01-07 RX ADMIN — SODIUM CHLORIDE 2.5 MILLION UNITS: 9 INJECTION, SOLUTION INTRAVENOUS at 21:54

## 2021-01-07 RX ADMIN — SODIUM CHLORIDE 125 ML/HR: 0.9 INJECTION, SOLUTION INTRAVENOUS at 11:28

## 2021-01-07 RX ADMIN — SODIUM CHLORIDE 2.5 MILLION UNITS: 9 INJECTION, SOLUTION INTRAVENOUS at 09:52

## 2021-01-07 RX ADMIN — ROPIVACAINE HYDROCHLORIDE 5 ML: 5 INJECTION, SOLUTION EPIDURAL; INFILTRATION; PERINEURAL at 21:06

## 2021-01-07 RX ADMIN — LEVOTHYROXINE SODIUM 75 MCG: 25 TABLET ORAL at 06:17

## 2021-01-07 RX ADMIN — SODIUM CHLORIDE 2.5 MILLION UNITS: 9 INJECTION, SOLUTION INTRAVENOUS at 05:31

## 2021-01-07 RX ADMIN — ONDANSETRON 4 MG: 2 INJECTION INTRAMUSCULAR; INTRAVENOUS at 02:25

## 2021-01-07 RX ADMIN — SODIUM CHLORIDE 999 ML/HR: 0.9 INJECTION, SOLUTION INTRAVENOUS at 20:45

## 2021-01-07 RX ADMIN — MISOPROSTOL 50 MCG: 100 TABLET ORAL at 09:53

## 2021-01-07 RX ADMIN — LIDOCAINE HYDROCHLORIDE AND EPINEPHRINE 5 ML: 15; 5 INJECTION, SOLUTION EPIDURAL at 20:59

## 2021-01-07 NOTE — OB LABOR/OXYTOCIN SAFETY PROGRESS
Oxytocin Safety Progress Check Note - Chente Carvajal 31 y o  female MRN: [id number]    Unit/Bed#: L&D 321-01 Encounter: [id number]    Dose (carley-units/min) Oxytocin: 14 carley-units/min  Contraction Frequency (minutes): 2-7(irritability from novii)  Contraction Quality: Mild  Tachysystole: No   Cervical Dilation: 3        Cervical Effacement: 60  Fetal Station: -3  Baseline Rate: 120 bpm     FHR Category: Category I               Vital Signs:   Vitals:    01/07/21 0233   BP: 115/71   Pulse: 83   Resp:    Temp:            Notes/comments:    Evaluated patient after 2 hours  Comfortable, but starting to feel contractions some  Category I FHT  Will defer SVE and continue to monitor      Discussed with Dr Rita Valle MD 1/7/2021 3:24 AM

## 2021-01-07 NOTE — OB LABOR/OXYTOCIN SAFETY PROGRESS
Oxytocin Safety Progress Check Note - Logan Grider 31 y o  female MRN: [id number]    Unit/Bed#: L&D 321-01 Encounter: [id number]    Dose (carley-units/min) Oxytocin: 22 carley-units/min  Contraction Frequency (minutes): 2-5  Contraction Quality: Mild  Tachysystole: No   Cervical Dilation: 3        Cervical Effacement: 60  Fetal Station: -3  Baseline Rate: 115 bpm     FHR Category: Category I               Vital Signs:   Vitals:    01/07/21 0519   BP: 113/62   Pulse: 68   Resp:    Temp:            Notes/comments:    Evaluated patient after 2 hours  Feeling some contractions  Category I FHT  Found to be minimally changed on exam  Will plan to continue pitocin titration and continue to monitor      Discussed with Dr Josep Hallman MD 1/7/2021 5:32 AM

## 2021-01-07 NOTE — UTILIZATION REVIEW
ADMITTED FOR POOR FETAL GROWTH        Notification of Maternity/Delivery & [place] Birth Information for Admission   Notification of Maternity/Delivery for Admission to our facility [address]  Be advised that this patient was admitted to our facility under Inpatient Status  Contact Rossana Foster at [phone] for additional admission information  [facility] PARENT/CHILD HEALTH UR DEPT  DEDICATED [phone]  Mother & [place] Information   Patient Name: Logan Quiroz YOB: 1995   Delivering clinician: This patient has no babies on file  OB History        1    Para        Term                AB        Living           SAB        TAB        Ectopic        Multiple        Live Births                    Name & MRN:   This patient has no babies on file   Birth Information: 31 y o  female MRN: [id number] Unit/Bed#: L&D 327-01 Estimated Date of Delivery: 1/15/21  Birthweight: No birth weight on file  Gestational Age: <None> Delivery Type: This patient has no babies on file  APGARS  One minute Five minutes Ten minutes   Totals:                 [address]   [address]:   [place]  Tax ID: [id number]  NPI: [id number] Attending Provider/NPI:  Phone:  Address: Jackie Hall [[id number]]  [phone]  Same as KONSTANTIN/Toshia Julio 1106 of Service Code: 24 Place of Service Name: [address] [place] [address]   Start Date: 21   Discharge Date & Time: 2021  9:45 AM    Type of Admission: Inpatient Status Discharge Disposition (if discharged): PRA - Home   Patient Diagnoses:   Encounter for full-term uncomplicated delivery [N80]  The encounter diagnosis was Poor fetal growth affecting management of mother in third trimester, single or unspecified fetus     1  Poor fetal growth affecting management of mother in third trimester, single or unspecified fetus       Orders: Admission Orders (From admission, onward)     Ordered        01/01/21 2125  Inpatient Admission  Once                    Assigned Utilization Review Contact: Clemente Perez Utilization Review Department  Phone: [phone]; Fax [phone]  Email: [email or web address]     Initial Clinical Review    Admission: Date/Time/Statement:   Admission Orders (From admission, onward)     Ordered        01/01/21 2125  Inpatient Admission  Once                   Orders Placed This Encounter   Procedures    Inpatient Admission     Standing Status:   Standing     Number of Occurrences:   1     Order Specific Question:   Admitting Physician     Answer:   Sarah Ramirez [[id number]]     Order Specific Question:   Level of Care     Answer:   Med Surg [16]     Order Specific Question:   Estimated length of stay     Answer:   More than 2 Midnights     Order Specific Question:   Certification     Answer:   I certify that inpatient services are medically necessary for this patient for a duration of greater than two midnights  See H&P and MD Progress Notes for additional information about the patient's course of treatment  Chief Complaint   Patient presents with    Scheduled Induction     iugr     Assessment/Plan:  30 yo G 2 P 0 @ 38 wk presented to [facility] triage as inpatient admission for IOL for fetal growth restriction with normal dopplers    This pregnancy is complicated by BMI 37, type 2 diabetes   Cervix:  Cervical Dilation: 1  Cervical Effacement: 50  Fetal Station: -3  Presentation: Vertex  Method: Manual     Fetal heart rate: 135, moderate variability positive accelerations no decelerations  Sellers: uterine irritability  Plan IVF  Vaginal cytotec q 3-4 hrs as needed  Blood sugar q 1 hr  Continuous external monitoring    01-02-21-@ 0141  Cervical Dilation: 1  Cervical Effacement: 60  Fetal Station: -3  Baseline Rate: 130 bpm       @ 0900  IVF   IV Pitocin started  Continuous external fetal monitoring  Cervical Dilation: 2-3  Cervical Effacement: 60  Fetal Station: -2  Baseline Rate: 115 bpm  FHR Category: Category       @    Oxytocin: 12 carley-units/min  Cervical Dilation: 2-3  Cervical Effacement: 60  Fetal Station: -2  Baseline Rate: 120 bpm  FHR Category: Category I        21 @ 2330   turn off the Pitocin, let it washout, and start back over with Cytotec        21@ 0230  Tachysystole: No   Cervical Dilation: 2-3  Cervical Effacement: 60  Fetal Station: -2  Baseline Rate: 120 bpm  FHR Category: Category I  iVF  Continuous external feta monitoring  Cytotec inserted     21 @ 1030   Contraction Frequency (minutes): 0  Contraction Quality: Mild  Tachysystole: No   Cervical Dilation: 2-3  Cervical Effacement: 60  Fetal Station: -3  Baseline Rate: 130 bpm  IV Pitocin restarted  IVF  Continuous external fetal monitoring    @ 1750   Dose (carley-units/min) Oxytocin: 24 carley-units/min  Contraction Frequency (minutes): 1-7  Contraction Quality: Mild  Tachysystole: No   Cervical Dilation: 3  Cervical Effacement: 60  Fetal Station: -3  Baseline Rate: 125 bpm  FHR Category: Category I    Admission Date: 2021      Discharge Date: 21     Admitting Attending: Aaron Hollingsworth DO     Discharging Attending: Dr Gloria Duran     Principal Diagnosis: Pregnancy at 38w3d     Secondary Diagnosis:   1  Fetal growth restriction with normal Dopplers  2  Type 2 diabetes, on insulin  3  Hypothyroidism  4  GBS bacteriuria  5  Obesity (BMI 37)     Hospital course: Nivia Ruelas is a 31 y o   at 38w3d who was initially admitted for IOL for FGR with normal dopplers  Pregnancy also complicated by type 2 diabetes, hypothyroidism, GBS bacteria, and obesity (BMI 37)    Her last estimated fetal weight 2020 was 8%ile, AC 15%ile      She was initially 60/-3, with FHT B/L 135, pos accels, neg decels, moderate variability, and uterine irritability on toco        During her induction course, she received penicillin for GBS bacteriuria and was given 2 doses of vaginal Cytotec  She made change to 2/60/-3, and was started on pit  She was on a pit of 30 for the day and did not make change  She had pit washout and was given 2 additional doses of Cytotec  It was restarted the following day and she was noted to make change to 3/60/-3  She proceeded to be on pit of 30 for the remainder of the day, and made no change; she got a pit break overnight  On day of discharge, she was unchanged, 3/60/-3, and the decision was made to discharge patient to home, where she could eat and sleep   She will return to [facility] L&D on Wednesday, 1/6/21, at 1:00pm for her induction      Complications: none apparent     Condition at discharge: good            OB  Triage Vitals [01/01/21 2125]   Temperature Pulse Respirations Blood Pressure SpO2   99 °F (37 2 °C) 103 18 131/84 --      Temp Source Heart Rate Source Patient Position - Orthostatic VS BP Location FiO2 (%)   Oral Monitor -- Left arm --      Pain Score       No Pain          Wt Readings from Last 1 Encounters:   01/06/21 98 kg (216 lb)   Pertinent Labs/Diagnostic Test Results:       Results from last 7 days   Lab Units 01/06/21  2148 01/01/21  2134   WBC Thousand/uL 7 64 6 73   HEMOGLOBIN g/dL 11 6 10 4*   HEMATOCRIT % 34 8 31 4*   PLATELETS Thousands/uL 224 203     Results from last 7 days   Lab Units 01/07/21  1333 01/07/21  1125 01/07/21  0655 01/07/21  0519 01/07/21  0309 01/07/21  0058 01/07/21  0017 01/06/21  2251 01/06/21  2150 01/04/21  0600 01/03/21  2108 01/03/21  1808   POC GLUCOSE mg/dl 74 71 94 67 75 98 61* 70 54* 89 143* 69         Past Medical History:   Diagnosis Date    Anemia     Clostridium difficile infection     Hyperglycemia     Hypokalemia     Hypothyroidism 12/2019    IBS (irritable bowel syndrome)     IUD (intrauterine device) in place     Kidney injury     L2 vertebral fracture ([facility] )     Type 2 diabetes mellitus ([facility] ) 12/2019    Vitamin D deficiency      Present on Admission:  **None**      Admitting Diagnosis: Encounter for full-term uncomplicated delivery [R75]  Age/Sex: 31 y o  female    Network Utilization Review Department  ATTENTION: Please call with any questions or concerns to [phone] and carefully listen to the prompts so that you are directed to the right person  All voicemails are confidential   Chrystine Can all requests for admission clinical reviews, approved or denied determinations and any other requests to dedicated fax number below belonging to the campus where the patient is receiving treatment  List of dedicated fax numbers for the Facilities:  [address] DENIALS (Administrative/Medical Necessity) [phone]   [address] (Maternity/NICU/Pediatrics) [phone]   [address] (Jolena Honey) [address] [place] [address] Betzy Wilma Maharaj [address] [place] [address] [phone]       Notification of Discharge  This is a Notification of Discharge from our facility [address]  Please be advised that this patient has been discharge from our facility  Below you will find the admission and discharge date and time including the patients disposition      PRESENTATION DATE: 1/1/2021  8:52 PM  OBS ADMISSION DATE:  IP ADMISSION DATE: 1/1/21 2125   DISCHARGE DATE: 1/4/2021  9:45 AM  DISPOSITION: PRA - Home PRA - Home   Admission Orders listed below:  Admission Orders (From admission, onward)     Ordered        01/01/21 2125  Inpatient Admission  Once                   Please contact the UR Department if additional information is required to close this patient's authorization/case  Nima Marion Utilization Review Department  Main: [phone] x carefully listen to the prompts  All voicemails are confidential   [email or web address]  Send all requests for admission clinical reviews, approved or denied determinations and any other requests to dedicated fax number below belonging to the campus where the patient is receiving treatment   List of dedicated fax numbers:  [address] DENIALS (Administrative/Medical Necessity) [phone]   [address] (Maternity/NICU/Pediatrics) [phone]   Grant Lagunas [phone]   Yandy Lindo [phone]   [facility] [phone]   [facility] [address] [place] [address] [phone]   [facility]  [phone]   [address] [facility] [address]  [facility] [facility] [phone]

## 2021-01-07 NOTE — H&P
H&P Exam - Obstetrics   Lala Curry 31 y o  female MRN: [id number]  Unit/Bed#: L&D 321-01 Encounter: [id number]      History of Present Illness     Chief Complaint: Induction of labor    HPI:  Lala Curry is a 31 y o   female with an RACHEL of 1/15/2021, by Last Menstrual Period at 38w5d weeks gestation who is being admitted for induction of labor for fetal growth restriction  Contractions: No  Loss of fluid: no  Vaginal bleeding: no  Fetal movement: present    She is a SOLO patient  PREGNANCY COMPLICATIONS:   1) Type 2 diabetes  2) Hypothyroidism  3) BMI 40    OB History    Para Term  AB Living   1             SAB TAB Ectopic Multiple Live Births                  # Outcome Date GA Lbr Raymundo/2nd Weight Sex Delivery Anes PTL Lv   1 Current                Baby complications/comments: Fetal growth restriction    Ultrasound 20:    MEASUREMENTS (* Included In Average GA)      AC              31 6 cm        35 weeks 5 days* (15%)   BPD              9 2 cm        37 weeks 1 day * (51%)   HC              32 9 cm        36 weeks 6 days* (26%)   Femur            6 6 cm        33 weeks 6 days* (<5%)      Cerebellum       5 3 cm        37 weeks 3 days      HC/AC           1 04   FL/AC           0 21   FL/BPD          0 72   EFW (Ac/Fl/Hc)  2666 grams - 5 lbs 14 oz                 (8%)    Review of Systems   Constitutional: Negative for fever  HENT: Negative for rhinorrhea, sinus pressure, sneezing and sore throat  Eyes: Negative for visual disturbance  Respiratory: Negative for cough, shortness of breath and wheezing  Cardiovascular: Negative for chest pain, palpitations and leg swelling  Gastrointestinal: Negative for abdominal distention, abdominal pain, blood in stool, nausea and vomiting  Genitourinary: Negative for dysuria, flank pain, vaginal bleeding and vaginal discharge  Musculoskeletal: Negative for neck pain and neck stiffness     Skin: Negative for color change, pallor and rash  Neurological: Negative for light-headedness, numbness and headaches  Historical Information   Past Medical History:   Diagnosis Date    Anemia     Clostridium difficile infection     Hyperglycemia     Hypokalemia     Hypothyroidism 12/2019    IBS (irritable bowel syndrome)     IUD (intrauterine device) in place     Kidney injury     [facility]     Type 2 diabetes mellitus ([facility] ) 12/2019    Vitamin D deficiency      Past Surgical History:   Procedure Laterality Date    CHOLECYSTECTOMY      OR COLONOSCOPY FLX DX W/COLLJ SPEC WHEN PFRMD N/A 1/10/2017    Procedure: EGD AND COLONOSCOPY;  Surgeon: Doris Keyes MD;  Location: [facility];   Service: Gastroenterology    WISDOM TOOTH EXTRACTION       Social History   Social History     Substance and Sexual Activity   Alcohol Use Not Currently    Frequency: Monthly or less    Drinks per session: 1 or 2     Social History     Substance and Sexual Activity   Drug Use No     Social History     Tobacco Use   Smoking Status Never Smoker   Smokeless Tobacco Never Used     Family History: non-contributory    Meds/Allergies      Medications Prior to Admission   Medication    albuterol (PROVENTIL HFA,VENTOLIN HFA) 90 mcg/act inhaler    Alcohol Swabs 70 % PADS    aspirin 81 mg chewable tablet    Blood Glucose Monitoring Suppl (ONETOUCH VERIO) w/Device KIT    Cholecalciferol (VITAMIN D) 50 MCG (2000 UT) tablet    ciclopirox (LOPROX) 0 77 % SUSP    Continuous Blood Gluc  (DEXCOM G6 ) SARTHAK    Continuous Blood Gluc Sensor (DEXCOM G6 SENSOR) MISC    Continuous Blood Gluc Transmit (DEXCOM G6 TRANSMITTER) MISC    diphenhydrAMINE (BENADRYL) 25 mg tablet    ferrous sulfate 325 (65 Fe) mg tablet    fluticasone (FLONASE) 50 mcg/act nasal spray    folic acid (FOLVITE) 809 mcg tablet    insulin aspart (NovoLOG FlexPen) 100 UNIT/ML injection pen    Insulin Pen Needle 31G X 5 MM MISC    Lantus SoloStar 100 units/mL injection pen    levothyroxine 75 mcg tablet    loratadine (CLARITIN) 10 mg tablet    metFORMIN (GLUCOPHAGE-XR) 500 mg 24 hr tablet    ondansetron (ZOFRAN) 4 mg tablet    Prenatal Vit-Iron Carbonyl-FA (PRENATAL MULTIVITAMIN) TABS        Allergies   Allergen Reactions    Bactrim [Sulfamethoxazole-Trimethoprim] Hives    Dilaudid [Hydromorphone Hcl] Itching       OBJECTIVE:    Vitals: Blood pressure 119/75, pulse 83, temperature 98 3 °F (36 8 °C), temperature source Oral, resp  rate 16, height 5' 4" (1 626 m), weight 98 kg (216 lb), last menstrual period 04/10/2020, unknown if currently breastfeeding  Body mass index is 37 08 kg/m²  Physical Exam  Exam conducted with a chaperone present  Constitutional:       General: She is not in acute distress  Appearance: She is well-developed  She is not diaphoretic  HENT:      Head: Normocephalic and atraumatic  Eyes:      Pupils: Pupils are equal, round, and reactive to light  Neck:      Musculoskeletal: Normal range of motion  Cardiovascular:      Rate and Rhythm: Normal rate  Pulses: Normal pulses  Pulmonary:      Effort: Pulmonary effort is normal  No respiratory distress  Abdominal:      General: There is no distension  Palpations: Abdomen is soft  There is no mass  Tenderness: There is no abdominal tenderness  There is no guarding  Genitourinary:     General: Normal vulva  Musculoskeletal: Normal range of motion  General: No deformity  Skin:     General: Skin is warm and dry  Neurological:      Mental Status: She is alert and oriented to person, place, and time     Psychiatric:         Behavior: Behavior normal            Cervix:   3/60/-3    Fetal heart rate:   Baseline Rate: 125 bpm  Variability: Moderate 6-25 bpm  Accelerations: 15 x 15 or greater  Decelerations: None  FHR Category: Category I    [place]:   Contraction Frequency (minutes): 2-4  Contraction Duration (seconds): 40-80  Contraction Quality: Mild    EFW: 7lbs    GBS: Positive    Prenatal Labs:   Blood Type:   Lab Results   Component Value Date/Time    ABO Grouping A 2021 09:34 PM     , D (Rh type):   Lab Results   Component Value Date/Time    Rh Factor Positive 2021 09:34 PM    , HCT/HGB:   Lab Results   Component Value Date/Time    Hematocrit 31 4 (L) 2021 09:34 PM    Hemoglobin 10 4 (L) 2021 09:34 PM      , MCV:   Lab Results   Component Value Date/Time    MCV 86 2021 09:34 PM      , Platelets:   Lab Results   Component Value Date/Time    Platelets 884 9834 09:34 PM      , 1 hour Glucola:   , Varicella:   Lab Results   Component Value Date/Time    Varicella IgG NON-IMMUNE (A) 2020 07:22 AM       , Rubella:   Lab Results   Component Value Date/Time    RUBELLA IGG QUANTITATION 11 2 2015 12:52 PM    Rubella IgG Quant 9 5 (L) 2020 07:22 AM        , VDRL/RPR:   Lab Results   Component Value Date/Time    RPR Non-Reactive 2021 09:34 PM      , Urine Culture/Screen:   Lab Results   Component Value Date/Time    Urine Culture (A) 2020 07:22 AM     <10,000 cfu/ml Beta Hemolytic Streptococcus Group B    Urine Culture <10,000 cfu/ml  2020 07:22 AM       , Hep B:   Lab Results   Component Value Date/Time    Hepatitis B Surface Ag Non-reactive 2020 07:22 AM     , HIV:   Lab Results   Component Value Date/Time    HIV-1/HIV-2 Ab Non-Reactive 2020 07:22 AM     , Chlamydia: Negative   , Gonorrhea:   Lab Results   Component Value Date/Time    N gonorrhoeae, DNA Probe Negative 07/10/2020 10:00 AM    N gonorrhoeae, DNA Probe N  gonorrhoeae Amplified DNA Negative 2016 08:21 AM     , Group B Strep:  Positive in urine     Invasive Devices     None                   Assessment/Plan     ASSESSMENT:  24yo  at 38w5d weeks gestation who is being admitted for induction of labor for fetal growth restriction      PLAN:   1) Admit   2) CBC, RPR, Blood Type   3) Start with pitocin titration   4) GBS Positive status: penicillin for PCN for prophylaxis    5) Analgesia and/or epidural at patient request   6) Anticipate    7) Discussed with Dr Codi Yepez    This patient will be an INPATIENT  and I certify the anticipated length of stay is >2 Midnights      Jevon Woodall MD  2021  8:41 PM

## 2021-01-07 NOTE — OB LABOR/OXYTOCIN SAFETY PROGRESS
Labor Progress Note - Ruiz Ray 31 y o  female MRN: [id number]    Unit/Bed#: L&D 321-01 Encounter: [id number]    Dose (carley-units/min) Oxytocin: 0 carley-units/min  Contraction Frequency (minutes): 1-4  Contraction Quality: Mild  Tachysystole: No   Cervical Dilation: 3        Cervical Effacement: 60  Fetal Station: -3  Baseline Rate: 125 bpm     FHR Category: Category I               Vital Signs:   Vitals:    01/07/21 0803   BP: 114/60   Pulse: 67   Resp:    Temp:            Notes/comments:     Pitocin was turned off for one hour and the patient ate breakfast  Her contractions spaced out and the fetal heart tracing was Category 1  Given that her cervix is remains thick, will proceed with oral Cytotec 50mg  Management discussed with Dr Maira Mistry MD 1/7/2021 10:07 AM

## 2021-01-07 NOTE — UTILIZATION REVIEW
Current status: In Labor    Notification of Maternity/Delivery &  Birth Information for Admission   Notification of Maternity/Delivery for Admission to our facility [address]  Be advised that this patient was admitted to our facility under Inpatient Status  Contact Fliiberto Nixon at [phone] for additional admission information  Carlota Chan PARENT/CHILD HEALTH UR DEPT  DEDICATED [phone]  Mother &  Information   Patient Name: Chente Carvajal YOB: 1995   Delivering clinician: This patient has no babies on file  OB History        1    Para        Term                AB        Living           SAB        TAB        Ectopic        Multiple        Live Births                    Name & MRN:   This patient has no babies on file   Birth Information: 31 y o  female MRN: [id number] Unit/Bed#: L&D 321-01 Estimated Date of Delivery: 1/15/21  Birthweight: No birth weight on file  Gestational Age: <None> Delivery Type: This patient has no babies on file  APGARS  One minute Five minutes Ten minutes   Totals:                 [address]   [address]:   [address] [facility]  Tax ID: [id number]  NPI: [id number] Attending Provider/NPI:  Phone:  Address: Vick Gill [[id number]]  [phone]  Same as Facility   Place of Service Code: 24 Place of Service Name: [address] [place] [address]   Start Date: 21   Discharge Date & Time: No discharge date for patient encounter  Type of Admission: Inpatient Status Discharge Disposition (if discharged): PRA - Home   Patient Diagnoses:   Encounter for full-term uncomplicated delivery [A70]  The encounter diagnosis was 38 weeks gestation of pregnancy     1  38 weeks gestation of pregnancy       Orders: Admission Orders (From admission, onward)     Ordered        21  Inpatient Admission  Once Assigned Utilization Review Contact: Olinda Humphrey  Utilization   Network Utilization Review Department  Phone: [phone]; Fax [phone]  Email: [email or web address]

## 2021-01-07 NOTE — OB LABOR/OXYTOCIN SAFETY PROGRESS
Oxytocin Safety Progress Check Note - Beata Palacios 31 y o  female MRN: [id number]    Unit/Bed#: L&D 321-01 Encounter: [id number]    Dose (carley-units/min) Oxytocin: 26 carley-units/min  Contraction Frequency (minutes): 2-3  Contraction Quality: Mild, Moderate  Tachysystole: No   Cervical Dilation: 3        Cervical Effacement: 60  Fetal Station: -3  Baseline Rate: 120 bpm     FHR Category: Category I               Vital Signs:   Vitals:    01/07/21 0715   BP: 107/58   Pulse:    Resp:    Temp:            Notes/comments:     Patient is comfortable and not feeling her contractions  She is in good spirits  FHT: Cat 1: 125/moderate/accelerations, no decelerations  [place]: q2 minutes   Will discontinue pitocin titration and allow patient to eat  Will then reassess contraction pattern            Shanell Nguyen MD 1/7/2021 7:52 AM

## 2021-01-07 NOTE — OB LABOR/OXYTOCIN SAFETY PROGRESS
Oxytocin Safety Progress Check Note - Marcel Rodríguez 31 y o  female MRN: [id number]    Unit/Bed#: L&D 321-01 Encounter: [id number]    Dose (carley-units/min) Oxytocin: 0 carley-units/min  Contraction Frequency (minutes): irregular  Contraction Quality: Mild  Tachysystole: No   Cervical Dilation: 4        Cervical Effacement: 60  Fetal Station: -2  Baseline Rate: 125 bpm     FHR Category: Category I           Vital Signs:   Vitals:    01/07/21 1332   BP: 122/59   Pulse: 76   Resp:    Temp:      Notes/comments:    Feeling well  Pt states she was feeling ctx earlier with pitocin but resolved with cytotec dose  Offered AROM and restart pitocin  Pt agreeable  AROM for clear fluid       Angélica Mitchell DO 1/7/2021 2:59 PM

## 2021-01-07 NOTE — OB LABOR/OXYTOCIN SAFETY PROGRESS
Oxytocin Safety Progress Check Note - Ladoris Cons 31 y o  female MRN: [id number]    Unit/Bed#: L&D 321-01 Encounter: [id number]    Dose (carley-units/min) Oxytocin: 6 carley-units/min  Contraction Frequency (minutes): 3  Contraction Quality: Mild  Tachysystole: No   Cervical Dilation: 4        Cervical Effacement: 60  Fetal Station: -2  Baseline Rate: 135 bpm     FHR Category: Category I               Vital Signs:   Vitals:    01/07/21 1733   BP: 121/64   Pulse: 73   Resp:    Temp:            Notes/comments:    Evaluated patient after 2 hours  IUPC came out, replaced  Found to be minimally changed  Will continue pitocin titration and continue to monitor      Discussed with Dr Argentina Cockayne, MD 1/7/2021 5:57 PM

## 2021-01-07 NOTE — PLAN OF CARE
Problem: Knowledge Deficit  Goal: Verbalizes understanding of labor plan  Description: Assess patient/family/caregiver's baseline knowledge level and ability to understand information  Provide education via patient/family/caregiver's preferred learning method at appropriate level of understanding  1  Provide teaching at level of understanding  2  Provide teaching via preferred learning method(s)  Outcome: Progressing  Goal: Patient/family/caregiver demonstrates understanding of disease process, treatment plan, medications, and discharge instructions  Description: Complete learning assessment and assess knowledge base  Interventions:  - Provide teaching at level of understanding  - Provide teaching via preferred learning methods  Outcome: Progressing     Problem: Labor & Delivery  Goal: Manages discomfort  Description: Assess and monitor for signs and symptoms of discomfort  Assess patient's pain level regularly and per hospital policy  Administer medications as ordered  Support use of nonpharmacological methods to help control pain such as distraction, imagery, relaxation, and application of heat and cold  Collaborate with interdisciplinary team and patient to determine appropriate pain management plan  1  Include patient in decisions related to comfort  2  Offer non-pharmacological pain management interventions  3  Report ineffective pain management to physician  Outcome: Progressing  Goal: Patient vital signs are stable  Description: 1  Assess vital signs - vaginal delivery    Outcome: Progressing     Problem: PAIN - ADULT  Goal: Verbalizes/displays adequate comfort level or baseline comfort level  Description: Interventions:  - Encourage patient to monitor pain and request assistance  - Assess pain using appropriate pain scale  - Administer analgesics based on type and severity of pain and evaluate response  - Implement non-pharmacological measures as appropriate and evaluate response  - Consider cultural and social influences on pain and pain management  - Notify physician/advanced practitioner if interventions unsuccessful or patient reports new pain  Outcome: Progressing     Problem: DISCHARGE PLANNING  Goal: Discharge to home or other facility with appropriate resources  Description: INTERVENTIONS:  - Identify barriers to discharge w/patient and caregiver  - Arrange for needed discharge resources and transportation as appropriate  - Identify discharge learning needs (meds, wound care, etc )  - Arrange for interpretive services to assist at discharge as needed  - Refer to Case Management Department for coordinating discharge planning if the patient needs post-hospital services based on physician/advanced practitioner order or complex needs related to functional status, cognitive ability, or social support system  Outcome: Progressing

## 2021-01-07 NOTE — OB LABOR/OXYTOCIN SAFETY PROGRESS
Oxytocin Safety Progress Check Note - Sarina Martinez 31 y o  female MRN: [id number]    Unit/Bed#: L&D 321-01 Encounter: [id number]    Dose (carley-units/min) Oxytocin: 12 carley-units/min  Contraction Frequency (minutes): 3-5(irritability from novii)  Contraction Quality: Mild  Tachysystole: No   Cervical Dilation: 3        Cervical Effacement: 60  Fetal Station: -3  Baseline Rate: 115 bpm     FHR Category: Category I               Vital Signs:   Vitals:    01/07/21 0133   BP: 94/53   Pulse: 70   Resp:    Temp:            Notes/comments:    Delayed entry  Evaluated patient after 2 hours on pitocin, Category I FHT, comfortable, irregular contraction pattern  Defer SVE and continue to monitor      Discussed with Dr Marie Sanchez MD 1/7/2021 2:19 AM

## 2021-01-08 LAB
GLUCOSE SERPL-MCNC: 118 MG/DL (ref 65–140)
GLUCOSE SERPL-MCNC: 159 MG/DL (ref 65–140)
GLUCOSE SERPL-MCNC: 71 MG/DL (ref 65–140)
GLUCOSE SERPL-MCNC: 98 MG/DL (ref 65–140)

## 2021-01-08 PROCEDURE — 88307 TISSUE EXAM BY PATHOLOGIST: CPT | Performed by: PATHOLOGY

## 2021-01-08 PROCEDURE — NC001 PR NO CHARGE: Performed by: OBSTETRICS & GYNECOLOGY

## 2021-01-08 PROCEDURE — 82948 REAGENT STRIP/BLOOD GLUCOSE: CPT

## 2021-01-08 PROCEDURE — 4A1H7CZ MONITORING OF PRODUCTS OF CONCEPTION, CARDIAC RATE, VIA NATURAL OR ARTIFICIAL OPENING: ICD-10-PCS | Performed by: OBSTETRICS & GYNECOLOGY

## 2021-01-08 PROCEDURE — 10H073Z INSERTION OF MONITORING ELECTRODE INTO PRODUCTS OF CONCEPTION, VIA NATURAL OR ARTIFICIAL OPENING: ICD-10-PCS | Performed by: OBSTETRICS & GYNECOLOGY

## 2021-01-08 PROCEDURE — 0KQM0ZZ REPAIR PERINEUM MUSCLE, OPEN APPROACH: ICD-10-PCS | Performed by: OBSTETRICS & GYNECOLOGY

## 2021-01-08 RX ORDER — OXYTOCIN/RINGER'S LACTATE 30/500 ML
62.5 PLASTIC BAG, INJECTION (ML) INTRAVENOUS ONCE
Status: COMPLETED | OUTPATIENT
Start: 2021-01-08 | End: 2021-01-08

## 2021-01-08 RX ORDER — ONDANSETRON 2 MG/ML
4 INJECTION INTRAMUSCULAR; INTRAVENOUS EVERY 6 HOURS PRN
Status: DISCONTINUED | OUTPATIENT
Start: 2021-01-08 | End: 2021-01-09 | Stop reason: HOSPADM

## 2021-01-08 RX ORDER — DOCUSATE SODIUM 100 MG/1
100 CAPSULE, LIQUID FILLED ORAL 2 TIMES DAILY
Status: DISCONTINUED | OUTPATIENT
Start: 2021-01-08 | End: 2021-01-09 | Stop reason: HOSPADM

## 2021-01-08 RX ORDER — DIPHENHYDRAMINE HCL 25 MG
25 TABLET ORAL EVERY 6 HOURS PRN
Status: DISCONTINUED | OUTPATIENT
Start: 2021-01-08 | End: 2021-01-09 | Stop reason: HOSPADM

## 2021-01-08 RX ORDER — ACETAMINOPHEN 325 MG/1
650 TABLET ORAL EVERY 6 HOURS PRN
Status: DISCONTINUED | OUTPATIENT
Start: 2021-01-08 | End: 2021-01-09 | Stop reason: HOSPADM

## 2021-01-08 RX ORDER — IBUPROFEN 600 MG/1
600 TABLET ORAL EVERY 6 HOURS PRN
Status: DISCONTINUED | OUTPATIENT
Start: 2021-01-08 | End: 2021-01-09 | Stop reason: HOSPADM

## 2021-01-08 RX ORDER — DIAPER,BRIEF,INFANT-TODD,DISP
1 EACH MISCELLANEOUS 4 TIMES DAILY PRN
Status: DISCONTINUED | OUTPATIENT
Start: 2021-01-08 | End: 2021-01-09 | Stop reason: HOSPADM

## 2021-01-08 RX ORDER — SIMETHICONE 80 MG
80 TABLET,CHEWABLE ORAL EVERY 6 HOURS PRN
Status: DISCONTINUED | OUTPATIENT
Start: 2021-01-08 | End: 2021-01-09 | Stop reason: HOSPADM

## 2021-01-08 RX ORDER — BISACODYL 10 MG
10 SUPPOSITORY, RECTAL RECTAL DAILY PRN
Status: DISCONTINUED | OUTPATIENT
Start: 2021-01-08 | End: 2021-01-09 | Stop reason: HOSPADM

## 2021-01-08 RX ORDER — METFORMIN HYDROCHLORIDE 500 MG/1
500 TABLET, EXTENDED RELEASE ORAL 2 TIMES DAILY WITH MEALS
Status: DISCONTINUED | OUTPATIENT
Start: 2021-01-08 | End: 2021-01-09 | Stop reason: HOSPADM

## 2021-01-08 RX ORDER — CALCIUM CARBONATE 200(500)MG
1000 TABLET,CHEWABLE ORAL 3 TIMES DAILY PRN
Status: DISCONTINUED | OUTPATIENT
Start: 2021-01-08 | End: 2021-01-09 | Stop reason: HOSPADM

## 2021-01-08 RX ADMIN — LEVOTHYROXINE SODIUM 75 MCG: 25 TABLET ORAL at 11:46

## 2021-01-08 RX ADMIN — METFORMIN HYDROCHLORIDE 500 MG: 500 TABLET, EXTENDED RELEASE ORAL at 14:05

## 2021-01-08 RX ADMIN — ACETAMINOPHEN 650 MG: 325 TABLET ORAL at 09:29

## 2021-01-08 RX ADMIN — BENZOCAINE AND LEVOMENTHOL: 200; 5 SPRAY TOPICAL at 06:32

## 2021-01-08 RX ADMIN — ACETAMINOPHEN 650 MG: 325 TABLET ORAL at 21:05

## 2021-01-08 RX ADMIN — ONDANSETRON 4 MG: 2 INJECTION INTRAMUSCULAR; INTRAVENOUS at 03:16

## 2021-01-08 RX ADMIN — IBUPROFEN 600 MG: 600 TABLET ORAL at 23:59

## 2021-01-08 RX ADMIN — Medication 62.5 MILLI-UNITS/MIN: at 05:31

## 2021-01-08 RX ADMIN — ACETAMINOPHEN 650 MG: 325 TABLET ORAL at 15:08

## 2021-01-08 RX ADMIN — HYDROCORTISONE 1 APPLICATION: 1 CREAM TOPICAL at 21:12

## 2021-01-08 RX ADMIN — WITCH HAZEL 1 PAD: 500 SOLUTION RECTAL; TOPICAL at 06:32

## 2021-01-08 RX ADMIN — DOCUSATE SODIUM 100 MG: 100 CAPSULE, LIQUID FILLED ORAL at 18:00

## 2021-01-08 RX ADMIN — IBUPROFEN 600 MG: 600 TABLET ORAL at 18:00

## 2021-01-08 RX ADMIN — BENZOCAINE AND LEVOMENTHOL: 200; 5 SPRAY TOPICAL at 21:07

## 2021-01-08 RX ADMIN — SODIUM CHLORIDE 2.5 MILLION UNITS: 9 INJECTION, SOLUTION INTRAVENOUS at 02:55

## 2021-01-08 RX ADMIN — DOCUSATE SODIUM 100 MG: 100 CAPSULE, LIQUID FILLED ORAL at 09:29

## 2021-01-08 RX ADMIN — IBUPROFEN 600 MG: 600 TABLET ORAL at 05:45

## 2021-01-08 RX ADMIN — IBUPROFEN 600 MG: 600 TABLET ORAL at 11:47

## 2021-01-08 RX ADMIN — SODIUM CHLORIDE 999 ML/HR: 0.9 INJECTION, SOLUTION INTRAVENOUS at 01:09

## 2021-01-08 NOTE — ANESTHESIA PREPROCEDURE EVALUATION
Procedure:  LABOR ANALGESIA    Relevant Problems   ANESTHESIA (within normal limits)      ENDO   (+) Hypothyroidism   (+) Type 2 diabetes mellitus with hyperglycemia, without long-term current use of insulin (HCC)      GYN   (+) 38 weeks gestation of pregnancy        Physical Exam    Airway    Mallampati score: II  TM Distance: >3 FB  Neck ROM: full     Dental   No notable dental hx     Cardiovascular  Rhythm: regular, Rate: normal, Cardiovascular exam normal    Pulmonary  Pulmonary exam normal Breath sounds clear to auscultation,     Other Findings        Anesthesia Plan  ASA Score- 3     Anesthesia Type- epidural with ASA Monitors  Additional Monitors:   Airway Plan:           Plan Factors-Exercise tolerance (METS): >4 METS  Chart reviewed  Existing labs reviewed  Patient summary reviewed  Patient is not a current smoker  Patient instructed to abstain from smoking on day of procedure  Patient did not smoke on day of surgery  Obstructive sleep apnea risk education given perioperatively  Induction-     Postoperative Plan-     Informed Consent- Anesthetic plan and risks discussed with patient

## 2021-01-08 NOTE — OB LABOR/OXYTOCIN SAFETY PROGRESS
Oxytocin Safety Progress Check Note - Daniel Fletcher 31 y o  female MRN: [id number]    Unit/Bed#: L&D 321-01 Encounter: [id number]    Dose (carley-units/min) Oxytocin: 20 carley-units/min  Contraction Frequency (minutes): 1 5-4 5  Contraction Quality: Moderate  Tachysystole: No   Cervical Dilation: 5-6        Cervical Effacement: 80  Fetal Station: -1  Baseline Rate: 125 bpm     FHR Category: Category I               Vital Signs:   Vitals:    01/07/21 2309   BP: 113/58   Pulse: 62   Resp:    Temp:    SpO2:            Notes/comments:    Evaluated patient  Category I FHT  Comfortable now with epidural  Found to be changed to 5 5cm dilated  Will continue pitocin titration and continue to monitor      Discussed with Dr Zi Collins MD 1/7/2021 11:54 PM

## 2021-01-08 NOTE — PLAN OF CARE
Problem: PAIN - ADULT  Goal: Verbalizes/displays adequate comfort level or baseline comfort level  Description: Interventions:  - Encourage patient to monitor pain and request assistance  - Assess pain using appropriate pain scale  - Administer analgesics based on type and severity of pain and evaluate response  - Implement non-pharmacological measures as appropriate and evaluate response  - Consider cultural and social influences on pain and pain management  - Notify physician/advanced practitioner if interventions unsuccessful or patient reports new pain  Outcome: Progressing     Problem: INFECTION - ADULT  Goal: Absence or prevention of progression during hospitalization  Description: INTERVENTIONS:  - Assess and monitor for signs and symptoms of infection  - Monitor lab/diagnostic results  - Monitor all insertion sites, i e  indwelling lines, tubes, and drains  - Monitor endotracheal if appropriate and nasal secretions for changes in amount and color  - [place] appropriate cooling/warming therapies per order  - Administer medications as ordered  - Instruct and encourage patient and family to use good hand hygiene technique  - Identify and instruct in appropriate isolation precautions for identified infection/condition  Outcome: Progressing  Goal: Absence of fever/infection during neutropenic period  Description: INTERVENTIONS:  - Monitor WBC    Outcome: Progressing     Problem: SAFETY ADULT  Goal: Patient will remain free of falls  Description: INTERVENTIONS:  - Assess patient frequently for physical needs  -  Identify cognitive and physical deficits and behaviors that affect risk of falls    -  [place] fall precautions as indicated by assessment   - Educate patient/family on patient safety including physical limitations  - Instruct patient to call for assistance with activity based on assessment  - Modify environment to reduce risk of injury  - Consider OT/PT consult to assist with strengthening/mobility  Outcome: Progressing  Goal: Maintain or return to baseline ADL function  Description: INTERVENTIONS:  -  Assess patient's ability to carry out ADLs; assess patient's baseline for ADL function and identify physical deficits which impact ability to perform ADLs (bathing, care of mouth/teeth, toileting, grooming, dressing, etc )  - Assess/evaluate cause of self-care deficits   - Assess range of motion  - Assess patient's mobility; develop plan if impaired  - Assess patient's need for assistive devices and provide as appropriate  - Encourage maximum independence but intervene and supervise when necessary  - Involve family in performance of ADLs  - Assess for home care needs following discharge   - Consider OT consult to assist with ADL evaluation and planning for discharge  - Provide patient education as appropriate  Outcome: Progressing  Goal: Maintain or return mobility status to optimal level  Description: INTERVENTIONS:  - Assess patient's baseline mobility status (ambulation, transfers, stairs, etc )    - Identify cognitive and physical deficits and behaviors that affect mobility  - Identify mobility aids required to assist with transfers and/or ambulation (gait belt, sit-to-stand, lift, walker, cane, etc )  - [place] fall precautions as indicated by assessment  - Record patient progress and toleration of activity level on Mobility SBAR; progress patient to next Phase/Stage  - Instruct patient to call for assistance with activity based on assessment  - Consider rehabilitation consult to assist with strengthening/weightbearing, etc   Outcome: Progressing     Problem: Knowledge Deficit  Goal: Patient/family/caregiver demonstrates understanding of disease process, treatment plan, medications, and discharge instructions  Description: Complete learning assessment and assess knowledge base    Interventions:  - Provide teaching at level of understanding  - Provide teaching via preferred learning methods  Outcome: Progressing     Problem: DISCHARGE PLANNING  Goal: Discharge to home or other facility with appropriate resources  Description: INTERVENTIONS:  - Identify barriers to discharge w/patient and caregiver  - Arrange for needed discharge resources and transportation as appropriate  - Identify discharge learning needs (meds, wound care, etc )  - Arrange for interpretive services to assist at discharge as needed  - Refer to Case Management Department for coordinating discharge planning if the patient needs post-hospital services based on physician/advanced practitioner order or complex needs related to functional status, cognitive ability, or social support system  Outcome: Progressing     Problem: POSTPARTUM  Goal: Experiences normal postpartum course  Description: INTERVENTIONS:  - Monitor maternal vital signs  - Assess uterine involution and lochia  Outcome: Progressing  Goal: Appropriate maternal -  bonding  Description: INTERVENTIONS:  - Identify family support  - Assess for appropriate maternal/infant bonding   -Encourage maternal/infant bonding opportunities  - Referral to  or  as needed  Outcome: Progressing  Goal: Establishment of infant feeding pattern  Description: INTERVENTIONS:  - Assess breast/bottle feeding  - Refer to lactation as needed  Outcome: Progressing  Goal: Incision(s), wounds(s) or drain site(s) healing without S/S of infection  Description: INTERVENTIONS  - Assess and document risk factors for skin impairment   - Assess and document dressing, incision, wound bed, drain sites and surrounding tissue  - Consider nutrition services referral as needed  - Oral mucous membranes remain intact  - Provide patient/ family education  Outcome: Progressing     Problem: Knowledge Deficit  Goal: Verbalizes understanding of labor plan  Description: Assess patient/family/caregiver's baseline knowledge level and ability to understand information    Provide education via patient/family/caregiver's preferred learning method at appropriate level of understanding  1  Provide teaching at level of understanding  2  Provide teaching via preferred learning method(s)  Outcome: Completed  Goal: Patient/family/caregiver demonstrates understanding of disease process, treatment plan, medications, and discharge instructions  Description: Complete learning assessment and assess knowledge base  Interventions:  - Provide teaching at level of understanding  - Provide teaching via preferred learning methods  Outcome: Completed     Problem: Labor & Delivery  Goal: Manages discomfort  Description: Assess and monitor for signs and symptoms of discomfort  Assess patient's pain level regularly and per hospital policy  Administer medications as ordered  Support use of nonpharmacological methods to help control pain such as distraction, imagery, relaxation, and application of heat and cold  Collaborate with interdisciplinary team and patient to determine appropriate pain management plan  1  Include patient in decisions related to comfort  2  Offer non-pharmacological pain management interventions  3  Report ineffective pain management to physician  Outcome: Completed  Goal: Patient vital signs are stable  Description: 1  Assess vital signs - vaginal delivery    Outcome: Completed     Problem: PAIN - ADULT  Goal: Verbalizes/displays adequate comfort level or baseline comfort level  Description: Interventions:  - Encourage patient to monitor pain and request assistance  - Assess pain using appropriate pain scale  - Administer analgesics based on type and severity of pain and evaluate response  - Implement non-pharmacological measures as appropriate and evaluate response  - Consider cultural and social influences on pain and pain management  - Notify physician/advanced practitioner if interventions unsuccessful or patient reports new pain  Outcome: Completed     Problem: DISCHARGE PLANNING  Goal: Discharge to home or other facility with appropriate resources  Description: INTERVENTIONS:  - Identify barriers to discharge w/patient and caregiver  - Arrange for needed discharge resources and transportation as appropriate  - Identify discharge learning needs (meds, wound care, etc )  - Arrange for interpretive services to assist at discharge as needed  - Refer to Case Management Department for coordinating discharge planning if the patient needs post-hospital services based on physician/advanced practitioner order or complex needs related to functional status, cognitive ability, or social support system  Outcome: Completed

## 2021-01-08 NOTE — UTILIZATION REVIEW
NOTIFICATION OF MATERNITY ADMISSION AND BIRTH            Notification of Maternity/Delivery & [place] Birth Information for Admission   Notification of Maternity/Delivery for Admission to our facility [facility]  Be advised that this patient was admitted to our facility under Inpatient Status  Contact Cherry Coello at [phone] for additional admission information  [facility]ENT[facility]ILD [facility] DEPT  DEDICATED [phone]  Mother & [place] Information   Patient Name: Marcel Rodríguez YOB: 1995   Delivering clinician: [facility]   OB History        1    Para   1    Term   1            AB        Living   1       SAB        TAB        Ectopic        Multiple   0    Live Births   1                Name & MRN:   Information for the patient's :  Katrin Castillo) [[id number]]      Delivery Information:  Sex: male  Delivered 2021 4:43 AM by Vaginal, Spontaneous; Gestational Age: 36w0d     Measurements:  Weight: 6 lb 5 4 oz (2875 g); Height: 20 25"    APGAR 1 minute 5 minutes 10 minutes   Totals: 8 9      [place] Birth Information: 31 y o  female MRN: [id number] Unit/Bed#: L&D 321-01 Estimated Date of Delivery: 1/15/21  Birthweight: No birth weight on file  Gestational Age: <None> Delivery Type: Vaginal, Spontaneous          APGARS  One minute Five minutes Ten minutes   Totals:                 [address]   [address]:   [place]  Tax ID: [id number]  NPI: [id number] Attending Provider/NPI:  Phone:  Address: James Kulkarni [[id number]]  [phone]  Same as Elina Julio [address] of Service Code: 24 Place of Service Name: [address] [place] [address]   Start Date: 21   Discharge Date & Time: No discharge date for patient encounter      Type of Admission: Inpatient Status Discharge Disposition (if discharged): PRA - Home   Patient Diagnoses: Encounter for full-term uncomplicated delivery [X57]  The encounter diagnosis was 38 weeks gestation of pregnancy  1  38 weeks gestation of pregnancy       Orders: Admission Orders (From admission, onward)     Ordered        01/06/21 2041  Inpatient Admission  Once                    Assigned Utilization Review Contact: Clemente Perez Utilization Review Department  Phone: [phone]; Fax [phone]  Email: [email or web address]

## 2021-01-08 NOTE — OB LABOR/OXYTOCIN SAFETY PROGRESS
Oxytocin Safety Progress Check Note - Kwame Travis 31 y o  female MRN: [id number]    Unit/Bed#: L&D 321-01 Encounter: [id number]    Dose (carley-units/min) Oxytocin: 20 carley-units/min  Contraction Frequency (minutes): 3-4  Contraction Quality: Moderate  Tachysystole: No   Cervical Dilation: 6-7        Cervical Effacement: 90  Fetal Station: -1  Baseline Rate: 125 bpm     FHR Category: Category II               Vital Signs:   Vitals:    01/08/21 0124   BP: 102/58   Pulse: 56   Resp:    Temp:    SpO2:            Notes/comments:    Evaluated patient after 2 hours  Having recurrent late decelerations with moderate variability and periods of minimal variability  Still with accelerations  Repositioning undertaken multiple times  Found to be changed to 6 5cm dilated  Acceleration with scalp stimulation  Will continue to monitor  Will continue pitocin titration when periods of improvement in FHT allow      Discussed with Dr Janneth Walton MD 1/8/2021 1:53 AM

## 2021-01-08 NOTE — ANESTHESIA PROCEDURE NOTES
Epidural Block    Patient location during procedure: OB  Start time: 1/7/2021 8:57 PM  Reason for block: procedure for pain  Staffing  Anesthesiologist: Lisbeth Billingsley DO  Performed: anesthesiologist   Preanesthetic Checklist  Completed: patient identified, site marked, surgical consent, pre-op evaluation, timeout performed, IV checked, risks and benefits discussed and monitors and equipment checked  Epidural  Patient position: sitting  Prep: Betadine  Patient monitoring: heart rate, continuous pulse ox and frequent blood pressure checks  Approach: midline  Location: lumbar (1-5)  Injection technique: WEST saline  Needle  Needle type: Tuohy   Epidural needle gauge: 17G  Catheter size: 19G    Test dose: negative  Assessment  Sensory level: [id number] aspiration for CSF, negative aspiration for heme and no paresthesia on injection  patient tolerated the procedure well with no immediate complications

## 2021-01-08 NOTE — LACTATION NOTE
This note was copied from a baby's chart  CONSULT - LACTATION  Baby Boy Elyssa Shethtish 0 days male MRN: [id number]    [facility], [facility] NURSERY Room / Bed: L&D 321(N)/L&D 321(N) Encounter: [id number]    Maternal Information     MOTHER:  Ruth Lares  Maternal Age: 31 y o    OB History: # 1 - Date: 21, Sex: Male, Weight: 2875 g (6 lb 5 4 oz), GA: 39w0d, Delivery: Vaginal, Spontaneous, Apgar1: 8, Apgar5: 9, Living: Living, Birth Comments: None   Previouse breast reduction surgery? No    Lactation history:   Has patient previously breast fed: No   How long had patient previously breast fed:     Previous breast feeding complications:       Past Surgical History:   Procedure Laterality Date    CHOLECYSTECTOMY      [place] COLONOSCOPY FLX DX W/COLLJ SPEC WHEN PFRMD N/A 1/10/2017    Procedure: EGD AND COLONOSCOPY;  Surgeon: Pankaj Montez MD;  Location: [facility]; Service: Gastroenterology    WISDOM TOOTH EXTRACTION          Birth information:  YOB: 2022   Time of birth: 4:43 AM   Sex: male   Delivery type: Vaginal, Spontaneous   Birth Weight: 2875 g (6 lb 5 4 oz)   Percent of Weight Change: 0%     Gestational Age: 39w0d   [unfilled]    Assessment     Breast and nipple assessment: normal assessment     Assessment: normal assessment    Feeding assessment: feeding well  LATCH:  Latch: Repeated attempts, hold nipple in mouth, stimulate to suck   Audible Swallowing: A few with stimulation   Type of Nipple: Everted (After stimulation)   Comfort (Breast/Nipple): Soft/non-tender   Hold (Positioning): No assist from staff, mother able to position/hold infant   LATCH Score: 8          Feeding recommendations:  breast feed on demand     Met with mother and father  Provided mother with Ready, Set, Baby booklet  Discussed Skin to Skin contact an benefits to mom and baby  Talked about the delay of the first bath until baby has adjusted   Spoke about the benefits of rooming in  Feeding on cue and what that means for recognizing infant's hunger  Avoidance of pacifiers for the first month discussed  Talked about exclusive breastfeeding for the first 6 months  Positioning and latch reviewed as well as showing images of other feeding positions  Discussed the properties of a good latch in any position  Reviewed hand/manual expression  Verbally instructed on positioning for a deep latch  Observed mom with successful deep latch with this feed,encouraged to stimulate baby till suckling well  Discussed s/s that baby is getting enough milk and some s/s that breastfeeding dyad may need further help  Gave information on common concerns, what to expect the first few weeks after delivery, preparing for other caregivers, and how partners can help  Resources for support also provided  Encouraged parents to call for assistance, questions, and concerns about breastfeeding  Extension provided          Ashley Nelson RN 1/8/2021 9:43 AM

## 2021-01-08 NOTE — ANESTHESIA POSTPROCEDURE EVALUATION
Post-Op Assessment Note    CV Status:  Stable    Pain management: adequate     Mental Status:  Alert and awake   Hydration Status:  Euvolemic   PONV Controlled:  Controlled   Airway Patency:  Patent      Post Op Vitals Reviewed: Yes      Staff: Anesthesiologist     Post-op block assessment: no complications and catheter intact      No complications documented      /58 (01/08/21 0715)    Temp      Pulse     Resp      SpO2

## 2021-01-08 NOTE — UTILIZATION REVIEW
Initial Clinical Review    Admission: Date/Time/Statement:   Admission Orders (From admission, onward)     Ordered        21  Inpatient Admission  Once                   Orders Placed This Encounter   Procedures    Inpatient Admission     Standing Status:   Standing     Number of Occurrences:   1     Order Specific Question:   Admitting Physician     Answer:   Sandra Perez [[id number]]     Order Specific Question:   Level of Care     Answer:   Med Surg [[id number]]     Order Specific Question:   Estimated length of stay     Answer:   More than 2 Midnights     Order Specific Question:   Certification     Answer:   I certify that inpatient services are medically necessary for this patient for a duration of greater than two midnights  See H&P and MD Progress Notes for additional information about the patient's course of treatment  ED Arrival Information     Patient not seen in ED                     Chief Complaint   Patient presents with    Scheduled Induction     Assessment/Plan: ON 2021 25 y o   femaleat 38w5d weeks gestation  admitted Inpatient for induction of labor for fetal growth restriction  Patient had previous admission - @ 39w0d for IOL with Cytotec & pit administration w decision to return for IOL due to failed progress  Progress on initial admission was cervical change to 3/60/3 which is where the patient presented on current admission  She received IV Penicillin for GBS, restarted w cerivcal ripening w Cytotec & started on Pit titration with amniotomy for augmentation  She cont with Pitocin titration w Epidural and delivered a viable male 2021  @ 0443       2021 8:41 PM  Cervix:  3/60/-3  2021  2:20 am  Dose (carley-units/min) Oxytocin: 12 carley-units/min, Cervical Dilation: 3  2021  7:52 am  Will discontinue pitocin titration and allow patient to eat; Dose (carley-units/min) Oxytocin: 26 carley-units/min-Cervical Dilation: 3  2021  9:45 am  Pitocin was turned off for one hour and the patient ate breakfast, Given that her cervix is remains thick, will proceed with oral Cytotec 50mg  Cervical Dilation: 3  1/7/2021  2:59 PM  Dose (carley-units/min) Oxytocin: 0 carley-units/min, Cervical Dilation: 4, Offered AROM and restart pitocin  Pt agreeable, AROM for clear fluid  1/7/2021  8:26 pm Oxytocin: 14 carley-units/min, Cervical Dilation: 4-5, Found to have descent of fetal vertex  Requesting epidural,   1/7/2021  8:57 PM  Epidural Block  1/7/2021  11:54 PM    Oxytocin: 20 carley-units/min, Cervical Dilation: 5-6  1/8/2021  3:48 am Oxytocin: 20 carley-units/min, Cervical Dilation: Lip/rim (Comment), Evaluated patient after 2 hours  Increasing back pain  Still Category II with some late decelerations, but continues to have moderate variability and accelerations  1/8/2021 0433   Vaginal Delivery Summary -    Procedure: Spontaneous Vaginal Delivery, repair of second degree laceration   Anesthesia: Epidural   Findings:   1   Viable male at 12, with APGARS of 8 and 9 at 1 and 5 minutes      Triage Vitals   Temperature Pulse Respirations Blood Pressure SpO2   01/06/21 2056 01/06/21 2056 01/06/21 2056 01/06/21 2056 01/07/21 2043   98 3 °F (36 8 °C) 83 16 119/75 98 %      Temp Source Heart Rate Source Patient Position - Orthostatic VS BP Location FiO2 (%)   01/06/21 2056 01/06/21 2056 -- 01/06/21 2056 --   Oral Monitor  Left arm       Pain Score       01/06/21 2056       No Pain          Wt Readings from Last 1 Encounters:   01/06/21 98 kg (216 lb)     Additional Vital Signs:   01/08/21 0610  98 °F (36 7 °C)  --  --  --  --   01/08/21 0600  --  --  --  126/68  --   01/08/21 0545  --  --  --  114/87  --   01/08/21 0525  97 6 °F (36 4 °C)  --  --  --  --   01/08/21 0445  --  --  --  124/60  --   01/08/21 0425  --  72  --  122/63  --   01/08/21 0355  --  71  --  118/56  --     2102  --  86  --  --  --   01/07/21 2100  97 8 °F (36 6 °C)  80  --  120/59  99 %   01/07/21 2048  --  77  --  --  99 % 01/07/21 2043  --  --  --  --  98 %   01/07/21 2032  --  74  --  137/85  --   01/07/21 2018  --  71  --  130/80  --   01/07/21 2000  --  --  --  116/63  --   01/07/21 1947  --  68  --  --  --   01/07/21 1932  --  73  --  123/71  --     0233  --  83  --  115/71  --   01/07/21 0230  98 1 °F (36 7 °C)  --  18  --  --   01/07/21 0148  --  69  --  100/57  --     01/06/21 2130  --  --  16  --  --   01/06/21 2100  --  --  --  119/75  --   01/06/21 2056  98 3 °F (36 8 °C)  83  16  119/75  --      Weights (last 14 days)    Date/Time  Weight  Height   01/06/21 2056  98 kg (216 lb)  5' 4" (1 626 m)     Pertinent Labs/Diagnostic Test Results:       Results from last 7 days   Lab Units 01/06/21  2148 01/01/21  2134   WBC Thousand/uL 7 64 6 73   HEMOGLOBIN g/dL 11 6 10 4*   HEMATOCRIT % 34 8 31 4*   PLATELETS Thousands/uL 224 203     Results from last 7 days   Lab Units 01/08/21  0309 01/07/21  2327 01/07/21  1931 01/07/21  1751 01/07/21  1539 01/07/21  1333 01/07/21  1125 01/07/21  0655 01/07/21  0519 01/07/21  0309 01/07/21  0058 01/07/21  0017   POC GLUCOSE mg/dl 71 71 71 70 61* 74 71 94 67 75 98 61*              Past Medical History:   Diagnosis Date    Anemia     Clostridium difficile infection     Hyperglycemia     Hypokalemia     Hypothyroidism 12/2019    IBS (irritable bowel syndrome)     IUD (intrauterine device) in place     Kidney injury     L2 vertebral fracture ([facility] [address] )     Type 2 diabetes mellitus ([facility] ) 12/2019    Vitamin D deficiency      Present on Admission:   Type 2 diabetes mellitus with hyperglycemia, without long-term current use of insulin ([facility])   Hypothyroidism   Obesity affecting pregnancy in third trimester   Poor fetal growth affecting management of mother in third trimester      Admitting Diagnosis: Encounter for full-term uncomplicated delivery [Z53]  Age/Sex: 31 y o  female  Admission Orders:  continuous External uterine contraction monitoring  Continuous fetal heart rate monitoring    Scheduled Medications:  docusate sodium, 100 mg, Oral, BID  insulin lispro, 1-5 Units, Subcutaneous, TID AC  levothyroxine, 75 mcg, Oral, Early Morning  metFORMIN, 500 mg, Oral, BID With Meals  IV  penicillin G (PFIZERPEN-G) in 0 9% sodium chloride 250 mL IVPB 5 Million Units 1/6/2021-1/8    Continuous IV Infusions:   IV 1/6/2021-1/8/2021  oxytocin (PITOCIN) 30 Units in lactated ringers 500 mL infusion titration  IV  1/6-1/8/2021 sodium chloride 0 9 % infusion @ 125 ml/hr    PRN Meds:  acetaminophen, 650 mg, Oral, Q6H PRN  albuterol, 2 puff, Inhalation, Q6H PRN  benzocaine-menthol-lanolin-aloe, , Topical, 4x Daily PRN  bisacodyl, 10 mg, Rectal, Daily PRN  calcium carbonate, 1,000 mg, Oral, TID PRN  diphenhydrAMINE, 25 mg, Oral, Q6H PRN  hydrocortisone, 1 application, Topical, 4x Daily PRN  ibuprofen, 600 mg, Oral, Q6H PRN  ondansetron, 4 mg, Intravenous, Q6H PRN  simethicone, 80 mg, Oral, Q6H PRN  witch hazel-glycerin, 1 pad, Topical, Q2H PRN    Network Utilization Review Department  ATTENTION: Please call with any questions or concerns to [phone] and carefully listen to the prompts so that you are directed to the right person  All voicemails are confidential   Justin Jones all requests for admission clinical reviews, approved or denied determinations and any other requests to dedicated fax number below belonging to the campus where the patient is receiving treatment   List of dedicated fax numbers for the Facilities:  [address] [place] [address] DENIALS (Administrative/Medical Necessity) [phone]   [address] (Maternity/NICU/Pediatrics) [phone]   [address] Mimi Lamas 1357 (  [facility]) [address] [facility] [phone] [address] [place] [address] Wilma Maharaj 1481 [phone]   Tari Whitaker [address]51 [phone]

## 2021-01-08 NOTE — DISCHARGE SUMMARY
Discharge Summary - Zelda Rios 31 y o  female MRN: [id number]    Unit/Bed#: L&D 321-01 Encounter: [id number]    Admission Date: 2021     Discharge Date: 2021    Admitting Diagnosis:   IBS with diarrhea  Type 2 diabetes  Hypothyroidism  38 weeks gestation of pregnancy  Obesity  Poor fetal growth affecting management of mother    Discharge Diagnosis:   Same, delivered    Procedures:   spontaneous vaginal delivery    Admitting Attending: Dr Meagan Benton  Delivery Attending: Dr Neha Godinez  Discharge Attending: Dr Odette Lopez Course:     Zelda Rios is a 31 y o   who was admitted for induction of labor for fetal growth restriction  She received cytotec for cervical ripening, penicillin for GBS carrier status, pitocin for induction, and amniotomy for augmentation  She received an epidural for analgesia  She progressed slowly initially but then progressed well  She was complete at 0433 on 21 and started pushing    She then underwent an uncomplicated spontaneous vaginal delivery and delivered a viable male  at [id number] on 21  APGARS were 8, 9 at 1 and 5 minutes, respectively   weighed 6 lb 5 4 oz   was then transferred to  nursery  Patient tolerated the procedure well and was transferred to postpartum in stable condition  The patient's post partum course was unremarkable  On day of discharge, she was ambulating and able to reasonably perform all ADLs  She was voiding and had appropriate bowel function  Pain was well controlled  She was discharged home on postpartum day #1 without complications  Patient was instructed to follow up with her OB as an outpatient and was given appropriate warnings to call provider if she develops signs of infection or uncontrolled pain  Condition at discharge:   good     Disposition:   Home    Planned Readmission:   No    Discharge Medications:   Please see after visit summary for full list of discharge medications        Discharge instructions :   -Do not place anything (no partner, tampons or douche) in your vagina for 6 weeks  -You may walk for exercise for the first 6 weeks then gradually return to your usual activities    -Please do not drive for 1 week if you have no stitches and for 2 weeks if you have stitches or underwent a  delivery     -You may take baths or shower per your preference    -Please look at your bust (breasts) in the mirror daily and call provider for redness or tenderness or increased warmth  - If you have had a  please look at your incision daily as well and call provider for increasing redness or steady drainage from the incision    -Please call your provider if temperature > 100 4*F or 38* C, worsening pain or a foul discharge

## 2021-01-08 NOTE — QUICK NOTE
CTSP after slight fall  Pt is s/p  at 0443  She had movement and feeling in B/L legs  Her nurse helped her up out of bed, and she fell onto her right knee  Now, pt denies pain, however her right leg still feels somewhat tingly since the epidural  When evaluated, the right knee has an area of erythema, about 9waz7ps  Non-tender to touch  Strength in R thigh, calf, foot 5/5  Anterior and posterior drawer tests negative  MCL and LCL w/o laxity  Plan to continue monitoring      D/w Dr Sneha Newsome, PGY1

## 2021-01-08 NOTE — OB LABOR/OXYTOCIN SAFETY PROGRESS
Oxytocin Safety Progress Check Note - Shirin Lara 31 y o  female MRN: [id number]    Unit/Bed#: L&D 321-01 Encounter: [id number]    Dose (carley-units/min) Oxytocin: 14 carley-units/min  Contraction Frequency (minutes): 1 5-3  Contraction Quality: Mild  Tachysystole: No   Cervical Dilation: 4-5        Cervical Effacement: 70  Fetal Station: -1  Baseline Rate: 130 bpm     FHR Category: Category I               Vital Signs:   Vitals:    01/07/21 1832   BP: 119/56   Pulse: 69   Resp:    Temp:            Notes/comments:   Evaluated patient after 2 hours  Feeling more uncomfortable  Found to have descent of fetal vertex  Requesting epidural, would be good candidate for epidural  Will continue to monitor      Discussed with Dr Melina Roberts MD 1/7/2021 8:26 PM

## 2021-01-08 NOTE — PROGRESS NOTES
At 10:45 attempted to get OOB to bathroom  Patient was able to lift both feet off bed and stated she had  feeling back in legs after epidural   When patient stood up R leg buckled and  Patient went down on R Knee with some support from nurse  Dr Leopoldo Stall notified of fall  Patient denies pain or injury at this time

## 2021-01-08 NOTE — OB LABOR/OXYTOCIN SAFETY PROGRESS
Oxytocin Safety Progress Check Note - Kwame Travis 31 y o  female MRN: [id number]    Unit/Bed#: L&D 321-01 Encounter: [id number]    Dose (carley-units/min) Oxytocin: 20 carley-units/min  Contraction Frequency (minutes): 2-3  Contraction Quality: Moderate  Tachysystole: No   Cervical Dilation: Lip/rim (Comment)        Cervical Effacement: 100  Fetal Station: 0  Baseline Rate: 120 bpm     FHR Category: Category II               Vital Signs:   Vitals:    01/08/21 0324   BP: 107/55   Pulse: 83   Resp:    Temp:    SpO2:            Notes/comments:    Evaluated patient after 2 hours  Increasing back pain  Still Category II with some late decelerations, but continues to have moderate variability and accelerations  Found to be changed to 9 5cm dilated  Will continue monitoring      Discussed with Dr Janneth Walton MD 1/8/2021 3:48 AM

## 2021-01-08 NOTE — L&D DELIVERY NOTE
Vaginal Delivery Summary - OB/GYN   Shirin Lara 31 y o  female MRN: [id number]  Unit/Bed#: L&D 321-01 Encounter: [id number]          Predelivery Diagnosis:  1  Pregnancy at 39w0d  2  Type 2 diabetes  3  Fetal growth restriction  4  Hypothyroidism  5  Obesity, BMI 37    Postdelivery Diagnosis:  1  Same as above  2  Delivery of term     Procedure: Spontaneous Vaginal Delivery, repair of second degree laceration    Attending: Marcus    Assistant: Tomas    Anesthesia: Epidural    Quantified blood loss (mL): 692  Admission Hg:   Lab Results   Component Value Date    HGB 11 6 2021      Admission platelets:   Lab Results   Component Value Date             Complications: none apparent    Specimens: cord blood, arterial and venous cord blood gasses, placenta to pathology for IUGR    Findings:   1  Viable male at 12, with APGARS of 8 and 9 at 1 and 5 minutes respectively,  2  Spontaneous delivery of intact placenta at 449  3  2 degree laceration repaired with 3-0 Vicryl  4  Blood gases:    Umbilical Cord Venous Blood Gas:    clotted  Umbilical Cord Arterial Blood Gas:    clotted    Disposition:  Patient tolerated the procedure well and was recovering in labor and delivery room     Brief history and labor course:  Ms Shirin Lara is a 31 y o   at 39wk0d  She presented to labor and delivery for induction of labor for intrauterine growth restriction with type 2 diabetes  She received pitocin for induction initially and failed to make progress or have regular contraction pattern  She was started on penicillin for GBS positive status  Pitocin was discontinued and she was restarted with cervical ripening with buccal cytotec  She progressed to 4cm and was started on pitocin for induction with amniotomy for augmentation  An IUPC was placed for titration of pitocin  She progressed well  She developed a Category II FHT with late decelerations but continued making good cervical change   She was complete at 0433 and started pushing  Description of procedure    Warm compresses were applied during pushing and perineal massage was performed  After pushing for 10 minutes, at 0443 patient delivered a viable male , wt pending, apgars of 8 (1 min) and 9 (5 min)  The fetal vertex delivered spontaneously  Baby was checked for nuchal  None present  The anterior shoulder delivered atraumatically with maternal expulsive forces and the assistance of gentle downward traction  The posterior shoulder delivered with maternal expulsive forces and the assistance of gentle upward traction  The remainder of the fetus delivered spontaneously  Upon delivery, the infant was placed on the mothers abdomen and the cord was clamped and cut  Delayed cord clamping was performed  The infant was noted to cry spontaneously and had all signs of life  There was no evidence for injury  Awaiting nurse resuscitators evaluated the   Arterial and venous cord blood gases and cord blood was collected for analysis  These were promptly sent to the lab  In the immediate post-partum, 30 units of IV pitocin was administered, and the uterus was noted to contract down well with massage and pitocin  The placenta delivered spontaneously at 0449 and was noted to have a centrally inserted 3 vessel cord  The vagina, cervix, perineum, and rectum were inspected and there was noted to be a second degree laceration requiring repair  The apex of the vaginal laceration was identified and a suture of 3-0 Vicryl was placed 1cm above the apex  The vaginal mucosa and underlying rectovaginal fascia were closed using a running locked suture to the hymenal ring  The suture was then brought underneath the hymenal ring  A stitch was then placed through the bulbocavernosus muscle  Continuing with the same suture, the transverse perineal muscles were re-approximated   The suture was brought to the posterior apex of the skin laceration and then the skin was re-approximated in a subcuticular fashion to the hymenal ring  Hemostasis was achieved  At the conclusion of the procedure, all needle, sponge, and instrument counts were noted to be correct  Lupillo You showed no retained sponges  Patient tolerated the procedure well and was allowed to recover in labor and delivery room with family and  before being transferred to the post-partum floor  Dr Troy Ellis was present and participated in all key portions of the case          Sylvie Chan MD  2021  5:06 AM

## 2021-01-09 VITALS
BODY MASS INDEX: 36.88 KG/M2 | RESPIRATION RATE: 18 BRPM | WEIGHT: 216 LBS | DIASTOLIC BLOOD PRESSURE: 72 MMHG | SYSTOLIC BLOOD PRESSURE: 122 MMHG | HEART RATE: 73 BPM | OXYGEN SATURATION: 100 % | TEMPERATURE: 97.8 F | HEIGHT: 64 IN

## 2021-01-09 LAB — GLUCOSE SERPL-MCNC: 78 MG/DL (ref 65–140)

## 2021-01-09 PROCEDURE — NC001 PR NO CHARGE: Performed by: OBSTETRICS & GYNECOLOGY

## 2021-01-09 PROCEDURE — 82948 REAGENT STRIP/BLOOD GLUCOSE: CPT

## 2021-01-09 RX ORDER — IBUPROFEN 600 MG/1
600 TABLET ORAL EVERY 6 HOURS PRN
Qty: 30 TABLET | Refills: 0 | Status: SHIPPED | OUTPATIENT
Start: 2021-01-09 | End: 2021-07-22

## 2021-01-09 RX ADMIN — ACETAMINOPHEN 650 MG: 325 TABLET ORAL at 03:37

## 2021-01-09 RX ADMIN — DOCUSATE SODIUM 100 MG: 100 CAPSULE, LIQUID FILLED ORAL at 08:14

## 2021-01-09 RX ADMIN — WITCH HAZEL 1 PAD: 500 SOLUTION RECTAL; TOPICAL at 12:06

## 2021-01-09 RX ADMIN — LEVOTHYROXINE SODIUM 75 MCG: 25 TABLET ORAL at 06:04

## 2021-01-09 RX ADMIN — METFORMIN HYDROCHLORIDE 500 MG: 500 TABLET, EXTENDED RELEASE ORAL at 08:14

## 2021-01-09 RX ADMIN — IBUPROFEN 600 MG: 600 TABLET ORAL at 06:06

## 2021-01-09 RX ADMIN — ACETAMINOPHEN 650 MG: 325 TABLET ORAL at 12:06

## 2021-01-09 NOTE — PLAN OF CARE
Problem: PAIN - ADULT  Goal: Verbalizes/displays adequate comfort level or baseline comfort level  Description: Interventions:  - Encourage patient to monitor pain and request assistance  - Assess pain using appropriate pain scale  - Administer analgesics based on type and severity of pain and evaluate response  - Implement non-pharmacological measures as appropriate and evaluate response  - Consider cultural and social influences on pain and pain management  - Notify physician/advanced practitioner if interventions unsuccessful or patient reports new pain  Outcome: Progressing     Problem: INFECTION - ADULT  Goal: Absence or prevention of progression during hospitalization  Description: INTERVENTIONS:  - Assess and monitor for signs and symptoms of infection  - Monitor lab/diagnostic results  -   -   - Berry appropriate cooling/warming therapies per order  - Administer medications as ordered  - Instruct and encourage patient and family to use good hand hygiene technique  - Identify and instruct in appropriate isolation precautions for identified infection/condition  Outcome: Progressing  Goal: Absence of fever/infection during neutropenic period  Description: INTERVENTIONS:  - Monitor WBC    Outcome: Progressing     Problem: SAFETY ADULT  Goal: Patient will remain free of falls  Description: INTERVENTIONS:  - Assess patient frequently for physical needs  -  Identify cognitive and physical deficits and behaviors that affect risk of falls    -  Berry fall precautions as indicated by assessment   - Educate patient/family on patient safety including physical limitations  - Instruct patient to call for assistance with activity based on assessment  - Modify environment to reduce risk of injury  - Consider OT/PT consult to assist with strengthening/mobility  Outcome: Progressing  Goal: Maintain or return to baseline ADL function  Description: INTERVENTIONS:  -  Assess patient's ability to carry out ADLs; assess patient's baseline for ADL function and identify physical deficits which impact ability to perform ADLs (bathing, care of mouth/teeth, toileting, grooming, dressing, etc )  - Assess/evaluate cause of self-care deficits   - Assess range of motion  - Assess patient's mobility; develop plan if impaired  - Assess patient's need for assistive devices and provide as appropriate  - Encourage maximum independence but intervene and supervise when necessary  - Involve family in performance of ADLs  - Assess for home care needs following discharge   - Consider OT consult to assist with ADL evaluation and planning for discharge  - Provide patient education as appropriate  Outcome: Progressing  Goal: Maintain or return mobility status to optimal level  Description: INTERVENTIONS:  - Assess patient's baseline mobility status (ambulation, transfers, stairs, etc )    - Identify cognitive and physical deficits and behaviors that affect mobility  - Identify mobility aids required to assist with transfers and/or ambulation (gait belt, sit-to-stand, lift, walker, cane, etc )  - [place] fall precautions as indicated by assessment  - Record patient progress and toleration of activity level on Mobility SBAR; progress patient to next Phase/Stage  - Instruct patient to call for assistance with activity based on assessment  - Consider rehabilitation consult to assist with strengthening/weightbearing, etc   Outcome: Progressing     Problem: Knowledge Deficit  Goal: Patient/family/caregiver demonstrates understanding of disease process, treatment plan, medications, and discharge instructions  Description: Complete learning assessment and assess knowledge base    Interventions:  - Provide teaching at level of understanding  - Provide teaching via preferred learning methods  Outcome: Progressing     Problem: DISCHARGE PLANNING  Goal: Discharge to home or other facility with appropriate resources  Description: INTERVENTIONS:  - Identify barriers to discharge w/patient and caregiver  - Arrange for needed discharge resources and transportation as appropriate  - Identify discharge learning needs (meds, wound care, etc )  - Arrange for interpretive services to assist at discharge as needed  - Refer to Case Management Department for coordinating discharge planning if the patient needs post-hospital services based on physician/advanced practitioner order or complex needs related to functional status, cognitive ability, or social support system  Outcome: Progressing     Problem: POSTPARTUM  Goal: Experiences normal postpartum course  Description: INTERVENTIONS:  - Monitor maternal vital signs  - Assess uterine involution and lochia  Outcome: Progressing  Goal: Appropriate maternal -  bonding  Description: INTERVENTIONS:  - Identify family support  - Assess for appropriate maternal/infant bonding   -Encourage maternal/infant bonding opportunities  - Referral to  or  as needed  Outcome: Progressing  Goal: Establishment of infant feeding pattern  Description: INTERVENTIONS:  - Assess breast/bottle feeding  - Refer to lactation as needed  Outcome: Progressing  Goal: Incision(s), wounds(s) or drain site(s) healing without S/S of infection  Description: INTERVENTIONS  - Assess and document risk factors for skin impairment   - Assess and document dressing, incision, wound bed, drain sites and surrounding tissue  - Consider nutrition services referral as needed  - Oral mucous membranes remain intact  - Provide patient/ family education  Outcome: Progressing

## 2021-01-09 NOTE — DISCHARGE INSTRUCTIONS
Self Care After Delivery   AMBULATORY CARE:   The postpartum period  is the period of time from delivery to about 6 weeks  During this time you may experience many physical and emotional changes  It is important to understand what is normal and when you need to call your healthcare provider  It is also important to know how to care for yourself during this time  Call your local emergency number (911 in the [address] [facility]) for any of the following:   · You see or hear things that are not there, or have thoughts of harming yourself or your baby  · You soak through 1 pad in 15 minutes, have blurry vision, clammy or pale skin, and feel faint  · You faint or lose consciousness  · You have trouble breathing  · You cough up blood  · Your  incision comes apart  Seek care immediately if:   · Your heart is beating faster than usual     · You have a bad headache or changes in your vision  · Your episiotomy or  incision is red, swollen, bleeding, or draining pus  · You have severe abdominal pain  Call your doctor or obstetrician if:   · Your leg is painful, red, and larger than usual     · You soak through 1 or more pads in an hour, or pass blood clots larger than a quarter from your vagina  · You have a fever  · You have new or worsening pain in your abdomen or vagina  · You continue to have depression 1 to 2 weeks after you deliver  · You have trouble sleeping  · You have foul-smelling discharge from your vagina  · You have pain or burning when you urinate  · You do not have a bowel movement for 3 days or more  · You have nausea or are vomiting  · You have hard lumps or red streaks over your breasts  · You have cracked nipples or bleed from your nipples  · You have questions or concerns about your condition or care  Physical changes:   The following are normal changes after you give birth:  · Pain in the area between your anus and vagina    · Breast pain    · Constipation or hemorrhoids    · Hot or cold flashes    · Vaginal bleeding or discharge    · Mild to moderate abdominal cramping    · Difficulty controlling bowel movements or urine    Emotional changes:  A drop in hormone levels after you deliver may cause changes in your emotions  You may feel irritable, sad, or anxious  You may cry easily or for no reason  You may also feel depressed  Depression that continues can be a sign of postpartum depression, a condition that can be treated  Treatment may include talk therapy, medicines, or both  Healthcare providers will ask how you are feeling and if you have any depression  These talks can happen during appointments for your medical care and for your baby's care, such as well child visits  Providers can help you find ways to care for yourself and your baby  Talk to your providers about the following:  · When emotional changes or depression started, and if it is getting worse over time    · Problems you are having with daily activities, sleep, or caring for your baby    · If anything makes you feel worse, or makes you feel better    · Feeling that you are not bonding with your baby the way you want    · Any problems your baby has with sleeping or feeding    · Your baby is fussy or cries a lot    · Support you have from friends, family, or others    Breast care for breastfeeding mothers: You may have sore breasts for 3 to 6 days after you give birth  This happens as your milk begins to fill your breasts  You may also have sore breasts if you do not breastfeed frequently  Do the following to care for your breasts:  · Apply a moist, warm, compress to your breast as directed  This may help soothe your breasts  Make sure the washcloth is not too hot before you apply it to your breast     · Nurse your baby or pump your milk frequently  This may prevent clogged milk ducts  Ask your healthcare provider how often to nurse or pump      · Massage your breasts as directed  This may help increase your milk flow  Gently rub your breasts in a circular motion before you breastfeed  You may need to gently squeeze your breast or nipple to help release milk  You can also use a breast pump to help release milk from your breast     · Wash your breasts with warm water only  Do not put soap on your nipples  Soap may cause your nipples to become dry  · Apply lanolin cream to your nipples as directed  Lanolin cream may add moisture to your skin and prevent nipple dryness  Always  wash off lanolin cream with warm water before you breastfeed  · Place pads in your bra  Your nipples may leak milk when you are not breastfeeding  You can place pads inside of your bra to help prevent leaking onto your clothing  Ask your healthcare provider where to purchase bra pads  · Get breastfeeding support if needed  Healthcare providers can answer questions about breastfeeding and provide you with support  Ask your healthcare provider who you can contact if you need breastfeeding support  Breast care for non-breastfeeding mothers:  Milk will fill your breasts even if you bottle feed your baby  Do the following to help stop your milk from filling your breasts and causing pain:  · Wear a bra with support at all times  A sports bra or a tight-fitting bra will help stop your milk from coming in  · Apply ice on each breast for 15 to 20 minutes every hour or as directed  Use an ice pack, or put crushed ice in a plastic bag  Cover it with a towel before you apply it to your breast  Ice helps your milk ducts shrink  · Keep your breasts away from warm water  Warm water will make it easier for milk to fill your breasts  Stand with your breasts away from warm water in the shower  · Limit how much you touch your breasts  This will prevent them from filling with milk  Perineum care: Your perineum is the area between your rectum and vagina   It is normal to have swelling and pain in this area after you give birth  If you had an episiotomy, your healthcare provider may give you special instructions  · Clean your perineum after you use the bathroom  This may prevent infection and help with healing  Use a spray bottle with warm water to clean your perineum  You may also gently spray warm water against your perineum when you urinate  Always wipe front to back  · Take a sitz bath as directed  A sitz bath may help relieve swelling and pain  Fill your bath tub or bucket with water up to your hips and sit in the water  Use cold water for 2 days after you deliver  Then use warm water  Ask your healthcare provider for more information about a sitz bath  · Apply ice packs for the first 24 hours or as directed  Use a plastic glove filled with ice or buy an ice pack  Wrap the ice pack or plastic glove in a small towel or wash cloth  Place the ice pack on your perineum for 20 minutes at a time  · Sit on a donut-shaped pillow  This may relieve pressure on your perineum when you sit  · Use wipes that contain medicine or take pills as directed  Your healthcare provider may tell you to use witch hazel pads  You can place witch hazel pads in the refrigerator before you apply them to your perineum  Your provider may also tell you to take NSAIDs  Ask him or her how often to take pills or use the wipes  · Do not go swimming or take tub baths for 4 to 6 weeks or as directed  This will help prevent an infection in your vagina or uterus  Bowel and bladder care: It may take 3 to 5 days to have a bowel movement after you deliver your baby  You can do the following to prevent or manage constipation, and get control of your bowel or bladder:  · Take stool softeners as directed  A stool softener is medicine that will make your bowel movements softer  This may prevent or relieve constipation  A stool softener may also make bowel movements less painful  · Drink plenty of liquids    Ask how much liquid to drink each day and which liquids are best for you  Liquids may help prevent constipation  · Eat foods high in fiber  Examples include fruits, vegetables, grains, beans, and lentils  Ask your healthcare provider how much fiber you need each day  Fiber may prevent constipation  · Do Kegel exercises as directed  Kegel exercises will help strengthen the muscles that control bowel movements and urination  Ask your healthcare provider for more information on Kegel exercises  · Apply cold compresses or medicine to hemorrhoids as directed  This may relieve swelling and pain  Your healthcare provider may tell you to apply ice or wipes that contain medicine to your hemorrhoids  He or she may also tell you to use a sitz bath  Ask your provider for more information on how to manage hemorrhoids  Nutrition:  Good nutrition is important in the postpartum period  It will help you return to a healthy weight, increase your energy levels, and prevent constipation  It will also help you get enough nutrients and calories if you are going to breastfeed your baby  · Eat a variety of healthy foods  Healthy foods include fruits, vegetables, whole-grain breads, low-fat dairy products, beans, lean meats, and fish  You may need 500 to 700 extra calories each day if you breastfeed your baby  You may also need extra protein  · Limit foods with added sugar and high amounts of fat  These foods are high in calories and low in healthy nutrients  Read food labels so you know how much sugar and fat is in the food you want to eat  · Drink 8 to 10 glasses of water per day  Water will help you make plenty of milk for your baby  It will also help prevent constipation  Drink a glass of water every time you breastfeed your baby  · Take vitamins as directed  Ask your healthcare provider what vitamins you need  · Limit caffeine and alcohol if you are breastfeeding    Caffeine and alcohol can get into your breast milk  Caffeine and alcohol can make your baby fussy  They can also interfere with your baby's sleep  Ask your healthcare provider if you can drink alcohol or caffeine  Rest and sleep: You may feel very tired in the postpartum period  Enough sleep will help you heal and give you energy to care for your baby  The following may help you get sleep and rest:  · Nap when your baby naps  Your baby may nap several times during the day  Get rest during this time  · Limit visitors  Many people may want to see you and your baby  Ask friends or family to visit on different days  This will give you time to rest     · Do not plan too much for one day  Put off household chores so that you have time to rest  Gradually do more each day  · Ask for help from family, friends, or neighbors  Ask them to help you with laundry, cleaning, or errands  Also ask someone to watch the baby while you take a nap or relax  Ask your partner to help with the care of your baby  Pump some of your breast milk so your partner can feed your baby during the night  Exercise after delivery:  Wait until your healthcare provider says it is okay to exercise  Exercise can help you lose weight, increase your energy levels, and manage your mood  It can also prevent constipation and blood clots  Start with gentle exercises such as walking  Do more as you have more energy  You may need to avoid abdominal exercises for 1 to 2 weeks after you deliver  Talk to your healthcare provider about an exercise plan that is right for you  Sexual activity after delivery:   · Do not have sex until your healthcare provider says it is okay  You may need to wait 4 to 6 weeks before you have sex  This may prevent infection and allow time to heal     · Your menstrual cycle may begin as soon as 3 weeks after you deliver  Your period may be delayed if you breastfeed your baby  You can become pregnant before you get your first postpartum period   Talk to your healthcare provider about birth control that is right for you  Some types of birth control are not safe during breastfeeding  For support and more information:  Join a support group for new mothers  Ask for help from family and friends with chores, errands, and care of your baby  · Office of Women's Health,  Department of Health and Human Services  [address], [address] [place] [place]  [place] , [address]  [address], [address] [facility] [place]  [place] , [address]  Phone: [phone]  Web Address: www womenshealth gov  · [facility] Postpartum [address] [facility] , [address]  [address] [facility] , [address] [facility]  Web Address: [email or web address]  Follow up with your doctor or obstetrician as directed: You will need to follow up within 2 to 6 weeks of delivery  Write down your questions so you remember to ask them at your visits  © Copyright [facility] [address] Information is for End User's use only and may not be sold, redistributed or otherwise used for commercial purposes  All illustrations and images included in CareNotes® are the copyrighted property of A D A M , Inc  or [address] [place] [address] [facility]  The above information is an  only  It is not intended as medical advice for individual conditions or treatments  Talk to your doctor, nurse or pharmacist before following any medical regimen to see if it is safe and effective for you  COVID-19 (Coronavirus Disease 2019)   WHAT YOU NEED TO KNOW:   What do I need to know about coronavirus disease 2019 (COVID-19)? COVID-19 is the disease caused by the novel (new) coronavirus first discovered in December 2019  Coronaviruses generally cause upper respiratory (nose, throat, and lung) infections, such as a cold  The new virus can also cause serious lower respiratory conditions, such as pneumonia or acute respiratory distress syndrome (ARDS)   Anyone can develop serious problems from the new virus, but your risk is higher if you are 65 or older  A weak immune system, diabetes, or a heart or lung condition can also increase your risk  What are the signs and symptoms of COVID-19? You may not develop any signs or symptoms  Signs and symptoms that do develop usually start about 5 days after infection but can take 2 to 14 days  Signs and symptoms range from mild to severe  You may feel like you have the flu or a bad cold  Information on COVID-19 is still being learned  Tell your healthcare provider if you think you were infected but develop signs or symptoms not listed below:  · A cough    · Shortness of breath or trouble breathing that may become severe    · A fever of at least 100 4°F, or 38°C (may be lower in adults 65 or older)    · Chills that might include shaking    · Muscle pain, body aches, or a headache    · A sore throat    · Suddenly not being able to taste or smell anything    · Feeling mentally and physically tired (fatigue)    · Congestion (stuffy head and nose), or a runny nose    · Diarrhea, nausea, or vomiting    How is COVID-19 diagnosed? If you think you have COVID-19, call your healthcare provider  In some areas, testing is only done if a person has severe symptoms or is hospitalized  Testing is done more widely in other places  Your provider will tell you what to do based on your symptoms and the rules in your area  In general, the following may be used:  · A viral test  shows if you have a current infection  Samples are taken from your nose and throat, usually with swabs  You may need to wait several days to get the test results  Your healthcare provider will tell you how to get your results  You will need to quarantine (stay physically away from others) until you get your results  If results show you have COVID-19, you will need to quarantine until you are well  Your provider or other health official may give you more directions   You will also need to prevent another infection until it is known if you can get COVID-19 again  · An antibody test  shows if you had a past infection  Blood samples are used for this test  Antibodies are made by your immune system to attack the virus that causes COVID-19  Antibodies will form 1 to 3 weeks after you are infected  It is not known if antibodies prevent a second infection, or for how long a person might be protected  If you have antibodies, you will still need to be careful around others until more is known  · CT scans or x-rays  may be used to check for signs of pneumonia  The 2019 coronavirus causes a specific kind of pneumonia, usually in both lungs  How is COVID-19 treated? No medicine or specific treatment is currently approved for COVID-19  The following may be used to manage your symptoms or treat the effects of COVID-19:  · Mild symptoms  may get better on their own  If you do not need to be treated in a hospital, you will be given instructions to use at home  Your condition will be closely monitored  You will need to watch for worsening symptoms and seek immediate care if needed  Talk to your healthcare provider about the following:    ? Relieve your symptoms  To soothe a sore throat, gargle with warm salt water, or use throat lozenges or a throat spray  Your healthcare provider may recommend a cough medicine  Drink more liquids to thin and loosen mucus and to prevent dehydration  Use decongestants or saline drops as directed for nasal congestion  ? NSAIDs or acetaminophen  can help lower a fever and relieve body aches or a headache  Follow directions  If not taken correctly, NSAIDs can cause kidney damage and acetaminophen can cause liver damage  · Severe or life-threatening symptoms  are treated in the hospital  You may need a combination of the following:    ? Medicines  may be given to reduce inflammation or to fight the virus  You may also need blood thinners to prevent or treat blood clots   If you have a deep vein thrombosis (DVT) or pulmonary embolism (PE), you may need to keep using blood thinners for 3 months  ? Extra oxygen  may be given if you have respiratory failure  This means your lungs cannot get enough oxygen into your blood and out to your organs  Extra oxygen can help prevent organ failure  ? A ventilator  may be used to help you breathe  ? Convalescent plasma (part of blood)  from a patient who has recovered from COVID-19 may be used  The plasma contains antibodies that can help your body fight the infection  Convalescent plasma is only given to patients who have severe signs and symptoms  How does the 2019 coronavirus spread? The virus spreads quickly and easily  You can become infected if you are in contact with a large amount of the virus, even for a short time  You can also become infected by being around a small amount of virus for a long time  The following are ways the virus is thought to spread, but more information may be coming:  · Droplets are the most common way all coronaviruses spread  The virus can travel in droplets that form when a person talks, coughs, or sneezes  Anyone who breathes in the droplets or gets them in his or her eyes can become infected with the virus  Close personal contact with an infected person is thought to be the main way the virus spreads  Close personal contact means you are within 6 feet (2 meters) of the person  · Person-to-person contact can spread the virus  For example, a person with the virus on his or her hands can spread it by shaking hands with someone  At this time, it does not appear that the virus can be passed to a baby during pregnancy or delivery  The baby can be infected after he or she is born through person-to-person contact  The virus also does not appear to spread in breast milk  If you are pregnant or breastfeeding, talk to your healthcare provider or obstetrician about any concerns you have      · The virus can stay on objects and surfaces  A person can get the virus on his or her hands by touching the object or surface  Infection happens if the person then touches his or her eyes or mouth with unwashed hands  It is not yet known how long the virus can stay on an object or surface  That is why it is important to clean all surfaces that are used regularly  · An infected animal may be able to infect a person who touches it  This may happen at live markets or on a farm  How can everyone lower the risk for COVID-19? The best way to prevent infection is to avoid anyone who is infected, but this can be hard to do  An infected person can spread the virus before signs or symptoms begin, or even if signs or symptoms never develop  The following can help lower the risk for infection:      · Wash your hands often throughout the day  Use soap and water  Rub your soapy hands together, lacing your fingers  Wash the front and back of each hand, and in between your fingers  Use the fingers of one hand to scrub under the fingernails of the other hand  Wash for at least 20 seconds  Rinse with warm, running water for several seconds  Then dry your hands with a clean towel or paper towel  Use hand  that contains alcohol if soap and water are not available  Do not touch your eyes, nose, or mouth without washing your hands first  Teach children how to wash their hands and use hand   · Cover a sneeze or cough  This prevents droplets from traveling from you to others  Turn your face away and cover your mouth and nose with a tissue  Throw the tissue away  Use the bend of your arm if a tissue is not available  Then wash your hands well with soap and water or use hand   Turn and cover your face if you are around someone who is sneezing or coughing  Teach children how to cover a cough or sneeze  · Follow worldwide, national, and local social distancing guidelines    Social distancing means people avoid close physical contact so the virus cannot spread from one person to another  Keep at least 6 feet (2 meters) between you and others  Also keep this distance from anyone who comes to your home, such as someone making a delivery  · Make a habit of not touching your face  It is not known how long the virus can stay on objects and surfaces  If you get the virus on your hands, you can transfer it to your eyes, nose, or mouth and become infected  You can also transfer it to objects, surfaces, or people  Be aware of what you touch when you go out  Examples include handrails and elevator buttons  Try not to touch anything with bare hands unless it is necessary  Wash your hands before you leave your home and when you return  · Clean and disinfect high-touch surfaces and objects often  Use a disinfecting solution or wipes  You can make a solution by diluting 4 teaspoons of bleach in 1 quart (4 cups) of water  Clean and disinfect even if you think no one living in or coming to your home is infected with the virus  You can wipe items with a disinfecting cloth before you bring them into your home  Wash your hands after you handle anything you bring into your home  · Make your immune system as healthy as possible  A weakened immune system makes you more vulnerable to the new coronavirus  No COVID-19 vaccine is available yet  Vaccines such as the flu and pneumonia vaccines can help your immune system  Your healthcare provider can tell you which vaccines to get, and when to get them  Keep your immune system as strong as possible  Do not smoke  Eat healthy foods, exercise regularly, and try to manage stress  Go to bed and wake up at the same times each day  How do I follow social distancing guidelines to help lower the risk for COVID-19? National and local social distancing rules vary  Rules may change over time as restrictions are lifted  Restrictions may return if an outbreak happens where you live   It is important to know and follow all current social distancing rules in your area  The following are general guidelines:  · Limit trips out of your home  You may be able to have food, medicines, and other supplies delivered  If possible, have delivered items left at your door or other area  Try not to have someone hand you an item  You will be so close to the person that the virus can spread between you  · Do not have close physical contact with anyone who does not live in your home  Do not shake hands with, hug, or kiss a person as a greeting  Stand or walk as far from others as possible  If you must use public transportation (such as a bus or subway), try to sit or stand away from others  You can stay safely connected with others through phone calls, e-mail messages, social media websites, and video chats  Check in on anyone who may be having a hard time socially distancing, or who lives alone  Ask administrators at nursing homes or long-term care facilities how you can safely communicate with someone living there  · Wear a cloth face covering around others who do not live in your home  Face coverings help prevent the virus from spreading to others in droplets  You can use a clear face covering if someone needs to read your lips  This is a cloth covering that has plastic over the mouth area so your lips can be seen  Do not use coverings that have breathing valves or vents  The virus can travel out of the valve or vent and be spread to others  Do not take your covering off to talk, cough, or sneeze  Do not use coverings on children younger than 2 years or on anyone who has breathing problems or cannot remove it  · Only allow medical or other necessary professionals into your home  Wear your face covering, and remind professionals to wear a face covering  Remind them to wash their hands when they arrive and before they leave  Do not  let anyone who does not live in your home in, even if the person is not sick   A person can pass the virus to others before symptoms of COVID-19 begin  Some people never even develop symptoms  Children commonly have mild symptoms or no symptoms  It may be hard to tell a child not to hug or kiss you  Explain that this is how he or she can help you stay healthy  · Do not go to someone else's home unless it is necessary  Do not go over to visit, even if the person is lonely  Only go if you need to help him or her  Make sure you both wear face coverings while you are there  · Avoid large gatherings and crowds  Gatherings or crowds of 10 or more individuals can cause the virus to spread  Examples of gatherings include parties, sporting events, [employer] services, and conferences  Crowds may form at beaches, bello, and tourist attractions  Protect yourself by staying away from large gatherings and crowds  · Ask your healthcare provider for other ways to have appointments  You may be able to have appointments without having to go into the provider's office  Some providers offer phone, video, or other types of appointments  You may also be able to get prescriptions for a few months of your medicines at a time  · Stay safe if you must go out to work  You may have a job that can only be done outside your home  Keep physical distance between you and other workers as much as possible  Follow your employer's rules so everyone stays safe  What should I do if I have COVID-19 and am recovering at home? Healthcare providers will give you specific instructions to follow  The following are general guidelines to remind you how to keep others safe until you are well:  · Wash your hands often  Use soap and water as much as possible  You can use hand  that contains alcohol if soap and water are not available  Do not share towels with anyone  If you use paper towels, throw them away in a lined trash can kept in your room or area  Use a covered trash can, if possible      · Do not go out of your home unless it is necessary  You may have to go to your healthcare provider's office for check-ups or to get prescription refills  Do not arrive at the provider's office without an appointment  Providers have to make their offices safe for staff and other patients  · Do not have close physical contact with anyone unless it is necessary  Only have close physical contact with a person giving direct care, or a baby or child you must care for  Family members and friends should not visit you  If possible, stay in a separate area or room of your home if you live with others  No one should go into the area or room except to give you care  You can visit with others by phone, video chat, e-mail, or similar systems  It is important to stay connected with others in your life while you recover  · Wear a face covering while others are near you  This can help prevent droplets from spreading the virus when you talk, sneeze, or cough  Put the covering on before anyone comes into your room or area  Remind the person to cover his or her nose and mouth before going in to provide care for you  · Do not share items  Do not share dishes, towels, or other items with anyone  Items need to be washed after you use them  · Protect your baby  Wash your hands with soap and water often throughout the day  Wear a clean face covering while you breastfeed, or while you express or pump breast milk  If possible, ask someone who is well to care for your baby  You can put breast milk in bottles for the person to use, if needed  Talk to your healthcare provider if you have any questions or concerns about caring for or bonding with your baby  He or she will tell you when to bring your baby in for check-ups and vaccines  He or she will also tell you what to do if you think your baby was infected with the new virus  · Do not handle live animals  Until more is known, it is best not to touch, play with, or handle live animals   Some animals, including pets, have been infected with the new coronavirus  Do not handle or care for animals until you are well  Care includes feeding, petting, and cuddling your pet  Do not let your pet lick you or share your food  Ask someone who is not infected to take care of your pet, if possible  If you must care for a pet, wear a face covering  Wash your hands before and after you give care  · Follow directions from your healthcare provider for being around others after you recover  You will need to wait at least 10 days after symptoms first appeared  Then you will need to have no fever for 24 hours without fever medicine, and no other symptoms  A loss of taste or smell may continue for several months  It is considered okay to be around others if this is your only symptom  It is not known for sure if or for how long a recovered person can pass the virus to others  Your provider may give you instructions, such as continuing social distancing or wearing a face covering around others  How should I take care of someone who has COVID-19? If the person lives in another home, arrange for a time to give care  Remember to bring a few pairs of disposable gloves and a cloth face covering  The following are general guidelines to help you safely care for anyone who has COVID-19:  · Wash your hands often  Wash before and after you go into the person's home, area, or room  Throw paper towels away in a lined trash can that has a lid, if possible  · Do not allow others to go near the person  No one should come into the person's home unless it is necessary  If possible, the person should be in a separate area or room if he or she lives with others  Keep the room's door shut unless you need to go in or out  Have others call, video chat, or e-mail the person if he or she is feeling well enough  The person may feel lonely if he or she is kept separate for a long period of time   Safe communication can help him or her stay connected to family and friends  · Make sure the person's room has good air flow  You may be able to open the window if the weather allows  An air conditioner can also be turned on to help air move  · Contact the person before you go in to give care  Make sure the person is wearing a face covering  Remind him or her to wash his or her hands with soap and water  He or she can use hand  that contains alcohol if soap and water are not available  Put on a face covering before you go in to give care  · Wear gloves while you give care and clean  Clean items the person uses often  Clean countertops, cooking surfaces, and the fronts and insides of the microwave and refrigerator  Clean the shower, toilet, the area around the toilet, the sink, the area around the sink, and faucets  Gather used laundry or bedding  Wash and dry items on the warmest settings the fabric allows  Wash dishes and silverware in hot, soapy water or in a   · Anything you throw away needs to go into a lined trash can  When you need to empty the trash, close the open end of the lining and tie it closed  This helps prevent items the virus is on from spilling out of the trash  Remove your gloves and throw them away  Wash your hands  Where can I find more information? · Centers for Disease Control and Prevention  [address] , [address]  Phone: [phone]- [phone]  Web Address: [employer] com     What should I do if I think I or someone I know may be infected? Do the following to protect others:  · If emergency care is needed,  tell the  about the possible infection, or call ahead and tell the emergency department  · Call a healthcare provider  for instructions if symptoms are mild  Anyone who may be infected should not  arrive without calling first  The provider will need to protect staff members and other patients      · The person who may be infected needs to wear a face covering  while getting medical care  This will help lower the risk of infecting others  Coverings are not used for anyone who is younger than 2 years, has breathing problems, or cannot remove it  The provider can give you instructions for anyone who cannot wear a covering  Call your local emergency number (911 in the [address] [place] [address] Rd[address]) or an emergency department if:   · You have trouble breathing or shortness of breath at rest     · You have chest pain or pressure that lasts longer than 5 minutes  · You become confused or hard to wake  · Your lips or face are blue  · You have a fever of 104°F (40°C) or higher  When should I call my doctor? · You do not  have symptoms of COVID-19 but had close physical contact within 14 days with someone who tested positive  · You have questions or concerns about your condition or care  CARE AGREEMENT:   You have the right to help plan your care  Learn about your health condition and how it may be treated  Discuss treatment options with your healthcare providers to decide what care you want to receive  You always have the right to refuse treatment  The above information is an  only  It is not intended as medical advice for individual conditions or treatments  Talk to your doctor, nurse or pharmacist before following any medical regimen to see if it is safe and effective for you  © Copyright [address] Information is for End User's use only and may not be sold, redistributed or otherwise used for commercial purposes   All illustrations and images included in CareNotes® are the copyrighted property of [employer]  or [address] [place] [address]

## 2021-01-09 NOTE — CASE MANAGEMENT
Sent rx for Spectra S2 breast pump to [facility]  Insurance approved  Delivered to patient's room and patient signed off on delivery

## 2021-01-09 NOTE — PROGRESS NOTES
Progress Note - OB/GYN   Hernan Lucas 31 y o  female MRN: [id number]  Unit/Bed#: L&D 309-01 Encounter: [id number]    Assessment/ Plan:  31 y o  Onesimo Bond s/p Spontaneous Vaginal Delivery Postpartum day  1  Patient recovering well, Stable  Desires discharge today    PPD #1  Continue routine post partum care  Pain management PRN  Encourage ambulation  Encourage breastfeeding    T2DM  / 98/ 118  Metformin 500mg PO BID    Hypothyroid   Continue home Levothyroxine    Subjective/Objective   Chief Complaint:    Postpartum state    Subjective:   Pain: yes, cramping, improved with meds  Tolerating PO: yes  Voiding: yes  Flatus: yes  BM: no  Ambulating: yes  Breastfeeding:  yes  Chest pain: no  Shortness of breath: no  Leg pain: no  Lochia: minimal    Objective:     Vitals: Temp:  [97 7 °F (36 5 °C)-98 1 °F (36 7 °C)] 98 1 °F (36 7 °C)  HR:  [66-98] 80  Resp:  [18] 18  BP: (113-136)/(58-84) 113/68       Intake/Output Summary (Last 24 hours) at 1/9/2021 0654  Last data filed at 1/8/2021 1401  Gross per 24 hour   Intake --   Output 1000 ml   Net -1000 ml     Physical Exam:   General: NAD, alert, oriented  Cardio: Regular rate and rhythm, no murmur  Resp: nonlabored breathing, clear to auscultation bilaterally  Abdomen: Soft, no distension/rebound/guarding/tenderness   Fundus: Firm, non-tender, fundus: 1 cm below umbilicus  G/U: minimal lochia noted on pad  Lower Extremities: Non-tender, no palpable cords    Medications:  Current Facility-Administered Medications   Medication Dose Route Frequency    acetaminophen (TYLENOL) tablet 650 mg  650 mg Oral Q6H PRN    albuterol (PROVENTIL HFA,VENTOLIN HFA) inhaler 2 puff  2 puff Inhalation Q6H PRN    benzocaine-menthol-lanolin-aloe (DERMOPLAST) 20-0 5 % topical spray   Topical 4x Daily PRN    bisacodyl (DULCOLAX) rectal suppository 10 mg  10 mg Rectal Daily PRN    calcium carbonate (TUMS) chewable tablet 1,000 mg  1,000 mg Oral TID PRN    diphenhydrAMINE (BENADRYL) tablet 25 mg  25 mg Oral Q6H PRN    docusate sodium (COLACE) capsule 100 mg  100 mg Oral BID    hydrocortisone 1 % cream 1 application  1 application Topical 4x Daily PRN    ibuprofen (MOTRIN) tablet 600 mg  600 mg Oral Q6H PRN    insulin lispro (HumaLOG) 100 units/mL subcutaneous injection 1-5 Units  1-5 Units Subcutaneous TID AC    levothyroxine tablet 75 mcg  75 mcg Oral Early Morning    metFORMIN (GLUCOPHAGE-XR) 24 hr tablet 500 mg  500 mg Oral BID With Meals    ondansetron (ZOFRAN) injection 4 mg  4 mg Intravenous Q6H PRN    simethicone (MYLICON) chewable tablet 80 mg  80 mg Oral Q6H PRN    witch hazel-glycerin (TUCKS) topical pad 1 pad  1 pad Topical Q2H PRN       Labs:   Recent Results (from the past 24 hour(s))   Fingerstick Glucose (POCT)    Collection Time: 01/08/21 11:38 AM   Result Value Ref Range    POC Glucose 159 (H) 65 - 140 mg/dl   Fingerstick Glucose (POCT)    Collection Time: 01/08/21  5:56 PM   Result Value Ref Range    POC Glucose 98 65 - 140 mg/dl   Fingerstick Glucose (POCT)    Collection Time: 01/08/21 11:20 PM   Result Value Ref Range    POC Glucose 118 65 - 140 mg/dl         Dorian Ayoub  1/9/2021  6:54 AM

## 2021-01-09 NOTE — PLAN OF CARE
Problem: PAIN - ADULT  Goal: Verbalizes/displays adequate comfort level or baseline comfort level  Description: Interventions:  - Encourage patient to monitor pain and request assistance  - Assess pain using appropriate pain scale  - Administer analgesics based on type and severity of pain and evaluate response  - Implement non-pharmacological measures as appropriate and evaluate response  - Consider cultural and social influences on pain and pain management  - Notify physician/advanced practitioner if interventions unsuccessful or patient reports new pain  Outcome: Progressing     Problem: INFECTION - ADULT  Goal: Absence or prevention of progression during hospitalization  Description: INTERVENTIONS:  - Assess and monitor for signs and symptoms of infection  - Monitor lab/diagnostic results  - Monitor all insertion sites, i e  indwelling lines, tubes, and drains  - Monitor endotracheal if appropriate and nasal secretions for changes in amount and color  - [place] appropriate cooling/warming therapies per order  - Administer medications as ordered  - Instruct and encourage patient and family to use good hand hygiene technique  - Identify and instruct in appropriate isolation precautions for identified infection/condition  Outcome: Progressing  Goal: Absence of fever/infection during neutropenic period  Description: INTERVENTIONS:  - Monitor WBC    Outcome: Progressing     Problem: SAFETY ADULT  Goal: Patient will remain free of falls  Description: INTERVENTIONS:  - Assess patient frequently for physical needs  -  Identify cognitive and physical deficits and behaviors that affect risk of falls    -  [place] fall precautions as indicated by assessment   - Educate patient/family on patient safety including physical limitations  - Instruct patient to call for assistance with activity based on assessment  - Modify environment to reduce risk of injury  - Consider OT/PT consult to assist with strengthening/mobility  Outcome: Progressing  Goal: Maintain or return to baseline ADL function  Description: INTERVENTIONS:  -  Assess patient's ability to carry out ADLs; assess patient's baseline for ADL function and identify physical deficits which impact ability to perform ADLs (bathing, care of mouth/teeth, toileting, grooming, dressing, etc )  - Assess/evaluate cause of self-care deficits   - Assess range of motion  - Assess patient's mobility; develop plan if impaired  - Assess patient's need for assistive devices and provide as appropriate  - Encourage maximum independence but intervene and supervise when necessary  - Involve family in performance of ADLs  - Assess for home care needs following discharge   - Consider OT consult to assist with ADL evaluation and planning for discharge  - Provide patient education as appropriate  Outcome: Progressing  Goal: Maintain or return mobility status to optimal level  Description: INTERVENTIONS:  - Assess patient's baseline mobility status (ambulation, transfers, stairs, etc )    - Identify cognitive and physical deficits and behaviors that affect mobility  - Identify mobility aids required to assist with transfers and/or ambulation (gait belt, sit-to-stand, lift, walker, cane, etc )  - [place] fall precautions as indicated by assessment  - Record patient progress and toleration of activity level on Mobility SBAR; progress patient to next Phase/Stage  - Instruct patient to call for assistance with activity based on assessment  - Consider rehabilitation consult to assist with strengthening/weightbearing, etc   Outcome: Progressing     Problem: Knowledge Deficit  Goal: Patient/family/caregiver demonstrates understanding of disease process, treatment plan, medications, and discharge instructions  Description: Complete learning assessment and assess knowledge base    Interventions:  - Provide teaching at level of understanding  - Provide teaching via preferred learning methods  Outcome: Progressing     Problem: DISCHARGE PLANNING  Goal: Discharge to home or other facility with appropriate resources  Description: INTERVENTIONS:  - Identify barriers to discharge w/patient and caregiver  - Arrange for needed discharge resources and transportation as appropriate  - Identify discharge learning needs (meds, wound care, etc )  - Arrange for interpretive services to assist at discharge as needed  - Refer to Case Management Department for coordinating discharge planning if the patient needs post-hospital services based on physician/advanced practitioner order or complex needs related to functional status, cognitive ability, or social support system  Outcome: Progressing     Problem: POSTPARTUM  Goal: Experiences normal postpartum course  Description: INTERVENTIONS:  - Monitor maternal vital signs  - Assess uterine involution and lochia  Outcome: Progressing  Goal: Appropriate maternal -  bonding  Description: INTERVENTIONS:  - Identify family support  - Assess for appropriate maternal/infant bonding   -Encourage maternal/infant bonding opportunities  - Referral to  or  as needed  Outcome: Progressing  Goal: Establishment of infant feeding pattern  Description: INTERVENTIONS:  - Assess breast/bottle feeding  - Refer to lactation as needed  Outcome: Progressing  Goal: Incision(s), wounds(s) or drain site(s) healing without S/S of infection  Description: INTERVENTIONS  - Assess and document risk factors for skin impairment   - Assess and document dressing, incision, wound bed, drain sites and surrounding tissue  - Consider nutrition services referral as needed  - Oral mucous membranes remain intact  - Provide patient/ family education  Outcome: Progressing

## 2021-01-14 NOTE — UTILIZATION REVIEW
3rd REQUEST FOR AUTHORIZATION FOR:  Poor fetal growth affecting management of mother in third trimester, single or unspecified fetus     DID NOT DELIVER THIS ADMISSION    Initial Clinical Review    Admission: Date/Time/Statement:   Admission Orders (From admission, onward)     Ordered        01/01/21 2125  Inpatient Admission  Once                   Orders Placed This Encounter   Procedures    Inpatient Admission     Standing Status:   Standing     Number of Occurrences:   1     Order Specific Question:   Admitting Physician     Answer:   Aniket Schuler [[id number]]     Order Specific Question:   Level of Care     Answer:   Med Surg [16]     Order Specific Question:   Estimated length of stay     Answer:   More than 2 Midnights     Order Specific Question:   Certification     Answer:   I certify that inpatient services are medically necessary for this patient for a duration of greater than two midnights  See H&P and MD Progress Notes for additional information about the patient's course of treatment  Chief Complaint   Patient presents with    Scheduled Induction     iugr     Assessment/Plan:  30 yo G 2 P 0 @ 38 wk presented to [facility] triage as inpatient admission for IOL for fetal growth restriction with normal dopplers    This pregnancy is complicated by BMI 37, type 2 diabetes   Cervix:  Cervical Dilation: 1  Cervical Effacement: 50  Fetal Station: -3  Presentation: Vertex  Method: Manual     Fetal heart rate: 135, moderate variability positive accelerations no decelerations  Bourg: uterine irritability  Plan IVF  Vaginal cytotec q 3-4 hrs as needed  Blood sugar q 1 hr  Continuous external monitoring    01-02-21-@ 0141  Cervical Dilation: 1  Cervical Effacement: 60  Fetal Station: -3  Baseline Rate: 130 bpm       @ 0900  IVF   IV Pitocin started  Continuous external fetal monitoring  Cervical Dilation: 2-3  Cervical Effacement: 60  Fetal Station: -2  Baseline Rate: 115 bpm  FHR Category: Category       @ 2046   Oxytocin: 12 carley-units/min  Cervical Dilation: 2-3  Cervical Effacement: 60  Fetal Station: -2  Baseline Rate: 120 bpm  FHR Category: Category I        21 @ 2330   turn off the Pitocin, let it washout, and start back over with Cytotec        21@ 0230  Tachysystole: No   Cervical Dilation: 2-3  Cervical Effacement: 60  Fetal Station: -2  Baseline Rate: 120 bpm  FHR Category: Category I  iVF  Continuous external feta monitoring  Cytotec inserted     21 @ 1030   Contraction Frequency (minutes): 0  Contraction Quality: Mild  Tachysystole: No   Cervical Dilation: 2-3  Cervical Effacement: 60  Fetal Station: -3  Baseline Rate: 130 bpm  IV Pitocin restarted  IVF  Continuous external fetal monitoring    @ 1750   Dose (carley-units/min) Oxytocin: 24 carley-units/min  Contraction Frequency (minutes): 1-7  Contraction Quality: Mild  Tachysystole: No   Cervical Dilation: 3  Cervical Effacement: 60  Fetal Station: -3  Baseline Rate: 125 bpm  FHR Category: Category I    Admission Date: 2021      Discharge Date: 21     Admitting Attending: Ludy Nguyen DO     Discharging Attending: Dr Karen Burciaga     Principal Diagnosis: Pregnancy at 38w3d     Secondary Diagnosis:   1  Fetal growth restriction with normal Dopplers  2  Type 2 diabetes, on insulin  3  Hypothyroidism  4  GBS bacteriuria  5  Obesity (BMI 37)     Hospital course: Maya Bishop is a 31 y o   at 38w3d who was initially admitted for IOL for FGR with normal dopplers  Pregnancy also complicated by type 2 diabetes, hypothyroidism, GBS bacteria, and obesity (BMI 37)  Her last estimated fetal weight 2020 was 8%ile, AC 15%ile      She was initially 1/60/-3, with FHT B/L 135, pos accels, neg decels, moderate variability, and uterine irritability on toco        During her induction course, she received penicillin for GBS bacteriuria and was given 2 doses of vaginal Cytotec  She made change to 2/60/-3, and was started on pit    She was on a pit of 30 for the day and did not make change  She had pit washout and was given 2 additional doses of Cytotec  It was restarted the following day and she was noted to make change to 3/60/-3  She proceeded to be on pit of 30 for the remainder of the day, and made no change; she got a pit break overnight  On day of discharge, she was unchanged, 3/60/-3, and the decision was made to discharge patient to home, where she could eat and sleep  She will return to [facility] L&D on Wednesday, 1/6/21, at 1:00pm for her induction      Complications: none apparent     Condition at discharge: good            OB  Triage Vitals [01/01/21 2125]   Temperature Pulse Respirations Blood Pressure SpO2   99 °F (37 2 °C) 103 18 131/84 --      Temp Source Heart Rate Source Patient Position - Orthostatic VS BP Location FiO2 (%)   Oral Monitor -- Left arm --      Pain Score       No Pain          Wt Readings from Last 1 Encounters:   01/06/21 98 kg (216 lb)   Pertinent Labs/Diagnostic Test Results:           Results from last 7 days   Lab Units 01/09/21  0741 01/08/21  2320 01/08/21  1756 01/08/21  1138 01/08/21  0309 01/07/21  2327 01/07/21  1931 01/07/21  1751 01/07/21  1539 01/07/21  1333 01/07/21  1125   POC GLUCOSE mg/dl 78 118 98 159* 71 71 71 70 61* 74 71         Past Medical History:   Diagnosis Date    Anemia     Clostridium difficile infection     Hyperglycemia     Hypokalemia     Hypothyroidism 12/2019    IBS (irritable bowel syndrome)     IUD (intrauterine device) in place     Kidney injury     L2 vertebral fracture (HCC)     Type 2 diabetes mellitus ([facility] [address] ) 12/2019    Vitamin D deficiency      Present on Admission:  **None**      Admitting Diagnosis: Encounter for full-term uncomplicated delivery [U77]  Age/Sex: 31 y o  female    Network Utilization Review Department  ATTENTION: Please call with any questions or concerns to [phone] and carefully listen to the prompts so that you are directed to the right person   All voicemails are confidential   Sue Benitez all requests for admission clinical reviews, approved or denied determinations and any other requests to dedicated fax number below belonging to the campus where the patient is receiving treatment   List of dedicated fax numbers for the Facilities:  [address] DENIALS (Administrative/Medical Necessity) [phone]   [address] (Maternity/NICU/Pediatrics) [phone] [address] [facility] [address] Mimi Lamas 7660 (  Chava Fay "Margareth" 103) [address] [place] [address] Betzy Maharaj 1481 [address] [place] [address] [phone]

## 2021-01-14 NOTE — UTILIZATION REVIEW
3RD REQUEST FOR AUTHORIZATION   ADMITTED FOR POOR FETAL GROWTH        Notification of Maternity/Delivery &  Birth Information for Admission   Notification of Maternity/Delivery for Admission to our facility [address]  Be advised that this patient was admitted to our facility under Inpatient Status  Contact Raine Mariano at [phone] for additional admission information  Renée Castellanos PARENT/CHILD HEALTH UR DEPT  DEDICATED [phone]  Mother &  Information   Patient Name: Maurilio Champagne YOB: 1995   Delivering clinician: Kayla Allen Of [facility]   OB History        1    Para   1    Term   1            AB        Living   1       SAB        TAB        Ectopic        Multiple   0    Live Births   1               [facility]ocker Name & MRN:   Information for the patient's :  Kasia Rodrigues [[id number]]     [facility] Delivery Information:  Sex: male  Delivered 2021 4:43 AM by Vaginal, Spontaneous; Gestational Age: 36w0d     Measurements:  Weight: 6 lb 5 4 oz (2875 g); Height: 20 25"    APGAR 1 minute 5 minutes 10 minutes   Totals: 8 9       Birth Information: 31 y o  female MRN: [id number] Unit/Bed#: L&D 327-01 Estimated Date of Delivery: 1/15/21  Birthweight: No birth weight on file   Gestational Age: <None> Delivery Type: Vaginal, Spontaneous          APGARS  One minute Five minutes Ten minutes   Totals:                 [address]   [address]:   [address]   Tax ID: [id number]  NPI: [id number] Attending Provider/NPI:  Phone:  Address: Debi Devon [[id number]]  [phone]  Same as Elina Julio 1106 of Service Code: 24 Place of Service Name: [address] [place] [address]   Start Date: 21   Discharge Date & Time: 2021  9:45 AM    Type of Admission: Inpatient Status Discharge Disposition (if discharged): Home/Self Care   Patient Diagnoses:   Encounter for full-term uncomplicated delivery [H54]  The encounter diagnosis was Poor fetal growth affecting management of mother in third trimester, single or unspecified fetus  1  Poor fetal growth affecting management of mother in third trimester, single or unspecified fetus       Orders: Admission Orders (From admission, onward)     Ordered        01/01/21 2125  Inpatient Admission  Once                    Assigned Utilization Review Contact: Clemente Perez Utilization Review Department  Phone: [phone]; Fax [phone]  Email: [email or web address]     Initial Clinical Review    Admission: Date/Time/Statement:   Admission Orders (From admission, onward)     Ordered        01/01/21 2125  Inpatient Admission  Once                   Orders Placed This Encounter   Procedures    Inpatient Admission     Standing Status:   Standing     Number of Occurrences:   1     Order Specific Question:   Admitting Physician     Answer:   Amairani Simons [[id number]]     Order Specific Question:   Level of Care     Answer:   Med Surg [16]     Order Specific Question:   Estimated length of stay     Answer:   More than 2 Midnights     Order Specific Question:   Certification     Answer:   I certify that inpatient services are medically necessary for this patient for a duration of greater than two midnights  See H&P and MD Progress Notes for additional information about the patient's course of treatment  Chief Complaint   Patient presents with    Scheduled Induction     iugr     Assessment/Plan:  32 yo G 2 P 0 @ 38 wk presented to [facility] triage as inpatient admission for IOL for fetal growth restriction with normal dopplers    This pregnancy is complicated by BMI 37, type 2 diabetes   Cervix:  Cervical Dilation: 1  Cervical Effacement: 50  Fetal Station: -3  Presentation: Vertex  Method: Manual     Fetal heart rate: 135, moderate variability positive accelerations no decelerations  Calio: uterine irritability  Plan IVF  Vaginal cytotec q 3-4 hrs as needed  Blood sugar q 1 hr  Continuous external monitoring    21-@ 0141  Cervical Dilation: 1  Cervical Effacement: 60  Fetal Station: -3  Baseline Rate: 130 bpm       @ 0900  IVF   IV Pitocin started  Continuous external fetal monitoring  Cervical Dilation: 2-3  Cervical Effacement: 60  Fetal Station: -2  Baseline Rate: 115 bpm  FHR Category: Category       @ 6   Oxytocin: 12 carley-units/min  Cervical Dilation: 2-3  Cervical Effacement: 60  Fetal Station: -2  Baseline Rate: 120 bpm  FHR Category: Category I        21 @ 2330   turn off the Pitocin, let it washout, and start back over with Cytotec        21@ 0230  Tachysystole: No   Cervical Dilation: 2-3  Cervical Effacement: 60  Fetal Station: -2  Baseline Rate: 120 bpm  FHR Category: Category I  iVF  Continuous external feta monitoring  Cytotec inserted     21 @ 1030   Contraction Frequency (minutes): 0  Contraction Quality: Mild  Tachysystole: No   Cervical Dilation: 2-3  Cervical Effacement: 60  Fetal Station: -3  Baseline Rate: 130 bpm  IV Pitocin restarted  IVF  Continuous external fetal monitoring    @ 1750   Dose (carley-units/min) Oxytocin: 24 carley-units/min  Contraction Frequency (minutes): 1-7  Contraction Quality: Mild  Tachysystole: No   Cervical Dilation: 3  Cervical Effacement: 60  Fetal Station: -3  Baseline Rate: 125 bpm  FHR Category: Category I    Admission Date: 2021      Discharge Date: 21     Admitting Attending: Crys Cardona DO     Discharging Attending: Dr Ashley Webster     Principal Diagnosis: Pregnancy at 38w3d     Secondary Diagnosis:   1  Fetal growth restriction with normal Dopplers  2  Type 2 diabetes, on insulin  3  Hypothyroidism  4  GBS bacteriuria  5  Obesity (BMI 37)     Hospital course: Libertad Jhaveri is a 31 y o   at 38w3d who was initially admitted for IOL for FGR with normal dopplers    Pregnancy also complicated by type 2 diabetes, hypothyroidism, GBS bacteria, and obesity (BMI 37)  Her last estimated fetal weight 12/30/2020 was 8%ile, AC 15%ile      She was initially 1/60/-3, with FHT B/L 135, pos accels, neg decels, moderate variability, and uterine irritability on toco        During her induction course, she received penicillin for GBS bacteriuria and was given 2 doses of vaginal Cytotec  She made change to 2/60/-3, and was started on pit  She was on a pit of 30 for the day and did not make change  She had pit washout and was given 2 additional doses of Cytotec  It was restarted the following day and she was noted to make change to 3/60/-3  She proceeded to be on pit of 30 for the remainder of the day, and made no change; she got a pit break overnight  On day of discharge, she was unchanged, 3/60/-3, and the decision was made to discharge patient to home, where she could eat and sleep   She will return to [facility] L&D on Wednesday, 1/6/21, at 1:00pm for her induction      Complications: none apparent     Condition at discharge: good            OB  Triage Vitals [01/01/21 2125]   Temperature Pulse Respirations Blood Pressure SpO2   99 °F (37 2 °C) 103 18 131/84 --      Temp Source Heart Rate Source Patient Position - Orthostatic VS BP Location FiO2 (%)   Oral Monitor -- Left arm --      Pain Score       No Pain          Wt Readings from Last 1 Encounters:   01/06/21 98 kg (216 lb)   Pertinent Labs/Diagnostic Test Results:           Results from last 7 days   Lab Units 01/09/21  0741 01/08/21  2320 01/08/21  1756 01/08/21  1138 01/08/21  0309 01/07/21  2327 01/07/21  1931 01/07/21  1751 01/07/21  1539 01/07/21  1333 01/07/21  1125   POC GLUCOSE mg/dl 78 118 98 159* 71 71 71 70 61* 74 71         Past Medical History:   Diagnosis Date    Anemia     Clostridium difficile infection     Hyperglycemia     Hypokalemia     Hypothyroidism 12/2019    IBS (irritable bowel syndrome)     IUD (intrauterine device) in place     Kidney injury  L2 vertebral fracture (HCC)     Type 2 diabetes mellitus ([facility] [address] ) 12/2019    Vitamin D deficiency      Present on Admission:  **None**      Admitting Diagnosis: Encounter for full-term uncomplicated delivery [C23]  Age/Sex: 31 y o  female    Network Utilization Review Department  ATTENTION: Please call with any questions or concerns to [phone] and carefully listen to the prompts so that you are directed to the right person  All voicemails are confidential   Nohemi Rivero all requests for admission clinical reviews, approved or denied determinations and any other requests to dedicated fax number below belonging to the campus where the patient is receiving treatment  List of dedicated fax numbers for the Facilities:  [address] DENIALS (Administrative/Medical Necessity) [phone]   [address] (Maternity/NICU/Pediatrics) [phone] [address] Mimi Lamas 1656 (  Chava Fay "Margareth" 103) [address] [address] 1481 [address] [place]) [address] [place] [address] [phone]       Notification of Discharge  This is a Notification of Discharge from our facility [address]  Please be advised that this patient has been discharge from our facility  Below you will find the admission and discharge date and time including the patients disposition      PRESENTATION DATE: 1/1/2021  8:52 PM  OBS ADMISSION DATE:  IP ADMISSION DATE: 1/1/21 2125   DISCHARGE DATE: 1/4/2021  9:45 AM  DISPOSITION: Home/Self Care Home/Self Care   Admission Orders listed below:  Admission Orders (From admission, onward)     Ordered        01/01/21 2125  Inpatient Admission  Once                   Please contact the UR Department if additional information is required to close this patient's authorization/case  Nima Marion Utilization Review Department  Main: [phone] x carefully listen to the prompts  All voicemails are confidential   [email or web address]  Send all requests for admission clinical reviews, approved or denied determinations and any other requests to dedicated fax number below belonging to the campus where the patient is receiving treatment   List of dedicated fax numbers:  [address] DENIALS (Administrative/Medical Necessity) [phone]   [address] (Maternity/NICU/Pediatrics) [phone] 5400 [facility] [phone]   [facility] Constant [phone]   [facility] [phone]   [address] [phone]   [facility]  [phone]   [facility] [facility] [address] [facility]  [address] [facility] 1000 [address] [facility] [phone]

## 2021-01-15 NOTE — H&P
H&P Exam - Obstetrics   Ian Winston 31 y o  female MRN: [id number]  Unit/Bed#: L&D 309-01 Encounter: [id number]      History of Present Illness     Chief Complaint: Induction of labor    HPI:  Ian Winston is a 31 y o   female with an RACHEL of 1/15/2021, by Last Menstrual Period at 38w0d weeks gestation who is being admitted for induction of labor for fetal growth restriction with normal dopplers  This pregnancy is complicated by BMI 37, type 2 diabetes on Lantus 34 units at 4:00 p m , and humalog 5 units at breakfast 11 units at lunch and 9 units at dinner, last A1C (20) 5 1  Hypothyroidism on levothyroxine 75 mcg day  Last estimated fetal weight 2020 8th percentile, 5 lb 14 oz, abdominal circumference percentile 15 , femur longitude less than 5 percentile, normal Dopplers  In addition there is evidence of fetal abdominal circumference lag  GBS positive  Rh positive      Contractions: no  Loss of fluid: no  Vaginal bleeding: no  Fetal movement: yes    She is SOLO patient  PREGNANCY COMPLICATIONS:   1) Type 2 diabetes  2) Fetal growth restriction with normal Dopplers  3) Hypothyroidism  4) BMI 37    OB History    Para Term  AB Living   1 1 1     1   SAB TAB Ectopic Multiple Live Births         0 1      # Outcome Date GA Lbr Raymundo/2nd Weight Sex Delivery Anes PTL Lv   1 Term 21 39w0d / 00:10 2875 g (6 lb 5 4 oz) M Vag-Spont EPI N CRISELDA       Baby complications/comments:  Last estimated fetal weight 2020 8 percentile, 5 lb 14 oz, abdominal circumference percentile 15 , femur longitude less than 5 percentile, normal Dopplers  In addition there is evidence of fetal abdominal circumference lag  Review of Systems   Constitutional: Negative  HENT: Negative  Eyes: Negative  Respiratory: Negative  Cardiovascular: Negative  Gastrointestinal: Negative  Endocrine: Negative  Genitourinary: Negative  Musculoskeletal: Negative  Allergic/Immunologic: Negative  Neurological: Negative  Hematological: Negative  Psychiatric/Behavioral: Negative  Historical Information   Past Medical History:   Diagnosis Date    Anemia     Clostridium difficile infection     Hyperglycemia     Hypokalemia     Hypothyroidism 12/2019    IBS (irritable bowel syndrome)     IUD (intrauterine device) in place     Kidney injury     [facility] [address]     Type 2 diabetes mellitus ([facility] [address] ) 12/2019    Vitamin D deficiency      Past Surgical History:   Procedure Laterality Date    CHOLECYSTECTOMY      [place] COLONOSCOPY FLX DX W/COLLJ SPEC WHEN PFRMD N/A 1/10/2017    Procedure: EGD AND COLONOSCOPY;  Surgeon: Jennyfer Hannah MD;  Location: [facility]; Service: Gastroenterology    WISDOM TOOTH EXTRACTION       Social History   Social History     Substance and Sexual Activity   Alcohol Use Not Currently    Frequency: Monthly or less    Drinks per session: 1 or 2     Social History     Substance and Sexual Activity   Drug Use No     Social History     Tobacco Use   Smoking Status Never Smoker   Smokeless Tobacco Never Used     Family History: non-contributory    Meds/Allergies      No medications prior to admission  Allergies   Allergen Reactions    Bactrim [Sulfamethoxazole-Trimethoprim] Hives    Dilaudid [Hydromorphone Hcl] Itching       OBJECTIVE:    Vitals: Blood pressure 122/72, pulse 73, temperature 97 8 °F (36 6 °C), resp  rate 18, height 5' 4" (1 626 m), weight 98 kg (216 lb), last menstrual period 04/10/2020, SpO2 100 %, currently breastfeeding  Body mass index is 37 08 kg/m²  Physical Exam  Constitutional:       Appearance: She is well-developed  HENT:      Head: Normocephalic and atraumatic  Eyes:      Conjunctiva/sclera: Conjunctivae normal       Pupils: Pupils are equal, round, and reactive to light  Neck:      Musculoskeletal: Normal range of motion and neck supple     Cardiovascular:      Rate and Rhythm: Normal rate and regular rhythm  Heart sounds: Normal heart sounds  Pulmonary:      Effort: Pulmonary effort is normal       Breath sounds: Normal breath sounds  Abdominal:      General: Bowel sounds are normal       Palpations: Abdomen is soft  Genitourinary:     Vagina: Normal    Musculoskeletal: Normal range of motion  Skin:     General: Skin is warm  Neurological:      Mental Status: She is alert and oriented to person, place, and time         Cervix:  Cervical Dilation: 10  Dilation Complete Date: 01/08/21  Dilation Complete Time: 7096  Cervical Effacement: 100  Cervical Consistency: Soft  Fetal Station: 0  Presentation: Vertex  Position: Unknown  Method: Manual  OB Examiner: Tomas         Fetal heart rate: 135, moderate variability positive accelerations no decelerations  Baseline Rate: 120 bpm  Variability: Moderate 6-25 bpm  Accelerations: 15 x 15 or greater  Decelerations: (!) Variable, Late  FHR Category: Category I    P[place]et: uterine irritability  Contraction Frequency (minutes): 1 5-2 5  Contraction Duration (seconds):   Contraction Quality: Moderate    EFW: 6    GBS:  Positive    Prenatal Labs:   Blood Type:   Lab Results   Component Value Date/Time    ABO Grouping A 01/06/2021 10:20 PM     , D (Rh type):   Lab Results   Component Value Date/Time    Rh Factor Positive 01/06/2021 10:20 PM     , , HCT/HGB:   Lab Results   Component Value Date/Time    Hematocrit 34 8 01/06/2021 09:48 PM    Hemoglobin 11 6 01/06/2021 09:48 PM      , MCV:   Lab Results   Component Value Date/Time    MCV 86 01/06/2021 09:48 PM      , Platelets:   Lab Results   Component Value Date/Time    Platelets 195 39/90/5117 09:48 PM      , , Rubella:   Lab Results   Component Value Date/Time    RUBELLA IGG QUANTITATION 11 2 11/30/2015 12:52 PM    Rubella IgG Quant 9 5 (L) 07/13/2020 07:22 AM        , VDRL/RPR:   Lab Results   Component Value Date/Time    RPR Non-Reactive 01/06/2021 09:48 PM      , Hep B:   Lab Results Component Value Date/Time    Hepatitis B Surface Ag Non-reactive 2020 07:22 AM     , HIV:   Lab Results   Component Value Date/Time    HIV-1/HIV-2 Ab Non-Reactive 2020 07:22 AM     , Gonorrhea:   Lab Results   Component Value Date/Time    N gonorrhoeae, DNA Probe Negative 07/10/2020 10:00 AM    N gonorrhoeae, DNA Probe N  gonorrhoeae Amplified DNA Negative 2016 08:21 AM     , Group B Strep:  Bacteriuria     Invasive Devices     Intrauterine Pressure Catheter            Intrauterine Pressure Catheter 21 1455 7 days                  Assessment/Plan     ASSESSMENT:  32yo  at 38w0d weeks gestation who is being admitted for IOL FGR  Pregnancy complicated by:  See above  SVE: /3  FHT: 135  Clinical EFW: 6 ; Vertex confirmed by U/S  GBS status:  positive  Rh:  Positive    PLAN:    - Admit  - CBC, RPR, Blood Type, fingersticks every hour  - Start with Cytotec  - GBS positive status:  PCN for prophylaxis   - Analgesia and/or epidural at patient request  - Anticipate     Discussed with Dr Juani Gaspar      This patient will be an INPATIENT  and I certify the anticipated length of stay is >2 Midnights  Weston Mackay MD  1/15/2021  12:57 PM    I discussed the patient in detail with the resident  I have reviewed the notes & assessments performed by Dr Gayle Daly, I concur with her documented findings and plan for Daily Phelan

## 2021-01-27 DIAGNOSIS — Z23 ENCOUNTER FOR IMMUNIZATION: ICD-10-CM

## 2021-01-31 ENCOUNTER — LAB (OUTPATIENT)
Dept: LAB | Age: 26
End: 2021-01-31
Payer: COMMERCIAL

## 2021-01-31 DIAGNOSIS — E03.9 HYPOTHYROIDISM, UNSPECIFIED TYPE: ICD-10-CM

## 2021-01-31 LAB
T4 FREE SERPL-MCNC: 1.03 NG/DL (ref 0.76–1.46)
TSH SERPL DL<=0.05 MIU/L-ACNC: 0.36 UIU/ML (ref 0.36–3.74)

## 2021-01-31 PROCEDURE — 84443 ASSAY THYROID STIM HORMONE: CPT

## 2021-01-31 PROCEDURE — 84439 ASSAY OF FREE THYROXINE: CPT

## 2021-01-31 PROCEDURE — 36415 COLL VENOUS BLD VENIPUNCTURE: CPT

## 2021-02-01 ENCOUNTER — TELEPHONE (OUTPATIENT)
Dept: ENDOCRINOLOGY | Facility: CLINIC | Age: 26
End: 2021-02-01

## 2021-02-01 DIAGNOSIS — E03.9 HYPOTHYROIDISM, UNSPECIFIED TYPE: ICD-10-CM

## 2021-02-01 RX ORDER — LEVOTHYROXINE SODIUM 0.07 MG/1
TABLET ORAL
Qty: 30 TABLET | Refills: 2
Start: 2021-02-01 | End: 2021-04-23

## 2021-02-01 NOTE — TELEPHONE ENCOUNTER
Responded via my chart results message  Advised her to reduce levothyroxine to 75mcg six days per week  Check TSH/Free T4 in 6 weeks  Labs ordered

## 2021-02-01 NOTE — TELEPHONE ENCOUNTER
----- Message from Ivanna Muniz sent at 1/31/2021  2:11 PM EST -----  Regarding: Test Results Question  Contact: [phone]  Hi,  I know I wa supposed to wait til 6 weeks postpartum to have my thyroid levels checked but I had a major drop in milk supply so lactation said to check my thyroid since that severely affects milk production  My son was born on January 8th  Do I need a dose adjustment?  I really want to get my milk supply back

## 2021-02-03 ENCOUNTER — TELEPHONE (OUTPATIENT)
Dept: ENDOCRINOLOGY | Facility: CLINIC | Age: 26
End: 2021-02-03

## 2021-02-03 DIAGNOSIS — E03.9 HYPOTHYROIDISM, UNSPECIFIED TYPE: Primary | ICD-10-CM

## 2021-02-03 NOTE — TELEPHONE ENCOUNTER
----- Message from Keerthi Vega PA-C sent at 2/1/2021  9:51 AM EST -----  Good morning  Congrats! !  I saw your message about the decrease in milk supply  Thyroid function is normal, so I'm not sure if it is related  Your thyroid labs are normal, but the TSH has decreased into the low normal range and you are only a few weeks post partum - so if you are taking 75mcg daily I would suggest for now to reduce to 1 tablet six days per week   Repeat TSH/Free T4 in 6 weeks and follow up after that

## 2021-02-04 ENCOUNTER — IMMUNIZATIONS (OUTPATIENT)
Dept: FAMILY MEDICINE CLINIC | Facility: HOSPITAL | Age: 26
End: 2021-02-04

## 2021-02-04 DIAGNOSIS — Z23 ENCOUNTER FOR IMMUNIZATION: Primary | ICD-10-CM

## 2021-02-04 PROCEDURE — 91301 SARS-COV-2 / COVID-19 MRNA VACCINE (MODERNA) 100 MCG: CPT

## 2021-02-04 PROCEDURE — 0011A SARS-COV-2 / COVID-19 MRNA VACCINE (MODERNA) 100 MCG: CPT

## 2021-03-04 ENCOUNTER — IMMUNIZATIONS (OUTPATIENT)
Dept: FAMILY MEDICINE CLINIC | Facility: HOSPITAL | Age: 26
End: 2021-03-04

## 2021-03-04 DIAGNOSIS — Z23 ENCOUNTER FOR IMMUNIZATION: Primary | ICD-10-CM

## 2021-03-04 PROCEDURE — 91301 SARS-COV-2 / COVID-19 MRNA VACCINE (MODERNA) 100 MCG: CPT

## 2021-03-04 PROCEDURE — 0012A SARS-COV-2 / COVID-19 MRNA VACCINE (MODERNA) 100 MCG: CPT

## 2021-03-25 DIAGNOSIS — E11.9 TYPE 2 DIABETES MELLITUS WITHOUT COMPLICATION, WITHOUT LONG-TERM CURRENT USE OF INSULIN (HCC): ICD-10-CM

## 2021-03-26 RX ORDER — METFORMIN HYDROCHLORIDE 500 MG/1
TABLET, EXTENDED RELEASE ORAL
Qty: 360 TABLET | Refills: 0 | Status: SHIPPED | OUTPATIENT
Start: 2021-03-26 | End: 2021-06-17 | Stop reason: SDUPTHER

## 2021-04-21 ENCOUNTER — APPOINTMENT (OUTPATIENT)
Dept: LAB | Age: 26
End: 2021-04-21

## 2021-04-21 ENCOUNTER — TRANSCRIBE ORDERS (OUTPATIENT)
Dept: URGENT CARE | Age: 26
End: 2021-04-21

## 2021-04-21 ENCOUNTER — APPOINTMENT (OUTPATIENT)
Dept: LAB | Age: 26
End: 2021-04-21
Payer: COMMERCIAL

## 2021-04-21 DIAGNOSIS — E03.9 HYPOTHYROIDISM, UNSPECIFIED TYPE: ICD-10-CM

## 2021-04-21 DIAGNOSIS — Z00.8 HEALTH EXAMINATION IN POPULATION SURVEYS: Primary | ICD-10-CM

## 2021-04-21 DIAGNOSIS — Z00.8 HEALTH EXAMINATION IN POPULATION SURVEYS: ICD-10-CM

## 2021-04-21 LAB
CHOLEST SERPL-MCNC: 244 MG/DL (ref 50–200)
HDLC SERPL-MCNC: 57 MG/DL
LDLC SERPL CALC-MCNC: 110 MG/DL (ref 0–100)
NONHDLC SERPL-MCNC: 187 MG/DL
T4 FREE SERPL-MCNC: 1.06 NG/DL (ref 0.76–1.46)
TRIGL SERPL-MCNC: 385 MG/DL
TSH SERPL DL<=0.05 MIU/L-ACNC: 0.85 UIU/ML (ref 0.36–3.74)

## 2021-04-21 PROCEDURE — 84439 ASSAY OF FREE THYROXINE: CPT

## 2021-04-21 PROCEDURE — 84443 ASSAY THYROID STIM HORMONE: CPT

## 2021-04-21 PROCEDURE — 36415 COLL VENOUS BLD VENIPUNCTURE: CPT

## 2021-04-21 PROCEDURE — 80061 LIPID PANEL: CPT

## 2021-04-23 DIAGNOSIS — E03.9 HYPOTHYROIDISM, UNSPECIFIED TYPE: ICD-10-CM

## 2021-04-23 RX ORDER — LEVOTHYROXINE SODIUM 0.07 MG/1
TABLET ORAL
Qty: 60 TABLET | Refills: 0 | Status: SHIPPED | OUTPATIENT
Start: 2021-04-23 | End: 2021-07-22 | Stop reason: SDUPTHER

## 2021-04-23 NOTE — TELEPHONE ENCOUNTER
----- Message from Kori Ge sent at 4/23/2021 10:12 AM EDT -----  Regarding: Prescription Question  Contact: [phone]  Hi! I tried to refil my thyroid medication and it did not have refills  [facility] Thank you!

## 2021-06-17 DIAGNOSIS — E11.9 TYPE 2 DIABETES MELLITUS WITHOUT COMPLICATION, WITHOUT LONG-TERM CURRENT USE OF INSULIN (HCC): ICD-10-CM

## 2021-06-17 RX ORDER — METFORMIN HYDROCHLORIDE 500 MG/1
2000 TABLET, EXTENDED RELEASE ORAL DAILY
Qty: 360 TABLET | Refills: 0 | Status: SHIPPED | OUTPATIENT
Start: 2021-06-17 | End: 2021-09-17 | Stop reason: SDUPTHER

## 2021-06-17 NOTE — TELEPHONE ENCOUNTER
----- Message from Mellisa De Leon sent at 6/17/2021  9:34 AM EDT -----  Regarding: Prescription Question  Contact: [phone]  Hello,   Could you please add refills for my metformin? I'm getting low and it has 0  Could you please send it to the pharmacy at [facility]     Thank you

## 2021-07-22 ENCOUNTER — OFFICE VISIT (OUTPATIENT)
Dept: ENDOCRINOLOGY | Facility: CLINIC | Age: 26
End: 2021-07-22
Payer: COMMERCIAL

## 2021-07-22 VITALS
SYSTOLIC BLOOD PRESSURE: 126 MMHG | HEART RATE: 93 BPM | DIASTOLIC BLOOD PRESSURE: 70 MMHG | WEIGHT: 219 LBS | BODY MASS INDEX: 37.39 KG/M2 | HEIGHT: 64 IN

## 2021-07-22 DIAGNOSIS — E03.9 HYPOTHYROIDISM, UNSPECIFIED TYPE: ICD-10-CM

## 2021-07-22 DIAGNOSIS — E11.9 TYPE 2 DIABETES MELLITUS WITH HEMOGLOBIN A1C GOAL OF LESS THAN 7.0% (HCC): ICD-10-CM

## 2021-07-22 DIAGNOSIS — E55.9 VITAMIN D DEFICIENCY: ICD-10-CM

## 2021-07-22 DIAGNOSIS — E78.5 HYPERLIPIDEMIA, UNSPECIFIED HYPERLIPIDEMIA TYPE: ICD-10-CM

## 2021-07-22 DIAGNOSIS — E03.2 HYPOTHYROIDISM DUE TO NON-MEDICATION EXOGENOUS SUBSTANCES: ICD-10-CM

## 2021-07-22 DIAGNOSIS — E11.9 TYPE 2 DIABETES MELLITUS WITHOUT COMPLICATION, WITHOUT LONG-TERM CURRENT USE OF INSULIN (HCC): Primary | ICD-10-CM

## 2021-07-22 PROBLEM — O24.113 PRE-EXISTING TYPE 2 DIABETES MELLITUS IN PREGNANCY IN THIRD TRIMESTER: Status: RESOLVED | Noted: 2020-06-03 | Resolved: 2021-07-22

## 2021-07-22 PROBLEM — O36.5930 POOR FETAL GROWTH AFFECTING MANAGEMENT OF MOTHER IN THIRD TRIMESTER: Status: RESOLVED | Noted: 2020-12-30 | Resolved: 2021-07-22

## 2021-07-22 PROBLEM — E11.65 TYPE 2 DIABETES MELLITUS WITH HYPERGLYCEMIA, WITHOUT LONG-TERM CURRENT USE OF INSULIN (HCC): Status: RESOLVED | Noted: 2019-12-10 | Resolved: 2021-07-22

## 2021-07-22 PROBLEM — Z3A.38 38 WEEKS GESTATION OF PREGNANCY: Status: RESOLVED | Noted: 2020-06-03 | Resolved: 2021-07-22

## 2021-07-22 PROBLEM — O99.213 OBESITY AFFECTING PREGNANCY IN THIRD TRIMESTER: Status: RESOLVED | Noted: 2020-06-03 | Resolved: 2021-07-22

## 2021-07-22 LAB — SL AMB POCT HEMOGLOBIN AIC: 5.9 (ref ?–6.5)

## 2021-07-22 PROCEDURE — 83036 HEMOGLOBIN GLYCOSYLATED A1C: CPT | Performed by: PHYSICIAN ASSISTANT

## 2021-07-22 PROCEDURE — 99214 OFFICE O/P EST MOD 30 MIN: CPT | Performed by: PHYSICIAN ASSISTANT

## 2021-07-22 RX ORDER — LEVOTHYROXINE SODIUM 0.07 MG/1
TABLET ORAL
Qty: 90 TABLET | Refills: 1 | Status: SHIPPED | OUTPATIENT
Start: 2021-07-22 | End: 2021-11-23 | Stop reason: SDUPTHER

## 2021-07-22 NOTE — ASSESSMENT & PLAN NOTE
Control is excellent  Continue Metformin and Lifestyle modification  She can monitor blood sugars periodically and let us know if she is having high blood sugar readings between visits  She will discuss plans for future pregnancy at next visit     Lab Results   Component Value Date    HGBA1C 5 9 07/22/2021

## 2021-07-22 NOTE — PROGRESS NOTES
Established Patient Progress Note      Chief Complaint   Patient presents with    Diabetes Type 2    Hypothyroidism        History of Present Illness:   Jennifer Rios is a 31 y o  female with a history of type 3 diabetes without long term use of insulin since 2016  Reports complications of none  Denies recent illness or hospitalizations  Denies recent severe hypoglycemic or severe hyperglycemic episodes  Denies any issues with her current regimen  home glucose monitoring: are not performed  She is currently 6 months post partum  She followed with  center throughout pregnancy and was treated with insulin and  used Dexcom G6  She did require insulin prior to pregnancy but has been on metformin alone since delivery  After delivery her blood sugars were good so she stopped checking  She did great with her diet during pregnancy and has been making healthy choices and walking but trying not to be too restrictive as she is breastfeeding and doesn't want her supply to drop  Current regimen:   Metformin ER 1000mg twice per day    Last Eye Exam: 2020- retinal Blink in [place], Eyes were not dilated due to pregnancy but exam showed no retinopathy  Last Foot Exam: UTD    Has hyperlipidemia: Taking No med- would like labs repeated because labs were taken just after delivery    Thyroid disorders: hypothyroidism, taking levothyroxine daily and properly  Feels well but does have fatigue       Patient Active Problem List   Diagnosis    Irritable bowel syndrome with diarrhea    Type 2 diabetes mellitus without complication, without long-term current use of insulin ([facility])    Vitamin D deficiency    Hypothyroidism    Type 2 diabetes mellitus ([address] )    BMI 37 0-37 9, adult     (spontaneous vaginal delivery)      Past Medical History:   Diagnosis Date    Anemia     Clostridium difficile infection     Hyperglycemia     Hypokalemia     Hypothyroidism 2019    IBS (irritable bowel syndrome)     IUD (intrauterine device) in place     Kidney injury     L2 vertebral fracture [facility] [address] [facility]     Type 2 diabetes mellitus ([facility] [address] ) 12/2019    Vitamin D deficiency       Past Surgical History:   Procedure Laterality Date    CHOLECYSTECTOMY      [place] COLONOSCOPY FLX DX W/COLLJ SPEC WHEN PFRMD N/A 1/10/2017    Procedure: EGD AND COLONOSCOPY;  Surgeon: Dago Wilkins MD;  Location: [facility];   Service: Gastroenterology    WISDOM TOOTH EXTRACTION        Family History   Problem Relation Age of Onset    Diabetes Other     Thyroid disease Other     Diabetes type II Mother     No Known Problems Father     Diabetes Paternal Grandfather     Hypothyroidism Paternal Grandfather      Social History     Tobacco Use    Smoking status: Never Smoker    Smokeless tobacco: Never Used   Substance Use Topics    Alcohol use: Not Currently     Allergies   Allergen Reactions    Bactrim [Sulfamethoxazole-Trimethoprim] Hives    Dilaudid [Hydromorphone Hcl] Itching         Current Outpatient Medications:     albuterol (PROVENTIL HFA,VENTOLIN HFA) 90 mcg/act inhaler, Inhale 2 puffs every 6 (six) hours as needed for wheezing (Patient taking differently: Inhale 2 puffs every 6 (six) hours as needed for wheezing PRN), Disp: 1 Inhaler, Rfl: 0    Alcohol Swabs 70 % PADS, Use to cleanse injection site tid, Disp: 120 each, Rfl: 5    Blood Glucose Monitoring Suppl (ONETOUCH VERIO) w/Device KIT, Disp 1 meter, Disp: 1 kit, Rfl: 1    Cholecalciferol (VITAMIN D) 50 MCG (2000 UT) tablet, Take 2,000 Units by mouth daily, Disp: , Rfl:     fluticasone (FLONASE) 50 mcg/act nasal spray, 1 spray into each nostril as needed, Disp: , Rfl:     levothyroxine 75 mcg tablet, TAKE ONE TABLET BY MOUTH DAILY, Disp: 90 tablet, Rfl: 1    loratadine (CLARITIN) 10 mg tablet, Take 10 mg by mouth as needed, Disp: , Rfl:     metFORMIN (GLUCOPHAGE-XR) 500 mg 24 hr tablet, Take 4 tablets (2,000 mg total) by mouth daily (with food), Disp: 360 tablet, Rfl: 0    Prenatal Vit-Iron Carbonyl-FA (PRENATAL MULTIVITAMIN) TABS, Take 1 tablet by mouth daily, Disp: , Rfl:     Review of Systems   Constitutional: Positive for fatigue  Negative for activity change, appetite change, chills, diaphoresis, fever and unexpected weight change  HENT: Negative for trouble swallowing and voice change  Eyes: Negative for visual disturbance  Respiratory: Negative for shortness of breath  Cardiovascular: Negative for chest pain and palpitations  Gastrointestinal: Negative for abdominal pain, constipation and diarrhea  Endocrine: Negative for cold intolerance, heat intolerance, polydipsia, polyphagia and polyuria  Genitourinary: Positive for menstrual problem  Negative for frequency  Pelvic pain: Amenorrhea, breastfeeding  Musculoskeletal: Negative for arthralgias and myalgias  Skin: Negative for rash  Allergic/Immunologic: Negative for food allergies  Neurological: Negative for dizziness and tremors  Hematological: Negative for adenopathy  Psychiatric/Behavioral: Negative for sleep disturbance  All other systems reviewed and are negative  Physical Exam:  Body mass index is 37 59 kg/m²  /70 (BP Location: Left arm, Patient Position: Sitting)   Pulse 93   Ht 5' 4" (1 626 m)   Wt 99 3 kg (219 lb)   BMI 37 59 kg/m²    Wt Readings from Last 3 Encounters:   07/22/21 99 3 kg (219 lb)   01/06/21 98 kg (216 lb)   01/01/21 98 kg (216 lb)       Physical Exam  Vitals reviewed  Constitutional:       General: She is not in acute distress  Appearance: She is well-developed  HENT:      Head: Normocephalic and atraumatic  Eyes:      Conjunctiva/sclera: Conjunctivae normal       Pupils: Pupils are equal, round, and reactive to light  Neck:      Thyroid: No thyromegaly  Cardiovascular:      Rate and Rhythm: Normal rate and regular rhythm        Pulses: no weak pulses          Dorsalis pedis pulses are 2+ on the right side and 2+ on the left side  Posterior tibial pulses are 2+ on the right side and 2+ on the left side  Heart sounds: Normal heart sounds  Pulmonary:      Effort: Pulmonary effort is normal  No respiratory distress  Breath sounds: Normal breath sounds  No wheezing or rales  Abdominal:      General: Bowel sounds are normal  There is no distension  Palpations: Abdomen is soft  Tenderness: There is no abdominal tenderness  Musculoskeletal:         General: Normal range of motion  Cervical back: Normal range of motion and neck supple  Feet:      Right foot:      Skin integrity: No ulcer, skin breakdown, erythema, warmth, callus or dry skin  Left foot:      Skin integrity: No ulcer, skin breakdown, erythema, warmth, callus or dry skin  Skin:     General: Skin is warm and dry  Neurological:      Mental Status: She is alert and oriented to person, place, and time  Patient's shoes and socks removed  Right Foot/Ankle   Right Foot Inspection  Skin Exam: skin normal and skin intact no dry skin, no warmth, no callus, no erythema, no maceration, no abnormal color, no pre-ulcer, no ulcer and no callus                          Toe Exam: ROM and strength within normal limitsno swelling, no tenderness, erythema and  no right toe deformity  Sensory       Monofilament testing: intact  Vascular  Capillary refills: < 3 seconds  The right DP pulse is 2+  The right PT pulse is 2+  Left Foot/Ankle  Left Foot Inspection  Skin Exam: skin normal and skin intactno dry skin, no warmth, no erythema, no maceration, normal color, no pre-ulcer, no ulcer and no callus                         Toe Exam: ROM and strength within normal limitsno swelling, no tenderness, no erythema and no left toe deformity                   Sensory       Monofilament: intact  Vascular  Capillary refills: < 3 seconds  The left DP pulse is 2+  The left PT pulse is 2+  Assign Risk Category:  No deformity present;  No loss of protective sensation; No weak pulses       Risk: 0      Labs:   Lab Results   Component Value Date    HGBA1C 5 9 07/22/2021    HGBA1C 5 1 11/17/2020    HGBA1C 4 9 10/05/2020     Lab Results   Component Value Date    CREATININE 0 59 (L) 02/10/2020    CREATININE 0 62 11/12/2019    CREATININE 0 69 03/23/2017    BUN 12 02/10/2020    K 3 6 02/10/2020     02/10/2020    CO2 26 02/10/2020     eGFR   Date Value Ref Range Status   02/10/2020 128 ml/min/1 73sq m Final   11/20/2016 >60 0 ml/min/1 73sq m Final     Lab Results   Component Value Date    HDL 57 04/21/2021    TRIG 385 (H) 04/21/2021     Lab Results   Component Value Date    ALT 32 02/10/2020    AST 13 02/10/2020    ALKPHOS 61 02/10/2020     Lab Results   Component Value Date    EBN1GAWHFHOM 0 847 04/21/2021    TXU9MFIGBATB 0 361 01/31/2021    CTL6OEGCHFJP 1 620 11/17/2020     Lab Results   Component Value Date    FREET4 1 06 04/21/2021       Impression & Plan:    Problem List Items Addressed This Visit        Endocrine    Type 2 diabetes mellitus without complication, without long-term current use of insulin ([address] ) - Primary     Control is excellent  Continue Metformin and Lifestyle modification  She can monitor blood sugars periodically and let us know if she is having high blood sugar readings between visits  She will discuss plans for future pregnancy at next visit  Lab Results   Component Value Date    HGBA1C 5 9 07/22/2021            Relevant Orders    POCT hemoglobin A1c (Completed)    Comprehensive metabolic panel    Hypothyroidism     Continue levothyroxine  Check TSH/Free T4  Relevant Medications    levothyroxine 75 mcg tablet    Other Relevant Orders    TSH, 3rd generation    T4, free    Type 2 diabetes mellitus ([address] )    Relevant Orders    POCT hemoglobin A1c (Completed)    Comprehensive metabolic panel       Other    Vitamin D deficiency     Continue supplement check Vitamin D level            Relevant Orders    Vitamin D 25 hydroxy    BMI 37 0-37 9, adult     Focus on lifestyle modification and weight loss  Other Visit Diagnoses     Hyperlipidemia, unspecified hyperlipidemia type        Relevant Orders    Lipid Panel with Direct LDL reflex          Orders Placed This Encounter   Procedures    Comprehensive metabolic panel     This is a patient instruction: Patient fasting for 8 hours or longer recommended  Standing Status:   Future     Standing Expiration Date:   1/22/2022    Lipid Panel with Direct LDL reflex     This is a patient instruction: This test requires patient fasting for 10-12 hours or longer  Drinking of black coffee or black tea is acceptable  Standing Status:   Future     Standing Expiration Date:   1/22/2022    Microalbumin / creatinine urine ratio     Standing Status:   Future     Standing Expiration Date:   1/22/2022    TSH, 3rd generation     This is a patient instruction: This test is non-fasting  Please drink two glasses of water morning of bloodwork  Standing Status:   Future     Standing Expiration Date:   1/22/2022    T4, free     Standing Status:   Future     Standing Expiration Date:   1/22/2022    Vitamin D 25 hydroxy     Standing Status:   Future     Standing Expiration Date:   1/22/2022    POCT hemoglobin A1c       There are no Patient Instructions on file for this visit  Discussed with the patient and all questioned fully answered  She will call me if any problems arise  Follow-up appointment in 6 months       Counseled patient on diagnostic results, prognosis, risk and benefit of treatment options, instruction for management, importance of treatment compliance, Risk  factor reduction and impressions    Dillon Davidson PA-C

## 2021-07-27 ENCOUNTER — TELEPHONE (OUTPATIENT)
Dept: ADMINISTRATIVE | Facility: OTHER | Age: 26
End: 2021-07-27

## 2021-07-27 NOTE — LETTER
Diabetic Eye Exam Form    Date Requested: 21  Patient: Armando Phelan  Patient : 1995   Referring Provider: CLAUDINE Taylor    Dilated Retinal Exam, Optomap-Iris Exam, or Fundus Photography Done         Yes ([place] one above)         No     Date of Diabetic Eye Exam ________________________per patient: ______  Left Eye      Exam did show retinopathy    Exam did not show retinopathy         Mild       Moderate       None       Proliferative       Severe     Right Eye     Exam did show retinopathy    Exam did not show retinopathy         Mild       Moderate       None       Proliferative       Severe     Comments __________________________________________________________    Practice Providing Exam ______________________________________________    Exam Performed By (print name) _______________________________________      Provider Signature ___________________________________________________      These reports are needed for  compliance    Please fax this completed form and a copy of the Diabetic Eye Exam report to our office located at [place] [address] as soon as possible to [phone] [email or web address] Peres: Phone [phone]    We thank you for your assistance in treating our mutual patient

## 2021-07-27 NOTE — LETTER
Diabetic Eye Exam Form    Date Requested: 21  Patient: Farhan Morel  Patient : 1995   Referring Provider: CLAUDINE Rodriguez    Dilated Retinal Exam, Optomap-Iris Exam, or Fundus Photography Done         Yes ([place] one above)         No     Date of Diabetic Eye Exam ______________________________  Left Eye      Exam did show retinopathy    Exam did not show retinopathy         Mild       Moderate       None       Proliferative       Severe     Right Eye     Exam did show retinopathy    Exam did not show retinopathy         Mild       Moderate       None       Proliferative       Severe     Comments __________________________________________________________    Practice Providing Exam ______________________________________________    Exam Performed By (print name) _______________________________________      Provider Signature ___________________________________________________      These reports are needed for  compliance    Please fax this completed form and a copy of the Diabetic Eye Exam report to our office located at [place] [address] as soon as possible to [phone] [email or web address] Malone: Phone [phone]    We thank you for your assistance in treating our mutual patient

## 2021-07-27 NOTE — TELEPHONE ENCOUNTER
Upon review of the In Basket request and the patient's chart, initial outreach has been made via fax, please see Contacts section for details        [phone] ([phone]    Called also , but got their recording "Busy with other customers"    Thank you  Jeff Rousseau

## 2021-07-27 NOTE — TELEPHONE ENCOUNTER
----- Message from Shoaib Alvarez sent at 7/22/2021 11:44 AM EDT -----  Patient stated she had her eye exam in June 2021  [address] [place] [address] Road  [phone]    Please update chart  Thank you

## 2021-08-12 NOTE — TELEPHONE ENCOUNTER
As a follow-up, a second attempt has been made for outreach via fax, please see Contacts section for details      Thank you  Afsaneh Conway MA

## 2021-08-23 NOTE — TELEPHONE ENCOUNTER
As a final attempt, a third outreach has been made via telephone call  Please see Contacts section for details  This encounter will be closed and completed by end of day  Should we receive the requested information because of previous outreach attempts, the requested patient's chart will be updated appropriately     Closed on Sundays and Mondays, on calendar to call tomorrow  8-24-21    Thank you  Jose M Wan MA

## 2021-08-24 NOTE — TELEPHONE ENCOUNTER
As a final attempt, a third outreach has been made via telephone call  Please see Contacts section for details  This encounter will be closed and completed by end of day  Should we receive the requested information because of previous outreach attempts, the requested patient's chart will be updated appropriately  Left a message 3rd and final attempt        Thank you  Angelique Deleon MA

## 2021-08-25 ENCOUNTER — APPOINTMENT (OUTPATIENT)
Dept: LAB | Age: 26
End: 2021-08-25
Payer: COMMERCIAL

## 2021-08-25 DIAGNOSIS — R53.83 FATIGUE, UNSPECIFIED TYPE: Primary | ICD-10-CM

## 2021-08-25 DIAGNOSIS — E11.9 TYPE 2 DIABETES MELLITUS WITH HEMOGLOBIN A1C GOAL OF LESS THAN 7.0% (HCC): ICD-10-CM

## 2021-08-25 DIAGNOSIS — R53.83 FATIGUE, UNSPECIFIED TYPE: ICD-10-CM

## 2021-08-25 DIAGNOSIS — E78.5 HYPERLIPIDEMIA, UNSPECIFIED HYPERLIPIDEMIA TYPE: ICD-10-CM

## 2021-08-25 DIAGNOSIS — E11.9 TYPE 2 DIABETES MELLITUS WITHOUT COMPLICATION, WITHOUT LONG-TERM CURRENT USE OF INSULIN (HCC): ICD-10-CM

## 2021-08-25 DIAGNOSIS — E03.2 HYPOTHYROIDISM DUE TO NON-MEDICATION EXOGENOUS SUBSTANCES: ICD-10-CM

## 2021-08-25 DIAGNOSIS — E55.9 VITAMIN D DEFICIENCY: ICD-10-CM

## 2021-08-25 LAB
25(OH)D3 SERPL-MCNC: 42.3 NG/ML (ref 30–100)
ALBUMIN SERPL BCP-MCNC: 4.1 G/DL (ref 3.5–5)
ALP SERPL-CCNC: 81 U/L (ref 46–116)
ALT SERPL W P-5'-P-CCNC: 61 U/L (ref 12–78)
ANION GAP SERPL CALCULATED.3IONS-SCNC: 6 MMOL/L (ref 4–13)
AST SERPL W P-5'-P-CCNC: 28 U/L (ref 5–45)
BASOPHILS # BLD AUTO: 0.02 THOUSANDS/ΜL (ref 0–0.1)
BASOPHILS NFR BLD AUTO: 0 % (ref 0–1)
BILIRUB SERPL-MCNC: 0.52 MG/DL (ref 0.2–1)
BUN SERPL-MCNC: 11 MG/DL (ref 5–25)
CALCIUM SERPL-MCNC: 9.4 MG/DL (ref 8.3–10.1)
CHLORIDE SERPL-SCNC: 103 MMOL/L (ref 100–108)
CHOLEST SERPL-MCNC: 213 MG/DL (ref 50–200)
CO2 SERPL-SCNC: 27 MMOL/L (ref 21–32)
CREAT SERPL-MCNC: 0.62 MG/DL (ref 0.6–1.3)
CREAT UR-MCNC: 218 MG/DL
EOSINOPHIL # BLD AUTO: 0.14 THOUSAND/ΜL (ref 0–0.61)
EOSINOPHIL NFR BLD AUTO: 3 % (ref 0–6)
ERYTHROCYTE [DISTWIDTH] IN BLOOD BY AUTOMATED COUNT: 13.9 % (ref 11.6–15.1)
FERRITIN SERPL-MCNC: 24 NG/ML (ref 8–388)
GFR SERPL CREATININE-BSD FRML MDRD: 124 ML/MIN/1.73SQ M
GLUCOSE P FAST SERPL-MCNC: 118 MG/DL (ref 65–99)
HCT VFR BLD AUTO: 38.7 % (ref 34.8–46.1)
HDLC SERPL-MCNC: 54 MG/DL
HGB BLD-MCNC: 12.7 G/DL (ref 11.5–15.4)
IMM GRANULOCYTES # BLD AUTO: 0.02 THOUSAND/UL (ref 0–0.2)
IMM GRANULOCYTES NFR BLD AUTO: 0 % (ref 0–2)
LDLC SERPL CALC-MCNC: 119 MG/DL (ref 0–100)
LYMPHOCYTES # BLD AUTO: 1.63 THOUSANDS/ΜL (ref 0.6–4.47)
LYMPHOCYTES NFR BLD AUTO: 32 % (ref 14–44)
MCH RBC QN AUTO: 27.4 PG (ref 26.8–34.3)
MCHC RBC AUTO-ENTMCNC: 32.8 G/DL (ref 31.4–37.4)
MCV RBC AUTO: 83 FL (ref 82–98)
MICROALBUMIN UR-MCNC: 11 MG/L (ref 0–20)
MICROALBUMIN/CREAT 24H UR: 5 MG/G CREATININE (ref 0–30)
MONOCYTES # BLD AUTO: 0.44 THOUSAND/ΜL (ref 0.17–1.22)
MONOCYTES NFR BLD AUTO: 9 % (ref 4–12)
NEUTROPHILS # BLD AUTO: 2.93 THOUSANDS/ΜL (ref 1.85–7.62)
NEUTS SEG NFR BLD AUTO: 56 % (ref 43–75)
NRBC BLD AUTO-RTO: 0 /100 WBCS
PLATELET # BLD AUTO: 286 THOUSANDS/UL (ref 149–390)
PMV BLD AUTO: 10.4 FL (ref 8.9–12.7)
POTASSIUM SERPL-SCNC: 3.7 MMOL/L (ref 3.5–5.3)
PROT SERPL-MCNC: 7.6 G/DL (ref 6.4–8.2)
RBC # BLD AUTO: 4.64 MILLION/UL (ref 3.81–5.12)
SODIUM SERPL-SCNC: 136 MMOL/L (ref 136–145)
T4 FREE SERPL-MCNC: 1.05 NG/DL (ref 0.76–1.46)
TRIGL SERPL-MCNC: 199 MG/DL
TSH SERPL DL<=0.05 MIU/L-ACNC: 1.26 UIU/ML (ref 0.36–3.74)
WBC # BLD AUTO: 5.18 THOUSAND/UL (ref 4.31–10.16)

## 2021-08-25 PROCEDURE — 80061 LIPID PANEL: CPT

## 2021-08-25 PROCEDURE — 82570 ASSAY OF URINE CREATININE: CPT

## 2021-08-25 PROCEDURE — 82306 VITAMIN D 25 HYDROXY: CPT

## 2021-08-25 PROCEDURE — 82728 ASSAY OF FERRITIN: CPT

## 2021-08-25 PROCEDURE — 80053 COMPREHEN METABOLIC PANEL: CPT

## 2021-08-25 PROCEDURE — 36415 COLL VENOUS BLD VENIPUNCTURE: CPT

## 2021-08-25 PROCEDURE — 84439 ASSAY OF FREE THYROXINE: CPT

## 2021-08-25 PROCEDURE — 82043 UR ALBUMIN QUANTITATIVE: CPT

## 2021-08-25 PROCEDURE — 85025 COMPLETE CBC W/AUTO DIFF WBC: CPT

## 2021-08-25 PROCEDURE — 84443 ASSAY THYROID STIM HORMONE: CPT

## 2021-09-01 NOTE — TELEPHONE ENCOUNTER
Upon review of the In Basket request we No response after 3 attempts    Any additional questions or concerns should be emailed to the Practice Liaisons via [email or web address] email, please do not reply via In Basket      Thank you  Angelique Deleon MA

## 2021-09-17 DIAGNOSIS — E11.9 TYPE 2 DIABETES MELLITUS WITHOUT COMPLICATION, WITHOUT LONG-TERM CURRENT USE OF INSULIN (HCC): ICD-10-CM

## 2021-09-17 RX ORDER — METFORMIN HYDROCHLORIDE 500 MG/1
2000 TABLET, EXTENDED RELEASE ORAL DAILY
Qty: 360 TABLET | Refills: 0 | Status: SHIPPED | OUTPATIENT
Start: 2021-09-17 | End: 2021-12-20 | Stop reason: SDUPTHER

## 2021-09-17 NOTE — TELEPHONE ENCOUNTER
Good morning,   Could you please send a script for my metformin to the pharmacy at [facility]? I do not have any refills on it    Thank you,  Rosalva Simpson

## 2021-11-09 ENCOUNTER — IMMUNIZATIONS (OUTPATIENT)
Dept: FAMILY MEDICINE CLINIC | Facility: HOSPITAL | Age: 26
End: 2021-11-09

## 2021-11-09 DIAGNOSIS — Z23 ENCOUNTER FOR IMMUNIZATION: Primary | ICD-10-CM

## 2021-11-09 PROCEDURE — 91306 COVID-19 MODERNA VACC 0.25 ML BOOSTER: CPT

## 2021-11-09 PROCEDURE — 0013A COVID-19 MODERNA VACC 0.25 ML BOOSTER: CPT

## 2021-11-23 DIAGNOSIS — E03.9 HYPOTHYROIDISM, UNSPECIFIED TYPE: ICD-10-CM

## 2021-11-23 RX ORDER — LEVOTHYROXINE SODIUM 0.07 MG/1
TABLET ORAL
Qty: 90 TABLET | Refills: 1 | Status: SHIPPED | OUTPATIENT
Start: 2021-11-23 | End: 2022-03-09 | Stop reason: SDUPTHER

## 2021-12-20 DIAGNOSIS — E11.9 TYPE 2 DIABETES MELLITUS WITHOUT COMPLICATION, WITHOUT LONG-TERM CURRENT USE OF INSULIN (HCC): ICD-10-CM

## 2021-12-20 RX ORDER — METFORMIN HYDROCHLORIDE 500 MG/1
2000 TABLET, EXTENDED RELEASE ORAL DAILY
Qty: 360 TABLET | Refills: 0 | Status: SHIPPED | OUTPATIENT
Start: 2021-12-20 | End: 2022-03-09 | Stop reason: SDUPTHER

## 2022-01-07 ENCOUNTER — TELEPHONE (OUTPATIENT)
Dept: FAMILY MEDICINE CLINIC | Facility: CLINIC | Age: 27
End: 2022-01-07

## 2022-01-07 ENCOUNTER — CLINICAL SUPPORT (OUTPATIENT)
Dept: FAMILY MEDICINE CLINIC | Facility: CLINIC | Age: 27
End: 2022-01-07

## 2022-01-07 DIAGNOSIS — J02.9 SORE THROAT: Primary | ICD-10-CM

## 2022-01-07 LAB
SARS-COV-2 AG UPPER RESP QL IA: POSITIVE
VALID CONTROL: ABNORMAL

## 2022-01-07 NOTE — TELEPHONE ENCOUNTER
Patient scheduled for virtual today, I checked her in and saw she made payment online for her copay  The visit turned out to just be a nurse visit with a poct rapid covid test  I deleted the virtual visit out completely and put visit in as a nurse visit with the poct rapid covid test  I let patient know what happened and that this was new for us today and she was our first rapid done in the office  Let patient know that billing will handle any refund  Patient understood

## 2022-01-07 NOTE — TELEPHONE ENCOUNTER
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

## 2022-01-07 NOTE — TELEPHONE ENCOUNTER
Talked with pt  She started 2 days ago with congestion, sore throat and slight headache    Fully vax and with booster     Works weekend program  Will come to office for antigen testing   If + will not need home test since she works weekends only

## 2022-03-07 PROCEDURE — G0145 SCR C/V CYTO,THINLAYER,RESCR: HCPCS | Performed by: OBSTETRICS & GYNECOLOGY

## 2022-03-08 ENCOUNTER — LAB REQUISITION (OUTPATIENT)
Dept: LAB | Facility: HOSPITAL | Age: 27
End: 2022-03-08
Payer: COMMERCIAL

## 2022-03-08 DIAGNOSIS — Z12.4 ENCOUNTER FOR SCREENING FOR MALIGNANT NEOPLASM OF CERVIX: ICD-10-CM

## 2022-03-09 DIAGNOSIS — E03.9 HYPOTHYROIDISM, UNSPECIFIED TYPE: ICD-10-CM

## 2022-03-09 DIAGNOSIS — E11.9 TYPE 2 DIABETES MELLITUS WITHOUT COMPLICATION, WITHOUT LONG-TERM CURRENT USE OF INSULIN (HCC): ICD-10-CM

## 2022-03-09 RX ORDER — LEVOTHYROXINE SODIUM 0.07 MG/1
TABLET ORAL
Qty: 90 TABLET | Refills: 0 | Status: SHIPPED | OUTPATIENT
Start: 2022-03-09 | End: 2022-06-07 | Stop reason: SDUPTHER

## 2022-03-09 RX ORDER — METFORMIN HYDROCHLORIDE 500 MG/1
2000 TABLET, EXTENDED RELEASE ORAL DAILY
Qty: 360 TABLET | Refills: 0 | Status: SHIPPED | OUTPATIENT
Start: 2022-03-09 | End: 2022-06-07 | Stop reason: SDUPTHER

## 2022-03-09 NOTE — TELEPHONE ENCOUNTER
Kelli Verdugo  to Alfredo Schroeder MD          3/9/22 9:08 AM  Good morning,   Could you please put in refills for my metformin and levothyroxine   I use [facility] in [facility]   Thank you

## 2022-03-13 LAB
LAB AP GYN PRIMARY INTERPRETATION: NORMAL
Lab: NORMAL

## 2022-05-04 ENCOUNTER — TELEPHONE (OUTPATIENT)
Dept: OTHER | Facility: OTHER | Age: 27
End: 2022-05-04

## 2022-05-05 NOTE — TELEPHONE ENCOUNTER
Patient called and canceled her appointment because she is not feeling well and is asking if the office can give her back a call to reschedule her appointment

## 2022-06-07 DIAGNOSIS — E03.9 HYPOTHYROIDISM, UNSPECIFIED TYPE: ICD-10-CM

## 2022-06-07 DIAGNOSIS — E11.9 TYPE 2 DIABETES MELLITUS WITHOUT COMPLICATION, WITHOUT LONG-TERM CURRENT USE OF INSULIN (HCC): ICD-10-CM

## 2022-06-07 NOTE — TELEPHONE ENCOUNTER
Needs refill on levothyroxine and metformin called into [facility]    Last seen 7- and fu 9-7-2022  thankyou   Needs a 90day supply

## 2022-06-08 RX ORDER — METFORMIN HYDROCHLORIDE 500 MG/1
2000 TABLET, EXTENDED RELEASE ORAL DAILY
Qty: 360 TABLET | Refills: 0 | Status: SHIPPED | OUTPATIENT
Start: 2022-06-08 | End: 2022-09-06

## 2022-06-08 RX ORDER — LEVOTHYROXINE SODIUM 0.07 MG/1
75 TABLET ORAL
Qty: 90 TABLET | Refills: 0 | Status: SHIPPED | OUTPATIENT
Start: 2022-06-08 | End: 2022-09-06

## 2022-07-20 ENCOUNTER — TELEPHONE (OUTPATIENT)
Dept: ENDOCRINOLOGY | Facility: CLINIC | Age: 27
End: 2022-07-20

## 2022-07-20 NOTE — TELEPHONE ENCOUNTER
Patient called and said her fasting blood sugars have been 111,129,120 in that range she is trying to get pregnant, she said last time you had put her on lantus insulin want to know if that can be started again

## 2022-07-20 NOTE — TELEPHONE ENCOUNTER
Spoke with pt and told her the message she stated that her perinatology wont set up a apt till she is pregnant she is seeing you in august, she said she needs a referral to see them I told her to call her primary care dr yung Wallace do referrals

## 2022-07-20 NOTE — TELEPHONE ENCOUNTER
Her last labs are from a year back - she should set up appointment with perinatology before trying to get pregnant so she is optimized

## 2022-08-03 ENCOUNTER — OFFICE VISIT (OUTPATIENT)
Dept: DERMATOLOGY | Facility: CLINIC | Age: 27
End: 2022-08-03
Payer: COMMERCIAL

## 2022-08-03 VITALS — HEIGHT: 64 IN | WEIGHT: 209 LBS | BODY MASS INDEX: 35.68 KG/M2 | TEMPERATURE: 98 F

## 2022-08-03 DIAGNOSIS — L02.91 ABSCESS: Primary | ICD-10-CM

## 2022-08-03 PROCEDURE — 87186 SC STD MICRODIL/AGAR DIL: CPT | Performed by: STUDENT IN AN ORGANIZED HEALTH CARE EDUCATION/TRAINING PROGRAM

## 2022-08-03 PROCEDURE — 87205 SMEAR GRAM STAIN: CPT | Performed by: STUDENT IN AN ORGANIZED HEALTH CARE EDUCATION/TRAINING PROGRAM

## 2022-08-03 PROCEDURE — 10060 I&D ABSCESS SIMPLE/SINGLE: CPT | Performed by: STUDENT IN AN ORGANIZED HEALTH CARE EDUCATION/TRAINING PROGRAM

## 2022-08-03 PROCEDURE — 99213 OFFICE O/P EST LOW 20 MIN: CPT | Performed by: STUDENT IN AN ORGANIZED HEALTH CARE EDUCATION/TRAINING PROGRAM

## 2022-08-03 PROCEDURE — 87077 CULTURE AEROBIC IDENTIFY: CPT | Performed by: STUDENT IN AN ORGANIZED HEALTH CARE EDUCATION/TRAINING PROGRAM

## 2022-08-03 PROCEDURE — 87070 CULTURE OTHR SPECIMN AEROBIC: CPT | Performed by: STUDENT IN AN ORGANIZED HEALTH CARE EDUCATION/TRAINING PROGRAM

## 2022-08-03 RX ORDER — CEPHALEXIN 500 MG/1
500 CAPSULE ORAL EVERY 12 HOURS SCHEDULED
Qty: 20 CAPSULE | Refills: 0 | Status: SHIPPED | OUTPATIENT
Start: 2022-08-03 | End: 2022-08-13

## 2022-08-03 NOTE — PROGRESS NOTES
[facility] Follow Up Note    Patient Name: Alexis Yusuf  Encounter Date: 8/3/2022    Today's Chief Concerns:  Mayte Segovia Concern #1:  Mass on right underarm      Current Medications:    Current Outpatient Medications:     albuterol (PROVENTIL HFA,VENTOLIN HFA) 90 mcg/act inhaler, Inhale 2 puffs every 6 (six) hours as needed for wheezing (Patient taking differently: Inhale 2 puffs every 6 (six) hours as needed for wheezing PRN), Disp: 1 Inhaler, Rfl: 0    Alcohol Swabs 70 % PADS, Use to cleanse injection site tid, Disp: 120 each, Rfl: 5    Blood Glucose Monitoring Suppl (ONETOUCH VERIO) w/Device KIT, Disp 1 meter, Disp: 1 kit, Rfl: 1    Cholecalciferol (VITAMIN D) 50 MCG (2000 UT) tablet, Take 2,000 Units by mouth daily, Disp: , Rfl:     fluticasone (FLONASE) 50 mcg/act nasal spray, 1 spray into each nostril as needed, Disp: , Rfl:     levothyroxine 75 mcg tablet, Take 1 tablet (75 mcg total) by mouth daily in the early morning, Disp: 90 tablet, Rfl: 0    loratadine (CLARITIN) 10 mg tablet, Take 10 mg by mouth as needed, Disp: , Rfl:     metFORMIN (GLUCOPHAGE-XR) 500 mg 24 hr tablet, Take 4 tablets (2,000 mg total) by mouth daily (with food), Disp: 360 tablet, Rfl: 0    Prenatal Vit-Iron Carbonyl-FA (PRENATAL MULTIVITAMIN) TABS, Take 1 tablet by mouth daily, Disp: , Rfl:     CONSTITUTIONAL:   Vitals:    08/03/22 0815   Temp: 98 °F (36 7 °C)   TempSrc: Temporal   Weight: 94 8 kg (209 lb)   Height: 5' 4" (1 626 m)         Specific Alerts:    Have you been seen by a [facility] Dermatologist in the last 3 years? No    Are you pregnant or planning to become pregnant? No    Are you currently or planning to be nursing or breast feeding? YES    Allergies   Allergen Reactions    Bactrim [Sulfamethoxazole-Trimethoprim] Hives    Dilaudid [Hydromorphone Hcl] Itching       May we call your Preferred Phone number to discuss your specific medical information?  YES    May we leave a detailed message that includes your specific medical information? YES    Have you traveled outside of the [facility] in the past 3 months? No    Do you currently have a pacemaker or defibrillator? No    Do you have any artificial heart valves, joints, plates, screws, rods, stents, pins, etc? No   - If Yes, were any placed within the last 2 years? Do you require any medications prior to a surgical procedure? No       Are you taking any medications that cause you to bleed more easily ("blood thinners") No    Have you ever experienced a rapid heartbeat with epinephrine? No    Have you ever been treated with "gold" (gold sodium thiomalate) therapy? No      Review of Systems:  Have you recently had or currently have any of the following? · Fever or chills: No  · Night Sweats: No  · Headaches: No  · Weight Gain: No  · Weight Loss: No  · Blurry Vision: No  · Nausea: No  · Vomiting: No  · Diarrhea: No  · Blood in Stool: No  · Abdominal Pain: No  · Itchy Skin: No  · Painful Joints: No  · Swollen Joints: No  · Muscle Pain: No  · Irregular Mole: No  · Sun Burn: No  · Dry Skin: No  · Skin Color Changes: No  · Scar or Keloid: No  · Cold Sores/Fever Blisters: No  · Bacterial Infections/MRSA: No  · Anxiety: No  · Depression: No  · Suicidal or Homicidal Thoughts: No      PSYCH: Normal mood and affect  EYES: Normal conjunctiva  ENT: Normal lips and oral mucosa  CARDIOVASCULAR: No edema  RESPIRATORY: Normal respirations  HEME/LYMPH/IMMUNO:  No ostensible subQ swelling except as noted below in ASSESSMENT AND PLAN BY DIAGNOSIS    FOCUSED ORGAN SYSTEM SKIN EXAM (SKIN)   Right axilla, face Normal except as noted below in Assessment       Abscess  Physical Exam:   Anatomic Location Affected:  Right armpit   Morphological Description:  2x2 cm fluctuant nodule   Pertinent Positives:   Pertinent Negatives: Additional History of Present Condition:  Started about a month ago  Painful, draining, not improving    Bacterial culture taken  We will call with culture results   Keflex 500 mg 2x/day with food for 10 days (pt breastfeeding)   Incision and drainage performed after written consent    PROCEDURE:  INCISION AND DRAINAGE     Attending: eRno Jimenes  Assistant: Anyi Fan    Pre-Op Diagnosis: Abscess  Post-Op Diagnosis: Same   Benign versus Malignant Benign      Lesion Anatomic Location: Right armpit  Pre-op size: 2 x2 cm          Written and verbal, witnessed informed consent was obtained  I discussed that excision is a method of removing lesions both benign and malignant lesions  A portion of normal skin is often taken to ensure completeness of removal   I reviewed that procedure will include numbing the area,  cutting around and under defect, undermining tissue, and closing the wound with sutures both inside and out  These sutures are usually removed in 7 to 14 days  Risks (bleeding, pain, infection, scarring, recurrence) and benefits discussed  It was discussed with patient that every effort is made to minimize scar, but scarring is influenced also by extrinsic factor such as location, age and genetics  Time Out: performed:  yes  Correct patient: yes  Correct site per Clinic Report: yes  Correct site per Patient Report: yes    LOCAL ANESTHESIA: 1% xylocaine with epi     DESCRIPTION OF PROCEDURE: The patient was brought back into the procedure room, anesthetized locally, prepped and draped in the usual fashion  Using a #11 blade the lesion was incised to drain  Bleeding was minimal and stopped with pressure  The wound was closed with pressure dressing          Estimated blood loss less than 1ml  The patient tolerated the procedure well without any complications  A pressure dressing was applied for stabilization and light pressure  The patient was given detailed oral and written instructions on postoperative care as detailed in consent  The patient left in good medical condition      POSTOP DISCUSSION DISCUSSION AND INSTRUCTIONS FOR PATIENT          Risks and Potential Complications    - Some bleeding is normal at time of procedure and some bleeding on gauze is normal  the first few days after surgery  Profuse bleeding and bleeding with swelling and pain should be reported as detailed  below  - Infection is uncommon in skin surgery  Infection should be reported and is indicated by pain, redness, and discharge of purulent material   - Some dull to at time sharp pain could occur initially the day after surgery  Persistent pain or increasing pain days after surgery is not expected and should be reported  - Every effort is made to minimize scar, but location, size, and genetics do play a role in scar appearance  A surgical wound does not achieve its optimal appearance until 6 months  There are several treatments available if scarring would be problematic including scar creams, silicone pad, laser and scar revision   - Skin discoloration can occur especially in people of color  Its important to avoid sun on wound in first 6 months after surgery  Treatment is available if pigment is problematic   - Incomplete removal of the lesion or recurrence of lesion can occur and this would then require further treatment and more surgery   - Nerve Damage/Numbness/Loss of Function is very rare, but is most likely to occur if lesion is large or if it is in a high risk location  - Allergic Reaction to lidocaine is rare  More commonly,  epinephrine is used  with the lidocaine  Occasionally, epinephrine (aka adrenalin) may cause a brief  feeling of anxiety or jitteriness  - The person at the microscope  (pathologist) may provide additional information that was unexpected  This unexpected finding could provoke the need for additional treatment or evaluation  What You Will Need to Do After the Procedure  1  Keep the area clean and dry the first day  Try NOT to remove the bandage for the first day    2  Gently clean the area with soap and water and apply Vaseline ointment (this is over the counter and not a prescription) to the excision  site for up to 2 weeks  3  Apply a clean appropriately sized bandage to area  Gauze and paper tape are recommended for sensitive skin  4  Return for suture removal as instructed (generally 1 week for the face, 2 weeks for the body)  5  Take Acetaminophen (Tylenol) for discomfort, if no contraindications  Do NOT take Ibuprofen or aspirin unless specifically told to do so by your Dermatologist because these medications can make bleeding worse  6  Call our office immediately for signs of infection: fever, chills, increased redness, warmth, tenderness, discomfort/pain, or pus or foul smell coming from the wound  If bleeding is noticed, place a clean cloth over the area and apply firm pressure for thirty minutes  Check the wound ONLY after 30 minutes of direct pressure; do not cheat and sneak a peak, as that does not count  If bleeding persists after 30 minutes of legitimate direct pressure, then try one more round of direct pressure for an additional 10 minutes to the area  Should the bleeding become heavier or not stop after the second attempt, call [facility] directly at [phone] (SKIN) or, if after hours, go to your local Emergency Room/Emergency Department      Scribe Attestation    I,:  Malaika Fonseca am acting as a scribe while in the presence of the attending physician :       I,:  Cyndi Elizondo MD personally performed the services described in this documentation    as scribed in my presence :

## 2022-08-06 LAB
BACTERIA WND AEROBE CULT: ABNORMAL
BACTERIA WND AEROBE CULT: ABNORMAL
GRAM STN SPEC: ABNORMAL

## 2022-08-08 ENCOUNTER — OFFICE VISIT (OUTPATIENT)
Dept: FAMILY MEDICINE CLINIC | Facility: CLINIC | Age: 27
End: 2022-08-08
Payer: COMMERCIAL

## 2022-08-08 VITALS
WEIGHT: 210.3 LBS | HEART RATE: 87 BPM | HEIGHT: 64 IN | TEMPERATURE: 97.5 F | RESPIRATION RATE: 16 BRPM | SYSTOLIC BLOOD PRESSURE: 134 MMHG | DIASTOLIC BLOOD PRESSURE: 78 MMHG | BODY MASS INDEX: 35.9 KG/M2 | OXYGEN SATURATION: 99 %

## 2022-08-08 DIAGNOSIS — O24.311 PRE-EXISTING DIABETES MELLITUS DURING PREGNANCY IN FIRST TRIMESTER: Primary | ICD-10-CM

## 2022-08-08 PROCEDURE — 99214 OFFICE O/P EST MOD 30 MIN: CPT | Performed by: NURSE PRACTITIONER

## 2022-08-08 RX ORDER — INSULIN GLARGINE 100 [IU]/ML
10 INJECTION, SOLUTION SUBCUTANEOUS DAILY
Qty: 3 ML | Refills: 2 | Status: SHIPPED | OUTPATIENT
Start: 2022-08-08 | End: 2022-08-17 | Stop reason: CLARIF

## 2022-08-08 NOTE — PROGRESS NOTES
Subjective:   Chief Complaint   Patient presents with    Diabetes        Patient ID: Alexis Yusuf is a 28 y o  female  Patient presents today for concerns of her diabetes and current pregnancy  Patient states that she is about 5 weeks pregnant but is starting to see a rise in her blood sugars  She is currently followed by endocrinology, but cannot get an appointment until September  She is also being seen my mother/fetal medicine but they told her they cannot get her an appointment for another 2 weeks and instructed that she should follow up with her primary care provider to be started on lantus  Patient states that her morning fasting blood sugars have ranged from 110-130 and post-meal blood sugars range between 120-150  Patient was instructed to start taking lantus 10 units daily and increase by 2 units after 5 days if she is not seeing a decrease in her blood sugars  The following portions of the patient's history were reviewed and updated as appropriate: allergies, current medications, past family history, past medical history, past social history, past surgical history and problem list     Review of Systems   Constitutional: Negative for chills, fatigue and fever  Respiratory: Negative for chest tightness and shortness of breath  Cardiovascular: Negative for chest pain and palpitations  Psychiatric/Behavioral: Negative for dysphoric mood  The patient is not nervous/anxious  Objective:  Vitals:    08/08/22 0801   BP: 134/78   BP Location: Left arm   Patient Position: Sitting   Cuff Size: Adult   Pulse: 87   Resp: 16   Temp: 97 5 °F (36 4 °C)   SpO2: 99%   Weight: 95 4 kg (210 lb 4 8 oz)   Height: 5' 4" (1 626 m)      Physical Exam  Constitutional:       Appearance: Normal appearance  She is obese  Cardiovascular:      Rate and Rhythm: Normal rate and regular rhythm  Pulses: Normal pulses  Heart sounds: Normal heart sounds     Pulmonary:      Effort: Pulmonary effort is normal       Breath sounds: Normal breath sounds  Skin:     General: Skin is warm and dry  Capillary Refill: Capillary refill takes less than 2 seconds  Neurological:      Mental Status: She is alert  Psychiatric:         Mood and Affect: Mood normal          Behavior: Behavior normal            Assessment/Plan:    No problem-specific Assessment & Plan notes found for this encounter         Diagnoses and all orders for this visit:    Pre-existing diabetes mellitus during pregnancy in first trimester  -     Insulin Glargine Solostar (Lantus SoloStar) 100 UNIT/ML SOPN; Inject 10 Units under the skin daily

## 2022-08-08 NOTE — RESULT ENCOUNTER NOTE
Edward Vega: please inform patient that culture did not grow any concerning organisms  Also, please ask how she is doing after drainage and after being on antibiotics  Thank you

## 2022-08-14 ENCOUNTER — APPOINTMENT (OUTPATIENT)
Dept: LAB | Age: 27
End: 2022-08-14
Payer: COMMERCIAL

## 2022-08-14 DIAGNOSIS — O24.111 PRE-EXISTING TYPE 2 DIABETES MELLITUS IN PREGNANCY IN FIRST TRIMESTER: ICD-10-CM

## 2022-08-14 DIAGNOSIS — E03.9 ACQUIRED HYPOTHYROIDISM: ICD-10-CM

## 2022-08-14 DIAGNOSIS — Z32.01 POSITIVE URINE PREGNANCY TEST: ICD-10-CM

## 2022-08-14 LAB
B-HCG SERPL-ACNC: 9454 MIU/ML
EST. AVERAGE GLUCOSE BLD GHB EST-MCNC: 128 MG/DL
GLUCOSE P FAST SERPL-MCNC: 110 MG/DL (ref 65–99)
HBA1C MFR BLD: 6.1 %
T4 FREE SERPL-MCNC: 1.1 NG/DL (ref 0.76–1.46)
TSH SERPL DL<=0.05 MIU/L-ACNC: 1.13 UIU/ML (ref 0.45–4.5)

## 2022-08-14 PROCEDURE — 84702 CHORIONIC GONADOTROPIN TEST: CPT

## 2022-08-14 PROCEDURE — 84439 ASSAY OF FREE THYROXINE: CPT

## 2022-08-14 PROCEDURE — 82947 ASSAY GLUCOSE BLOOD QUANT: CPT

## 2022-08-14 PROCEDURE — 84443 ASSAY THYROID STIM HORMONE: CPT

## 2022-08-14 PROCEDURE — 36415 COLL VENOUS BLD VENIPUNCTURE: CPT

## 2022-08-14 PROCEDURE — 83036 HEMOGLOBIN GLYCOSYLATED A1C: CPT

## 2022-08-17 ENCOUNTER — TELEMEDICINE (OUTPATIENT)
Dept: PERINATAL CARE | Facility: CLINIC | Age: 27
End: 2022-08-17
Payer: COMMERCIAL

## 2022-08-17 VITALS — HEIGHT: 64 IN | WEIGHT: 210 LBS | BODY MASS INDEX: 35.85 KG/M2

## 2022-08-17 DIAGNOSIS — E03.9 HYPOTHYROIDISM, UNSPECIFIED TYPE: ICD-10-CM

## 2022-08-17 DIAGNOSIS — E11.65 PRE-EXISTING TYPE 2 DIABETES MELLITUS WITH HYPERGLYCEMIA DURING PREGNANCY IN FIRST TRIMESTER (HCC): Primary | ICD-10-CM

## 2022-08-17 DIAGNOSIS — O24.111 PRE-EXISTING TYPE 2 DIABETES MELLITUS WITH HYPERGLYCEMIA DURING PREGNANCY IN FIRST TRIMESTER (HCC): Primary | ICD-10-CM

## 2022-08-17 DIAGNOSIS — E55.9 VITAMIN D DEFICIENCY: ICD-10-CM

## 2022-08-17 DIAGNOSIS — Z3A.01 LESS THAN 8 WEEKS GESTATION OF PREGNANCY: ICD-10-CM

## 2022-08-17 PROCEDURE — 99214 OFFICE O/P EST MOD 30 MIN: CPT | Performed by: NURSE PRACTITIONER

## 2022-08-17 RX ORDER — PEN NEEDLE, DIABETIC 32GX 5/32"
NEEDLE, DISPOSABLE MISCELLANEOUS
COMMUNITY
Start: 2022-08-08

## 2022-08-17 RX ORDER — PERPHENAZINE 16 MG/1
TABLET, FILM COATED ORAL
Qty: 150 STRIP | Refills: 8 | Status: SHIPPED | OUTPATIENT
Start: 2022-08-17

## 2022-08-17 RX ORDER — BLOOD-GLUCOSE METER
EACH MISCELLANEOUS
Qty: 1 KIT | Refills: 0 | Status: SHIPPED | OUTPATIENT
Start: 2022-08-17

## 2022-08-17 RX ORDER — INSULIN GLARGINE-YFGN 100 [IU]/ML
22 INJECTION, SOLUTION SUBCUTANEOUS DAILY
COMMUNITY
Start: 2022-08-08 | End: 2022-10-11 | Stop reason: SDUPTHER

## 2022-08-17 RX ORDER — LANCETS
EACH MISCELLANEOUS
Qty: 150 EACH | Refills: 8 | Status: SHIPPED | OUTPATIENT
Start: 2022-08-17

## 2022-08-17 NOTE — PROGRESS NOTES
Virtual Regular Visit    Verification of patient location:[place]    Patient is located in the following state in which I hold an active license [place]      Assessment/Plan:    Problem List Items Addressed This Visit        Endocrine    Hypothyroidism     -On Levothyroxine   -Last TSH normal   -Followed by endocrinology  Relevant Medications    Contour Next Test test strip    Blood Glucose Monitoring Suppl (Contour Next EZ) w/Device KIT    Microlet Lancets MISC    Other Relevant Orders    Comprehensive metabolic panel    Protein / creatinine ratio, urine    Hemoglobin A1C    Pre-existing type 2 diabetes mellitus with hyperglycemia during pregnancy in first trimester ([facility] [address] ) - Primary     -A1c 6 1%; aim for less than 6% with minimal hypoglycemia   -Check baseline CMP and urine protein creatinine ratio in 1 month with repeat A1c   -Due to FBS>90; increase Semglee to 18 units daily at 5 PM    -Follow up with dietitian   -Keep 3 day food log to review with dietitian   -Self monitoring blood glucose fasting and 2 hours after start of each meal  Keep glucose log  Glucose goals: fasting 60-90 mg/dL, 140 mg/dL or less 1 hour post meals, and 120 mg/dL or less 2 hours post meal, before meals/overnight   -Report glucose readings weekly via [employer] every Wednesday    -Start GDM 1800 calorie meal plan with 3 meals and 3 snacks including recommended combination of carb, protein and fat per meal/snack   -Please eat meal or snack every 2-3 5 hours while awake   -No more than 8 to 10 hours of fasting overnight   -Stay active if no restriction from your OB, walk up to 30 minutes a day  -Always have glucose available to treat hypoglycemia  Use 15:15 rule  Refer to hypoglycemia patient education sheet   Test blood sugar when experiencing signs and symptoms of hypoglycemia and prior to driving    -Continue prenatal vitamin as recommended   -Continue follow-up with your OB and MFM as recommended   -Stay in close contact with diabetes education team   -Insulin requirements during pregnancy; basal/bolus concept and Metformin discussed  -Very important to maintain tight glucose control during pregnancy to decrease risk factors including fetal macrosomia; birth injury; risk of ; polyhydramnios; pre-term labor; pre-eclampsia;  hypoglycemia; jaundice and stillbirth  -Diabetes and pregnancy booklet; diet plan and hypoglycemia patient education  Lab Results   Component Value Date    HGBA1C 6 1 (H) 2022            Relevant Medications    Semglee, yfgn, 100 UNIT/ML SOPN    Contour Next Test test strip    Blood Glucose Monitoring Suppl (Contour Next EZ) w/Device KIT    Microlet Lancets MISC    Other Relevant Orders    Comprehensive metabolic panel    Protein / creatinine ratio, urine    Hemoglobin A1C       Other    Vitamin D deficiency     -Last D level normal; on D3 supplements and prenatal vitamin  Relevant Medications    Contour Next Test test strip    Blood Glucose Monitoring Suppl (Contour Next EZ) w/Device KIT    Microlet Lancets MISC    Other Relevant Orders    Comprehensive metabolic panel    Protein / creatinine ratio, urine    Hemoglobin A1C    BMI 36 0-36 9,adult     -Pre-pregnancy weight 209 lbs  -Current weight 210 lbs  -Recommended weight gain 11 to 20 lbs  -Follow up with dietitian   -Continue walking 30 to 40 minutes daily if no restrictions from OB            Relevant Medications    Contour Next Test test strip    Blood Glucose Monitoring Suppl (Contour Next EZ) w/Device KIT    Microlet Lancets MISC    Other Relevant Orders    Comprehensive metabolic panel    Protein / creatinine ratio, urine    Hemoglobin A1C    Less than 8 weeks gestation of pregnancy    Relevant Medications    Contour Next Test test strip    Blood Glucose Monitoring Suppl (Contour Next EZ) w/Device KIT    Microlet Lancets MISC    Other Relevant Orders    Comprehensive metabolic panel    Protein / creatinine ratio, urine    Hemoglobin A1C               Reason for visit is   Chief Complaint   Patient presents with    Virtual Regular Visit    Diabetes Type 2    Patient Education        Encounter provider Zulema Stevens [place]    Provider located at [address] [place] [id number] [phone]      Recent Visits  No visits were found meeting these conditions  Showing recent visits within past 7 days and meeting all other requirements  Today's Visits  Date Type Provider Dept   22 Telemedicine Zulema Stevens, [facility] today's visits and meeting all other requirements  Future Appointments  No visits were found meeting these conditions  Showing future appointments within next 150 days and meeting all other requirements       The patient was identified by name and date of birth  Bryan Armenta was informed that this is a telemedicine visit and that the visit is being conducted through Telephone  My office door was closed  No one else was in the room  She acknowledged consent and understanding of privacy and security of the video platform  The patient has agreed to participate and understands they can discontinue the visit at any time  Patient is aware this is a billable service  Subjective  Bryan Armenta is a 28 y o  female  6 4/7 weeks gestation T2DM on Semglee 12 units at 5 PM daily and Metformin XR 2000 mg daily  SMBG fasting and 2 hours post meal; fasting ; 2 hours post meal 115 to 136  Eating 3 meals and 3 snacks a day  Walking 30 to 40 minutes a day  Has 3 yo son Jihan  Works Fri-Sat-Sun 12 hour nights 3 weekends a month         HPI     Past Medical History:   Diagnosis Date    Anemia     Clostridium difficile infection     Hyperglycemia     Hypokalemia     Hypothyroidism 2019    IBS (irritable bowel syndrome)     IUD (intrauterine device) in place     Kidney injury     L2 [facility] fracture ([facility] )      (spontaneous vaginal delivery) 2021    Type 2 diabetes mellitus ([facility] ) 2019    Vitamin D deficiency        Past Surgical History:   Procedure Laterality Date    CHOLECYSTECTOMY      DE COLONOSCOPY FLX DX W/COLLJ SPEC WHEN PFRMD N/A 1/10/2017    Procedure: EGD AND COLONOSCOPY;  Surgeon: Reyes Che MD;  Location: [facility]; Service: Gastroenterology    WISDOM TOOTH EXTRACTION         Current Outpatient Medications   Medication Sig Dispense Refill    Blood Glucose Monitoring Suppl (Contour Next EZ) w/Device KIT Dispense 1 kit  1 kit 0    Contour Next Test test strip Test up to 6 times a day  T2DM and pregnancy  150 strip 8    Microlet Lancets MISC Use up to 6 a day  T2DM and pregnancy  150 each 8    albuterol (PROVENTIL HFA,VENTOLIN HFA) 90 mcg/act inhaler Inhale 2 puffs every 6 (six) hours as needed for wheezing (Patient taking differently: Inhale 2 puffs every 6 (six) hours as needed for wheezing PRN) 1 Inhaler 0    Alcohol Swabs 70 % PADS Use to cleanse injection site tid 120 each 5    BD Pen Needle Jennie U/F 32G X 4 MM MISC       Cholecalciferol (VITAMIN D) 50 MCG ( UT) tablet Take 2,000 Units by mouth daily      fluticasone (FLONASE) 50 mcg/act nasal spray 1 spray into each nostril as needed (Patient not taking: Reported on 2022)      levothyroxine 75 mcg tablet Take 1 tablet (75 mcg total) by mouth daily in the early morning 90 tablet 0    loratadine (CLARITIN) 10 mg tablet Take 10 mg by mouth as needed      metFORMIN (GLUCOPHAGE-XR) 500 mg 24 hr tablet Take 4 tablets (2,000 mg total) by mouth daily (with food) 360 tablet 0    Prenatal Vit-Iron Carbonyl-FA (PRENATAL MULTIVITAMIN) TABS Take 1 tablet by mouth daily      jes Reyesgn, 100 UNIT/ML SOPN Inject 12 Units under the skin in the morning       No current facility-administered medications for this visit          Allergies   Allergen Reactions    Bactrim [Sulfamethoxazole-Trimethoprim] Hives    Dilaudid [Hydromorphone Hcl] Itching       Review of Systems   Constitutional: Positive for fatigue  Negative for fever  Eyes: Negative for visual disturbance  Last eye exam less than 1 year ago  Needs eye exam     Respiratory: Negative for cough and shortness of breath  Cardiovascular: Negative for chest pain and palpitations  Gastrointestinal: Positive for nausea  Negative for constipation, diarrhea and vomiting  Endocrine: Positive for polyuria  Negative for polydipsia and polyphagia  Neurological: Negative for headaches  Psychiatric/Behavioral: Negative for sleep disturbance  Video Exam    Vitals:    08/17/22 1526   Weight: 95 3 kg (210 lb)   Height: 5' 4" (1 626 m)       Physical Exam   It was my intent to perform this visit via video technology but the patient was not able to do a video connection so the visit was completed via audio telephone only  I spent 30 minutes directly with the patient during this visit

## 2022-08-17 NOTE — ASSESSMENT & PLAN NOTE
-A1c 6 1%; aim for less than 6% with minimal hypoglycemia   -Check baseline CMP and urine protein creatinine ratio in 1 month with repeat A1c   -Due to FBS>90; increase Semglee to 18 units daily at 5 PM    -Follow up with dietitian   -Keep 3 day food log to review with dietitian   -Self monitoring blood glucose fasting and 2 hours after start of each meal  Keep glucose log  Glucose goals: fasting 60-90 mg/dL, 140 mg/dL or less 1 hour post meals, and 120 mg/dL or less 2 hours post meal, before meals/overnight   -Report glucose readings weekly via [employer] every Wednesday    -Start GDM 1800 calorie meal plan with 3 meals and 3 snacks including recommended combination of carb, protein and fat per meal/snack   -Please eat meal or snack every 2-3 5 hours while awake   -No more than 8 to 10 hours of fasting overnight   -Stay active if no restriction from your OB, walk up to 30 minutes a day  -Always have glucose available to treat hypoglycemia  Use 15:15 rule  Refer to hypoglycemia patient education sheet  Test blood sugar when experiencing signs and symptoms of hypoglycemia and prior to driving    -Continue prenatal vitamin as recommended   -Continue follow-up with your OB and MFM as recommended   -Stay in close contact with diabetes education team   -Insulin requirements during pregnancy; basal/bolus concept and Metformin discussed  -Very important to maintain tight glucose control during pregnancy to decrease risk factors including fetal macrosomia; birth injury; risk of ; polyhydramnios; pre-term labor; pre-eclampsia;  hypoglycemia; jaundice and stillbirth  -Diabetes and pregnancy booklet; diet plan and hypoglycemia patient education                  Lab Results   Component Value Date    HGBA1C 6 1 (H) 2022

## 2022-08-17 NOTE — ASSESSMENT & PLAN NOTE
-Pre-pregnancy weight 209 lbs  -Current weight 210 lbs  -Recommended weight gain 11 to 20 lbs  -Follow up with dietitian   -Continue walking 30 to 40 minutes daily if no restrictions from OB

## 2022-08-17 NOTE — PATIENT INSTRUCTIONS
-A1c 6 1%; aim for less than 6% with minimal hypoglycemia   -Check baseline CMP and urine protein creatinine ratio in 1 month with repeat A1c   -Due to FBS>90; increase Semglee to 18 units daily at 5 PM    -Follow up with dietitian   -Keep 3 day food log to review with dietitian   -Self monitoring blood glucose fasting and 2 hours after start of each meal  Keep glucose log  Glucose goals: fasting 60-90 mg/dL, 140 mg/dL or less 1 hour post meals, and 120 mg/dL or less 2 hours post meal, before meals/overnight   -Report glucose readings weekly via [employer] every Wednesday    -Start GDM 1800 calorie meal plan with 3 meals and 3 snacks including recommended combination of carb, protein and fat per meal/snack   -Please eat meal or snack every 2-3 5 hours while awake   -No more than 8 to 10 hours of fasting overnight   -Stay active if no restriction from your OB, walk up to 30 minutes a day  -Always have glucose available to treat hypoglycemia  Use 15:15 rule  Refer to hypoglycemia patient education sheet  Test blood sugar when experiencing signs and symptoms of hypoglycemia and prior to driving    -Continue prenatal vitamin as recommended   -Continue follow-up with your OB and MFM as recommended   -Stay in close contact with diabetes education team   -Insulin requirements during pregnancy; basal/bolus concept and Metformin discussed  -Very important to maintain tight glucose control during pregnancy to decrease risk factors including fetal macrosomia; birth injury; risk of ; polyhydramnios; pre-term labor; pre-eclampsia;  hypoglycemia; jaundice and stillbirth  -Diabetes and pregnancy booklet; diet plan and hypoglycemia patient education

## 2022-09-01 ENCOUNTER — TELEPHONE (OUTPATIENT)
Dept: PERINATAL CARE | Facility: CLINIC | Age: 27
End: 2022-09-01

## 2022-09-01 ENCOUNTER — TELEMEDICINE (OUTPATIENT)
Dept: PERINATAL CARE | Facility: CLINIC | Age: 27
End: 2022-09-01
Payer: COMMERCIAL

## 2022-09-01 DIAGNOSIS — O99.281 HYPOTHYROIDISM AFFECTING PREGNANCY IN FIRST TRIMESTER: ICD-10-CM

## 2022-09-01 DIAGNOSIS — Z3A.08 8 WEEKS GESTATION OF PREGNANCY: ICD-10-CM

## 2022-09-01 DIAGNOSIS — E03.9 HYPOTHYROIDISM, UNSPECIFIED TYPE: ICD-10-CM

## 2022-09-01 DIAGNOSIS — E11.65 PRE-EXISTING TYPE 2 DIABETES MELLITUS WITH HYPERGLYCEMIA DURING PREGNANCY IN FIRST TRIMESTER (HCC): Primary | ICD-10-CM

## 2022-09-01 DIAGNOSIS — E03.9 HYPOTHYROIDISM AFFECTING PREGNANCY IN FIRST TRIMESTER: ICD-10-CM

## 2022-09-01 DIAGNOSIS — E55.9 VITAMIN D DEFICIENCY: ICD-10-CM

## 2022-09-01 DIAGNOSIS — O24.111 TYPE 2 DIABETES MELLITUS AFFECTING PREGNANCY IN FIRST TRIMESTER, ANTEPARTUM: Primary | ICD-10-CM

## 2022-09-01 DIAGNOSIS — O24.111 PRE-EXISTING TYPE 2 DIABETES MELLITUS WITH HYPERGLYCEMIA DURING PREGNANCY IN FIRST TRIMESTER (HCC): Primary | ICD-10-CM

## 2022-09-01 PROCEDURE — G0108 DIAB MANAGE TRN  PER INDIV: HCPCS

## 2022-09-01 NOTE — PROGRESS NOTES
Virtual Regular Visit    Verification of patient location:    Patient is located in the following state in which I hold an active license [place]      Assessment/Plan:    Problem List Items Addressed This Visit        Endocrine    Hypothyroidism    Pre-existing type 2 diabetes mellitus with hyperglycemia during pregnancy in first trimester ([facility] [address] ) - Primary       Other    Vitamin D deficiency      Other Visit Diagnoses     8 weeks gestation of pregnancy                   Reason for visit is   Chief Complaint   Patient presents with    Diabetes Type 2    Patient Education    Virtual Regular Visit        Encounter provider Leora Jeronimo    Provider located at [address] [place] [id number] [phone]      Recent Visits  No visits were found meeting these conditions  Showing recent visits within past 7 days and meeting all other requirements  Today's Visits  Date Type Provider Dept   09/01/22 Telephone [address]   09/01/22 1401 [address]   Showing today's visits and meeting all other requirements  Future Appointments  No visits were found meeting these conditions  Showing future appointments within next 150 days and meeting all other requirements       The patient was identified by name and date of birth  Isak Lee was informed that this is a telemedicine visit and that the visit is being conducted through [facility] and patient was informed this is a secure, HIPAA-complaint platform  She agrees to proceed     My office door was closed  No one else was in the room  She acknowledged consent and understanding of privacy and security of the video platform  The patient has agreed to participate and understands they can discontinue the visit at any time  Patient is aware this is a billable service  Subjective  Isak Lee is a 28 y  o pregnant female         HPI Past Medical History:   Diagnosis Date    Anemia     Clostridium difficile infection     Hyperglycemia     Hypokalemia     Hypothyroidism 2019    IBS (irritable bowel syndrome)     IUD (intrauterine device) in place     Kidney injury     L2 vertebral fracture (HCC)      (spontaneous vaginal delivery) 2021    Type 2 diabetes mellitus ([facility] [address] ) 2019    Vitamin D deficiency        Past Surgical History:   Procedure Laterality Date    CHOLECYSTECTOMY      [place] COLONOSCOPY FLX DX W/COLLJ SPEC WHEN PFRMD N/A 1/10/2017    Procedure: EGD AND COLONOSCOPY;  Surgeon: Aidan Terry MD;  Location: [facility] GI LAB; Service: Gastroenterology    WISDOM TOOTH EXTRACTION         Current Outpatient Medications   Medication Sig Dispense Refill    Alcohol Swabs 70 % PADS Use to cleanse injection site tid 120 each 5    BD Pen Needle Jennie U/F 32G X 4 MM MISC       metFORMIN (GLUCOPHAGE-XR) 500 mg 24 hr tablet Take 4 tablets (2,000 mg total) by mouth daily (with food) 360 tablet 0    Microlet Lancets MISC Use up to 6 a day  T2DM and pregnancy  150 each 8    Prenatal Vit-Iron Carbonyl-FA (PRENATAL MULTIVITAMIN) TABS Take 1 tablet by mouth daily      Amy yfgn, 100 UNIT/ML SOPN Inject 18 Units under the skin in the morning      albuterol (PROVENTIL HFA,VENTOLIN HFA) 90 mcg/act inhaler Inhale 2 puffs every 6 (six) hours as needed for wheezing (Patient not taking: Reported on 2022) 1 Inhaler 0    Blood Glucose Monitoring Suppl (Contour Next EZ) w/Device KIT Dispense 1 kit  1 kit 0    Cholecalciferol (VITAMIN D) 50 MCG (2000 UT) tablet Take 2,000 Units by mouth daily      Contour Next Test test strip Test up to 6 times a day  T2DM and pregnancy   150 strip 8    fluticasone (FLONASE) 50 mcg/act nasal spray 1 spray into each nostril as needed (Patient not taking: Reported on 2022)      levothyroxine 75 mcg tablet Take 1 tablet (75 mcg total) by mouth daily in the early morning 90 tablet 0    loratadine (CLARITIN) 10 mg tablet Take 10 mg by mouth as needed       No current facility-administered medications for this visit  Allergies   Allergen Reactions    Bactrim [Sulfamethoxazole-Trimethoprim] Hives    Dilaudid [Hydromorphone Hcl] Itching       Review of Systems    Video Exam    There were no vitals filed for this visit  Physical Exam     I spent 40 minutes with patient today in which greater than 50% of the time was spent in counseling/coordination of care regarding pre-existing type 2 diabetes currently pregnant in the first trimester  Thank you for referring your patient to [facility] Diabetes in Pregnancy Program      Keshia Chowdhury is a  28 y o  female who presents today for Combo Class 1 and 2  Noted patient completed initial CRNP evaluation on 22  Patient is at Unknown gestation, Estimated Date of Delivery: None noted        Reviewed and updated the following from patients medical record: PMH, Problem List, Allergies, and Current Medications  Visit Diagnosis:  Pre-existing type 2 DM with hyperglycemia in pregnancy Recent pregnancy  and was managed by this program      Discussed with patient pathophysiology of Type 2 DM, untreated hyperglycemia in pregnancy and maternal fetal complications including fetal macrosomia,  hypoglycemia, polyhydramnios, increased incidence of  section,  labor, and in severe cases fetal demise and still birth   Discussed importance of blood glucose monitoring, nutrition, and medication if necessary in achieving BG goals  Additional Pregnancy Complications:  Obesity    Labs:  No results found for: [id number]    Lab Results   Component Value Date    GLUF 110 (H) 2022 HGA1c 6 1%    Medications:  Semglee recently increased from 12 to 18 units at 5 PM  Patient reported starting the increase 2 days ago  Metformin XR 2000 mg per day which patient is tolerating  Anthropometrics:  Ht Readings from Last 3 Encounters:   22 5' 4" (1 626 m)   22 5' 4" (1 626 m)   22 5' 4" (1 626 m)     Wt Readings from Last 3 Encounters:   22 95 3 kg (210 lb)   22 95 4 kg (210 lb 4 8 oz)   22 94 8 kg (209 lb)     Pre-gravid weight: 209 pounds  Pre-gravid BMI: 35 8  Weight Change: +1 pound  Weight gain recommendations: BMI (> 30) 11-20 lbs  Comments: Patient may gain up to 10-19 pounds for duration of her pregnancy  Recent Ultra Sound Results:  Noted Nuchal Transluency scheduled for 22    Blood Glucose Monitoring:   Glucose Meter: Contour Next EZ  Instructed on testing blood sugars: 6 times per day before and 2 hrs after the start of each meal    Gave instruction on site selection, skin preparation, loading strips and lancet device, meter activation, obtaining blood sample, test strip and lancet disposal and storage, and recording log book entries  Patient has good understanding of material covered and was able to test their own blood sugar in office today  Instruction for reporting blood sugar results weekly via:  Phone: [phone]   OR  My Chart (Message with image attachment, or Glucose Flowsheet)    Goal Blood Sugar Ranges:   Fastin-90 mg/dL  1 hour after the start of each meal: 140 mg/dL or less  2 hours after start of each meal: 120 mg/dL or less  Patient is familiar with Dexcom G6 from her previous pregnancy  At this point patient declined starting the CGM due to alarms but is agreeable to start if blood sugars indicate a Dexcom would be helpful in future  22  [facility] message from [facility]: -Due to FBS>90; increase Semglee to 18 units daily at 5 PM  Patient reported increasing dosage 2 days ago and today's 22 FB mg/dl  Advised to continue current dose    Patient reported overdoing potatoes on  for dinner meal       Meal Plan (daily calorie and protein needs):  Calories: 1900 calorie first trimester and 2100 calories for second and third trimester     Type of Diet:Regular  Additional Nutrition Concerns: some nausea without vomiting reported in the first trimester  Nausea is worse early morning affecting breakfast and then improves as day progresses  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, Friday, 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 3 weekends per month  When working she will usually eat her breakfast meal at 8 am and sleep until 4 pm which is her fasting blood sugar and 5 pm for lunch and dinner at midnight during work hours  Diet Review: Patient is following the meal plan very well  Sometimes has difficult time for midmorning snack due to decreased appetite and possible nausea  Meal Plan Tips:  1  Patient was provided with a meal plan including 3 meals and 3 snacks  2  Discussed appropriate amounts of CHO, PRO, and Fat at each meal and snack  3  Reviewed CHO exchange list, and portion sizes for both CHO and PRO via food models  4  Instruction on how to read a food label  5  Provided suggested meal/snack options to increase nutrition and maintain consistent meal and snack intakes  6  Instructed on how to keep a 3-day food diary to be brought to follow- up appointment  7  Encouraged  patient to eat every 2 0-3 5 hours while awake  8  Encouraged patient to go no longer than 8-10 hours fasting overnight until first meal of the day  9  Supplements discussed if needed to difficulty eating breakfast meal or sometimes snacks  Physical Activity:  Discussed benefits of physical activity to optimize blood glucose control, encouraged activity at patient is physically able  Always consult a physician prior to starting an exercise program  Recommend 20-30 minutes daily  Is patient physically active? Yes walks daily with her dog after dinner meal    Sick day Guidelines:   Patient advised that sickness will raise blood sugar and need to continue medication regimen as directed   If blood sugar is > 160 mg/dL twice in one day call doctor  Instructed on what to do when unable to consume normal meal plan  Hypoglycemia & Treatment Guidelines:  Reviewed what hypoglycemia is, signs and symptoms, and how to treat  Post-Partum Guidelines:  Completion of 75 gm CHO 2 hr gtt at 6 weeks post-partum to check for Type 2 DM diagnosis    Breastfeeding Guidelines:  Continue GDM meal plan plus additional 350-500 calories daily  Stay hydrated by drinking 8-10 (8 oz ) fluids daily  Examples of protein and carbohydrate snacks provided  Dining Out & Travel Guidelines:  Patient advised to be prepared with extra diabetes supplies, medications, and snacks, as well as sticking to the same time schedule and portions eaten at home for meals and snacks  Maternal-Fetal Testing:    Ultrasounds- growth scans every 4 weeks  NST- twice weekly starting at 32nd week GA   REBECCA- weekly starting at 32 weeks GA       Patient Stated Goal: "I will eat 3 meals and 3 snacks each day, including protein at each"    Diabetes Self Management Support Plan outside of ongoing care: Spouse/Family    Learner/s Present:Learners Present: Patient   Barriers to Learning/Change: No Barriers  Expected Compliance: very good    Date to report blood sugars: Patient to report blood sugars every week  F/U Date: Patient to contact CLAUDINE Zuniga if noting 2 hr pp elevations  Patient used basal/bolus insulin therapy with her last pregnancy  Begin Time: 2:30 PM  End Time: 3:10 PM    It was a pleasure working with them today  Please feel free to call with any questions or concerns      Bambi Robertson RD,LDN,CDE  Diabetes Educator  [facility]  Diabetes in Pregnancy Program  [address] [place] [address],[address]  [address]

## 2022-09-06 ENCOUNTER — TELEPHONE (OUTPATIENT)
Dept: OTHER | Facility: OTHER | Age: 27
End: 2022-09-06

## 2022-09-07 DIAGNOSIS — E03.8 OTHER SPECIFIED HYPOTHYROIDISM: ICD-10-CM

## 2022-09-07 DIAGNOSIS — E03.9 HYPOTHYROIDISM, UNSPECIFIED TYPE: ICD-10-CM

## 2022-09-07 DIAGNOSIS — E11.9 TYPE 2 DIABETES MELLITUS WITHOUT COMPLICATION, WITHOUT LONG-TERM CURRENT USE OF INSULIN (HCC): Primary | ICD-10-CM

## 2022-09-07 RX ORDER — LEVOTHYROXINE SODIUM 0.07 MG/1
75 TABLET ORAL
Qty: 90 TABLET | Refills: 0 | Status: SHIPPED | OUTPATIENT
Start: 2022-09-07 | End: 2022-12-06

## 2022-09-07 RX ORDER — METFORMIN HYDROCHLORIDE 500 MG/1
2000 TABLET, EXTENDED RELEASE ORAL DAILY
Qty: 360 TABLET | Refills: 0 | Status: SHIPPED | OUTPATIENT
Start: 2022-09-07 | End: 2022-12-06

## 2022-09-08 DIAGNOSIS — E03.9 HYPOTHYROIDISM, UNSPECIFIED TYPE: Primary | ICD-10-CM

## 2022-09-08 DIAGNOSIS — E55.9 VITAMIN D DEFICIENCY: ICD-10-CM

## 2022-09-09 ENCOUNTER — DOCUMENTATION (OUTPATIENT)
Dept: PERINATAL CARE | Facility: CLINIC | Age: 27
End: 2022-09-09

## 2022-09-09 ENCOUNTER — PATIENT MESSAGE (OUTPATIENT)
Dept: PERINATAL CARE | Facility: CLINIC | Age: 27
End: 2022-09-09

## 2022-09-09 NOTE — PROGRESS NOTES
Date: 09/09/22  Kevin Mitchell  1995  Estimated Date of Delivery: Undetermined so far  Unknown  OB/GYN:Pa[facility]    Diagnosis:  Pre-existing Type 2 Diabetes in pregnancy    Blood Sugar Logs Submitted via: Glucose Flowsheet         Assessment and Plan:  Semglee--Increase from 18 to 20 units at 5 PM Patient is requesting to begin meal time insulin  Metformin XR--2000 mg daily  1900 calorie  meal plan consisting of 3 meals and 3 snacks daily including protein at each  Advised patient to Limit dinner potatoes  Avoid fasting for > 10 hours overnight  Continue SMBG 4 x per day (Fasting, 2 hour after start of each meal) with a Contour Next EZ glucose meter  Walks dog after dinner nightly    Lab Work:   Lab Results   Component Value Date    HGBA1C 6 1 (H) 08/14/2022        Ultrasound:       Next Date:9/27/22 for dating scan         Diabetes Self Management Support Plan outside of ongoing care: Spouse/Family   Patient Stated Goal: "I will eat 3 meals and 3 snacks each day, including protein at each"   Goal Assessment:  On track    Date to report next: Weekly     Agatha Patterson, MS, RD, CDE  Diabetes Educator  [facility] Maternal Fetal Medicine  Diabetes in Pregnancy Program  [facility], [address] [place] [address],[address]  [address]

## 2022-09-15 ENCOUNTER — APPOINTMENT (OUTPATIENT)
Dept: LAB | Age: 27
End: 2022-09-15

## 2022-09-15 DIAGNOSIS — E11.65 PRE-EXISTING TYPE 2 DIABETES MELLITUS WITH HYPERGLYCEMIA DURING PREGNANCY IN FIRST TRIMESTER (HCC): ICD-10-CM

## 2022-09-15 DIAGNOSIS — O24.111 PRE-EXISTING TYPE 2 DIABETES MELLITUS WITH HYPERGLYCEMIA DURING PREGNANCY IN FIRST TRIMESTER (HCC): ICD-10-CM

## 2022-09-15 DIAGNOSIS — Z36.89 SCREENING FOR PREGNANCY-ASSOCIATED PLASMA PROTEIN A: ICD-10-CM

## 2022-09-15 DIAGNOSIS — E55.9 VITAMIN D DEFICIENCY: ICD-10-CM

## 2022-09-15 DIAGNOSIS — Z00.8 HEALTH EXAMINATION IN POPULATION SURVEYS: ICD-10-CM

## 2022-09-15 DIAGNOSIS — Z3A.01 LESS THAN 8 WEEKS GESTATION OF PREGNANCY: ICD-10-CM

## 2022-09-15 DIAGNOSIS — E03.9 HYPOTHYROIDISM, UNSPECIFIED TYPE: ICD-10-CM

## 2022-09-15 LAB
25(OH)D3 SERPL-MCNC: 54.3 NG/ML (ref 30–100)
ALBUMIN SERPL BCP-MCNC: 3.5 G/DL (ref 3.5–5)
ALP SERPL-CCNC: 54 U/L (ref 46–116)
ALT SERPL W P-5'-P-CCNC: 35 U/L (ref 12–78)
ANION GAP SERPL CALCULATED.3IONS-SCNC: 8 MMOL/L (ref 4–13)
AST SERPL W P-5'-P-CCNC: 12 U/L (ref 5–45)
BASOPHILS # BLD AUTO: 0.02 THOUSANDS/ΜL (ref 0–0.1)
BASOPHILS NFR BLD AUTO: 0 % (ref 0–1)
BILIRUB SERPL-MCNC: 0.62 MG/DL (ref 0.2–1)
BUN SERPL-MCNC: 8 MG/DL (ref 5–25)
CALCIUM SERPL-MCNC: 8.9 MG/DL (ref 8.3–10.1)
CHLORIDE SERPL-SCNC: 103 MMOL/L (ref 96–108)
CHOLEST SERPL-MCNC: 171 MG/DL
CO2 SERPL-SCNC: 23 MMOL/L (ref 21–32)
CREAT SERPL-MCNC: 0.52 MG/DL (ref 0.6–1.3)
CREAT UR-MCNC: 162 MG/DL
EOSINOPHIL # BLD AUTO: 0.09 THOUSAND/ΜL (ref 0–0.61)
EOSINOPHIL NFR BLD AUTO: 2 % (ref 0–6)
ERYTHROCYTE [DISTWIDTH] IN BLOOD BY AUTOMATED COUNT: 14 % (ref 11.6–15.1)
EST. AVERAGE GLUCOSE BLD GHB EST-MCNC: 114 MG/DL
GFR SERPL CREATININE-BSD FRML MDRD: 130 ML/MIN/1.73SQ M
GLUCOSE P FAST SERPL-MCNC: 93 MG/DL (ref 65–99)
HBA1C MFR BLD: 5.6 %
HBV SURFACE AG SER QL: NORMAL
HCT VFR BLD AUTO: 36 % (ref 34.8–46.1)
HCV AB SER QL: NORMAL
HDLC SERPL-MCNC: 52 MG/DL
HGB BLD-MCNC: 11.5 G/DL (ref 11.5–15.4)
IMM GRANULOCYTES # BLD AUTO: 0.02 THOUSAND/UL (ref 0–0.2)
IMM GRANULOCYTES NFR BLD AUTO: 0 % (ref 0–2)
LDLC SERPL CALC-MCNC: 98 MG/DL (ref 0–100)
LYMPHOCYTES # BLD AUTO: 1.57 THOUSANDS/ΜL (ref 0.6–4.47)
LYMPHOCYTES NFR BLD AUTO: 28 % (ref 14–44)
MCH RBC QN AUTO: 26.7 PG (ref 26.8–34.3)
MCHC RBC AUTO-ENTMCNC: 31.9 G/DL (ref 31.4–37.4)
MCV RBC AUTO: 84 FL (ref 82–98)
MONOCYTES # BLD AUTO: 0.46 THOUSAND/ΜL (ref 0.17–1.22)
MONOCYTES NFR BLD AUTO: 8 % (ref 4–12)
NEUTROPHILS # BLD AUTO: 3.38 THOUSANDS/ΜL (ref 1.85–7.62)
NEUTS SEG NFR BLD AUTO: 62 % (ref 43–75)
NONHDLC SERPL-MCNC: 119 MG/DL
NRBC BLD AUTO-RTO: 0 /100 WBCS
PLATELET # BLD AUTO: 262 THOUSANDS/UL (ref 149–390)
PMV BLD AUTO: 10.6 FL (ref 8.9–12.7)
POTASSIUM SERPL-SCNC: 3.7 MMOL/L (ref 3.5–5.3)
PROT SERPL-MCNC: 7.3 G/DL (ref 6.4–8.4)
PROT UR-MCNC: 12 MG/DL
PROT/CREAT UR: 0.07 MG/G{CREAT} (ref 0–0.1)
RBC # BLD AUTO: 4.3 MILLION/UL (ref 3.81–5.12)
RUBV IGG SERPL IA-ACNC: 11.3 IU/ML
SODIUM SERPL-SCNC: 134 MMOL/L (ref 135–147)
T4 FREE SERPL-MCNC: 1.16 NG/DL (ref 0.76–1.46)
TRIGL SERPL-MCNC: 105 MG/DL
TSH SERPL DL<=0.05 MIU/L-ACNC: 0.63 UIU/ML (ref 0.45–4.5)
WBC # BLD AUTO: 5.54 THOUSAND/UL (ref 4.31–10.16)

## 2022-09-15 PROCEDURE — 84439 ASSAY OF FREE THYROXINE: CPT

## 2022-09-15 PROCEDURE — 84156 ASSAY OF PROTEIN URINE: CPT

## 2022-09-15 PROCEDURE — 80053 COMPREHEN METABOLIC PANEL: CPT

## 2022-09-15 PROCEDURE — 82570 ASSAY OF URINE CREATININE: CPT

## 2022-09-15 PROCEDURE — 80081 OBSTETRIC PANEL INC HIV TSTG: CPT

## 2022-09-15 PROCEDURE — 86803 HEPATITIS C AB TEST: CPT

## 2022-09-15 PROCEDURE — 36415 COLL VENOUS BLD VENIPUNCTURE: CPT

## 2022-09-15 PROCEDURE — 86787 VARICELLA-ZOSTER ANTIBODY: CPT

## 2022-09-15 PROCEDURE — 83036 HEMOGLOBIN GLYCOSYLATED A1C: CPT

## 2022-09-15 PROCEDURE — 80061 LIPID PANEL: CPT

## 2022-09-15 PROCEDURE — 82306 VITAMIN D 25 HYDROXY: CPT

## 2022-09-15 PROCEDURE — 84443 ASSAY THYROID STIM HORMONE: CPT

## 2022-09-16 LAB
ABO GROUP BLD: NORMAL
HIV 1+2 AB+HIV1 P24 AG SERPL QL IA: NORMAL
RH BLD: POSITIVE
RPR SER QL: NORMAL
SPECIMEN EXPIRATION DATE: NORMAL
VZV IGG SER QL IA: ABNORMAL

## 2022-09-21 ENCOUNTER — OFFICE VISIT (OUTPATIENT)
Dept: FAMILY MEDICINE CLINIC | Facility: CLINIC | Age: 27
End: 2022-09-21
Payer: COMMERCIAL

## 2022-09-21 VITALS
RESPIRATION RATE: 18 BRPM | DIASTOLIC BLOOD PRESSURE: 76 MMHG | OXYGEN SATURATION: 99 % | SYSTOLIC BLOOD PRESSURE: 114 MMHG | WEIGHT: 203.8 LBS | HEIGHT: 64 IN | BODY MASS INDEX: 34.79 KG/M2 | HEART RATE: 73 BPM | TEMPERATURE: 97.9 F

## 2022-09-21 DIAGNOSIS — H65.03 NON-RECURRENT ACUTE SEROUS OTITIS MEDIA OF BOTH EARS: ICD-10-CM

## 2022-09-21 DIAGNOSIS — Z00.00 ANNUAL PHYSICAL EXAM: Primary | ICD-10-CM

## 2022-09-21 DIAGNOSIS — R51.9 NONINTRACTABLE HEADACHE, UNSPECIFIED CHRONICITY PATTERN, UNSPECIFIED HEADACHE TYPE: ICD-10-CM

## 2022-09-21 LAB
SARS-COV-2 AG UPPER RESP QL IA: NEGATIVE
VALID CONTROL: NORMAL

## 2022-09-21 PROCEDURE — 99395 PREV VISIT EST AGE 18-39: CPT | Performed by: NURSE PRACTITIONER

## 2022-09-21 PROCEDURE — 87811 SARS-COV-2 COVID19 W/OPTIC: CPT | Performed by: NURSE PRACTITIONER

## 2022-09-21 RX ORDER — ASPIRIN 81 MG/1
162 TABLET, CHEWABLE ORAL DAILY
COMMUNITY

## 2022-09-21 RX ORDER — AMOXICILLIN 500 MG/1
500 CAPSULE ORAL EVERY 8 HOURS SCHEDULED
Qty: 21 CAPSULE | Refills: 0 | Status: SHIPPED | OUTPATIENT
Start: 2022-09-21 | End: 2022-09-28

## 2022-09-21 NOTE — PATIENT INSTRUCTIONS

## 2022-09-21 NOTE — PROGRESS NOTES
Patient's shoes and socks removed  Right Foot/Ankle   Right Foot Inspection  Skin Exam: skin normal and skin intact  No dry skin, no warmth, no callus, no erythema, no maceration, no abnormal color, no pre-ulcer, no ulcer and no callus  Toe Exam: ROM and strength within normal limits  Sensory   Monofilament testing: intact    Vascular  The right DP pulse is 2+  The right PT pulse is 2+  Left Foot/Ankle  Left Foot Inspection  Skin Exam: skin normal and skin intact  No dry skin, no warmth, no erythema, no maceration, normal color, no pre-ulcer, no ulcer and no callus  Toe Exam: ROM and strength within normal limits  Sensory   Monofilament testing: intact    Vascular  The left DP pulse is 2+  The left PT pulse is 2+  Assign Risk Category  No deformity present  No loss of protective sensation  No weak pulses  Risk: [address] [facility]    NAME: Ivette Morocho  AGE: 28 y o  SEX: female  : 1995     DATE: 2022     Assessment and Plan:     Problem List Items Addressed This Visit    None     Visit Diagnoses     Annual physical exam    -  Primary    Nonintractable headache, unspecified chronicity pattern, unspecified headache type        Relevant Orders    POCT Rapid Covid Ag (Completed)    Non-recurrent acute serous otitis media of both ears        Relevant Medications    amoxicillin (AMOXIL) 500 mg capsule          Immunizations and preventive care screenings were discussed with patient today  Appropriate education was printed on patient's after visit summary  Counseling:  Alcohol/drug use: discussed moderation in alcohol intake, the recommendations for healthy alcohol use, and avoidance of illicit drug use  Dental Health: discussed importance of regular tooth brushing, flossing, and dental visits    Injury prevention: discussed safety/seat belts, safety helmets, smoke detectors, carbon dioxide detectors, and smoking near bedding or upholstery  Sexual health: discussed sexually transmitted diseases, partner selection, use of condoms, avoidance of unintended pregnancy, and contraceptive alternatives  · Exercise: the importance of regular exercise/physical activity was discussed  Recommend exercise 3-5 times per week for at least 30 minutes  BMI Counseling: Body mass index is 34 98 kg/m²  The BMI is above normal  Nutrition recommendations include decreasing portion sizes, encouraging healthy choices of fruits and vegetables, decreasing fast food intake, consuming healthier snacks, limiting drinks that contain sugar and moderation in carbohydrate intake  Exercise recommendations include exercising 3-5 times per week  No pharmacotherapy was ordered  Rationale for BMI follow-up plan is due to patient being overweight or obese  Depression Screening and Follow-up Plan: Patient was screened for depression during today's encounter  They screened negative with a PHQ-2 score of 0  Return in about 1 year (around 9/21/2023) for Annual physical      Chief Complaint:     Chief Complaint   Patient presents with    Headache    Annual Exam      History of Present Illness:     Adult Annual Physical   Patient here for a comprehensive physical exam  The patient reports problems - headache started Sunday and nausea and food aversions  Preg 11 1/2 weeks  Migraines past few days    Migraine broke last night    Gets auras with loss of vision and knew was coming    Has had in past  Hasn't had for years       Diet and Physical Activity  · Diet/Nutrition: well balanced diet and consuming 3-5 servings of fruits/vegetables daily  · Exercise: walking and 30-60 minutes on average        Depression Screening  PHQ-2/9 Depression Screening    Little interest or pleasure in doing things: 0 - not at all  Feeling down, depressed, or hopeless: 0 - not at all  PHQ-2 Score: 0  PHQ-2 Interpretation: Negative depression screen       General Health  · Sleep: sleeps well  · Hearing: normal - bilateral   · Vision: wears glasses  · Dental: regular dental visits  /GYN Health  · Last menstrual period: pregnancy  ·   ·      Review of Systems:     Review of Systems   Constitutional: Negative for activity change, appetite change and fatigue  HENT: Negative for congestion and rhinorrhea  Respiratory: Negative for cough  Gastrointestinal: Positive for nausea  Negative for constipation and diarrhea  Genitourinary: Negative for urgency  Neurological: Positive for headaches  Negative for dizziness, light-headedness and numbness  Psychiatric/Behavioral: The patient is not nervous/anxious  Past Medical History:     Past Medical History:   Diagnosis Date    Anemia     Clostridium difficile infection     Hyperglycemia     Hypokalemia     Hypothyroidism 2019    IBS (irritable bowel syndrome)     IUD (intrauterine device) in place     Kidney injury     L2 vertebral fracture (HCC)      (spontaneous vaginal delivery) 2021    [facility]    Vitamin D deficiency       Past Surgical History:     Past Surgical History:   Procedure Laterality Date    CHOLECYSTECTOMY      IA COLONOSCOPY FLX DX W/COLLJ SPEC WHEN PFRMD N/A 1/10/2017    Procedure: EGD AND COLONOSCOPY;  Surgeon: Skylar Jay MD;  Location: [facility];   Service: Gastroenterology    WISDOM TOOTH EXTRACTION        Social History:     Social History     Socioeconomic History    Marital status: /Civil Union     Spouse name: None    Number of children: None    Years of education: None    Highest education level: None   Occupational History    None   Tobacco Use    Smoking status: Never Smoker    Smokeless tobacco: Never Used   Vaping Use    Vaping Use: Never used   Substance and Sexual Activity    Alcohol use: Not Currently    Drug use: No    Sexual activity: Yes     Partners: Male Other Topics Concern    None   Social History Narrative    None     Social Determinants of Health     Financial Resource Strain: Not on file   Food Insecurity: Not on file   Transportation Needs: Not on file   Physical Activity: Not on file   Stress: Not on file   Social Connections: Not on file   Intimate Partner Violence: Not on file   Housing Stability: Not on file      Family History:     Family History   Problem Relation Age of Onset    Diabetes Other     Thyroid disease Other     Diabetes type II Mother     No Known Problems Father     Diabetes Paternal Grandfather     Hypothyroidism Paternal Grandfather       Current Medications:     Current Outpatient Medications   Medication Sig Dispense Refill    Alcohol Swabs 70 % PADS Use to cleanse injection site tid 120 each 5    amoxicillin (AMOXIL) 500 mg capsule Take 1 capsule (500 mg total) by mouth every 8 (eight) hours for 7 days 21 capsule 0    aspirin 81 mg chewable tablet Chew 81 mg daily      BD Pen Needle Jennie U/F 32G X 4 MM MISC       Blood Glucose Monitoring Suppl (Contour Next EZ) w/Device KIT Dispense 1 kit  1 kit 0    Cholecalciferol (VITAMIN D) 50 MCG (2000 UT) tablet Take 2,000 Units by mouth daily      Contour Next Test test strip Test up to 6 times a day  T2DM and pregnancy  150 strip 8    levothyroxine 75 mcg tablet Take 1 tablet (75 mcg total) by mouth daily in the early morning 90 tablet 0    loratadine (CLARITIN) 10 mg tablet Take 10 mg by mouth as needed      metFORMIN (GLUCOPHAGE-XR) 500 mg 24 hr tablet Take 4 tablets (2,000 mg total) by mouth daily (with food) 360 tablet 0    Microlet Lancets MISC Use up to 6 a day  T2DM and pregnancy  150 each 8    Prenatal Vit-Iron Carbonyl-FA (PRENATAL MULTIVITAMIN) TABS Take 1 tablet by mouth daily      Semglee, yfgn, 100 UNIT/ML SOPN Inject 18 Units under the skin in the morning       No current facility-administered medications for this visit  Allergies:      Allergies Allergen Reactions    Bactrim [Sulfamethoxazole-Trimethoprim] Hives    Dilaudid [Hydromorphone Hcl] Itching      Physical Exam:     /76 (BP Location: Left arm, Patient Position: Sitting, Cuff Size: Standard)   Pulse 73   Temp 97 9 °F (36 6 °C) (Temporal)   Resp 18   Ht 5' 4" (1 626 m)   Wt 92 4 kg (203 lb 12 8 oz)   LMP 07/02/2022   SpO2 99%   Breastfeeding Unknown   BMI 34 98 kg/m²     Physical Exam  Vitals and nursing note reviewed  Constitutional:       General: She is not in acute distress  Appearance: Normal appearance  She is well-developed  HENT:      Head: Normocephalic and atraumatic  Right Ear: Ear canal and external ear normal  A middle ear effusion is present  Tympanic membrane has decreased mobility  Left Ear: Ear canal and external ear normal  A middle ear effusion is present  Tympanic membrane has decreased mobility  Mouth/Throat:      Pharynx: Oropharynx is clear  Eyes:      Conjunctiva/sclera: Conjunctivae normal       Pupils: Pupils are equal, round, and reactive to light  Cardiovascular:      Rate and Rhythm: Normal rate and regular rhythm  Pulses: Normal pulses  no weak pulses          Dorsalis pedis pulses are 2+ on the right side and 2+ on the left side  Posterior tibial pulses are 2+ on the right side and 2+ on the left side  Heart sounds: Normal heart sounds  No murmur heard  Pulmonary:      Effort: Pulmonary effort is normal  No respiratory distress  Breath sounds: Normal breath sounds  Abdominal:      General: Bowel sounds are normal       Palpations: Abdomen is soft  Tenderness: There is no abdominal tenderness  Musculoskeletal:         General: Normal range of motion  Cervical back: Normal range of motion and neck supple  Feet:      Right foot:      Skin integrity: No ulcer, skin breakdown, erythema, warmth, callus or dry skin        Left foot:      Skin integrity: No ulcer, skin breakdown, erythema, warmth, callus or dry skin  Lymphadenopathy:      Cervical: Cervical adenopathy present  Skin:     General: Skin is warm and dry  Capillary Refill: Capillary refill takes less than 2 seconds  Neurological:      General: No focal deficit present  Mental Status: She is alert and oriented to person, place, and time  Psychiatric:         Mood and Affect: Mood normal          Behavior: Behavior normal          Thought Content:  Thought content normal          Judgment: Judgment normal           CLAUDINE Coleman   [address]

## 2022-09-26 ENCOUNTER — LAB REQUISITION (OUTPATIENT)
Dept: LAB | Facility: HOSPITAL | Age: 27
End: 2022-09-26
Payer: COMMERCIAL

## 2022-09-26 DIAGNOSIS — Z36.89 ENCOUNTER FOR OTHER SPECIFIED ANTENATAL SCREENING: ICD-10-CM

## 2022-09-26 PROCEDURE — 87591 N.GONORRHOEAE DNA AMP PROB: CPT | Performed by: OBSTETRICS & GYNECOLOGY

## 2022-09-27 ENCOUNTER — ROUTINE PRENATAL (OUTPATIENT)
Dept: PERINATAL CARE | Facility: OTHER | Age: 27
End: 2022-09-27
Payer: COMMERCIAL

## 2022-09-27 ENCOUNTER — APPOINTMENT (OUTPATIENT)
Dept: LAB | Facility: CLINIC | Age: 27
End: 2022-09-27
Payer: COMMERCIAL

## 2022-09-27 VITALS
HEIGHT: 64 IN | WEIGHT: 204.6 LBS | BODY MASS INDEX: 34.93 KG/M2 | DIASTOLIC BLOOD PRESSURE: 64 MMHG | SYSTOLIC BLOOD PRESSURE: 134 MMHG | HEART RATE: 91 BPM

## 2022-09-27 DIAGNOSIS — Z36.82 NUCHAL TRANSLUCENCY OF FETUS ON PRENATAL ULTRASOUND: ICD-10-CM

## 2022-09-27 DIAGNOSIS — O24.111 PREGNANCY WITH TYPE 2 DIABETES MELLITUS IN FIRST TRIMESTER: Primary | ICD-10-CM

## 2022-09-27 DIAGNOSIS — E03.9 HYPOTHYROIDISM AFFECTING PREGNANCY IN FIRST TRIMESTER: ICD-10-CM

## 2022-09-27 DIAGNOSIS — Z33.1 PREGNANT STATE, INCIDENTAL: ICD-10-CM

## 2022-09-27 DIAGNOSIS — O99.211 OBESITY DURING PREGNANCY IN FIRST TRIMESTER: ICD-10-CM

## 2022-09-27 DIAGNOSIS — Z3A.12 12 WEEKS GESTATION OF PREGNANCY: ICD-10-CM

## 2022-09-27 DIAGNOSIS — O99.281 HYPOTHYROIDISM AFFECTING PREGNANCY IN FIRST TRIMESTER: ICD-10-CM

## 2022-09-27 LAB
C TRACH DNA SPEC QL NAA+PROBE: ABNORMAL
N GONORRHOEA DNA SPEC QL NAA+PROBE: ABNORMAL

## 2022-09-27 PROCEDURE — 76813 OB US NUCHAL MEAS 1 GEST: CPT | Performed by: OBSTETRICS & GYNECOLOGY

## 2022-09-27 PROCEDURE — 99241 PR OFFICE CONSULTATION NEW/ESTAB PATIENT 15 MIN: CPT | Performed by: OBSTETRICS & GYNECOLOGY

## 2022-09-27 PROCEDURE — 76801 OB US < 14 WKS SINGLE FETUS: CPT | Performed by: OBSTETRICS & GYNECOLOGY

## 2022-09-27 PROCEDURE — 36415 COLL VENOUS BLD VENIPUNCTURE: CPT

## 2022-09-27 RX ORDER — METOCLOPRAMIDE 10 MG/1
10 TABLET ORAL EVERY 6 HOURS
COMMUNITY
Start: 2022-09-22

## 2022-09-27 RX ORDER — UREA 10 %
500 LOTION (ML) TOPICAL DAILY
COMMUNITY

## 2022-09-27 NOTE — PROGRESS NOTES
Patient chose to have [facility] Non-invasive Prenatal Screen  Patient given brochure and is aware [facility] will contact patients insurance and coordinate coverage  Patient made aware she will need to respond to text message or e-mail from [employer] within 2 business days or testing will be run through insurance  Patient informed text message will come from area code  "415"  Provided [address] [place] [address] # [phone] and web site : [email or web address]  Patient was provided with an [facility] kit to have blood work drawn at any laboratory  Test tubes labeled with patient identifiers (name and date of birth)  Order and test tubes were verified with patient  [facility] packing instructions provided inside kit for when blood is drawn  Copy of lab order scanned to Epic media  Maternal Fetal Medicine will have results in approximately 7-10 business days and will call patient or notify via [address]  Patient aware viewing lab result online will reveal fetal sex If ordered  Patient verbalized understanding of all instructions and no questions at this time

## 2022-09-27 NOTE — LETTER
2022     Sailaja EmilyYenni rooney 56  [address]    Patient: Dhruv Leyva   YOB: 1995   Date of Visit: 2022       Dear Dr Azam Hutchins: Thank you for referring Boo Forrest to me for evaluation  Below are my notes for this consultation  If you have questions, please do not hesitate to call me  I look forward to following your patient along with you  Sincerely,        Cortez Salgado MD        CC: No Recipients  Cortez Salgado MD  2022 12:14 AM  Sign when Signing Visit  OFFICE CONSULT  Referring physician: Sailaja Woodall Do  [address] [facility]      Dear Dr Azam Hutchins      Thank you for requesting a  consultation on your patient Ms  Dhruv Leyva for the following indications:  Genetic screening    History  Medications:  Amoxicillin 500 mg tid x 7 d, aspirin 162 mg daily, vitamin-D, levothyroxine 75 mcg daily, Claritin, magnesium, metformin 2000 mg daily, Reglan, prenatal vitamins, insulin ( sSemglee 20 u at 5 pm daily)  Allergies to medications: Bactrim which causes hives and Dilaudid which causes itching  Past medical history:  Type 2 diabetes since 15 based on hgba1c of 8 3 , hypothyroidism since 2019, irritable bowel syndrome, kidney injury  Past surgical history:  Cholecystectomy, wisdom tooth extraction  Past obstetrical history:   20 at 39 weeks and 0 days she had a baby boy that weighed 6 pounds 5 4 ounces vaginally  Social history:  Denies substance use  First generation family history:  Her mother has type 2 diabetes She reports a family history of hypertension    Review of her lab results showed a baseline hemoglobin A1c in early pregnancy of 6 1and a TSH of 1 13  Baseline preeclamptic labs were normal  Blood sugars show improved control with fbs 88-93 and 2hr pp largely less then 120 with some as high as 135  Ultrasound findings:  The ultrasound shows a fetus concordant with dates  The nasal bone and nuchal translucency appears normal  No malformations are seen on today's early ultrasound  There is an incidental venous Lake behind the baby  The patient was informed of the findings and counseled about the limitations of the exam in detecting all forms of fetal congenital abnormalities  She does not report any vaginal bleeding or uterine cramping or contractions  Specific counseling was provided on the following problems:  1  We discussed the options for genetic screening which include invasive testing on the fetal placenta or on fetal skin cells within the amniotic fluid and compared this to noninvasive testing which includes cell free DNA screening and the sequential screen  We reviewed the risks, the benefits and the limitations of each  In the end patient chose to complete the cell free DNA screen  2  In the first trimester, the normal range for TSH level is 0 1 to 2 5 mIU/L; this level increases to 0 2 to 3 0 mIU/L in the second trimester and 0 3 to 3 0 mIU/L in the third trimester  Her TSH levels should be rechecked with each trimester and then her meds titrated to keep her TSH in the normal range for pregnancy  Concurrent use of iron or calcium and her thyroid meds may result in decreased effectiveness of her meds  3  Future ultrasounds include a 16 week early anatomy, 20 week level 2, 24 week fetal echo and serial 3rd trimester growth scans every 4-6 weeks  4  The recommend twice weekly nonstress tests and weekly fluid scans completed locally through her OB office starting at 32 weeks  5  Delivery for the patient with optimal sugars is recommended after 39 weeks  6  Recommend she start baby aspirin 162 milligrams daily till 36 and 0 days  Future tests recommended:  1  The results of her NIPT will return in 7-10 days and her OB office will order an MSAFP screen at 16-18 weeks to screen for spina bifida       Future ultrasounds ordered today:   1  Fetal Level II ultrasound imaging is recommended at 19-20 weeks' gestation  2  An early anatomy scan at 16 weeks  3  We scheduled her fetal echo at 24 weeks        The face to face time, in addition to time spent discussing ultrasound results, was approximately 15 minutes, greater than 50% of which was spent during counseling and coordination of care    Aida Browne MD

## 2022-09-27 NOTE — PROGRESS NOTES
OFFICE CONSULT  Referring physician: Kathy Mays, Do  [facility]      Dear Dr Dany Roman      Thank you for requesting a  consultation on your patient Ms Jan Giles for the following indications:  Genetic screening    History  Medications:  Amoxicillin 500 mg tid x 7 d, aspirin 162 mg daily, vitamin-D, levothyroxine 75 mcg daily, Claritin, magnesium, metformin 2000 mg daily, Reglan, prenatal vitamins, insulin ( sSemglee 20 u at 5 pm daily)  Allergies to medications: Bactrim which causes hives and Dilaudid which causes itching  Past medical history:  Type 2 diabetes since 15 based on hgba1c of 8 3 , hypothyroidism since 2019, irritable bowel syndrome, kidney injury  Past surgical history:  Cholecystectomy, wisdom tooth extraction  Past obstetrical history:   20 at 39 weeks and 0 days she had a baby boy that weighed 6 pounds 5 4 ounces vaginally  Social history:  Denies substance use  First generation family history:  Her mother has type 2 diabetes She reports a family history of hypertension    Review of her lab results showed a baseline hemoglobin A1c in early pregnancy of 6 1and a TSH of 1 13  Baseline preeclamptic labs were normal  Blood sugars show improved control with fbs 88-93 and 2hr pp largely less then 120 with some as high as 135  Ultrasound findings: The ultrasound shows a fetus concordant with dates  The nasal bone and nuchal translucency appears normal  No malformations are seen on today's early ultrasound  There is an incidental venous Lake behind the baby  The patient was informed of the findings and counseled about the limitations of the exam in detecting all forms of fetal congenital abnormalities  She does not report any vaginal bleeding or uterine cramping or contractions  Specific counseling was provided on the following problems:  1   We discussed the options for genetic screening which include invasive testing on the fetal placenta or on fetal skin cells within the amniotic fluid and compared this to noninvasive testing which includes cell free DNA screening and the sequential screen  We reviewed the risks, the benefits and the limitations of each  In the end patient chose to complete the cell free DNA screen  2  In the first trimester, the normal range for TSH level is 0 1 to 2 5 mIU/L; this level increases to 0 2 to 3 0 mIU/L in the second trimester and 0 3 to 3 0 mIU/L in the third trimester  Her TSH levels should be rechecked with each trimester and then her meds titrated to keep her TSH in the normal range for pregnancy  Concurrent use of iron or calcium and her thyroid meds may result in decreased effectiveness of her meds  3  Future ultrasounds include a 16 week early anatomy, 20 week level 2, 24 week fetal echo and serial 3rd trimester growth scans every 4-6 weeks  4  The recommend twice weekly nonstress tests and weekly fluid scans completed locally through her OB office starting at 32 weeks  5  Delivery for the patient with optimal sugars is recommended after 39 weeks  6  Recommend she start baby aspirin 162 milligrams daily till 36 and 0 days  Future tests recommended:  1  The results of her NIPT will return in 7-10 days and her OB office will order an MSAFP screen at 16-18 weeks to screen for spina bifida  Future ultrasounds ordered today:   1  Fetal Level II ultrasound imaging is recommended at 19-20 weeks' gestation  2  An early anatomy scan at 16 weeks  3  We scheduled her fetal echo at 24 weeks        The face to face time, in addition to time spent discussing ultrasound results, was approximately 15 minutes, greater than 50% of which was spent during counseling and coordination of care    Reji Tello MD

## 2022-09-28 PROBLEM — E03.9 HYPOTHYROIDISM AFFECTING PREGNANCY IN FIRST TRIMESTER: Status: ACTIVE | Noted: 2022-09-28

## 2022-09-28 PROBLEM — O24.111 PREGNANCY WITH TYPE 2 DIABETES MELLITUS IN FIRST TRIMESTER: Status: ACTIVE | Noted: 2022-09-28

## 2022-09-28 PROBLEM — O99.211 OBESITY DURING PREGNANCY IN FIRST TRIMESTER: Status: ACTIVE | Noted: 2022-09-28

## 2022-09-28 PROBLEM — O99.281 HYPOTHYROIDISM AFFECTING PREGNANCY IN FIRST TRIMESTER: Status: ACTIVE | Noted: 2022-09-28

## 2022-10-11 DIAGNOSIS — O24.410 DIET CONTROLLED GESTATIONAL DIABETES MELLITUS (GDM), ANTEPARTUM: Primary | ICD-10-CM

## 2022-10-11 RX ORDER — INSULIN GLARGINE-YFGN 100 [IU]/ML
22 INJECTION, SOLUTION SUBCUTANEOUS DAILY
Qty: 6.6 ML | Refills: 0 | Status: SHIPPED | OUTPATIENT
Start: 2022-10-11 | End: 2022-11-10

## 2022-10-17 ENCOUNTER — TELEPHONE (OUTPATIENT)
Dept: PERINATAL CARE | Facility: CLINIC | Age: 27
End: 2022-10-17

## 2022-10-17 NOTE — TELEPHONE ENCOUNTER
Left patient a message that her MFM appointment had to be rescheduled  The new time, date and location were provided  The patient has been instructed to please call us back at [phone] with any questions or concerns

## 2022-10-24 ENCOUNTER — ROUTINE PRENATAL (OUTPATIENT)
Dept: PERINATAL CARE | Facility: CLINIC | Age: 27
End: 2022-10-24
Payer: COMMERCIAL

## 2022-10-24 VITALS
BODY MASS INDEX: 34.49 KG/M2 | WEIGHT: 202 LBS | SYSTOLIC BLOOD PRESSURE: 126 MMHG | DIASTOLIC BLOOD PRESSURE: 80 MMHG | HEART RATE: 67 BPM | HEIGHT: 64 IN

## 2022-10-24 DIAGNOSIS — O24.912 DIABETES MELLITUS AFFECTING PREGNANCY IN SECOND TRIMESTER: Primary | ICD-10-CM

## 2022-10-24 DIAGNOSIS — O99.211 OBESITY DURING PREGNANCY IN FIRST TRIMESTER: ICD-10-CM

## 2022-10-24 DIAGNOSIS — O99.281 HYPOTHYROIDISM AFFECTING PREGNANCY IN FIRST TRIMESTER: ICD-10-CM

## 2022-10-24 DIAGNOSIS — E11.65 PRE-EXISTING TYPE 2 DIABETES MELLITUS WITH HYPERGLYCEMIA DURING PREGNANCY IN SECOND TRIMESTER (HCC): Primary | ICD-10-CM

## 2022-10-24 DIAGNOSIS — Z36.3 ENCOUNTER FOR ANTENATAL SCREENING FOR MALFORMATION: ICD-10-CM

## 2022-10-24 DIAGNOSIS — E03.9 HYPOTHYROIDISM AFFECTING PREGNANCY IN FIRST TRIMESTER: ICD-10-CM

## 2022-10-24 DIAGNOSIS — Z36.86 ENCOUNTER FOR ANTENATAL SCREENING FOR CERVICAL LENGTH: ICD-10-CM

## 2022-10-24 DIAGNOSIS — O24.112 PRE-EXISTING TYPE 2 DIABETES MELLITUS WITH HYPERGLYCEMIA DURING PREGNANCY IN SECOND TRIMESTER (HCC): Primary | ICD-10-CM

## 2022-10-24 DIAGNOSIS — Z3A.16 16 WEEKS GESTATION OF PREGNANCY: ICD-10-CM

## 2022-10-24 PROCEDURE — 76815 OB US LIMITED FETUS(S): CPT | Performed by: OBSTETRICS & GYNECOLOGY

## 2022-10-24 RX ORDER — INSULIN ASPART 100 [IU]/ML
4 INJECTION, SOLUTION INTRAVENOUS; SUBCUTANEOUS
Qty: 15 ML | Refills: 0 | Status: SHIPPED | OUTPATIENT
Start: 2022-10-24

## 2022-10-24 RX ORDER — BUTALBITAL, ACETAMINOPHEN AND CAFFEINE 300; 40; 50 MG/1; MG/1; MG/1
CAPSULE ORAL
COMMUNITY
Start: 2022-09-26

## 2022-10-24 NOTE — ASSESSMENT & PLAN NOTE
-Due to 2 hours post prandials above 120; start Novolog/Humalog 4 units before lunch and dinner   -Continue Semglee 22 units at 5 PM daily   -Continue with GDM diet, SMBG and reporting via flowsheet    Lab Results   Component Value Date    HGBA1C 5 6 09/15/2022

## 2022-10-24 NOTE — PATIENT INSTRUCTIONS
Thank you for choosing us for your  care today  If you have any questions about your ultrasound or care, please do not hesitate to contact us or your primary obstetrician  Some general instructions for your pregnancy are:    Protect against coronavirus: get vaccinated - pregnant women are increased risk of severe COVID  Notify your primary care doctor if you have any symptoms  Exercise: Aim for 22 minutes per day (150 minutes per week) of regular exercise  Walking is great! Nutrition: aim for calcium-rich and iron-rich foods as well as healthy sources of protein  Learn about Preeclampsia: preeclampsia is a common, serious high blood pressure complication in pregnancy  A blood pressure of 998SACD (systolic or top number) or 29HHMV (diastolic or bottom number) is not normal and needs evaluation by your doctor  Aspirin is sometimes prescribed in early pregnancy to prevent preeclampsia in women with risk factors - ask your obstetrician if you should be on this medication  If you smoke, try to reduce how many cigarettes you smoke or try to quit completely  Do not vape  Other warning signs to watch out for in pregnancy or postpartum: chest pain, obstructed breathing or shortness of breath, seizures, thoughts of hurting yourself or your baby, bleeding, a painful or swollen leg, fever, or headache (see [facility] POST-BIRTH Warning Signs campaign)  If these happen call 911  Itching is also not normal in pregnancy and if you experience this, especially over your hands and feet, potentially worse at night, notify your doctors

## 2022-10-24 NOTE — PROGRESS NOTES
[address]: Ms Angelo Jhaveri was seen today for early anatomic screen  See ultrasound report under "OB Procedures" tab  The time spent on this established patient on the encounter date included 10 minutes previsit service time reviewing records and precharting, 10 minutes face-to-face service time counseling regarding results and coordinating care, and  4 minutes charting, totalling 24 minutes    Please don't hesitate to contact our office with any concerns or questions   -Jey Sharp

## 2022-10-25 ENCOUNTER — TELEPHONE (OUTPATIENT)
Dept: PERINATAL CARE | Facility: CLINIC | Age: 27
End: 2022-10-25

## 2022-10-26 ENCOUNTER — LAB REQUISITION (OUTPATIENT)
Dept: LAB | Facility: HOSPITAL | Age: 27
End: 2022-10-26

## 2022-10-26 DIAGNOSIS — E03.9 HYPOTHYROIDISM, UNSPECIFIED TYPE: Primary | ICD-10-CM

## 2022-10-26 DIAGNOSIS — Z36.9 ENCOUNTER FOR ANTENATAL SCREENING, UNSPECIFIED: ICD-10-CM

## 2022-10-27 LAB
C TRACH DNA SPEC QL NAA+PROBE: NEGATIVE
N GONORRHOEA DNA SPEC QL NAA+PROBE: NEGATIVE

## 2022-10-28 ENCOUNTER — APPOINTMENT (OUTPATIENT)
Dept: LAB | Age: 27
End: 2022-10-28
Payer: COMMERCIAL

## 2022-10-28 DIAGNOSIS — Z36.9 UNSPECIFIED ANTENATAL SCREENING: ICD-10-CM

## 2022-10-28 DIAGNOSIS — E03.9 HYPOTHYROIDISM, UNSPECIFIED TYPE: ICD-10-CM

## 2022-10-28 LAB
EST. AVERAGE GLUCOSE BLD GHB EST-MCNC: 108 MG/DL
HBA1C MFR BLD: 5.4 %
T4 FREE SERPL-MCNC: 1.09 NG/DL (ref 0.76–1.46)
TSH SERPL DL<=0.05 MIU/L-ACNC: 1.27 UIU/ML (ref 0.45–4.5)

## 2022-10-28 PROCEDURE — 84439 ASSAY OF FREE THYROXINE: CPT

## 2022-10-28 PROCEDURE — 83036 HEMOGLOBIN GLYCOSYLATED A1C: CPT | Performed by: NURSE PRACTITIONER

## 2022-10-28 PROCEDURE — 84443 ASSAY THYROID STIM HORMONE: CPT

## 2022-10-28 PROCEDURE — 82105 ALPHA-FETOPROTEIN SERUM: CPT

## 2022-10-28 PROCEDURE — 36415 COLL VENOUS BLD VENIPUNCTURE: CPT

## 2022-11-09 LAB
2ND TRIMESTER 4 SCREEN SERPL-IMP: NORMAL
AFP ADJ MOM SERPL: 1.15
AFP INTERP AMN-IMP: NORMAL
AFP INTERP SERPL-IMP: NORMAL
AFP INTERP SERPL-IMP: NORMAL
AFP SERPL-MCNC: 29.9 NG/ML
AGE AT DELIVERY: 28.8 YR
GA METHOD: NORMAL
GA: 16.9 WEEKS
IDDM PATIENT QL: YES
MULTIPLE PREGNANCY: NO
NEURAL TUBE DEFECT RISK FETUS: 2270 %

## 2022-11-10 DIAGNOSIS — O24.119 TYPE 2 DIABETES MELLITUS DURING PREGNANCY, ANTEPARTUM: Primary | ICD-10-CM

## 2022-11-10 DIAGNOSIS — O24.419 GESTATIONAL DIABETES MELLITUS (GDM), ANTEPARTUM, GESTATIONAL DIABETES METHOD OF CONTROL UNSPECIFIED: ICD-10-CM

## 2022-11-10 RX ORDER — PEN NEEDLE, DIABETIC 32GX 5/32"
NEEDLE, DISPOSABLE MISCELLANEOUS
Qty: 150 EACH | Refills: 3 | Status: SHIPPED | OUTPATIENT
Start: 2022-11-10

## 2022-11-21 ENCOUNTER — ROUTINE PRENATAL (OUTPATIENT)
Dept: PERINATAL CARE | Facility: CLINIC | Age: 27
End: 2022-11-21

## 2022-11-21 VITALS
WEIGHT: 210 LBS | HEART RATE: 93 BPM | BODY MASS INDEX: 35.85 KG/M2 | HEIGHT: 64 IN | SYSTOLIC BLOOD PRESSURE: 122 MMHG | DIASTOLIC BLOOD PRESSURE: 60 MMHG

## 2022-11-21 DIAGNOSIS — Z36.86 ENCOUNTER FOR ANTENATAL SCREENING FOR CERVICAL LENGTH: ICD-10-CM

## 2022-11-21 DIAGNOSIS — Z36.3 ENCOUNTER FOR ANTENATAL SCREENING FOR MALFORMATION: ICD-10-CM

## 2022-11-21 DIAGNOSIS — O24.112 PRE-EXISTING TYPE 2 DIABETES MELLITUS WITH HYPERGLYCEMIA DURING PREGNANCY IN SECOND TRIMESTER (HCC): ICD-10-CM

## 2022-11-21 DIAGNOSIS — Z3A.20 20 WEEKS GESTATION OF PREGNANCY: Primary | ICD-10-CM

## 2022-11-21 DIAGNOSIS — E11.65 PRE-EXISTING TYPE 2 DIABETES MELLITUS WITH HYPERGLYCEMIA DURING PREGNANCY IN SECOND TRIMESTER (HCC): ICD-10-CM

## 2022-11-21 DIAGNOSIS — O99.212 OBESITY AFFECTING PREGNANCY IN SECOND TRIMESTER: ICD-10-CM

## 2022-11-21 NOTE — PATIENT INSTRUCTIONS
Please communicate your blood sugars at least weekly with the diabetic educators at the [address] [place] [address] Diabetes in Pregnancy program   The telephone number is [phone]  The e-mail address is [email or web address]  If you do not hear back from the program within 48 hours of sending your blood sugars, please call CLAUDINE Navarrete at [phone]  Thank you

## 2022-11-21 NOTE — PROGRESS NOTES
[address]: Ms Latoya Morgan was seen today for anatomic survey and cervical length screening ultrasound  See ultrasound report under "OB Procedures" tab  The time spent on this established patient on the encounter date included 5 minutes previsit service time reviewing records and precharting, 10 minutes face-to-face service time counseling regarding results and coordinating care, and  5 minutes charting, totalling 20 minutes    Please don't hesitate to contact our office with any concerns or questions   -Flash Flanagan MD

## 2022-11-21 NOTE — PROGRESS NOTES
Ultrasound Probe Disinfection    A transvaginal ultrasound was performed  Prior to use, disinfection was performed with High Level Disinfection Process (Trophon)  Probe serial number B1: [id number] was used        Theodore Valdes  11/21/22  3:05 PM

## 2022-11-22 ENCOUNTER — DOCUMENTATION (OUTPATIENT)
Dept: PERINATAL CARE | Facility: CLINIC | Age: 27
End: 2022-11-22

## 2022-11-22 NOTE — PROGRESS NOTES
Date: 22  Isaiah Gardner  1995  Estimated Date of Delivery: 2023  EGA: 20w3d  OB/GYN:Pa[facility]    Diagnosis:  Pre-existing Type 2 Diabetes in pregnancy    Blood Sugar Logs Submitted via: Glucose Flowsheet         Assessment and Plan:  MD Kesha Diane CRNP; Angélica Yu in [address] today  Lunch and dinner postprandials have been high, increased lunch and dinner novolog from 4 to 6  Didn't change basal b/c she has low fastings (she worried that some fastings were 88 but she also has some in 60s-70s so didn't bump that up)     Thanks,     Freddy Reyes--Continue current dose 22 units at 5 PM   Increase Novolog increase from 4 to 6 units with lunch and dinner per Dr Howie Moeller  Metformin XR--2000 mg daily  1900 calorie  meal plan consisting of 3 meals and 3 snacks daily including protein at each  Avoid fasting for > 10 hours overnight  Continue SMBG 4 x per day (Fasting, 2 hour after start of each meal) with a Contour Next EZ glucose meter  Walks dog after dinner nightly    Lab Work:   Lab Results   Component Value Date    HGBA1C 5 4 10/28/2022        Ultrasound: Per Dr Howie Moeller       22 US: fetal growth and REBECCA normal    Fetal echo scheduled  - Initiate  testing at 32 weeks twice a week NST with weekly REBECCA  - Growth US q 4-6 weeks  - Delivery timing pending glucose control  If they remain controlled her  goal delivery timing would be 39 0/7 39 6/7           Diabetes Self Management Support Plan outside of ongoing care: Spouse/Family   Patient Stated Goal: "I will eat 3 meals and 3 snacks each day, including protein at each"   Goal Assessment:  On track    Date to report next: Weekly     Rafita Gresham RD, CDE  Diabetes Educator  [facility] Maternal Fetal Medicine  Diabetes in Pregnancy Program  [address] [place] [address],[address]  [address]

## 2022-12-12 DIAGNOSIS — O24.119 TYPE 2 DIABETES MELLITUS AFFECTING PREGNANCY, ANTEPARTUM: Primary | ICD-10-CM

## 2022-12-12 RX ORDER — INSULIN GLARGINE-YFGN 100 [IU]/ML
22 INJECTION, SOLUTION SUBCUTANEOUS DAILY
Qty: 15 ML | Refills: 0 | Status: SHIPPED | OUTPATIENT
Start: 2022-12-12

## 2022-12-23 ENCOUNTER — ULTRASOUND (OUTPATIENT)
Dept: PERINATAL CARE | Facility: CLINIC | Age: 27
End: 2022-12-23

## 2022-12-23 VITALS
SYSTOLIC BLOOD PRESSURE: 106 MMHG | DIASTOLIC BLOOD PRESSURE: 62 MMHG | HEART RATE: 77 BPM | WEIGHT: 210.8 LBS | HEIGHT: 64 IN | BODY MASS INDEX: 35.99 KG/M2

## 2022-12-23 DIAGNOSIS — O24.112 PRE-EXISTING TYPE 2 DIABETES MELLITUS WITH HYPERGLYCEMIA DURING PREGNANCY IN SECOND TRIMESTER (HCC): Primary | ICD-10-CM

## 2022-12-23 DIAGNOSIS — Z3A.24 24 WEEKS GESTATION OF PREGNANCY: ICD-10-CM

## 2022-12-23 DIAGNOSIS — E11.65 PRE-EXISTING TYPE 2 DIABETES MELLITUS WITH HYPERGLYCEMIA DURING PREGNANCY IN SECOND TRIMESTER (HCC): Primary | ICD-10-CM

## 2022-12-23 NOTE — PROGRESS NOTES
The patient was seen today for an ultrasound  Please see ultrasound report (located under Ob Procedures) for additional details  Thank you very much for allowing us to participate in the care of this very nice patient  Should you have any questions, please do not hesitate to contact me  Dean Guevara MD [phone]  Attending Physician, Herb

## 2023-01-05 ENCOUNTER — TELEPHONE (OUTPATIENT)
Dept: PERINATAL CARE | Facility: OTHER | Age: 28
End: 2023-01-05

## 2023-01-05 DIAGNOSIS — O24.119 TYPE 2 DIABETES MELLITUS AFFECTING PREGNANCY, ANTEPARTUM: ICD-10-CM

## 2023-01-05 RX ORDER — INSULIN GLARGINE-YFGN 100 [IU]/ML
22 INJECTION, SOLUTION SUBCUTANEOUS DAILY
Qty: 300 ML | Refills: 2 | Status: SHIPPED | OUTPATIENT
Start: 2023-01-05

## 2023-01-05 NOTE — TELEPHONE ENCOUNTER
Spoke with patient and confirmed her 1/13 fetal echo MFM appointment had to be rescheduled to [place] office (scan/send)  Patient verbalized understanding of new time, date and location of appointment  Patient denies further questions

## 2023-01-09 DIAGNOSIS — E03.9 HYPOTHYROIDISM, UNSPECIFIED TYPE: Primary | ICD-10-CM

## 2023-01-11 DIAGNOSIS — O24.112 PRE-EXISTING TYPE 2 DIABETES MELLITUS WITH HYPERGLYCEMIA DURING PREGNANCY IN SECOND TRIMESTER (HCC): Primary | ICD-10-CM

## 2023-01-11 DIAGNOSIS — E11.65 PRE-EXISTING TYPE 2 DIABETES MELLITUS WITH HYPERGLYCEMIA DURING PREGNANCY IN SECOND TRIMESTER (HCC): Primary | ICD-10-CM

## 2023-01-11 RX ORDER — INSULIN ASPART 100 [IU]/ML
14 INJECTION, SOLUTION INTRAVENOUS; SUBCUTANEOUS DAILY
Qty: 15 ML | Refills: 0 | Status: SHIPPED | OUTPATIENT
Start: 2023-01-11

## 2023-01-13 ENCOUNTER — ROUTINE PRENATAL (OUTPATIENT)
Facility: HOSPITAL | Age: 28
End: 2023-01-13

## 2023-01-13 VITALS
WEIGHT: 214.73 LBS | SYSTOLIC BLOOD PRESSURE: 110 MMHG | HEIGHT: 64 IN | HEART RATE: 80 BPM | DIASTOLIC BLOOD PRESSURE: 62 MMHG | BODY MASS INDEX: 36.66 KG/M2

## 2023-01-13 DIAGNOSIS — O24.112 PREGNANCY COMPLICATED BY PRE-EXISTING TYPE 2 DIABETES, SECOND TRIMESTER: Primary | ICD-10-CM

## 2023-01-13 DIAGNOSIS — Z3A.27 27 WEEKS GESTATION OF PREGNANCY: ICD-10-CM

## 2023-01-13 RX ORDER — ONDANSETRON 4 MG/1
TABLET, FILM COATED ORAL
COMMUNITY
Start: 2022-11-30

## 2023-01-13 NOTE — PROGRESS NOTES
Please refer to the [facility] ultrasound report in Ob Procedures for additional information regarding today's visit

## 2023-01-13 NOTE — LETTER
January 13, 2023     Yenni [address] 56  [address]    Patient: Imelda Pulido   YOB: 1995   Date of Visit: 1/13/2023       Dear Dr Cody Sanders: Thank you for referring Rigo Olguin to me for evaluation  Below are my notes for this consultation  If you have questions, please do not hesitate to call me  I look forward to following your patient along with you           Sincerely,        Milagro Pan MD        CC: No Recipients  Milagro Pan MD  1/13/2023  8:18 AM  Sign when Signing Visit  Please refer to the [facility] ultrasound report in Ob Procedures for additional information regarding today's visit

## 2023-01-23 ENCOUNTER — APPOINTMENT (OUTPATIENT)
Dept: LAB | Age: 28
End: 2023-01-23

## 2023-01-23 DIAGNOSIS — O09.92 SUPERVISION OF HIGH RISK PREGNANCY IN SECOND TRIMESTER: ICD-10-CM

## 2023-01-23 DIAGNOSIS — E03.9 HYPOTHYROIDISM, UNSPECIFIED TYPE: ICD-10-CM

## 2023-01-23 LAB
BASOPHILS # BLD AUTO: 0.01 THOUSANDS/ÂΜL (ref 0–0.1)
BASOPHILS NFR BLD AUTO: 0 % (ref 0–1)
EOSINOPHIL # BLD AUTO: 0.08 THOUSAND/ÂΜL (ref 0–0.61)
EOSINOPHIL NFR BLD AUTO: 1 % (ref 0–6)
ERYTHROCYTE [DISTWIDTH] IN BLOOD BY AUTOMATED COUNT: 14.4 % (ref 11.6–15.1)
HCT VFR BLD AUTO: 34.6 % (ref 34.8–46.1)
HGB BLD-MCNC: 11.1 G/DL (ref 11.5–15.4)
IMM GRANULOCYTES # BLD AUTO: 0.02 THOUSAND/UL (ref 0–0.2)
IMM GRANULOCYTES NFR BLD AUTO: 0 % (ref 0–2)
LYMPHOCYTES # BLD AUTO: 1.37 THOUSANDS/ÂΜL (ref 0.6–4.47)
LYMPHOCYTES NFR BLD AUTO: 21 % (ref 14–44)
MCH RBC QN AUTO: 28.2 PG (ref 26.8–34.3)
MCHC RBC AUTO-ENTMCNC: 32.1 G/DL (ref 31.4–37.4)
MCV RBC AUTO: 88 FL (ref 82–98)
MONOCYTES # BLD AUTO: 0.35 THOUSAND/ÂΜL (ref 0.17–1.22)
MONOCYTES NFR BLD AUTO: 5 % (ref 4–12)
NEUTROPHILS # BLD AUTO: 4.74 THOUSANDS/ÂΜL (ref 1.85–7.62)
NEUTS SEG NFR BLD AUTO: 73 % (ref 43–75)
NRBC BLD AUTO-RTO: 0 /100 WBCS
PLATELET # BLD AUTO: 218 THOUSANDS/UL (ref 149–390)
PMV BLD AUTO: 10.7 FL (ref 8.9–12.7)
RBC # BLD AUTO: 3.94 MILLION/UL (ref 3.81–5.12)
T4 FREE SERPL-MCNC: 0.92 NG/DL (ref 0.76–1.46)
TSH SERPL DL<=0.05 MIU/L-ACNC: 1.33 UIU/ML (ref 0.45–4.5)
WBC # BLD AUTO: 6.57 THOUSAND/UL (ref 4.31–10.16)

## 2023-01-24 LAB — SL AMB TREPONEMA PALLIDUM ANTIBODIES: NON REACTIVE

## 2023-01-30 ENCOUNTER — APPOINTMENT (OUTPATIENT)
Dept: LAB | Facility: HOSPITAL | Age: 28
End: 2023-01-30

## 2023-02-13 ENCOUNTER — ULTRASOUND (OUTPATIENT)
Facility: HOSPITAL | Age: 28
End: 2023-02-13

## 2023-02-13 VITALS
DIASTOLIC BLOOD PRESSURE: 64 MMHG | HEIGHT: 64 IN | SYSTOLIC BLOOD PRESSURE: 102 MMHG | WEIGHT: 217.15 LBS | BODY MASS INDEX: 37.07 KG/M2 | HEART RATE: 71 BPM

## 2023-02-13 DIAGNOSIS — O24.112 PRE-EXISTING TYPE 2 DIABETES MELLITUS WITH HYPERGLYCEMIA DURING PREGNANCY IN SECOND TRIMESTER (HCC): Primary | ICD-10-CM

## 2023-02-13 DIAGNOSIS — E11.65 PRE-EXISTING TYPE 2 DIABETES MELLITUS WITH HYPERGLYCEMIA DURING PREGNANCY IN SECOND TRIMESTER (HCC): Primary | ICD-10-CM

## 2023-02-13 DIAGNOSIS — O99.213 OBESITY DURING PREGNANCY IN THIRD TRIMESTER: ICD-10-CM

## 2023-02-13 DIAGNOSIS — E03.9 HYPOTHYROIDISM AFFECTING PREGNANCY IN THIRD TRIMESTER: ICD-10-CM

## 2023-02-13 DIAGNOSIS — Z3A.32 32 WEEKS GESTATION OF PREGNANCY: ICD-10-CM

## 2023-02-13 DIAGNOSIS — O99.283 HYPOTHYROIDISM AFFECTING PREGNANCY IN THIRD TRIMESTER: ICD-10-CM

## 2023-02-13 NOTE — LETTER
February 13, 2023     Troy Donohue DO  [address]    Patient: Vale Felder   YOB: 1995   Date of Visit: 2/13/2023       Dear Dr Sravanthi Chavez: Thank you for referring Narendra Cervanteschilla to me for evaluation  Below are my notes for this consultation  If you have questions, please do not hesitate to call me  I look forward to following your patient along with you  Sincerely,        Jesus Hutchins MD        CC: No Recipients  Jesus Hutchins MD  2/13/2023  5:03 PM  Sign when Signing Visit  Vale Felder has no complaints today  She reports regular fetal movements and does not report any problems  She is here today at [place] [address] for an ultrasound for fetal growth  Her last baby weighed 6 pounds 5 ounces at 39 weeks  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[employer]  Problem list:  1  Hypothyroidism  2  Diabetes type 2 on Semglee 22 units at 5 PM and 12 units Novolog with lunch and dinner starting today due to some elevated sugars after meals  3  Increased BMI  4  She was delivered at 38 weeks in her last pregnancy because the estimated fetal weight was in the 8th percentile at 37 weeks and 5 days based on femur bones being less than 5th percentile while the abdomen was 15th percentile and head circumference was 26 percentile  Umbilical Dopplers and REBECCA was normal in that pregnancy  Ultrasound findings: The ultrasound today shows normal interval fetal growth and fluid  BPP is 8 out of 8  Umbilical Dopplers appear normal     Pregnancy ultrasound has limitations and is unable to detect all forms of fetal congenital abnormalities  The inaccuracy in the EFW can be off by 1 lb either way in the third trimester  Follow up recommended:   1  She will start twice weekly NSTs and weekly REBECCA's through her OB office  2  Recommend a follow-up ultrasound for growth in 4 weeks       Pre visit time reviewing her records   10 minutes  Face to face time 15 minutes  Post visit time on documentation of note, updating her problem list, adding orders and prescriptions 10 minutes  Procedures that were completed today were charged separately  The level of decision making was moderate complexity      Cam Heller MD

## 2023-02-13 NOTE — PROGRESS NOTES
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[employer]  Problem list:  1  Hypothyroidism  2  Diabetes type 2 on Semglee 22 units at 5 PM and 12 units Novolog with lunch and dinner starting today due to some elevated sugars after meals  3  Increased BMI  4  She was delivered at 38 weeks in her last pregnancy because the estimated fetal weight was in the 8th percentile at 37 weeks and 5 days based on femur bones being less than 5th percentile while the abdomen was 15th percentile and head circumference was 26 percentile  Umbilical Dopplers and REBECCA was normal in that pregnancy  Ultrasound findings: The ultrasound today shows normal interval fetal growth and fluid  BPP is 8 out of 8  Umbilical Dopplers appear normal     Pregnancy ultrasound has limitations and is unable to detect all forms of fetal congenital abnormalities  The inaccuracy in the EFW can be off by 1 lb either way in the third trimester  Follow up recommended:   1  She will start twice weekly NSTs and weekly REBECCA's through her OB office  2  Recommend a follow-up ultrasound for growth in 4 weeks  Pre visit time reviewing her records   10 minutes  Face to face time 15 minutes  Post visit time on documentation of note, updating her problem list, adding orders and prescriptions 10 minutes  Procedures that were completed today were charged separately  The level of decision making was moderate complexity      Lucy Finch MD

## 2023-03-01 DIAGNOSIS — E11.9 TYPE 2 DIABETES MELLITUS WITHOUT COMPLICATION, WITHOUT LONG-TERM CURRENT USE OF INSULIN (HCC): Primary | ICD-10-CM

## 2023-03-01 RX ORDER — METFORMIN HYDROCHLORIDE 500 MG/1
2000 TABLET, EXTENDED RELEASE ORAL DAILY
Qty: 360 TABLET | Refills: 0 | Status: SHIPPED | OUTPATIENT
Start: 2023-03-01 | End: 2023-05-30

## 2023-03-01 NOTE — TELEPHONE ENCOUNTER
Received a [employer] message from the patient requesting a refill of metformin  Last office visit: 7/22/21  Next office visit: no appointments scheduled  Char Irwin Clinical (supporting Darius Puentes [place])      8:17 AM  Good morning  Can you please put in a refill for my metformin  And I will call to schedule an appointment for being post partum  I'm due in about 4 weeks  Thanks!

## 2023-03-06 DIAGNOSIS — O24.112 PRE-EXISTING TYPE 2 DIABETES MELLITUS WITH HYPERGLYCEMIA DURING PREGNANCY IN SECOND TRIMESTER (HCC): ICD-10-CM

## 2023-03-06 DIAGNOSIS — E11.65 PRE-EXISTING TYPE 2 DIABETES MELLITUS WITH HYPERGLYCEMIA DURING PREGNANCY IN SECOND TRIMESTER (HCC): ICD-10-CM

## 2023-03-06 RX ORDER — INSULIN ASPART 100 [IU]/ML
INJECTION, SOLUTION INTRAVENOUS; SUBCUTANEOUS
Qty: 15 ML | Refills: 0 | Status: SHIPPED | OUTPATIENT
Start: 2023-03-06

## 2023-03-14 ENCOUNTER — LAB REQUISITION (OUTPATIENT)
Dept: LAB | Facility: HOSPITAL | Age: 28
End: 2023-03-14

## 2023-03-14 DIAGNOSIS — Z36.85 ENCOUNTER FOR ANTENATAL SCREENING FOR STREPTOCOCCUS B: ICD-10-CM

## 2023-03-15 LAB — GP B STREP DNA SPEC QL NAA+PROBE: NEGATIVE

## 2023-03-18 NOTE — PROGRESS NOTES
Please refer to the [facility] ultrasound report in Ob Procedures for additional information regarding today's visit

## 2023-03-20 ENCOUNTER — ULTRASOUND (OUTPATIENT)
Facility: HOSPITAL | Age: 28
End: 2023-03-20

## 2023-03-20 VITALS
BODY MASS INDEX: 38.21 KG/M2 | SYSTOLIC BLOOD PRESSURE: 116 MMHG | WEIGHT: 223.8 LBS | HEIGHT: 64 IN | DIASTOLIC BLOOD PRESSURE: 82 MMHG | HEART RATE: 96 BPM

## 2023-03-20 DIAGNOSIS — O99.283 HYPOTHYROIDISM IN PREGNANCY, THIRD TRIMESTER: ICD-10-CM

## 2023-03-20 DIAGNOSIS — E03.9 HYPOTHYROIDISM IN PREGNANCY, THIRD TRIMESTER: ICD-10-CM

## 2023-03-20 DIAGNOSIS — O24.113 PREGNANCY COMPLICATED BY PRE-EXISTING TYPE 2 DIABETES, THIRD TRIMESTER: Primary | ICD-10-CM

## 2023-03-20 DIAGNOSIS — O99.213 MATERNAL OBESITY, ANTEPARTUM, THIRD TRIMESTER: ICD-10-CM

## 2023-03-20 DIAGNOSIS — Z3A.37 37 WEEKS GESTATION OF PREGNANCY: ICD-10-CM

## 2023-03-20 RX ORDER — FERROUS SULFATE 325(65) MG
325 TABLET ORAL
Status: ON HOLD | COMMUNITY

## 2023-03-20 NOTE — LETTER
March 20, 2023     Tonio Rousseau   [address]  [address]    Patient: Isak Lee   YOB: 1995   Date of Visit: 3/20/2023       Dear Dr Daily Amezcua: Thank you for referring Winston De La Fuente to me for evaluation  Below are my notes for this consultation  If you have questions, please do not hesitate to call me  I look forward to following your patient along with you           Sincerely,        Silver Newton MD        CC: No Recipients  Silver Newton MD  3/18/2023  7:38 AM  Sign when Signing Visit  Please refer to the [facility] ultrasound report in Ob Procedures for additional information regarding today's visit

## 2023-03-20 NOTE — LETTER
NST sleeve cover sheet    Patient name: Merissa Bazzi  : 1995  MRN: [id number]    RACHLE: Estimated Date of Delivery: 23    Obstetrician: [facility]     Reason(s) for testing: Type 2 DM    Testing frequency:    _X__ 2x/wk  ___ 1x/wk  ___ Dopplers  ___ BPP?       Last growth scan: __________________________________________

## 2023-03-20 NOTE — PROGRESS NOTES
Non-Stress Testing:    Non-Stress test, equipment, procedure, and expected outcomes reviewed  Reviewed fetal kick counts and when to call OB  Enzo Hagan Verified patient understanding of fetal kick counts with teach back method  Patient reports feeling daily fetal movements  Patient has no questions or concerns  Dr Yoav Solorzano viewed NST strip prior to completion of visit

## 2023-04-02 ENCOUNTER — ANESTHESIA (INPATIENT)
Dept: ANESTHESIOLOGY | Facility: HOSPITAL | Age: 28
End: 2023-04-02

## 2023-04-02 ENCOUNTER — ANESTHESIA EVENT (INPATIENT)
Dept: ANESTHESIOLOGY | Facility: HOSPITAL | Age: 28
End: 2023-04-02

## 2023-04-02 ENCOUNTER — HOSPITAL ENCOUNTER (OUTPATIENT)
Dept: LABOR AND DELIVERY | Facility: HOSPITAL | Age: 28
Discharge: HOME | End: 2023-04-02

## 2023-04-02 PROBLEM — O09.899 MATERNAL VARICELLA, NON-IMMUNE: Status: ACTIVE | Noted: 2023-04-02

## 2023-04-02 PROBLEM — Z3A.39 39 WEEKS GESTATION OF PREGNANCY: Status: ACTIVE | Noted: 2022-08-17

## 2023-04-02 PROBLEM — Z28.39 MATERNAL VARICELLA, NON-IMMUNE: Status: ACTIVE | Noted: 2023-04-02

## 2023-04-02 RX ORDER — ROPIVACAINE HYDROCHLORIDE 2 MG/ML
INJECTION, SOLUTION EPIDURAL; INFILTRATION; PERINEURAL
Status: COMPLETED | OUTPATIENT
Start: 2023-04-02 | End: 2023-04-02

## 2023-04-02 RX ORDER — ROPIVACAINE HYDROCHLORIDE 2 MG/ML
INJECTION, SOLUTION EPIDURAL; INFILTRATION; PERINEURAL CONTINUOUS PRN
Status: DISCONTINUED | OUTPATIENT
Start: 2023-04-02 | End: 2023-04-03 | Stop reason: HOSPADM

## 2023-04-02 RX ADMIN — ROPIVACAINE HYDROCHLORIDE 10 ML/HR: 2 INJECTION, SOLUTION EPIDURAL; INFILTRATION at 19:50

## 2023-04-02 RX ADMIN — ROPIVACAINE HYDROCHLORIDE 10 ML: 2 INJECTION, SOLUTION EPIDURAL; INFILTRATION; PERINEURAL at 19:46

## 2023-04-02 NOTE — ANESTHESIA PREPROCEDURE EVALUATION
Procedure:  LABOR ANALGESIA    Relevant Problems   ENDO   (+) Hypothyroidism   (+) Hypothyroidism affecting pregnancy in third trimester   (+) Pre-existing type 2 diabetes mellitus with hyperglycemia during pregnancy in second trimester (HCC)   (+) Type 2 diabetes mellitus (HCC)   (+) Type 2 diabetes mellitus without complication, without long-term current use of insulin (HCC)      GYN   (+) 39 weeks gestation of pregnancy        Physical Exam    Airway    Mallampati score: II         Dental       Cardiovascular  Rhythm: regular, Rate: normal,     Pulmonary  Breath sounds clear to auscultation,     Other Findings        Anesthesia Plan  ASA Score- 2     Anesthesia Type- epidural with ASA Monitors  Additional Monitors:   Airway Plan:           Plan Factors-Exercise tolerance (METS): >4 METS  Chart reviewed  Existing labs reviewed  Patient summary reviewed  Patient is not a current smoker  Patient not instructed to abstain from smoking on day of procedure  Patient did not smoke on day of surgery  Obstructive sleep apnea risk education given perioperatively  Induction- intravenous  Postoperative Plan-     Informed Consent- Anesthetic plan and risks discussed with patient

## 2023-04-02 NOTE — ANESTHESIA PROCEDURE NOTES
Epidural Block    Patient location during procedure: OB  Start time: 4/2/2023 7:46 PM  Reason for block: at surgeon's request  Staffing  Performed: Anesthesiologist   Anesthesiologist: Clearence Ganser, DO  Preanesthetic Checklist  Completed: patient identified, IV checked, site marked, risks and benefits discussed, surgical consent, monitors and equipment checked, pre-op evaluation and timeout performed  Epidural  Patient position: sitting  Prep: Betadine  Patient monitoring: heart rate and frequent blood pressure checks  Approach: midline  Location: lumbar  Injection technique: WEST air  Needle  Needle type: Tuohy   Needle gauge: 18 G  Catheter type: side hole  Catheter size: 20 G  Catheter securement method: surgical tape  Test dose: negativeropivacaine (NAROPIN) 0 2% - Epidural   10 mL - 4/2/2023 7:46:00 PM  Assessment  Sensory level: T10  Number of attempts: 1negative aspiration for CSF, negative aspiration for heme and no paresthesia on injection  patient tolerated the procedure well with no immediate complications

## 2023-04-03 NOTE — ANESTHESIA POSTPROCEDURE EVALUATION
"Post-Op Assessment Note    CV Status:  Stable  Pain Score: 1    Pain management: adequate     Mental Status:  Alert and awake   Hydration Status:  Euvolemic   PONV Controlled:  Controlled   Airway Patency:  Patent      Post Op Vitals Reviewed: Yes      Staff: Anesthesiologist     Post-op block assessment: no complications and catheter intact      No notable events documented      BP      Temp      Pulse     Resp      SpO2      /82 Comment: pt uncomfortable, changing position, Dr Farooq Miguel in to check pt  Pulse 69   Temp 97 7 °F (36 5 °C) (Oral)   Resp 16   Ht 5' 4\" (1 626 m)   Wt 102 kg (223 lb 12 8 oz)   LMP 07/02/2022   BMI 38 42 kg/m²     "

## 2023-04-07 ENCOUNTER — TELEPHONE (OUTPATIENT)
Dept: ENDOCRINOLOGY | Facility: CLINIC | Age: 28
End: 2023-04-07

## 2023-06-26 ENCOUNTER — LAB (OUTPATIENT)
Dept: LAB | Age: 28
End: 2023-06-26
Payer: COMMERCIAL

## 2023-06-26 DIAGNOSIS — E11.9 TYPE 2 DIABETES MELLITUS WITHOUT COMPLICATION, WITHOUT LONG-TERM CURRENT USE OF INSULIN (HCC): ICD-10-CM

## 2023-06-26 DIAGNOSIS — E55.9 VITAMIN D DEFICIENCY: ICD-10-CM

## 2023-06-26 DIAGNOSIS — E03.9 HYPOTHYROIDISM, UNSPECIFIED TYPE: ICD-10-CM

## 2023-06-26 LAB
25(OH)D3 SERPL-MCNC: 35.9 NG/ML (ref 30–100)
ANION GAP SERPL CALCULATED.3IONS-SCNC: 3 MMOL/L
BUN SERPL-MCNC: 16 MG/DL (ref 5–25)
CALCIUM SERPL-MCNC: 9.5 MG/DL (ref 8.3–10.1)
CHLORIDE SERPL-SCNC: 108 MMOL/L (ref 96–108)
CO2 SERPL-SCNC: 26 MMOL/L (ref 21–32)
CREAT SERPL-MCNC: 0.55 MG/DL (ref 0.6–1.3)
GFR SERPL CREATININE-BSD FRML MDRD: 127 ML/MIN/1.73SQ M
GLUCOSE P FAST SERPL-MCNC: 115 MG/DL (ref 65–99)
POTASSIUM SERPL-SCNC: 4.4 MMOL/L (ref 3.5–5.3)
SODIUM SERPL-SCNC: 137 MMOL/L (ref 135–147)
T4 FREE SERPL-MCNC: 0.82 NG/DL (ref 0.61–1.12)
TSH SERPL DL<=0.05 MIU/L-ACNC: 0.71 UIU/ML (ref 0.45–4.5)

## 2023-06-26 PROCEDURE — 82306 VITAMIN D 25 HYDROXY: CPT

## 2023-06-26 PROCEDURE — 36415 COLL VENOUS BLD VENIPUNCTURE: CPT

## 2023-06-26 PROCEDURE — 80048 BASIC METABOLIC PNL TOTAL CA: CPT

## 2023-06-26 PROCEDURE — 84443 ASSAY THYROID STIM HORMONE: CPT

## 2023-06-26 PROCEDURE — 84439 ASSAY OF FREE THYROXINE: CPT

## 2023-06-27 DIAGNOSIS — E03.9 HYPOTHYROIDISM, UNSPECIFIED TYPE: ICD-10-CM

## 2023-06-27 RX ORDER — METFORMIN HYDROCHLORIDE 500 MG/1
2000 TABLET, EXTENDED RELEASE ORAL DAILY
Qty: 360 TABLET | Refills: 0 | Status: SHIPPED | OUTPATIENT
Start: 2023-06-27 | End: 2023-09-25

## 2023-06-27 RX ORDER — LEVOTHYROXINE SODIUM 0.07 MG/1
75 TABLET ORAL
Qty: 90 TABLET | Refills: 1 | Status: SHIPPED | OUTPATIENT
Start: 2023-06-27 | End: 2023-09-25

## 2023-06-27 NOTE — RESULT ENCOUNTER NOTE
Please call the patient regarding labs - thyroid function tests normal-continue levothyroxine at current dose-fasting glucose is 115, how have  fingersticks been like at home, has she been using any metformin?

## 2023-09-22 DIAGNOSIS — E11.9 TYPE 2 DIABETES MELLITUS WITHOUT COMPLICATION, WITHOUT LONG-TERM CURRENT USE OF INSULIN (HCC): ICD-10-CM

## 2023-09-22 RX ORDER — METFORMIN HYDROCHLORIDE 500 MG/1
2000 TABLET, EXTENDED RELEASE ORAL DAILY
Qty: 120 TABLET | Refills: 0 | Status: SHIPPED | OUTPATIENT
Start: 2023-09-22 | End: 2023-10-22

## 2023-09-22 RX ORDER — METFORMIN HYDROCHLORIDE 500 MG/1
2000 TABLET, EXTENDED RELEASE ORAL DAILY
Qty: 360 TABLET | Refills: 0 | OUTPATIENT
Start: 2023-09-22 | End: 2023-12-21

## 2023-10-19 DIAGNOSIS — E11.9 TYPE 2 DIABETES MELLITUS WITHOUT COMPLICATION, WITHOUT LONG-TERM CURRENT USE OF INSULIN (HCC): ICD-10-CM

## 2023-10-19 NOTE — TELEPHONE ENCOUNTER
Pt requesting refill of metformin, f/u appt was r/s and will run out of medication before she comes in

## 2023-10-22 RX ORDER — METFORMIN HYDROCHLORIDE 500 MG/1
2000 TABLET, EXTENDED RELEASE ORAL DAILY
Qty: 120 TABLET | Refills: 0 | Status: SHIPPED | OUTPATIENT
Start: 2023-10-22 | End: 2023-11-21

## 2023-11-21 DIAGNOSIS — E11.9 TYPE 2 DIABETES MELLITUS WITHOUT COMPLICATION, WITHOUT LONG-TERM CURRENT USE OF INSULIN (HCC): ICD-10-CM

## 2023-11-22 RX ORDER — METFORMIN HYDROCHLORIDE 500 MG/1
2000 TABLET, EXTENDED RELEASE ORAL DAILY
Qty: 120 TABLET | Refills: 0 | Status: SHIPPED | OUTPATIENT
Start: 2023-11-22 | End: 2023-12-22

## 2023-12-20 DIAGNOSIS — E11.9 TYPE 2 DIABETES MELLITUS WITHOUT COMPLICATION, WITHOUT LONG-TERM CURRENT USE OF INSULIN (HCC): ICD-10-CM

## 2023-12-21 RX ORDER — METFORMIN HYDROCHLORIDE 500 MG/1
2000 TABLET, EXTENDED RELEASE ORAL DAILY
Qty: 120 TABLET | Refills: 0 | OUTPATIENT
Start: 2023-12-21 | End: 2024-01-20

## 2023-12-22 DIAGNOSIS — E11.9 TYPE 2 DIABETES MELLITUS WITHOUT COMPLICATION, WITHOUT LONG-TERM CURRENT USE OF INSULIN (HCC): ICD-10-CM

## 2023-12-22 RX ORDER — METFORMIN HYDROCHLORIDE 500 MG/1
2000 TABLET, EXTENDED RELEASE ORAL DAILY
Qty: 120 TABLET | Refills: 1 | Status: SHIPPED | OUTPATIENT
Start: 2023-12-22 | End: 2024-02-20

## 2023-12-28 ENCOUNTER — TELEPHONE (OUTPATIENT)
Dept: ENDOCRINOLOGY | Facility: CLINIC | Age: 28
End: 2023-12-28

## 2023-12-28 NOTE — TELEPHONE ENCOUNTER
Patient dtr is sick and is asking for today visit to be virtual so she doesn't have to reschedule.  Thank you

## 2024-01-03 ENCOUNTER — OFFICE VISIT (OUTPATIENT)
Dept: ENDOCRINOLOGY | Facility: CLINIC | Age: 29
End: 2024-01-03
Payer: COMMERCIAL

## 2024-01-03 VITALS
OXYGEN SATURATION: 98 % | DIASTOLIC BLOOD PRESSURE: 88 MMHG | SYSTOLIC BLOOD PRESSURE: 122 MMHG | HEART RATE: 100 BPM | WEIGHT: 216 LBS | BODY MASS INDEX: 36.88 KG/M2 | HEIGHT: 64 IN

## 2024-01-03 DIAGNOSIS — E11.9 TYPE 2 DIABETES MELLITUS WITHOUT COMPLICATION, WITHOUT LONG-TERM CURRENT USE OF INSULIN (HCC): Primary | ICD-10-CM

## 2024-01-03 DIAGNOSIS — E55.9 VITAMIN D DEFICIENCY: ICD-10-CM

## 2024-01-03 DIAGNOSIS — E03.9 HYPOTHYROIDISM, UNSPECIFIED TYPE: ICD-10-CM

## 2024-01-03 DIAGNOSIS — E66.01 CLASS 2 SEVERE OBESITY WITH SERIOUS COMORBIDITY AND BODY MASS INDEX (BMI) OF 37.0 TO 37.9 IN ADULT, UNSPECIFIED OBESITY TYPE: ICD-10-CM

## 2024-01-03 DIAGNOSIS — E11.9 TYPE 2 DIABETES MELLITUS WITHOUT COMPLICATION, WITHOUT LONG-TERM CURRENT USE OF INSULIN (HCC): ICD-10-CM

## 2024-01-03 LAB — SL AMB POCT HEMOGLOBIN AIC: 6 (ref ?–6.5)

## 2024-01-03 PROCEDURE — 99214 OFFICE O/P EST MOD 30 MIN: CPT | Performed by: PHYSICIAN ASSISTANT

## 2024-01-03 PROCEDURE — 83036 HEMOGLOBIN GLYCOSYLATED A1C: CPT | Performed by: PHYSICIAN ASSISTANT

## 2024-01-03 RX ORDER — METFORMIN HYDROCHLORIDE 500 MG/1
TABLET, EXTENDED RELEASE ORAL
Qty: 360 TABLET | Refills: 3 | Status: SHIPPED | OUTPATIENT
Start: 2024-01-03 | End: 2024-01-03

## 2024-01-03 RX ORDER — METFORMIN HYDROCHLORIDE 500 MG/1
TABLET, EXTENDED RELEASE ORAL
Qty: 360 TABLET | Refills: 3 | Status: SHIPPED | OUTPATIENT
Start: 2024-01-03

## 2024-01-03 NOTE — PROGRESS NOTES
Established Patient Progress Note    Chief Complaint:  Diabetes follow up visit    Impression & Plan:    Problem List Items Addressed This Visit        Endocrine    Type 2 diabetes mellitus without complication, without long-term current use of insulin ([facility]) - Primary     Diabetes under good control.   Focus on lifestyle modification/weight loss.   She declines follow up with dietician in office but will consider [facility] employee health coaching- will place referral.   She will contact office if she finds out that she is pregnant between visits and will refer to [facility] or start basal insulin if needed prior to [facility] eval.   Schedule eye exam.   Lab Results   Component Value Date    HGBA1C 6.0 01/03/2024          Relevant Medications    metFORMIN (GLUCOPHAGE-XR) 500 mg 24 hr tablet    Other Relevant Orders    POCT hemoglobin A1c (Completed)    Comprehensive metabolic panel    Lipid Panel with Direct LDL reflex    Albumin / creatinine urine ratio    Ambulatory Referral to Employee Health  Program    Hypothyroidism     Continue Levothyroxine. Goal TSH <2.5 as she will be planning pregnancy In the next few months.          Relevant Orders    TSH, 3rd generation    T4, free       Other    Vitamin D deficiency     Continue supplement.         Other Visit Diagnoses     Class 2 severe obesity with serious comorbidity and body mass index (BMI) of 37.0 to 37.9 in adult, unspecified obesity type               Orders Placed This Encounter   Procedures   • Comprehensive metabolic panel     This is a patient instruction: Patient fasting for 8 hours or longer recommended.     Standing Status:   Future     Standing Expiration Date:   7/3/2024   • TSH, 3rd generation     This is a patient instruction: This test is non-fasting. Please drink two glasses of water morning of bloodwork.        Standing Status:   Future     Standing Expiration Date:   7/3/2024   • T4, free     Standing Status:   Future     Standing Expiration Date:    7/3/2024   • Lipid Panel with Direct LDL reflex     This is a patient instruction: This test requires patient fasting for 10-12 hours or longer. Drinking of black coffee or black tea is acceptable.     Standing Status:   Future     Standing Expiration Date:   7/3/2024   • Albumin / creatinine urine ratio     Standing Status:   Future     Standing Expiration Date:   7/3/2024   • Ambulatory Referral to Employee Health  Program     Standing Status:   Future     Standing Expiration Date:   1/3/2025     Referral Priority:   Routine     Referral Type:   Program     Referral Reason:   Specialty Services Required     Number of Visits Requested:   1     Expiration Date:   1/3/2025   • POCT hemoglobin A1c         History of Present Illness:   Rabia Correa is a 28 y.o. female with a history of type 2 diabetes without long term use of insulin since 2016. Reports complications of none. Denies recent illness or hospitalizations. Denies recent severe hypoglycemic or severe hyperglycemic episodes. Denies any issues with her current regimen. home glucose monitoring: are performed sporadically and today 120.     Currently 9 months post partum. Requested insulin when pregnant. She is planning for at least 2 more pregnancies. Currently breastfeeding and menses haven't returned.  At last visit with Dr. Thorpe metformin was discontinued but resumed due to rising blood sugars. Her goal is to lose weight and try to get off medications for Diabetes.  She has seen dietician in the past and knows what she needs to do but finds it difficult due to having a baby that doesn't sleep well at night.     Current regimen:   Metformin ER 500mg-- 4 tabs daily with food    Last Eye Exam: Needs exam, just got reminder letter.   Last Foot Exam: today at visit    Has hyperlipidemia: Taking No meds    Thyroid disorders: hypothyroidism- taking levothyroxine.     Patient Active Problem List   Diagnosis   • Type 2 diabetes mellitus without  complication, without long-term current use of insulin (HCC)   • Vitamin D deficiency   • Hypothyroidism   • Type 2 diabetes mellitus (HCC)   • BMI 36.0-36.9,adult   •  (spontaneous vaginal delivery)   • Pre-existing type 2 diabetes mellitus with hyperglycemia during pregnancy in second trimester (HCC)   • Hypothyroidism affecting pregnancy in third trimester   • Obesity during pregnancy in third trimester   • Maternal varicella, non-immune      Past Medical History:   Diagnosis Date   • Anemia    • Clostridium difficile infection    • Hypothyroidism 2019   • IBS (irritable bowel syndrome)    • Kidney injury    • L2 vertebral fracture (HCC)    • Type 2 diabetes mellitus (HCC) 2019   • Vitamin D deficiency       Past Surgical History:   Procedure Laterality Date   • CHOLECYSTECTOMY     • CA COLONOSCOPY FLX DX W/COLLJ SPEC WHEN PFRMD N/A 1/10/2017    Procedure: EGD AND COLONOSCOPY;  Surgeon: Luis Yi MD;  Location: [facility] GI LAB;  Service: Gastroenterology   • WISDOM TOOTH EXTRACTION        Family History   Problem Relation Age of Onset   • Diabetes Other    • Thyroid disease Other    • Diabetes type II Mother    • No Known Problems Father    • Diabetes Paternal Grandfather    • Hypothyroidism Paternal Grandfather      Social History     Tobacco Use   • Smoking status: Never   • Smokeless tobacco: Never   Substance Use Topics   • Alcohol use: Not Currently     Allergies   Allergen Reactions   • Bactrim [Sulfamethoxazole-Trimethoprim] Hives   • Dilaudid [Hydromorphone Hcl] Itching         Current Outpatient Medications:   •  Alcohol Swabs 70 % PADS, Use to cleanse injection site tid, Disp: 120 each, Rfl: 5  •  Blood Glucose Monitoring Suppl (Contour Next EZ) w/Device KIT, Dispense 1 kit., Disp: 1 kit, Rfl: 0  •  Butalbital-APAP-Caffeine -40 MG CAPS, PRN, Disp: , Rfl:   •  Cholecalciferol (VITAMIN D) 50 MCG ( UT) tablet, Take 2,000 Units by mouth daily, Disp: , Rfl:   •  Contour Next Test test  "strip, Test up to 6 times a day. T2DM and pregnancy., Disp: 150 strip, Rfl: 8  •  levothyroxine 75 mcg tablet, Take 1 tablet (75 mcg total) by mouth daily in the early morning, Disp: 90 tablet, Rfl: 1  •  loratadine (CLARITIN) 10 mg tablet, Take 10 mg by mouth as needed, Disp: , Rfl:   •  magnesium gluconate (MAGONATE) 500 mg tablet, Take 500 mg by mouth daily, Disp: , Rfl:   •  metFORMIN (GLUCOPHAGE-XR) 500 mg 24 hr tablet, (with food), Disp: 360 tablet, Rfl: 3  •  Microlet Lancets MISC, Use up to 6 a day. T2DM and pregnancy., Disp: 150 each, Rfl: 8  •  ondansetron (ZOFRAN) 4 mg tablet, , Disp: , Rfl:   •  Prenatal Vit-Iron Carbonyl-FA (PRENATAL MULTIVITAMIN) TABS, Take 1 tablet by mouth daily, Disp: , Rfl:     Review of Systems   Constitutional:  Negative for activity change, appetite change, chills, diaphoresis, fatigue, fever and unexpected weight change.   HENT:  Negative for trouble swallowing and voice change.    Eyes:  Negative for visual disturbance.   Respiratory:  Negative for shortness of breath.    Cardiovascular:  Negative for chest pain and palpitations.   Gastrointestinal:  Negative for abdominal pain, constipation and diarrhea.   Endocrine: Negative for cold intolerance, heat intolerance, polydipsia, polyphagia and polyuria.   Genitourinary:  Negative for frequency and menstrual problem.   Musculoskeletal:  Negative for arthralgias and myalgias.   Skin:  Negative for rash.   Allergic/Immunologic: Negative for food allergies.   Neurological:  Negative for dizziness and tremors.   Hematological:  Negative for adenopathy.   Psychiatric/Behavioral:  Negative for sleep disturbance.    All other systems reviewed and are negative.      Physical Exam:  Body mass index is 37.08 kg/m².  /88 (BP Location: Left arm, Patient Position: Sitting, Cuff Size: Large)   Pulse 100   Ht 5' 4\" (1.626 m)   Wt 98 kg (216 lb)   SpO2 98%   BMI 37.08 kg/m²    Wt Readings from Last 3 Encounters:   01/03/24 98 kg (216 " lb)   04/14/23 98.3 kg (216 lb 12.8 oz)   04/02/23 102 kg (223 lb 12.8 oz)       Physical Exam  Vitals reviewed.   Constitutional:       General: She is not in acute distress.     Appearance: Normal appearance. She is well-developed. She is not toxic-appearing.   HENT:      Head: Normocephalic and atraumatic.   Eyes:      Conjunctiva/sclera: Conjunctivae normal.      Pupils: Pupils are equal, round, and reactive to light.   Neck:      Thyroid: No thyromegaly.   Cardiovascular:      Rate and Rhythm: Normal rate and regular rhythm.      Pulses: no weak pulses          Dorsalis pedis pulses are 2+ on the right side and 2+ on the left side.        Posterior tibial pulses are 2+ on the right side and 2+ on the left side.      Heart sounds: Normal heart sounds.   Pulmonary:      Effort: Pulmonary effort is normal. No respiratory distress.      Breath sounds: Normal breath sounds. No wheezing or rales.   Abdominal:      General: Bowel sounds are normal. There is no distension.      Palpations: Abdomen is soft.      Tenderness: There is no abdominal tenderness.   Musculoskeletal:         General: Normal range of motion.      Cervical back: Normal range of motion and neck supple.   Feet:      Right foot:      Skin integrity: No ulcer, skin breakdown, erythema, warmth, callus or dry skin.      Left foot:      Skin integrity: No ulcer, skin breakdown, erythema, warmth, callus or dry skin.   Skin:     General: Skin is warm and dry.   Neurological:      Mental Status: She is alert and oriented to person, place, and time.   Psychiatric:         Mood and Affect: Mood normal.         Behavior: Behavior normal.       Diabetic Foot Exam    Patient's shoes and socks removed.    Right Foot/Ankle   Right Foot Inspection  Skin Exam: skin normal and skin intact. No dry skin, no warmth, no callus, no erythema, no maceration, no abnormal color, no pre-ulcer, no ulcer and no callus.     Toe Exam: ROM and strength within normal limits. No  swelling, no tenderness, erythema and  no right toe deformity    Sensory   Monofilament testing: intact    Vascular  Capillary refills: < 3 seconds  The right DP pulse is 2+. The right PT pulse is 2+.     Left Foot/Ankle  Left Foot Inspection  Skin Exam: skin normal and skin intact. No dry skin, no warmth, no erythema, no maceration, normal color, no pre-ulcer, no ulcer and no callus.     Toe Exam: ROM and strength within normal limits. No swelling, no tenderness, no erythema and no left toe deformity.     Sensory   Monofilament testing: intact    Vascular  Capillary refills: < 3 seconds  The left DP pulse is 2+. The left PT pulse is 2+.     Assign Risk Category  No deformity present  No loss of protective sensation  No weak pulses  Risk: 0      Labs:   Component      Latest Ref Rng 1/23/2023 1/30/2023 6/26/2023   Sodium      135 - 147 mmol/L   137    Potassium      3.5 - 5.3 mmol/L   4.4    Chloride      96 - 108 mmol/L   108    CO2      21 - 32 mmol/L   26    Anion Gap      mmol/L   3    BUN      5 - 25 mg/dL   16    Creatinine      0.60 - 1.30 mg/dL   0.55 (L)    GLUCOSE FASTING      65 - 99 mg/dL   115 (H)    Calcium      8.3 - 10.1 mg/dL   9.5    eGFR      ml/min/1.73sq m   127    Hemoglobin A1C      Normal 3.8-5.6%; PreDiabetic 5.7-6.4%; Diabetic >=6.5%; Glycemic control for adults with diabetes <7.0% %  5.0     eAG, EST AVG Glucose      mg/dl  97     TSH 3RD GENERATON      0.450 - 4.500 uIU/mL 1.330   0.706    Free T4      0.61 - 1.12 ng/dL 0.92   0.82    Vit D, 25-Hydroxy      30.0 - 100.0 ng/mL   35.9       Legend:  (L) Low  (H) High        Discussed with the patient and all questioned fully answered. She will call me if any problems arise.    Follow-up appointment in 6 months.     Counseled patient on diagnostic results, prognosis, risk and benefit of treatment options, instruction for management, importance of treatment compliance, Risk  factor reduction and impressions    There are no Patient  Instructions on file for this visit.    Sang Maldonado PA-C

## 2024-01-03 NOTE — ASSESSMENT & PLAN NOTE
Diabetes under good control.   Focus on lifestyle modification/weight loss.   She declines follow up with dietician in office but will consider [facility] employee health coaching- will place referral.   She will contact office if she finds out that she is pregnant between visits and will refer to MFM or start basal insulin if needed prior to MFM eval.   Schedule eye exam.   Lab Results   Component Value Date    HGBA1C 6.0 01/03/2024

## 2024-01-11 DIAGNOSIS — E03.9 HYPOTHYROIDISM, UNSPECIFIED TYPE: ICD-10-CM

## 2024-01-11 RX ORDER — LEVOTHYROXINE SODIUM 0.07 MG/1
75 TABLET ORAL
Qty: 90 TABLET | Refills: 0 | Status: SHIPPED | OUTPATIENT
Start: 2024-01-11 | End: 2024-04-10

## 2024-03-07 ENCOUNTER — TELEPHONE (OUTPATIENT)
Dept: DERMATOLOGY | Facility: CLINIC | Age: 29
End: 2024-03-07

## 2024-03-07 NOTE — TELEPHONE ENCOUNTER
07/31/24 appt w/Dr Mathews at [facility] has been r/s to the [place] [facility] due to change in the providers schedule, to please call the office to confirm or r/s

## 2024-04-25 DIAGNOSIS — E03.9 HYPOTHYROIDISM, UNSPECIFIED TYPE: ICD-10-CM

## 2024-04-25 RX ORDER — LEVOTHYROXINE SODIUM 0.07 MG/1
75 TABLET ORAL
Qty: 90 TABLET | Refills: 1 | Status: SHIPPED | OUTPATIENT
Start: 2024-04-25

## 2024-07-02 ENCOUNTER — APPOINTMENT (OUTPATIENT)
Dept: LAB | Facility: CLINIC | Age: 29
End: 2024-07-02
Payer: COMMERCIAL

## 2024-07-02 DIAGNOSIS — E03.9 HYPOTHYROIDISM, UNSPECIFIED TYPE: ICD-10-CM

## 2024-07-02 DIAGNOSIS — E11.9 TYPE 2 DIABETES MELLITUS WITHOUT COMPLICATION, WITHOUT LONG-TERM CURRENT USE OF INSULIN (HCC): ICD-10-CM

## 2024-07-02 LAB
CREAT UR-MCNC: 142.3 MG/DL
MICROALBUMIN UR-MCNC: 18.1 MG/L
MICROALBUMIN/CREAT 24H UR: 13 MG/G CREATININE (ref 0–30)
T4 FREE SERPL-MCNC: 0.81 NG/DL (ref 0.61–1.12)
TSH SERPL DL<=0.05 MIU/L-ACNC: 2.17 UIU/ML (ref 0.45–4.5)

## 2024-07-02 PROCEDURE — 82043 UR ALBUMIN QUANTITATIVE: CPT

## 2024-07-02 PROCEDURE — 80061 LIPID PANEL: CPT

## 2024-07-02 PROCEDURE — 84439 ASSAY OF FREE THYROXINE: CPT

## 2024-07-02 PROCEDURE — 80053 COMPREHEN METABOLIC PANEL: CPT

## 2024-07-02 PROCEDURE — 84443 ASSAY THYROID STIM HORMONE: CPT

## 2024-07-02 PROCEDURE — 82570 ASSAY OF URINE CREATININE: CPT

## 2024-07-02 PROCEDURE — 36415 COLL VENOUS BLD VENIPUNCTURE: CPT

## 2024-07-03 LAB
ALBUMIN SERPL BCG-MCNC: 4.2 G/DL (ref 3.5–5)
ALP SERPL-CCNC: 63 U/L (ref 34–104)
ALT SERPL W P-5'-P-CCNC: 24 U/L (ref 7–52)
ANION GAP SERPL CALCULATED.3IONS-SCNC: 10 MMOL/L (ref 4–13)
AST SERPL W P-5'-P-CCNC: 17 U/L (ref 13–39)
BILIRUB SERPL-MCNC: 0.52 MG/DL (ref 0.2–1)
BUN SERPL-MCNC: 12 MG/DL (ref 5–25)
CALCIUM SERPL-MCNC: 9.4 MG/DL (ref 8.4–10.2)
CHLORIDE SERPL-SCNC: 102 MMOL/L (ref 96–108)
CHOLEST SERPL-MCNC: 188 MG/DL
CO2 SERPL-SCNC: 26 MMOL/L (ref 21–32)
CREAT SERPL-MCNC: 0.53 MG/DL (ref 0.6–1.3)
GFR SERPL CREATININE-BSD FRML MDRD: 127 ML/MIN/1.73SQ M
GLUCOSE P FAST SERPL-MCNC: 123 MG/DL (ref 65–99)
HDLC SERPL-MCNC: 49 MG/DL
LDLC SERPL CALC-MCNC: 99 MG/DL (ref 0–100)
POTASSIUM SERPL-SCNC: 4.1 MMOL/L (ref 3.5–5.3)
PROT SERPL-MCNC: 6.7 G/DL (ref 6.4–8.4)
SODIUM SERPL-SCNC: 138 MMOL/L (ref 135–147)
TRIGL SERPL-MCNC: 199 MG/DL

## 2024-07-16 ENCOUNTER — OFFICE VISIT (OUTPATIENT)
Dept: ENDOCRINOLOGY | Facility: CLINIC | Age: 29
End: 2024-07-16
Payer: COMMERCIAL

## 2024-07-16 VITALS
BODY MASS INDEX: 36.47 KG/M2 | DIASTOLIC BLOOD PRESSURE: 70 MMHG | SYSTOLIC BLOOD PRESSURE: 100 MMHG | WEIGHT: 213.6 LBS | HEIGHT: 64 IN | OXYGEN SATURATION: 99 % | HEART RATE: 96 BPM

## 2024-07-16 DIAGNOSIS — E11.9 TYPE 2 DIABETES MELLITUS WITHOUT COMPLICATION, WITHOUT LONG-TERM CURRENT USE OF INSULIN (HCC): Primary | ICD-10-CM

## 2024-07-16 DIAGNOSIS — E66.01 CLASS 2 SEVERE OBESITY DUE TO EXCESS CALORIES WITH SERIOUS COMORBIDITY AND BODY MASS INDEX (BMI) OF 36.0 TO 36.9 IN ADULT (HCC): ICD-10-CM

## 2024-07-16 DIAGNOSIS — E03.9 HYPOTHYROIDISM, UNSPECIFIED TYPE: ICD-10-CM

## 2024-07-16 DIAGNOSIS — E55.9 VITAMIN D DEFICIENCY: ICD-10-CM

## 2024-07-16 PROBLEM — E66.812 CLASS 2 SEVERE OBESITY DUE TO EXCESS CALORIES WITH SERIOUS COMORBIDITY AND BODY MASS INDEX (BMI) OF 36.0 TO 36.9 IN ADULT (HCC): Status: ACTIVE | Noted: 2024-07-16

## 2024-07-16 LAB — SL AMB POCT HEMOGLOBIN AIC: 6.2 (ref ?–6.5)

## 2024-07-16 PROCEDURE — 83036 HEMOGLOBIN GLYCOSYLATED A1C: CPT | Performed by: INTERNAL MEDICINE

## 2024-07-16 PROCEDURE — 99214 OFFICE O/P EST MOD 30 MIN: CPT | Performed by: INTERNAL MEDICINE

## 2024-07-16 RX ORDER — LEVOTHYROXINE SODIUM 0.07 MG/1
75 TABLET ORAL
Qty: 90 TABLET | Refills: 1 | Status: SHIPPED | OUTPATIENT
Start: 2024-07-16

## 2024-07-16 RX ORDER — CHLORAL HYDRATE 500 MG
1000 CAPSULE ORAL DAILY
COMMUNITY

## 2024-07-16 RX ORDER — INSULIN GLARGINE 100 [IU]/ML
INJECTION, SOLUTION SUBCUTANEOUS
Qty: 15 ML | Refills: 1 | Status: SHIPPED | OUTPATIENT
Start: 2024-07-16 | End: 2024-07-17

## 2024-07-16 RX ORDER — METFORMIN HYDROCHLORIDE 500 MG/1
TABLET, EXTENDED RELEASE ORAL
Qty: 360 TABLET | Refills: 3 | Status: SHIPPED | OUTPATIENT
Start: 2024-07-16

## 2024-07-16 NOTE — ASSESSMENT & PLAN NOTE
Lab Results   Component Value Date    HGBA1C 6.2 07/16/2024   Will start low-dose basal insulin to improve fasting glucose-advised to contact [facility] right away once pregnancy is confirmed

## 2024-07-16 NOTE — ASSESSMENT & PLAN NOTE
Continue levothyroxine at current dose-advised to let us know once pregnancy is confirmed her thyroid function test can be reevaluated

## 2024-07-16 NOTE — PROGRESS NOTES
Rabia Correa 29 y.o. female MRN: [id number]    Encounter: [id number]      Assessment & Plan   Problem List Items Addressed This Visit          Endocrine    Hypothyroidism     Continue levothyroxine at current dose-advised to let us know once pregnancy is confirmed her thyroid function test can be reevaluated         Relevant Medications    levothyroxine 75 mcg tablet    Other Relevant Orders    TSH, 3rd generation    Type 2 diabetes mellitus without complication, without long-term current use of insulin ([facility]) - Primary       Lab Results   Component Value Date    HGBA1C 6.2 07/16/2024   Will start low-dose basal insulin to improve fasting glucose-advised to contact [facility] right away once pregnancy is confirmed         Relevant Medications    metFORMIN (GLUCOPHAGE-XR) 500 mg 24 hr tablet    Insulin Glargine Solostar (Lantus SoloStar) 100 UNIT/ML SOPN    Other Relevant Orders    POCT hemoglobin A1c (Completed)    Hemoglobin A1C    Comprehensive metabolic panel       Other    Class 2 severe obesity due to excess calories with serious comorbidity and body mass index (BMI) of 36.0 to 36.9 in adult ([facility])    Vitamin D deficiency     Continue supplementation         Relevant Orders    Vitamin D 25 hydroxy        CC: Diabetes    History of Present Illness     HPI:  29-year-old female with type 2 diabetes, hypothyroidism, vitamin D deficiency and obesity seen in follow-up   Current regimen   Metformin  mg 4 tabs with dinner     Diarrhea once a day - cholecystectomy 2007    Checks f.s fasting occasionally and fasting 110s     Lost 4 lbs since last visit     Still breastfeeding - got her 2nd periods after giving birth 15 months back.  Planning a pregnancy    For hypothyroidism she is currently on LT4 75 mcg daily and has been taking it regularly and properly    Vitamin D deficiency she is currently on Vitamin D3 2000 iu daily         Review of Systems    Historical Information   Past Medical History:   Diagnosis Date     Anemia     Clostridium difficile infection     Hypothyroidism 12/2019    IBS (irritable bowel syndrome)     Kidney injury     L2 vertebral fracture (HCC)     Type 2 diabetes mellitus (HCC) 12/2019    Vitamin D deficiency      Past Surgical History:   Procedure Laterality Date    CHOLECYSTECTOMY      DC COLONOSCOPY FLX DX W/COLLJ SPEC WHEN PFRMD N/A 1/10/2017    Procedure: EGD AND COLONOSCOPY;  Surgeon: Luis Yi MD;  Location: [facility];  Service: Gastroenterology    WISDOM TOOTH EXTRACTION       Social History   Social History     Substance and Sexual Activity   Alcohol Use Not Currently     Social History     Substance and Sexual Activity   Drug Use No     Social History     Tobacco Use   Smoking Status Never   Smokeless Tobacco Never     Family History:   Family History   Problem Relation Age of Onset    Diabetes Other     Thyroid disease Other     Diabetes type II Mother     No Known Problems Father     Diabetes Paternal Grandfather     Hypothyroidism Paternal Grandfather        Meds/Allergies   Current Outpatient Medications   Medication Sig Dispense Refill    Alcohol Swabs 70 % PADS Use to cleanse injection site tid 120 each 5    Blood Glucose Monitoring Suppl (Contour Next EZ) w/Device KIT Dispense 1 kit. 1 kit 0    Butalbital-APAP-Caffeine -40 MG CAPS PRN      Cholecalciferol (VITAMIN D) 50 MCG (2000 UT) tablet Take 2,000 Units by mouth daily      Contour Next Test test strip Test up to 6 times a day. T2DM and pregnancy. 150 strip 8    Insulin Glargine Solostar (Lantus SoloStar) 100 UNIT/ML SOPN 10 units daily 15 mL 1    levothyroxine 75 mcg tablet Take 1 tablet (75 mcg total) by mouth daily in the early morning 90 tablet 1    loratadine (CLARITIN) 10 mg tablet Take 10 mg by mouth as needed      magnesium gluconate (MAGONATE) 500 mg tablet Take 500 mg by mouth daily      metFORMIN (GLUCOPHAGE-XR) 500 mg 24 hr tablet Take 4 tabs daily with food 360 tablet 3    Microlet Lancets MISC Use up to  "6 a day. T2DM and pregnancy. 150 each 8    Omega-3 Fatty Acids (fish oil) 1,000 mg Take 1,000 mg by mouth daily      ondansetron (ZOFRAN) 4 mg tablet       Prenatal Vit-Iron Carbonyl-FA (PRENATAL MULTIVITAMIN) TABS Take 1 tablet by mouth daily       No current facility-administered medications for this visit.     Allergies   Allergen Reactions    Bactrim [Sulfamethoxazole-Trimethoprim] Hives    Dilaudid [Hydromorphone Hcl] Itching       Objective   Vitals: Blood pressure 100/70, pulse 96, height 5' 4\" (1.626 m), weight 96.9 kg (213 lb 9.6 oz), SpO2 99%, currently breastfeeding.    Physical Exam  Vitals reviewed.   Constitutional:       General: She is not in acute distress.     Appearance: Normal appearance. She is obese. She is not ill-appearing, toxic-appearing or diaphoretic.   HENT:      Head: Normocephalic and atraumatic.   Eyes:      General: No scleral icterus.     Extraocular Movements: Extraocular movements intact.   Cardiovascular:      Rate and Rhythm: Normal rate and regular rhythm.      Heart sounds: Normal heart sounds. No murmur heard.  Pulmonary:      Effort: Pulmonary effort is normal. No respiratory distress.      Breath sounds: Normal breath sounds. No wheezing or rales.   Musculoskeletal:      Cervical back: Neck supple.      Right lower leg: No edema.      Left lower leg: No edema.   Lymphadenopathy:      Cervical: No cervical adenopathy.   Skin:     General: Skin is warm and dry.   Neurological:      General: No focal deficit present.      Mental Status: She is alert and oriented to person, place, and time.      Gait: Gait normal.   Psychiatric:         Mood and Affect: Mood normal.         Behavior: Behavior normal.         Thought Content: Thought content normal.         Judgment: Judgment normal.         The history was obtained from the review of the chart, patient.    Lab Results:   Lab Results   Component Value Date/Time    Hemoglobin A1C 6.2 07/16/2024 11:22 AM    Hemoglobin A1C 6.0 " "01/03/2024 10:16 AM    BUN 12 07/02/2024 07:59 AM    Potassium 4.1 07/02/2024 07:59 AM    Chloride 102 07/02/2024 07:59 AM    CO2 26 07/02/2024 07:59 AM    Creatinine 0.53 (L) 07/02/2024 07:59 AM    AST 17 07/02/2024 07:59 AM    ALT 24 07/02/2024 07:59 AM    Total Protein 6.7 07/02/2024 07:59 AM    Albumin 4.2 07/02/2024 07:59 AM    HDL, Direct 49 (L) 07/02/2024 07:59 AM    Triglycerides 199 (H) 07/02/2024 07:59 AM           Imaging Studies:     Portions of the record may have been created with voice recognition software. Occasional wrong word or \"sound a like\" substitutions may have occurred due to the inherent limitations of voice recognition software. Read the chart carefully and recognize, using context, where substitutions have occurred.    "

## 2024-07-17 DIAGNOSIS — E11.9 TYPE 2 DIABETES MELLITUS WITHOUT COMPLICATION, WITHOUT LONG-TERM CURRENT USE OF INSULIN (HCC): Primary | ICD-10-CM

## 2024-07-17 RX ORDER — INSULIN GLARGINE-YFGN 100 [IU]/ML
INJECTION, SOLUTION SUBCUTANEOUS
Qty: 15 ML | Refills: 1 | Status: SHIPPED | OUTPATIENT
Start: 2024-07-17

## 2024-07-31 ENCOUNTER — OFFICE VISIT (OUTPATIENT)
Dept: DERMATOLOGY | Facility: CLINIC | Age: 29
End: 2024-07-31
Payer: COMMERCIAL

## 2024-07-31 VITALS — HEIGHT: 64 IN | BODY MASS INDEX: 36 KG/M2 | TEMPERATURE: 97.1 F | WEIGHT: 210.9 LBS

## 2024-07-31 DIAGNOSIS — D18.01 CHERRY ANGIOMA: ICD-10-CM

## 2024-07-31 DIAGNOSIS — L72.0 EPIDERMAL INCLUSION CYST: ICD-10-CM

## 2024-07-31 DIAGNOSIS — D22.9 MULTIPLE BENIGN MELANOCYTIC NEVI: Primary | ICD-10-CM

## 2024-07-31 PROCEDURE — 99213 OFFICE O/P EST LOW 20 MIN: CPT | Performed by: STUDENT IN AN ORGANIZED HEALTH CARE EDUCATION/TRAINING PROGRAM

## 2024-07-31 NOTE — PROGRESS NOTES
"[facility] Note     Patient Name: Rabia Correa  Encounter Date: 7/31/2024     Have you been cared for by a [facility] Dermatologist in the last 3 years and, if so, which description applies to you?    Yes.  I have been here within the last 3 years, and my medical history has NOT changed since that time.  I am FEMALE/of child-bearing potential.    REVIEW OF SYSTEMS:  Have you recently had or currently have any of the following? No changes in my recent health.   PAST MEDICAL HISTORY:  Have you personally ever had or currently have any of the following?  If \"YES,\" then please provide more detail. No changes in my medical history.   HISTORY OF IMMUNOSUPPRESSION: Do you have a history of any of the following:  Systemic Immunosuppression such as Diabetes, Biologic or Immunotherapy, Chemotherapy, Organ Transplantation, Bone Marrow Transplantation?  YES, Diabetes     Answering \"YES\" requires the addition of the dotphrase \"IMMUNOSUPPRESSED\" as the first diagnosis of the patient's visit.   FAMILY HISTORY:  Any \"first degree relatives\" (parent, brother, sister, or child) with the following?    No changes in my family's known health.   PATIENT EXPERIENCE:    Do you want the Dermatologist to perform a COMPLETE skin exam today including a clinical examination under the \"bra and underwear\" areas?  Yes  If necessary, do we have your permission to call and leave a detailed message on your Preferred Phone number that includes your specific medical information?  Yes      Allergies   Allergen Reactions    Bactrim [Sulfamethoxazole-Trimethoprim] Hives    Dilaudid [Hydromorphone Hcl] Itching      Current Outpatient Medications:     Alcohol Swabs 70 % PADS, Use to cleanse injection site tid, Disp: 120 each, Rfl: 5    Blood Glucose Monitoring Suppl (Contour Next EZ) w/Device KIT, Dispense 1 kit., Disp: 1 kit, Rfl: 0    Butalbital-APAP-Caffeine -40 MG CAPS, PRN, Disp: , Rfl:     Cholecalciferol (VITAMIN D) " 50 MCG (2000 UT) tablet, Take 2,000 Units by mouth daily, Disp: , Rfl:     Contour Next Test test strip, Test up to 6 times a day. T2DM and pregnancy., Disp: 150 strip, Rfl: 8    Insulin Glargine-yfgn (Semglee, yfgn,) 100 UNIT/ML SOPN, 10 units daily, Disp: 15 mL, Rfl: 1    levothyroxine 75 mcg tablet, Take 1 tablet (75 mcg total) by mouth daily in the early morning, Disp: 90 tablet, Rfl: 1    loratadine (CLARITIN) 10 mg tablet, Take 10 mg by mouth as needed, Disp: , Rfl:     magnesium gluconate (MAGONATE) 500 mg tablet, Take 500 mg by mouth daily, Disp: , Rfl:     metFORMIN (GLUCOPHAGE-XR) 500 mg 24 hr tablet, Take 4 tabs daily with food, Disp: 360 tablet, Rfl: 3    Microlet Lancets MISC, Use up to 6 a day. T2DM and pregnancy., Disp: 150 each, Rfl: 8    Omega-3 Fatty Acids (fish oil) 1,000 mg, Take 1,000 mg by mouth daily, Disp: , Rfl:     ondansetron (ZOFRAN) 4 mg tablet, , Disp: , Rfl:     Prenatal Vit-Iron Carbonyl-FA (PRENATAL MULTIVITAMIN) TABS, Take 1 tablet by mouth daily, Disp: , Rfl:           Whom besides the patient is providing clinical information about today's encounter?   NO ADDITIONAL HISTORIAN (patient alone provided history)    Physical Exam and Assessment/Plan by Diagnosis:      MELANOCYTIC NEVI  -Relevant exam: Scattered over the trunk/extremities are homogenously pigmented brown macules and papules. ELM performed and without concerning findings.  - Exam and clinical history consistent with melanocytic nevi  - Educated on the ABCDE's of melanoma; handout provided  - Counseled to return to clinic prior to scheduled appointment should any of these lesions change or should any new lesions of concern arise  - Counseled on use of sun protection daily. Reviewed latest FDA sunscreen guidelines, including use of broad spectrum (UVA and UVB blocking) sunscreen or sun protective clothing with SPF 30-50 every 2-3 hours and reapplied after exposure to water; use of photoprotective clothing, including a  broad brim hat and UPF rated clothing if outdoors for several hours; avoid use of tanning beds as these pose significant risk for melanoma and skin cancer.      CHERRY ANGIOMAS  - Relevant exam: Scattered over the trunk/extremities are red papules  - Exam and clinical history consistent with cherry angiomas  - Educated that these are benign  - Educated that removal is considered aesthetic and would incur a fee.  - Patient does not wish to pursue removal at this time but will contact us should this change.        NEOPLASM OF UNCERTAIN BEHAVIOR OF THE SKIN, SUSPECT EPIDERMAL INCLUSION CYST vs Less likely lymph node    Physical Exam:  Anatomic Location Affected:  Right Axilla  Morphological Description:  Subcutaneous   Pertinent Positives:  Pertinent Negatives:    Additional History of Present Condition:  reported by patient, no change in size.     Assessment and Plan:  Based on a thorough discussion of this condition and the management approach to it (including a comprehensive discussion of the known risks, side effects and potential benefits of treatment), the patient (family) agrees to implement the following specific plan:  Ultrasound ordered to confirm consistent with EIC. If consistent, will schedule for excision     What are epidermal inclusion cysts?  Epidermal inclusion cysts are the most common, benign cutaneous cysts. There are many different names for epidermal inclusion cysts, including epidermoid cyst, epidermal cyst, infundibular cyst, inclusion cyst, and keratin cyst. These cysts can occur anywhere on the body and typically present as nodules directly underneath the skin. There is often a visible pore or opening in the center. The cysts are freely moveable and can range from a few millimeters to several centimeters in diameter. The center of epidermoid cysts almost always contains keratin, which has a cheesy appearance, and not sebum. They also do not originate from sebaceous glands. Therefore,  epidermal inclusion cysts are not the same as sebaceous cysts.    Cysts may remain stable or progressively enlarge over time. There are no reliable predictive factors to tell if an epidermal inclusion cyst will enlarge, become inflamed, or remain quiescent. Infected cysts tend to become larger, turn red, and are more noticeable to the patient. There may be accompanying pain and discomfort.     What causes epidermal inclusion cysts?  Epidermal inclusion cysts often appear out of the blue and are not contagious. They are due to a proliferation of epidermal cells within the dermis and are more common in men than women. They occur more frequently in patients in their 20s to 40s. Epidermal inclusion cysts by themselves are usually not inherited, but they can be hereditary in rare syndromes such as Calvert syndrome, nodular elastosis with cysts and comedones (Favre-Racouchot syndrome), and basal cell nevus syndrome (Gorlin syndrome). Elderly patients with chronic sun-damaged skin areas have a higher likelihood of developing epidermoid cysts. They often occur in areas where hair follicles have been inflamed or repeatedly irritated are more frequent in patients with acne vulgaris. In the  period, they are called milia.     Patients on BRAF inhibitors such as imiquimod and cyclosporine have a higher incidence of epidermoid cysts of the face.    How do we diagnose an epidermal inclusion cyst?  Epidermoid inclusion cysts are often diagnosed by history and physical exam. There is usually no need for biopsy prior to removal.  Radiographic and laboratory exams, such as ultrasound studies, are unnecessary and not typically ordered unless the practitioner suspects a genetic condition.    What is the treatment for an epidermal inclusion cyst?  Inflamed, uninfected epidermal inclusion cysts rarely resolve spontaneously without therapy or surgical intervention. Treatment is not emergent unless desired by the patient.      Definitive treatment is via surgical excision with walls intact. This method will prevent recurrence. This is best done when the cyst is not inflamed, to decrease the probability of rupture during surgery.   A local anesthetic will be injected around the cyst  A small incision is made in the skin overlying the cyst, and contents are expressed  The incision is repaired with sutures    Another option is to use a 4mm punch biopsy with cyst extraction through the defect.    Incision and drainage is often needed if the cyst is infected or inflamed. If there is surrounding cellulitis, oral antibiotic therapy may be necessary. The common agents used target methicillin sensitive Staphylococcal aureus and methicillin resistant S aureus in areas of high prevalence.        Scribe Attestation      I,:  Noreen Julio MA am acting as a scribe while in the presence of the attending physician.:       I,:  Ignacio Mathews MD personally performed the services described in this documentation    as scribed in my presence.:

## 2024-08-01 DIAGNOSIS — E03.9 HYPOTHYROIDISM, UNSPECIFIED TYPE: Primary | ICD-10-CM

## 2024-08-01 DIAGNOSIS — E55.9 VITAMIN D DEFICIENCY: ICD-10-CM

## 2024-08-01 DIAGNOSIS — E11.65 PRE-EXISTING TYPE 2 DIABETES MELLITUS WITH HYPERGLYCEMIA DURING PREGNANCY IN FIRST TRIMESTER (HCC): ICD-10-CM

## 2024-08-01 DIAGNOSIS — E03.9 HYPOTHYROIDISM, UNSPECIFIED TYPE: ICD-10-CM

## 2024-08-01 DIAGNOSIS — Z3A.01 LESS THAN 8 WEEKS GESTATION OF PREGNANCY: ICD-10-CM

## 2024-08-01 DIAGNOSIS — O24.111 PRE-EXISTING TYPE 2 DIABETES MELLITUS WITH HYPERGLYCEMIA DURING PREGNANCY IN FIRST TRIMESTER (HCC): ICD-10-CM

## 2024-08-01 NOTE — TELEPHONE ENCOUNTER
As discussed on the visit she needs to contact [facility] right away as soon as she gets pregnant.  For now increase Basaglar to 15 units daily  She will also need repeat thyroid function tests , I am ordering labs

## 2024-08-01 NOTE — TELEPHONE ENCOUNTER
Patient states she just found out she is pregnant and asking if provider wants to start her on short acting insulin. States she was on Novolog with last 2 pregnancies. Currently taking Semglee 10U daily and Metformin XR 500mg 4 tabs daily. States fasting BS was 130 today and is usually around 120, 2 hours after breakfast 126 and 2 hours after dinner 138. Patient also is requesting new rx for test strips and pen needles to [facility]. Please advise, thank you.

## 2024-08-02 ENCOUNTER — APPOINTMENT (OUTPATIENT)
Dept: LAB | Facility: CLINIC | Age: 29
End: 2024-08-02
Payer: COMMERCIAL

## 2024-08-02 DIAGNOSIS — Z32.01 PREGNANCY EXAMINATION OR TEST, POSITIVE RESULT: Primary | ICD-10-CM

## 2024-08-02 LAB
B-HCG SERPL-ACNC: 43.8 MIU/ML (ref 0–5)
T4 FREE SERPL-MCNC: 0.85 NG/DL (ref 0.61–1.12)
TSH SERPL DL<=0.05 MIU/L-ACNC: 1.36 UIU/ML (ref 0.45–4.5)

## 2024-08-02 PROCEDURE — 84439 ASSAY OF FREE THYROXINE: CPT | Performed by: INTERNAL MEDICINE

## 2024-08-02 PROCEDURE — 84702 CHORIONIC GONADOTROPIN TEST: CPT

## 2024-08-02 PROCEDURE — 36415 COLL VENOUS BLD VENIPUNCTURE: CPT

## 2024-08-02 PROCEDURE — 84443 ASSAY THYROID STIM HORMONE: CPT | Performed by: INTERNAL MEDICINE

## 2024-08-02 NOTE — TELEPHONE ENCOUNTER
Patient called- I read her Dr. Thorpe's message. She will increase her Basaglar to 15 units daily, but she is concerned about her fast acting sugars as well.    She is going to see MFM but they will not see her until after her first ultrasound. Should she change her fast acting insulin until she can see them?    Please advise.

## 2024-08-05 ENCOUNTER — APPOINTMENT (OUTPATIENT)
Dept: LAB | Facility: CLINIC | Age: 29
End: 2024-08-05
Payer: COMMERCIAL

## 2024-08-05 DIAGNOSIS — Z32.01 PREGNANCY EXAMINATION OR TEST, POSITIVE RESULT: ICD-10-CM

## 2024-08-05 DIAGNOSIS — E03.9 HYPOTHYROIDISM, UNSPECIFIED TYPE: Primary | ICD-10-CM

## 2024-08-05 LAB — B-HCG SERPL-ACNC: 105.3 MIU/ML (ref 0–5)

## 2024-08-05 PROCEDURE — 36415 COLL VENOUS BLD VENIPUNCTURE: CPT

## 2024-08-05 PROCEDURE — 84702 CHORIONIC GONADOTROPIN TEST: CPT

## 2024-08-05 NOTE — RESULT ENCOUNTER NOTE
Please call the patient regarding labs -TSH at goal-continue levothyroxine at current dose.  Repeat TSH and free T4 in 6 weeks

## 2024-08-06 DIAGNOSIS — Z79.4 TYPE 2 DIABETES MELLITUS WITH HYPERGLYCEMIA, WITH LONG-TERM CURRENT USE OF INSULIN (HCC): Primary | ICD-10-CM

## 2024-08-06 DIAGNOSIS — E11.65 TYPE 2 DIABETES MELLITUS WITH HYPERGLYCEMIA, WITH LONG-TERM CURRENT USE OF INSULIN (HCC): Primary | ICD-10-CM

## 2024-08-07 NOTE — PATIENT INSTRUCTIONS
-1st trimester A1c 6.2% close to goal. CMP within normal. Albumin/Creat ratio within normal.   -Repeat A1c, CMP and UPCR in 6 weeks.   -A1c goal is 5.6% or less.  -Due to FBS>90, increase semglee to 20 units daily.   -Continue Metformin 2000 mg daily.   -If 2 hours post meal readings above 120, will start Novolog before meals.   -Send a message on Monday, 8/12/2024 for readings to be reviewed.   -Follow up with dietitian.  -Keep 3 day food log to review with dietitian.  -Self monitoring blood glucose (SMBG) fasting; 2 hours after start of each meal and with hypoglycemia.   -Glucose goals: fasting 60-90 mg/dL, 140 mg/dL or less 1 hour post meals, and 120 mg/dL or less 2 hours post meal.   -Report glucose readings weekly via [employer] every Thursday.  -Start GDM meal plan with 3 meals and 3 snacks including recommended combination of carb, protein and fat per meal/snack.  -Please eat meal or snack every 2-3.5 hours while awake.  -No more than 8 to 10 hours of fasting overnight.  -Refer to [employer] MyPlate online as a reference.  -2nd/3rd trimester minimum total daily carbohydrates 175 grams paired with half grams in protein.   -Stay active if no restriction from your OB, walk up to 30 minutes a day.  -Always have glucose available to treat hypoglycemia. Use 15:15 rule.   -Refer to hypoglycemia patient education sheet. SMBG when experiencing signs and symptoms of hypoglycemia and prior to driving.   -Serial fetal growth ultrasounds.  -20 weeks detailed fetal growth ultrasound.  -22-24 weeks fetal echo.  -At 32 weeks gestation; NST twice a week and REBECCA weekly.   -Continue prenatal vitamin as recommended.  -Between 12 to 16 weeks gestation, starting baby aspirin as a preventative measure to pre-eclampsia.   -At 36 weeks gestation, stop baby aspirin.   -Continue follow-up with your OB and MFM as recommended.  -Stay in close contact with diabetes education team.  -Insulin requirements during pregnancy; basal/bolus  concept and Metformin discussed.  -Very important to maintain tight glucose control during pregnancy to decrease risk factors including fetal macrosomia; birth injury; risk of ; polyhydramnios; pre-term labor; pre-eclampsia;  hypoglycemia; jaundice and stillbirth.   -Diabetes and pregnancy booklet; meal plan and hypoglycemia patient education.      Thank you for choosing us for your  care today.  If you have any questions about your ultrasound or care, please do not hesitate to contact us or your primary obstetrician.        Some general instructions for your pregnancy are:    Exercise: Aim for 150 minutes per week of regular exercise.  Walking is great!  Nutrition: Choose healthy sources of calcium, iron, and protein.  Avoid ultraprocessed foods and added sugar.  Learn about Preeclampsia: preeclampsia is a common, potentially serious high blood pressure complication in pregnancy.  A blood pressure of 140mmHg (systolic or top number) or 90mmHg (diastolic or bottom number) should be evaluated by your doctor.  Aspirin is sometimes prescribed in early pregnancy to prevent preeclampsia in women with risk factors - ask your obstetrician if you should be on this medication.  For more resources, visit:  https://[email or web address].[email or web address]  If you smoke, please try to quit completely but also try to reduce your smoking by as much as possible (as soon as possible).  Do not vape.  Please also avoid cannabis products.  Other warning signs to watch out for in pregnancy or postpartum: chest pain, obstructed breathing or shortness of breath, seizures, thoughts of hurting yourself or your baby, bleeding, a painful or swollen leg, fever, or headache (see [employer]NN POST-BIRTH Warning Signs campaign).  If these happen call 911.  Itching is also not normal in pregnancy and if you experience this, especially over your hands and feet, potentially worse at night, notify your doctors.

## 2024-08-08 ENCOUNTER — TELEMEDICINE (OUTPATIENT)
Facility: HOSPITAL | Age: 29
End: 2024-08-08
Payer: COMMERCIAL

## 2024-08-08 VITALS — WEIGHT: 208 LBS | BODY MASS INDEX: 35.51 KG/M2 | HEIGHT: 64 IN

## 2024-08-08 DIAGNOSIS — Z34.90 PREGNANCY, UNSPECIFIED GESTATIONAL AGE: ICD-10-CM

## 2024-08-08 DIAGNOSIS — E66.01 CLASS 2 SEVERE OBESITY DUE TO EXCESS CALORIES WITH SERIOUS COMORBIDITY AND BODY MASS INDEX (BMI) OF 35.0 TO 35.9 IN ADULT (HCC): ICD-10-CM

## 2024-08-08 DIAGNOSIS — O24.112 PRE-EXISTING TYPE 2 DIABETES MELLITUS WITH HYPERGLYCEMIA DURING PREGNANCY IN SECOND TRIMESTER (HCC): Primary | ICD-10-CM

## 2024-08-08 DIAGNOSIS — O99.281 HYPOTHYROIDISM AFFECTING PREGNANCY IN FIRST TRIMESTER: ICD-10-CM

## 2024-08-08 DIAGNOSIS — E55.9 VITAMIN D DEFICIENCY: ICD-10-CM

## 2024-08-08 DIAGNOSIS — E03.9 HYPOTHYROIDISM AFFECTING PREGNANCY IN FIRST TRIMESTER: ICD-10-CM

## 2024-08-08 DIAGNOSIS — E11.65 PRE-EXISTING TYPE 2 DIABETES MELLITUS WITH HYPERGLYCEMIA DURING PREGNANCY IN SECOND TRIMESTER (HCC): Primary | ICD-10-CM

## 2024-08-08 PROBLEM — E11.9 TYPE 2 DIABETES MELLITUS (HCC): Status: RESOLVED | Noted: 2020-01-10 | Resolved: 2024-08-08

## 2024-08-08 PROCEDURE — 99215 OFFICE O/P EST HI 40 MIN: CPT | Performed by: NURSE PRACTITIONER

## 2024-08-08 PROCEDURE — 99417 PROLNG OP E/M EACH 15 MIN: CPT | Performed by: NURSE PRACTITIONER

## 2024-08-08 RX ORDER — INSULIN ASPART 100 [IU]/ML
4 INJECTION, SOLUTION INTRAVENOUS; SUBCUTANEOUS
Qty: 15 ML | Refills: 0 | Status: SHIPPED | OUTPATIENT
Start: 2024-08-08

## 2024-08-08 NOTE — PROGRESS NOTES
Virtual Regular Visit  Name: Rabia Correa      : 1995      MRN: [id number]  Encounter Provider: CLAUDINE Zuniga  Encounter Date: 2024   Encounter department: [facility]    Verification of patient location:    Patient is located at Home in the following state in which I hold an active license [place]    Assessment & Plan   1. Pre-existing type 2 diabetes mellitus with hyperglycemia during pregnancy in second trimester (HCC)  Assessment & Plan:  -1st trimester A1c 6.2% close to goal. CMP within normal. Albumin/Creat ratio within normal.   -Repeat A1c, CMP and UPCR in 6 weeks.   -A1c goal is 5.6% or less.  -Due to FBS>90, increase semglee to 20 units daily.   -Continue Metformin 2000 mg daily.   -If 2 hours post meal readings above 120, will start Novolog before meals.   -Send a message on Monday, 2024 for readings to be reviewed.   -Follow up with dietitian.  -Keep 3 day food log to review with dietitian.  -Self monitoring blood glucose (SMBG) fasting; 2 hours after start of each meal and with hypoglycemia.   -Glucose goals: fasting 60-90 mg/dL, 140 mg/dL or less 1 hour post meals, and 120 mg/dL or less 2 hours post meal.   -Report glucose readings weekly via [employer] every Thursday.  -Start GDM meal plan with 3 meals and 3 snacks including recommended combination of carb, protein and fat per meal/snack.  -Please eat meal or snack every 2-3.5 hours while awake.  -No more than 8 to 10 hours of fasting overnight.  -Refer to [employer] MyPlate online as a reference.  -2nd/3rd trimester minimum total daily carbohydrates 175 grams paired with half grams in protein.   -Stay active if no restriction from your OB, walk up to 30 minutes a day.  -Always have glucose available to treat hypoglycemia. Use 15:15 rule.   -Refer to hypoglycemia patient education sheet. SMBG when experiencing signs and symptoms of hypoglycemia and prior to driving.   -Serial fetal growth  ultrasounds.  -20 weeks detailed fetal growth ultrasound.  -22-24 weeks fetal echo.  -At 32 weeks gestation; NST twice a week and REBECCA weekly.   -Continue prenatal vitamin as recommended.  -Between 12 to 16 weeks gestation, starting baby aspirin as a preventative measure to pre-eclampsia.   -At 36 weeks gestation, stop baby aspirin.   -Continue follow-up with your OB and MFM as recommended.  -Stay in close contact with diabetes education team.  -Insulin requirements during pregnancy; basal/bolus concept and Metformin discussed.  -Very important to maintain tight glucose control during pregnancy to decrease risk factors including fetal macrosomia; birth injury; risk of ; polyhydramnios; pre-term labor; pre-eclampsia;  hypoglycemia; jaundice and stillbirth.   -Diabetes and pregnancy booklet; meal plan and hypoglycemia patient education.       Lab Results   Component Value Date    HGBA1C 6.2 2024     Orders:  -     Ambulatory Referral to Maternal Fetal Medicine  -     Application Developments plct glucose flowsheet  -     Insulin Pen Needle 31G X 5 MM MISC; Inject under the skin 4 (four) times a day (before meals and at bedtime) Use one with each pen needle.  -     insulin aspart (NovoLOG FlexPen) 100 UNIT/ML injection pen; Inject 4 Units under the skin 2 (two) times a day before meals T2DM and pregnancy. To be titrated as directed.  -     Comprehensive metabolic panel; Future  -     Hemoglobin A1C; Future  -     Protein / creatinine ratio, urine; Future  2. Pregnancy, unspecified gestational age  -     "[employer]hart glucose flowsheet  -     Insulin Pen Needle 31G X 5 MM MISC; Inject under the skin 4 (four) times a day (before meals and at bedtime) Use one with each pen needle.  -     insulin aspart (NovoLOG FlexPen) 100 UNIT/ML injection pen; Inject 4 Units under the skin 2 (two) times a day before meals T2DM and pregnancy. To be titrated as directed.  -     Comprehensive metabolic panel; Future  -     Hemoglobin A1C;  Future  -     Protein / creatinine ratio, urine; Future  3. Class 2 severe obesity due to excess calories with serious comorbidity and body mass index (BMI) of 35.0 to 35.9 in adult ([facility])  Assessment & Plan:  -First visit weight 208 lbs. BMI 35.70.  -Recommended weight gain 11 to 20 lbs.  -Start GDM meal plan and follow up with dietitian.   Orders:  -     [employer] glucose flowsheet  -     Insulin Pen Needle 31G X 5 MM MISC; Inject under the skin 4 (four) times a day (before meals and at bedtime) Use one with each pen needle.  -     insulin aspart (NovoLOG FlexPen) 100 UNIT/ML injection pen; Inject 4 Units under the skin 2 (two) times a day before meals T2DM and pregnancy. To be titrated as directed.  -     Comprehensive metabolic panel; Future  -     Hemoglobin A1C; Future  -     Protein / creatinine ratio, urine; Future  4. Vitamin D deficiency  Assessment & Plan:  -On D3 supplements and prenatal vitamin.   Orders:  -     [employer] glucose flowsheet  -     Insulin Pen Needle 31G X 5 MM MISC; Inject under the skin 4 (four) times a day (before meals and at bedtime) Use one with each pen needle.  -     insulin aspart (NovoLOG FlexPen) 100 UNIT/ML injection pen; Inject 4 Units under the skin 2 (two) times a day before meals T2DM and pregnancy. To be titrated as directed.  -     Comprehensive metabolic panel; Future  -     Hemoglobin A1C; Future  -     Protein / creatinine ratio, urine; Future  5. Hypothyroidism affecting pregnancy in first trimester  Assessment & Plan:  -On Levothyroxine 75 mcg daily and takes correctly.  -Last TSH normal.  -Followed by endocrinology.   Orders:  -     M[employer] glucose flowsheet  -     Insulin Pen Needle 31G X 5 MM MISC; Inject under the skin 4 (four) times a day (before meals and at bedtime) Use one with each pen needle.  -     insulin aspart (NovoLOG FlexPen) 100 UNIT/ML injection pen; Inject 4 Units under the skin 2 (two) times a day before meals T2DM and pregnancy. To be titrated as  directed.  -     Comprehensive metabolic panel; Future  -     Hemoglobin A1C; Future  -     Protein / creatinine ratio, urine; Future        Encounter provider CLAUDINE Zuniga    The patient was identified by name and date of birth. Rabia Correa was informed that this is a telemedicine visit and that the visit is being conducted through the Epic Embedded platform. She agrees to proceed..  My office door was closed. No one else was in the room.  She acknowledged consent and understanding of privacy and security of the video platform. The patient has agreed to participate and understands they can discontinue the visit at any time.    Patient is aware this is a billable service.     History of Present Illness     Rabia Correa is a 29 y.o. female  early pregnancy, LMP 2024, T2DM- started on semglee 10 units daily and increased to 15 units daily. Testing fasting and 2 hours post start of each meal. 's.   Wakes up 7 to 8 AM, breakfast 8:30 to 9 AM-waffle and protein shake; snack at 11 AM-cheese stick with gold fish or apple with PB; 12 to 2 PM lunch- soup or grilled chicken and bread; 4 PM snack; dinner at 6 PM-protein, carb and veggie; 9:30 PM snack-rice cake or apple with peanut butter or popcorn with cheese stick; bedtime 10:30 to 11 PM.   Works weekend nights in NICU.   Walks 2 miles with dogs about 45 minutes daily.   Hypothyroidism- on LT4 75 mcg daily, takes correctly, last TSH normal, followed by endocrinology.  Vitamin D deficiency- on OTC D3 daily and prenatal vitamin.   Review of Systems   Constitutional:  Positive for fatigue. Negative for fever.   HENT:  Negative for congestion and trouble swallowing.    Eyes:  Negative for visual disturbance.        Last eye 2024 normal.    Respiratory:  Negative for cough and shortness of breath.    Cardiovascular:  Negative for chest pain, palpitations and leg swelling.   Gastrointestinal:  Positive for nausea. Negative for constipation  "and vomiting.   Endocrine: Positive for polyuria. Negative for polydipsia and polyphagia.   Genitourinary:  Negative for difficulty urinating and vaginal bleeding.   Musculoskeletal:  Negative for back pain.   Skin:  Negative for rash.   Neurological:  Negative for numbness and headaches.   Psychiatric/Behavioral:  Negative for sleep disturbance.        Objective     Ht 5' 4\" (1.626 m)   Wt 94.3 kg (208 lb)   BMI 35.70 kg/m²   Physical Exam  HENT:      Head: Normocephalic.      Nose: Nose normal.   Eyes:      Conjunctiva/sclera: Conjunctivae normal.   Pulmonary:      Effort: Pulmonary effort is normal.   Neurological:      Mental Status: She is alert and oriented to person, place, and time.   Psychiatric:         Mood and Affect: Mood normal.         Behavior: Behavior normal.         Thought Content: Thought content normal.         Judgment: Judgment normal.       Visit Time  Total Visit Duration: 53 minutes with patient and 10 minutes charting.         "

## 2024-08-08 NOTE — ASSESSMENT & PLAN NOTE
-On Levothyroxine 75 mcg daily and takes correctly.  -Last TSH normal.  -Followed by endocrinology.

## 2024-08-08 NOTE — ASSESSMENT & PLAN NOTE
-First visit weight 208 lbs. BMI 35.70.  -Recommended weight gain 11 to 20 lbs.  -Start GDM meal plan and follow up with dietitian.

## 2024-08-08 NOTE — ASSESSMENT & PLAN NOTE
-1st trimester A1c 6.2% close to goal. CMP within normal. Albumin/Creat ratio within normal.   -Repeat A1c, CMP and UPCR in 6 weeks.   -A1c goal is 5.6% or less.  -Due to FBS>90, increase semglee to 20 units daily.   -Continue Metformin 2000 mg daily.   -If 2 hours post meal readings above 120, will start Novolog before meals.   -Send a message on Monday, 8/12/2024 for readings to be reviewed.   -Follow up with dietitian.  -Keep 3 day food log to review with dietitian.  -Self monitoring blood glucose (SMBG) fasting; 2 hours after start of each meal and with hypoglycemia.   -Glucose goals: fasting 60-90 mg/dL, 140 mg/dL or less 1 hour post meals, and 120 mg/dL or less 2 hours post meal.   -Report glucose readings weekly via [employer] every Thursday.  -Start GDM meal plan with 3 meals and 3 snacks including recommended combination of carb, protein and fat per meal/snack.  -Please eat meal or snack every 2-3.5 hours while awake.  -No more than 8 to 10 hours of fasting overnight.  -Refer to [employer] MyPlate online as a reference.  -2nd/3rd trimester minimum total daily carbohydrates 175 grams paired with half grams in protein.   -Stay active if no restriction from your OB, walk up to 30 minutes a day.  -Always have glucose available to treat hypoglycemia. Use 15:15 rule.   -Refer to hypoglycemia patient education sheet. SMBG when experiencing signs and symptoms of hypoglycemia and prior to driving.   -Serial fetal growth ultrasounds.  -20 weeks detailed fetal growth ultrasound.  -22-24 weeks fetal echo.  -At 32 weeks gestation; NST twice a week and REBECCA weekly.   -Continue prenatal vitamin as recommended.  -Between 12 to 16 weeks gestation, starting baby aspirin as a preventative measure to pre-eclampsia.   -At 36 weeks gestation, stop baby aspirin.   -Continue follow-up with your OB and MFM as recommended.  -Stay in close contact with diabetes education team.  -Insulin requirements during pregnancy; basal/bolus  concept and Metformin discussed.  -Very important to maintain tight glucose control during pregnancy to decrease risk factors including fetal macrosomia; birth injury; risk of ; polyhydramnios; pre-term labor; pre-eclampsia;  hypoglycemia; jaundice and stillbirth.   -Diabetes and pregnancy booklet; meal plan and hypoglycemia patient education.       Lab Results   Component Value Date    HGBA1C 6.2 2024

## 2024-08-09 ENCOUNTER — APPOINTMENT (OUTPATIENT)
Dept: LAB | Facility: HOSPITAL | Age: 29
End: 2024-08-09
Payer: COMMERCIAL

## 2024-08-09 DIAGNOSIS — Z32.01 PREGNANCY EXAMINATION OR TEST, POSITIVE RESULT: ICD-10-CM

## 2024-08-09 LAB
ABO GROUP BLD: NORMAL
B-HCG SERPL-ACNC: 615.6 MIU/ML (ref 0–5)
BLD GP AB SCN SERPL QL: NEGATIVE
RH BLD: POSITIVE

## 2024-08-09 PROCEDURE — 86850 RBC ANTIBODY SCREEN: CPT

## 2024-08-09 PROCEDURE — 84702 CHORIONIC GONADOTROPIN TEST: CPT

## 2024-08-09 PROCEDURE — 86901 BLOOD TYPING SEROLOGIC RH(D): CPT

## 2024-08-09 PROCEDURE — 36415 COLL VENOUS BLD VENIPUNCTURE: CPT

## 2024-08-09 PROCEDURE — 86900 BLOOD TYPING SEROLOGIC ABO: CPT

## 2024-08-22 ENCOUNTER — TELEMEDICINE (OUTPATIENT)
Facility: HOSPITAL | Age: 29
End: 2024-08-22
Payer: COMMERCIAL

## 2024-08-22 ENCOUNTER — TELEPHONE (OUTPATIENT)
Facility: HOSPITAL | Age: 29
End: 2024-08-22

## 2024-08-22 DIAGNOSIS — Z34.90 PREGNANCY, UNSPECIFIED GESTATIONAL AGE: ICD-10-CM

## 2024-08-22 DIAGNOSIS — O24.111 PREGNANCY WITH TYPE 2 DIABETES MELLITUS IN FIRST TRIMESTER: Primary | ICD-10-CM

## 2024-08-22 PROCEDURE — G0108 DIAB MANAGE TRN  PER INDIV: HCPCS | Performed by: DIETITIAN, REGISTERED

## 2024-08-22 NOTE — PROGRESS NOTES
Virtual Regular Visit  Name: Rabia Correa      : 1995      MRN: [id number]  Encounter Provider: Suzie Bansal  Encounter Date: 2024   Encounter department: [facility]    Verification of patient location:    Patient is located at {Amb Virtual Patient Location:[address]1903} in the following state in which I hold an active license { amb virtual patient location:[address]}    Assessment & Plan   1. Pregnancy with type 2 diabetes mellitus in first trimester  2. Pregnancy, unspecified gestational age        Encounter provider Suzie Bansal    The patient was identified by name and date of birth. Rabia Correa was informed that this is a telemedicine visit and that the visit is being conducted through {AMB VIRTUAL VISIT MEDIUM:97544}.  {Telemedicine confidentiality :99946} {Telemedicine participants:88581}  She acknowledged consent and understanding of privacy and security of the video platform. The patient has agreed to participate and understands they can discontinue the visit at any time.    Patient is aware this is a billable service.     History of Present Illness   {Disappearing Hyperlinks I Encounters * My Last Note * Since Last Visit * History :21016}  Rabia Correa is a 29 y.o. female who presents ***    Review of Systems  {Select to Display PMH (Optional):03464}  Objective   {Disappearing Hyperlinks   Review Vitals * Enter New Vitals * Results Review * Labs * Imaging * Cardiology * Procedures * Lung Cancer Screening :06656}  There were no vitals taken for this visit.  Physical Exam    Visit Time  Total Visit Duration: ***

## 2024-08-22 NOTE — PROGRESS NOTES
CLASS 2 - Individual  (virtual visit)    Thank you for referring your patient to [facility] Maternal Fetal Medicine Diabetes and Pregnancy Program.     Rabia Correa is a  29 y.o. female who presents today unaccompanied for Patient Education (Class 2), Virtual Regular Visit, Diabetes Type 2, and Virtual Regular Visit. Estimated to be 8 weeks pregnant (Sunday). - Patient complains of morning sickness (nausea/aversions; denies vomiting) - Experienced similar symptoms until 14-15 weeks pregnant during prior pregnancies.  Patient is at 7w4d gestation (reported by patient) , Estimated Date of Delivery: None noted..     Visit Diagnosis:  Encounter Diagnosis     ICD-10-CM    1. Pregnancy with type 2 diabetes mellitus in first trimester  O24.111       2. Pregnancy, unspecified gestational age  Z34.90            Reviewed and updated the following from patients medical record: Allergies, and Current Medications.    Labs  GDM LABS: See Class 1 Note    A1C:  Lab Results   Component Value Date/Time    HGBA1C 6.2 07/16/2024 11:22 AM    HGBA1C 6.0 01/03/2024 10:16 AM    HGBA1C 5.0 01/30/2023 07:13 AM    HGBA1C 5.4 10/28/2022 09:08 AM    HGBA1C 5.6 09/15/2022 09:09 AM        Labs Ordered This Visit: None    Current Medications:    Current Outpatient Medications:     Cholecalciferol (VITAMIN D) 50 MCG (2000 UT) tablet, Take 2,000 Units by mouth daily, Disp: , Rfl:     Contour Next Test test strip, Test up to 6 times a day. T2DM and pregnancy., Disp: 150 strip, Rfl: 8    Insulin Glargine-yfgn (Semglee, yfgn,) 100 UNIT/ML SOPN, 10 units daily (Patient taking differently: Inject 28 Units under the skin daily at bedtime 28 units taken daily at 4pm), Disp: 15 mL, Rfl: 1    levothyroxine 75 mcg tablet, Take 1 tablet (75 mcg total) by mouth daily in the early morning, Disp: 90 tablet, Rfl: 1    magnesium gluconate (MAGONATE) 500 mg tablet, Take 500 mg by mouth daily, Disp: , Rfl:     metFORMIN (GLUCOPHAGE-XR) 500 mg 24 hr tablet,  "Take 4 tabs daily with food, Disp: 360 tablet, Rfl: 3    Microlet Lancets MISC, Use up to 6 a day. T2DM and pregnancy., Disp: 150 each, Rfl: 8    Omega-3 Fatty Acids (fish oil) 1,000 mg, Take 1,000 mg by mouth daily, Disp: , Rfl:     Prenatal Vit-Iron Carbonyl-FA (PRENATAL MULTIVITAMIN) TABS, Take 1 tablet by mouth daily, Disp: , Rfl:     Blood Glucose Monitoring Suppl (Contour Next EZ) w/Device KIT, Dispense 1 kit., Disp: 1 kit, Rfl: 0    Butalbital-APAP-Caffeine -40 MG CAPS, PRN, Disp: , Rfl:     insulin aspart (NovoLOG FlexPen) 100 UNIT/ML injection pen, Inject 4 Units under the skin 2 (two) times a day before meals T2DM and pregnancy. To be titrated as directed., Disp: 15 mL, Rfl: 0    Insulin Pen Needle 31G X 5 MM MISC, Inject under the skin 4 (four) times a day (before meals and at bedtime) Use one with each pen needle., Disp: 100 each, Rfl: 7    loratadine (CLARITIN) 10 mg tablet, Take 10 mg by mouth as needed, Disp: , Rfl:     ondansetron (ZOFRAN) 4 mg tablet, , Disp: , Rfl:      Anthropometrics:  Ht Readings from Last 1 Encounters:   24 5' 4\" (1.626 m)      Wt Readings from Last 3 Encounters:   24 94.3 kg (208 lb)   24 95.7 kg (210 lb 14.4 oz)   24 96.9 kg (213 lb 9.6 oz)          Total Pregnancy Weight Gain Recommendations: BMI (> 30) 11-20 lbs - First visit weight 208 lbs. BMI 35.70.  Current Wt Status Compared to Recommendations: *Women should gain 2-5lbs in first trimester; Rate of wt gain in 2nd and 3rd trimester depends on TWG recommendations based on pregravid BMI and height    Most Recent Ultrasound Results: Currently <8 weeks      BLOOD GLUCOSE MONITORING:   Glucometer: Contour Next EZ     Reinforced at Today's Visit:   Timing/Frequency of SMB x per day (Fasting, 2 hour after start of each meal)  Goals: (Fasting) 60-90mg/dL // (2hr PP) <120mg/dL  Reporting Guidelines: Weekly via Phone: [phone] OR My Chart (Message with image attachment) OR Glucose " Flowsheet  Method of Reporting: [employer] Glucose Flowsheet    BG LOG:             Review of Blood Glucose Log:   Indicates poor glycemic control  FBG =  Overall trending down  -  Post-Prandial BG = Not well controlled - Testing blood sugars 4 x daily (Fasting/2 hours after the start of meal)    Education completed on Hypoglycemia Guidelines - 15:15 rule (Patient reports previously taking Peanut Butter; Discussed sources of rapid acting carbohydrates); Discussed testing blood sugar to confirm any symptoms of hypoglycemia; Patient declines need for Dexcom CGM (States she wore one during first pregnancy and disliked)    MEDICATIONS:    Due to fasting blood sugars overall trending down,  Continue Semglee 28 units daily (4pm) - 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  Continue Metformin 2000 mg daily    Due to elevated post prandial blood sugars (>120mg/dL),  Initiate bolus insulin - Novolog 4 units with meals 1-2-3 (Janee Mock sent [employer] message to patient)  Inject 15 minutes before meal, Hold if NPO  Education completed on basal vs bolus insulin (duration of action); 15:15 rule/Hypoglycemia guidelines       MEAL PLAN (Patient was provided with a meal plan including 3 meals and 3 snacks at class 1)  *Calories: 2000 calorie (CHO:45-15-60-15-60-30) (PRO: 2/3-1-3/4-1-3/4-2)    Review of Patient's Current Diet: refer to class 1 note for additional details (Eats 3 meals + 2-3 snacks day - struggles to eat 3 meals at times due to nausea)    Current Aversion: Peanut Butter, chicken, eggs  Tolerates Beef, Fish, Fairlife protein shake - 30 grams protein (Kashi Protein Waffles), Cheese    Day off 24 hour recall: (wakes up at 7-7:30am):    Meal #1 (8:30am): Fairlife Protein Shake + Anthony Waffle AM   Snack: Skipped (Travel to [facility] on this day)  Meal #2 (1pm): 1/2 Egg Salad [place] on Wheat Bread (Work day:   PM Snack (4pm): 1 oz Cheese + Crackers (15 grams)    Meal #3 (7:30pm): Grilled Chicken Caesar Wrap    Bedtime Snack(9:30pm): Skinny Popcorn (2 cups) + Light and Fit Yogurt    Work Day 24 hour recall: Wakes at 3pm-Bedtime: 9AM    Meal #1 (4:30pm) Stir Shay - 1 cup Brown Rice, Soy Sauce/Teriyaki, 3 oz Chicken, Veggies  Snack (9pm): Cheese stick, 2 cups Skinny Pop  Meal #2 (1am): Stir Shay - 1 cup Brown Rice, Soy Sauce/Teriyaki, 3 oz Chicken, Veggies  Snack (4:30am): Protein Shake + 3 saltine crackers (Struggled to eat as felt nauseous)  Meal #3 (8:30am): 1 medium Banana + Peanut Butter + 8 oz 1% milk, 1 medium banana      Beverages: No Sugar Sweetened Beverages      Meal Plan Recommendations Compliant? Comments:    Consistent CHO Intake Yes  May be undereating carbohydrates at meals complicated by morning sickness; Discussed snack ideas, setting alarms on phone to remember to eat   3 Meals and 3 Snacks Yes     Protein w/ Every Meal and Snack Yes     Eating every 2-3.5hrs while awake  Yes     8-10hrs Fasting (from time of bedtime snack until first meal of the day) Yes          Overall Impression: Pt has a good compliance and understanding of diet recommendations at this time.    Reinforced Diet Instructions:  Individualized meal plan. Discussed minimum target of 175 grams carbohydrate in 2nd/3rd trimester - (Suggestions: PB Fit, Premier Protein Pancakes, Barilla Protein Pasta)  Importance of consistent carbohydrate intake via 3 meals and 3 snacks per day   Importance of protein as it relates to blood glucose control.   Encouraged  patient to eat every 2.0-3.5 hours while awake  Encouraged patient to go no longer than 8-10 hours fasting overnight until first meal of the day.  Provided suggested meal/snack options to increase nutrition and maintain consistent meal and snack intakes.    Physical Activity:  Currently physically active? Yes, Walking daily    Reviewed w/ Pt:   Benefits of physical activity to optimize blood glucose control, encouraged activity at patient is physically able.   Instructed pt to always consult  "a physician prior to starting an exercise program.   Recommend 20-30 minutes daily.    Additional Topics Reviewed:    Maternal-Fetal Testing:   Ultrasounds: growth scans every 4 weeks.  NST: twice weekly starting at 32nd week GA  REBECCA:  weekly starting at 32 weeks GA  Sick day Guidelines:   Advised that sickness will raise blood sugar   If blood sugar is > 160 mg/dL twice in one day call doctor  If on diabetes medications, continue as instructed   If unable to consume normal meal plan, instructed to remain well hydrated   Hypoglycemia & Treatment Guidelines:  Reviewed what hypoglycemia is, signs and symptoms, and how to treat via the 15:15 rule.    Patient Goal: \"I will eat 3 meals and 3 snacks each day, including protein at each\"  Goal Assessment: On track    Diabetes Self Management Support Plan outside of ongoing care: Spouse/Family    Barriers to Learning/Change: No Barriers  Expected Compliance: good    Date to report blood sugars: Weekly   Follow up:  Return in about 4 weeks (around 2024) for Review Diet, Review Blood Sugars.     Begin Time: 10:42am  End Time: 11:34am    It was a pleasure working with them today. Please feel free to call ([phone]) with any questions or concerns.    Suzie Bansal   Diabetes Educator  [facility] Maternal Fetal Medicine  Diabetes and Pregnancy Program  Virtual Regular Visit  Name: Rabia Correa      : 1995      MRN: [id number]  Encounter Provider: Suzie Bansal  Encounter Date: 2024   Encounter department: [facility]  [facility]    Verification of patient location:    Patient is located at Home in the following state in which I hold an active license [place]    Assessment & Plan   1. Pregnancy with type 2 diabetes mellitus in first trimester  2. Pregnancy, unspecified gestational age      Encounter provider Suzie Bansal    The patient was identified by name and date of birth. Rabia Villalobos Sunny was informed that this is a telemedicine visit " and that the visit is being conducted through the [employer] platform. Patient unable to connect to Epic visit initially; link sent via [employer]. She agrees to proceed..  My office door was closed. No one else was in the room.  She acknowledged consent and understanding of privacy and security of the video platform. The patient has agreed to participate and understands they can discontinue the visit at any time.    Patient is aware this is a billable service.     History of Present Illness     Rabia Correa is a 29 y.o. female who presents pregnant    Review of Systems    Objective     There were no vitals taken for this visit.  Physical Exam    Visit Time  Total Visit Duration: 43 minutes

## 2024-09-05 ENCOUNTER — TREATMENT (OUTPATIENT)
Dept: PERINATAL CARE | Facility: CLINIC | Age: 29
End: 2024-09-05

## 2024-09-05 DIAGNOSIS — Z34.90 PREGNANCY, UNSPECIFIED GESTATIONAL AGE: ICD-10-CM

## 2024-09-05 DIAGNOSIS — Z79.4 INSULIN DOSE CHANGED (HCC): ICD-10-CM

## 2024-09-05 DIAGNOSIS — O24.119 TYPE 2 DIABETES MELLITUS DURING PREGNANCY, ANTEPARTUM: Primary | ICD-10-CM

## 2024-09-05 NOTE — PROGRESS NOTES
"Blood Sugar Log  Method of Reporting: [employer] Glucose Flowsheet   Date: 24    Patient: Rabia Correa  : 1995    Estimated Date of Delivery: None noted.  GA: Unknown     Reason for Documentation: Blood Sugar Log (24 - 24), Diabetes Type 2 (Currently Pregnant; Unknown GA), and Medication Management (Semglee + Novolog + Metformin)     ASSESSMENT - REVIEW OF BG LOG     BG Log:        Assessment:  FBG: Not Well Controlled; Consistently Elevated (>90mg/dl)   PPBG: Well Controlled (<120mg/dl)    PLAN     MEDICATIONS:     Due to fasting blood sugars >90,   Increase Semglee from 28 to 32 units once daily at 4pm    Due to well controlled after meal blood sugars <120,   Continue Metformin 2000mg daily  Continue Novolog 4 units with meals 1-2-3 (B/L/D). Injected 15 minutes before meal. Hold if not eating.    DIET:   Continue Assigned Meal Plan (including 3 meals and 3 snacks): 2000 calorie (CHO: 45-15-60-15-60-30) (PRO: 2/3-1-3/4-1-3/4-2)    BLOOD SUGAR MONITORING: (Glucometer: Contour Next EZ)  Continue Testing B x per day (Fasting, 2 hour after start of each meal)    PHYSICAL ACTIVITY:  Continue walking (or other preferred physical activity) daily - recommend at least 20-30 minutes daily, preferably after dinner if able (unless otherwise instructed per OB)     MONITORING     EDUCATION: (Diabetes and Pregnancy Program)    Completed: Class 1 (Pt Goal: \"I will eat 3 meals and 3 snacks each day, including protein at each\") and Class 2    Needs Scheduled: None at this time, Follow-ups to be scheduled as indicated per weekly review of blood sugar logs    WEIGHT:     Pre-Gravid Wt Pre-Gravid BMI TWG   Pregravid weight not on file Could not be calculated Not found.     FETAL MONITORITNG - ULTRASOUNDS  Recent Ultrasounds Findings:  No completed  US Follow-Ups: Not Scheduled - Pt instructed to call  to schedule at earliest convenience and  notified via in-basket staff " message  Further Fetal Surveillance: Beginning at 32 weeks (NST twice weekly + REBECCA once a week)     LABS  Lab Results   Component Value Date/Time    GLUF 123 (H) 07/02/2024 07:59 AM    HGBA1C 6.2 07/16/2024 11:22 AM    HGBA1C 5.0 01/30/2023 07:13 AM     Active Orders (needing to be collected):  A1C and CMP    Rosmery Smith RD   Diabetes and Pregnancy Program   Maternal Fetal Medicine  [facility]

## 2024-09-05 NOTE — PATIENT INSTRUCTIONS
EDICATIONS:     Due to fasting blood sugars >90,   Increase Semglee from 28 to 32 units once daily at 4pm    Due to well controlled after meal blood sugars <120,   Continue Metformin 2000mg daily  Continue Novolog 4 units with meals 1-2-3 (B/L/D). Injected 15 minutes before meal. Hold if not eating.    DIET:   Continue Assigned Meal Plan (including 3 meals and 3 snacks): 2000 calorie (CHO: 45-15-60-15-60-30) (PRO: 2/3-1-3/4-1-3/4-2)    BLOOD SUGAR MONITORING: (Glucometer: Contour Next EZ)  Continue Testing B x per day (Fasting, 2 hour after start of each meal)    PHYSICAL ACTIVITY:  Continue walking (or other preferred physical activity) daily - recommend at least 20-30 minutes daily, preferably after dinner if able (unless otherwise instructed per OB)     SCHEDULE APPOINTMENT:   Call [phone] to schedule an ultrasound with maternal fetal medicine.     Complete Lab: A1c   You do not need to fast for this lab  Measures average blood sugar over the past six weeks  Drawn every six to eight weeks during your pregnancy  Goal Range: 4% to 5.6%    Complete Lab: CMP  Requires you to be fasting (no food/only water) for at least 8hrs  Measures kidney and liver function  Completed once a trimester

## 2024-09-06 ENCOUNTER — TELEPHONE (OUTPATIENT)
Dept: PERINATAL CARE | Facility: OTHER | Age: 29
End: 2024-09-06

## 2024-09-06 NOTE — TELEPHONE ENCOUNTER
LVM for patient 9/6 at 0950. #[phone] provided to patient to return call to schedule appt. When patient returns call please ask for her RACHEL.

## 2024-09-06 NOTE — TELEPHONE ENCOUNTER
Spoke with patient 9/6 at 1050 in regards to scheduling of her MFM appts (pt currently being seen by GDM). Patient has not yet been seen by her OB for her official RACHEL. Patient is going to be seeing OB on 9/10. Patient instructed to call MFM once she sees her OB and a referral placed for her 1 tri screening. Patient verbalized understanding,

## 2024-09-12 ENCOUNTER — TREATMENT (OUTPATIENT)
Dept: PERINATAL CARE | Facility: CLINIC | Age: 29
End: 2024-09-12

## 2024-09-12 DIAGNOSIS — O24.112 PRE-EXISTING TYPE 2 DIABETES MELLITUS WITH HYPERGLYCEMIA DURING PREGNANCY IN SECOND TRIMESTER (HCC): Primary | ICD-10-CM

## 2024-09-12 DIAGNOSIS — E11.65 PRE-EXISTING TYPE 2 DIABETES MELLITUS WITH HYPERGLYCEMIA DURING PREGNANCY IN SECOND TRIMESTER (HCC): Primary | ICD-10-CM

## 2024-09-12 DIAGNOSIS — Z3A.10 10 WEEKS GESTATION OF PREGNANCY: ICD-10-CM

## 2024-09-12 NOTE — PROGRESS NOTES
"Blood Sugar Log  Method of Reporting: [employer] Glucose Flowsheet   Date: 24    Patient: Rabia Correa  : 1995    Estimated Date of Delivery: 25 pt stated  GA: 10w4d    Reason for Documentation: Blood Sugar Log (24 - 24), Diabetes Type 2 (Currently Pregnant; 10w2d), and Medication Management (Semglee + Metformin + Novolog)     ASSESSMENT - REVIEW OF BG LOG     BG Log:        Assessment:  FBG: Not Well Controlled; Consistently Elevated (>90mg/dl)   PPBG: Well Controlled (<120mg/dl)    PLAN     MEDICATIONS:     Due to fasting blood sugars >90,   Increase Semglee from 32 to 36 units once daily at 4pm.    Due to well controlled after meal blood sugars <120,   Continue Metformin 2000mg daily  Continue Novolog 4 units with meals 1-2-3 (B/L/D). Injected 15 minutes before meal. Hold if not eating.    DIET:   Continue Assigned Meal Plan (including 3 meals and 3 snacks): 2000 calorie (CHO: 45-15-60-15-60-30) (PRO: 2/3-1-3/4-1-3/4-2)    BLOOD SUGAR MONITORING: (Glucometer: Contour Next EZ)  Continue Testing B x per day (Fasting, 2 hour after start of each meal)    PHYSICAL ACTIVITY:  Continue walking (or other preferred physical activity) daily - recommend at least 20-30 minutes daily, preferably after dinner if able (unless otherwise instructed per OB)     MONITORING     EDUCATION: (Diabetes and Pregnancy Program)    Completed: Class 1 (Pt Goal: \"I will eat 3 meals and 3 snacks each day, including protein at each\") and Class 2    Needs Scheduled: None at this time, Follow-ups to be scheduled as indicated per weekly review of blood sugar logs    WEIGHT:     Pre-Gravid Wt Pre-Gravid BMI TWG   Pregravid weight not on file Could not be calculated Not found.     FETAL MONITORITNG - ULTRASOUNDS  Recent Ultrasounds Findings:  No completed  US Follow-Ups: Scheduled for 24  Further Fetal Surveillance: Beginning at 32 weeks (NST twice weekly + REBECCA once a week)     LABS  Lab Results   Component " Value Date/Time    GLUF 123 (H) 07/02/2024 07:59 AM    HGBA1C 6.2 07/16/2024 11:22 AM    HGBA1C 5.0 01/30/2023 07:13 AM     Active Orders (needing to be collected):  A1C and CMP    Rosmery Smith RD   Diabetes and Pregnancy Program   Maternal Fetal Medicine  [facility]

## 2024-09-12 NOTE — PATIENT INSTRUCTIONS
MEDICATIONS:     Due to fasting blood sugars >90,   Increase Semglee from 32 to 36 units once daily at 4pm.    Due to well controlled after meal blood sugars <120,   Continue Metformin 2000mg daily  Continue Novolog 4 units with meals 1-2-3 (B/L/D). Injected 15 minutes before meal. Hold if not eating.    DIET:   Continue Assigned Meal Plan (including 3 meals and 3 snacks): 2000 calorie (CHO: 45-15-60-15-60-30) (PRO: 2/3-1-3/4-1-3/4-2)    BLOOD SUGAR MONITORING: (Glucometer: Contour Next EZ)  Continue Testing B x per day (Fasting, 2 hour after start of each meal)    PHYSICAL ACTIVITY:  Continue walking (or other preferred physical activity) daily - recommend at least 20-30 minutes daily, preferably after dinner if able (unless otherwise instructed per OB)

## 2024-09-13 DIAGNOSIS — Z3A.10 10 WEEKS GESTATION OF PREGNANCY: ICD-10-CM

## 2024-09-13 DIAGNOSIS — O24.111 PREGNANCY WITH TYPE 2 DIABETES MELLITUS IN FIRST TRIMESTER: Primary | ICD-10-CM

## 2024-09-13 RX ORDER — INSULIN GLARGINE-YFGN 100 [IU]/ML
36 INJECTION, SOLUTION SUBCUTANEOUS DAILY
Qty: 15 ML | Refills: 0 | Status: SHIPPED | OUTPATIENT
Start: 2024-09-13 | End: 2025-04-06

## 2024-09-13 NOTE — TELEPHONE ENCOUNTER
Patient called and stated pharmacy does not have updated dosage for insulin.          She also stated if provider calls they can fill quicker.          Please advise.

## 2024-09-20 ENCOUNTER — LAB (OUTPATIENT)
Dept: LAB | Facility: CLINIC | Age: 29
End: 2024-09-20
Payer: COMMERCIAL

## 2024-09-20 DIAGNOSIS — Z36.5 ANTENATAL SCREENING FOR ISOIMMUNIZATION: ICD-10-CM

## 2024-09-20 DIAGNOSIS — Z36.89 SCREENING FOR PREGNANCY-ASSOCIATED PLASMA PROTEIN A: ICD-10-CM

## 2024-09-20 DIAGNOSIS — Z34.90 PREGNANCY, UNSPECIFIED GESTATIONAL AGE: ICD-10-CM

## 2024-09-20 DIAGNOSIS — E11.65 PRE-EXISTING TYPE 2 DIABETES MELLITUS WITH HYPERGLYCEMIA DURING PREGNANCY IN SECOND TRIMESTER (HCC): ICD-10-CM

## 2024-09-20 DIAGNOSIS — O24.112 PRE-EXISTING TYPE 2 DIABETES MELLITUS WITH HYPERGLYCEMIA DURING PREGNANCY IN SECOND TRIMESTER (HCC): ICD-10-CM

## 2024-09-20 DIAGNOSIS — E03.9 HYPOTHYROIDISM, UNSPECIFIED TYPE: ICD-10-CM

## 2024-09-20 DIAGNOSIS — E66.01 CLASS 2 SEVERE OBESITY DUE TO EXCESS CALORIES WITH SERIOUS COMORBIDITY AND BODY MASS INDEX (BMI) OF 35.0 TO 35.9 IN ADULT (HCC): ICD-10-CM

## 2024-09-20 DIAGNOSIS — E55.9 VITAMIN D DEFICIENCY: ICD-10-CM

## 2024-09-20 DIAGNOSIS — O99.281 HYPOTHYROIDISM AFFECTING PREGNANCY IN FIRST TRIMESTER: ICD-10-CM

## 2024-09-20 DIAGNOSIS — E03.9 HYPOTHYROIDISM AFFECTING PREGNANCY IN FIRST TRIMESTER: ICD-10-CM

## 2024-09-20 DIAGNOSIS — E66.812 CLASS 2 SEVERE OBESITY DUE TO EXCESS CALORIES WITH SERIOUS COMORBIDITY AND BODY MASS INDEX (BMI) OF 35.0 TO 35.9 IN ADULT (HCC): ICD-10-CM

## 2024-09-20 LAB
ABO GROUP BLD: NORMAL
ALBUMIN SERPL BCG-MCNC: 4.3 G/DL (ref 3.5–5)
ALP SERPL-CCNC: 51 U/L (ref 34–104)
ALT SERPL W P-5'-P-CCNC: 14 U/L (ref 7–52)
ANION GAP SERPL CALCULATED.3IONS-SCNC: 8 MMOL/L (ref 4–13)
AST SERPL W P-5'-P-CCNC: 11 U/L (ref 13–39)
BASOPHILS # BLD AUTO: 0.02 THOUSANDS/ΜL (ref 0–0.1)
BASOPHILS NFR BLD AUTO: 0 % (ref 0–1)
BILIRUB SERPL-MCNC: 0.48 MG/DL (ref 0.2–1)
BLD GP AB SCN SERPL QL: NEGATIVE
BUN SERPL-MCNC: 7 MG/DL (ref 5–25)
CALCIUM SERPL-MCNC: 9.2 MG/DL (ref 8.4–10.2)
CHLORIDE SERPL-SCNC: 104 MMOL/L (ref 96–108)
CO2 SERPL-SCNC: 23 MMOL/L (ref 21–32)
CREAT SERPL-MCNC: 0.46 MG/DL (ref 0.6–1.3)
CREAT UR-MCNC: 126.6 MG/DL
EOSINOPHIL # BLD AUTO: 0.08 THOUSAND/ΜL (ref 0–0.61)
EOSINOPHIL NFR BLD AUTO: 2 % (ref 0–6)
ERYTHROCYTE [DISTWIDTH] IN BLOOD BY AUTOMATED COUNT: 13.9 % (ref 11.6–15.1)
GFR SERPL CREATININE-BSD FRML MDRD: 133 ML/MIN/1.73SQ M
GLUCOSE P FAST SERPL-MCNC: 87 MG/DL (ref 65–99)
HBV SURFACE AG SER QL: NORMAL
HCT VFR BLD AUTO: 35.1 % (ref 34.8–46.1)
HCV AB SER QL: NORMAL
HGB BLD-MCNC: 11.6 G/DL (ref 11.5–15.4)
HIV 1+2 AB+HIV1 P24 AG SERPL QL IA: NORMAL
HIV 2 AB SERPL QL IA: NORMAL
HIV1 AB SERPL QL IA: NORMAL
HIV1 P24 AG SERPL QL IA: NORMAL
IMM GRANULOCYTES # BLD AUTO: 0.01 THOUSAND/UL (ref 0–0.2)
IMM GRANULOCYTES NFR BLD AUTO: 0 % (ref 0–2)
LYMPHOCYTES # BLD AUTO: 1.2 THOUSANDS/ΜL (ref 0.6–4.47)
LYMPHOCYTES NFR BLD AUTO: 26 % (ref 14–44)
MCH RBC QN AUTO: 27.7 PG (ref 26.8–34.3)
MCHC RBC AUTO-ENTMCNC: 33 G/DL (ref 31.4–37.4)
MCV RBC AUTO: 84 FL (ref 82–98)
MONOCYTES # BLD AUTO: 0.39 THOUSAND/ΜL (ref 0.17–1.22)
MONOCYTES NFR BLD AUTO: 8 % (ref 4–12)
NEUTROPHILS # BLD AUTO: 3.01 THOUSANDS/ΜL (ref 1.85–7.62)
NEUTS SEG NFR BLD AUTO: 64 % (ref 43–75)
NRBC BLD AUTO-RTO: 0 /100 WBCS
PLATELET # BLD AUTO: 253 THOUSANDS/UL (ref 149–390)
PMV BLD AUTO: 10.6 FL (ref 8.9–12.7)
POTASSIUM SERPL-SCNC: 3.8 MMOL/L (ref 3.5–5.3)
PROT SERPL-MCNC: 6.8 G/DL (ref 6.4–8.4)
PROT UR-MCNC: 8.4 MG/DL
PROT/CREAT UR: 0.1 MG/G{CREAT} (ref 0–0.1)
RBC # BLD AUTO: 4.19 MILLION/UL (ref 3.81–5.12)
RH BLD: POSITIVE
RUBV IGG SERPL IA-ACNC: <10 IU/ML
SODIUM SERPL-SCNC: 135 MMOL/L (ref 135–147)
SPECIMEN EXPIRATION DATE: NORMAL
T4 FREE SERPL-MCNC: 0.84 NG/DL (ref 0.61–1.12)
TREPONEMA PALLIDUM IGG+IGM AB [PRESENCE] IN SERUM OR PLASMA BY IMMUNOASSAY: NORMAL
TSH SERPL DL<=0.05 MIU/L-ACNC: 1.13 UIU/ML (ref 0.45–4.5)
VZV IGG SER QL IA: ABNORMAL
WBC # BLD AUTO: 4.71 THOUSAND/UL (ref 4.31–10.16)

## 2024-09-20 PROCEDURE — 84156 ASSAY OF PROTEIN URINE: CPT

## 2024-09-20 PROCEDURE — 86787 VARICELLA-ZOSTER ANTIBODY: CPT

## 2024-09-20 PROCEDURE — 87086 URINE CULTURE/COLONY COUNT: CPT

## 2024-09-20 PROCEDURE — 80053 COMPREHEN METABOLIC PANEL: CPT

## 2024-09-20 PROCEDURE — 86803 HEPATITIS C AB TEST: CPT

## 2024-09-20 PROCEDURE — 84443 ASSAY THYROID STIM HORMONE: CPT

## 2024-09-20 PROCEDURE — 87389 HIV-1 AG W/HIV-1&-2 AB AG IA: CPT

## 2024-09-20 PROCEDURE — 82570 ASSAY OF URINE CREATININE: CPT

## 2024-09-20 PROCEDURE — 36415 COLL VENOUS BLD VENIPUNCTURE: CPT

## 2024-09-20 PROCEDURE — 85025 COMPLETE CBC W/AUTO DIFF WBC: CPT

## 2024-09-20 PROCEDURE — 86900 BLOOD TYPING SEROLOGIC ABO: CPT

## 2024-09-20 PROCEDURE — 86850 RBC ANTIBODY SCREEN: CPT

## 2024-09-20 PROCEDURE — 84439 ASSAY OF FREE THYROXINE: CPT

## 2024-09-20 PROCEDURE — 86762 RUBELLA ANTIBODY: CPT

## 2024-09-20 PROCEDURE — 86901 BLOOD TYPING SEROLOGIC RH(D): CPT

## 2024-09-20 PROCEDURE — 87340 HEPATITIS B SURFACE AG IA: CPT

## 2024-09-20 PROCEDURE — 83036 HEMOGLOBIN GLYCOSYLATED A1C: CPT

## 2024-09-20 PROCEDURE — 86780 TREPONEMA PALLIDUM: CPT

## 2024-09-21 LAB
BACTERIA UR CULT: NORMAL
EST. AVERAGE GLUCOSE BLD GHB EST-MCNC: 128 MG/DL
HBA1C MFR BLD: 6.1 %

## 2024-09-22 NOTE — RESULT ENCOUNTER NOTE
Please call the patient regarding labs -TSH normal-continue levothyroxine at current dose diabetes being managed by M as she is pregnant.

## 2024-09-23 ENCOUNTER — ROUTINE PRENATAL (OUTPATIENT)
Dept: PERINATAL CARE | Facility: OTHER | Age: 29
End: 2024-09-23
Payer: COMMERCIAL

## 2024-09-23 VITALS
DIASTOLIC BLOOD PRESSURE: 64 MMHG | SYSTOLIC BLOOD PRESSURE: 124 MMHG | HEIGHT: 64 IN | WEIGHT: 212.6 LBS | HEART RATE: 85 BPM | BODY MASS INDEX: 36.29 KG/M2

## 2024-09-23 DIAGNOSIS — O24.112 PRE-EXISTING TYPE 2 DIABETES MELLITUS WITH HYPERGLYCEMIA DURING PREGNANCY IN SECOND TRIMESTER (HCC): ICD-10-CM

## 2024-09-23 DIAGNOSIS — O99.281 HYPOTHYROIDISM AFFECTING PREGNANCY IN FIRST TRIMESTER: ICD-10-CM

## 2024-09-23 DIAGNOSIS — O99.211 OBESITY AFFECTING PREGNANCY IN FIRST TRIMESTER, UNSPECIFIED OBESITY TYPE: ICD-10-CM

## 2024-09-23 DIAGNOSIS — Z3A.11 11 WEEKS GESTATION OF PREGNANCY: ICD-10-CM

## 2024-09-23 DIAGNOSIS — E03.9 HYPOTHYROIDISM AFFECTING PREGNANCY IN FIRST TRIMESTER: ICD-10-CM

## 2024-09-23 DIAGNOSIS — O09.899 SUPERVISION OF OTHER HIGH RISK PREGNANCIES, UNSPECIFIED TRIMESTER: ICD-10-CM

## 2024-09-23 DIAGNOSIS — Z36.0 ENCOUNTER FOR ANTENATAL SCREENING FOR CHROMOSOMAL ANOMALIES: ICD-10-CM

## 2024-09-23 DIAGNOSIS — E11.65 PRE-EXISTING TYPE 2 DIABETES MELLITUS WITH HYPERGLYCEMIA DURING PREGNANCY IN SECOND TRIMESTER (HCC): ICD-10-CM

## 2024-09-23 DIAGNOSIS — Z36.82 ENCOUNTER FOR (NT) NUCHAL TRANSLUCENCY SCAN: Primary | ICD-10-CM

## 2024-09-23 PROCEDURE — 76801 OB US < 14 WKS SINGLE FETUS: CPT | Performed by: STUDENT IN AN ORGANIZED HEALTH CARE EDUCATION/TRAINING PROGRAM

## 2024-09-23 PROCEDURE — 76813 OB US NUCHAL MEAS 1 GEST: CPT | Performed by: STUDENT IN AN ORGANIZED HEALTH CARE EDUCATION/TRAINING PROGRAM

## 2024-09-23 PROCEDURE — 99244 OFF/OP CNSLTJ NEW/EST MOD 40: CPT | Performed by: STUDENT IN AN ORGANIZED HEALTH CARE EDUCATION/TRAINING PROGRAM

## 2024-09-23 PROCEDURE — 36415 COLL VENOUS BLD VENIPUNCTURE: CPT | Performed by: STUDENT IN AN ORGANIZED HEALTH CARE EDUCATION/TRAINING PROGRAM

## 2024-09-23 NOTE — LETTER
"2024     Angélica Mitchell,   [address]  [address] [place] [address]    Patient: Rabia Correa   YOB: 1995   Date of Visit: 2024       Dear Dr. Mitchell:    Thank you for referring Rabia Correa to me for evaluation. Below are my notes for this consultation.    If you have questions, please do not hesitate to call me. I look forward to following your patient along with you.         Sincerely,        Bryanna Chisholm MD        CC: No Recipients    Bryanna Chisholm MD  2024  3:53 PM  Sign when Signing Visit  [facility]: Ms. Correa was seen today for nuchal translucency ultrasound.  See ultrasound report under \"OB Procedures\" tab.        Physical Exam  Constitutional:       General: She is not in acute distress.     Appearance: Normal appearance.   HENT:      Head: Normocephalic and atraumatic.   Eyes:      Extraocular Movements: Extraocular movements intact.   Cardiovascular:      Rate and Rhythm: Normal rate.   Pulmonary:      Effort: Pulmonary effort is normal. No respiratory distress.   Skin:     Findings: No erythema or rash.   Neurological:      Mental Status: She is alert and oriented to person, place, and time.   Psychiatric:         Mood and Affect: Mood normal.         Behavior: Behavior normal.         Please don't hesitate to contact our office with any concerns or questions.  -Bryanna Chisholm MD         "

## 2024-09-23 NOTE — PROGRESS NOTES
Patient chose to have [id number] Non-Invasive Prenatal Screen [id number] PLUS, NO SCA, NO fetal sex.  Patient choose to be billed through insurance.     Patient given brochure and is aware [facility] will contact patient's insurance and coordinate coverage.  Provided [facility] contact information. General inquiries [phone], Cost estimates [phone] and [facility] Billing [phone]. Website [email or web address].     Blood collection tubes labeled with patient identifiers (name, medical record number, and date of birth).     Filled out [facility] order form. Patient chose to have blood drawn in our office at time of visit. NIPS was drawn from right arm with a straight needle by Rosaline BAIRD .      If patient chose to have blood work drawn at a [facility] lab we requested patient notify MFM (via phone call or Baboo message) when blood collected so office can follow up on results.       Maternal Fetal Medicine will have results in approximately 5-7 business days and will call patient or notify via Baboo.  Patient aware viewing lab result online will reveal fetal sex if ordered.    Patient verbalized understanding of all instructions and no questions at this time.

## 2024-09-23 NOTE — PROGRESS NOTES
"[facility]: Ms. Correa was seen today for nuchal translucency ultrasound.  See ultrasound report under \"OB Procedures\" tab.        Physical Exam  Constitutional:       General: She is not in acute distress.     Appearance: Normal appearance.   HENT:      Head: Normocephalic and atraumatic.   Eyes:      Extraocular Movements: Extraocular movements intact.   Cardiovascular:      Rate and Rhythm: Normal rate.   Pulmonary:      Effort: Pulmonary effort is normal. No respiratory distress.   Skin:     Findings: No erythema or rash.   Neurological:      Mental Status: She is alert and oriented to person, place, and time.   Psychiatric:         Mood and Affect: Mood normal.         Behavior: Behavior normal.         Please don't hesitate to contact our office with any concerns or questions.  -Bryanna hCisholm MD      "

## 2024-09-27 LAB
CFDNA.FET/CFDNA.TOTAL SFR FETUS: NORMAL %
CITATION REF LAB TEST: NORMAL
FET 13+18+21+X+Y ANEUP PLAS.CFDNA: NEGATIVE
FET CHR 21 TS PLAS.CFDNA QL: NEGATIVE
FET CHR 21 TS PLAS.CFDNA QL: NEGATIVE
FET SEX PLAS.CFDNA DOSAGE CFDNA: NORMAL
FET TS 13 RISK PLAS.CFDNA QL: NEGATIVE
GA EST FROM CONCEPTION DATE: NORMAL D
GESTATIONAL AGE > 9:: YES
LAB DIRECTOR NAME PROVIDER: NORMAL
LAB DIRECTOR NAME PROVIDER: NORMAL
LABORATORY COMMENT REPORT: NORMAL
LIMITATIONS OF THE TEST: NORMAL
NEGATIVE PREDICTIVE VALUE: NORMAL
PERFORMANCE CHARACTERISTICS: NORMAL
POSITIVE PREDICTIVE VALUE: NORMAL
REF LAB TEST METHOD: NORMAL
SL AMB NOTE:: NORMAL
TEST PERFORMANCE INFO SPEC: NORMAL

## 2024-10-07 NOTE — PATIENT INSTRUCTIONS
-A1c 6.1% close to goal. CMP within normal. UPCR 0.10.   -Repeat A1c with next TSH level.   -A1c goal is 5.6% or less.  -FBS <90; continue semglee 36 units daily.  -Continue Novolog 4-4-4 before meals 1-2-3.    -Continue Metformin 2000 mg daily.   -Follow up with dietitian as needed.   -Self monitoring blood glucose (SMBG) fasting; 2 hours after start of each meal and with hypoglycemia.   -Glucose goals: fasting 60-90 mg/dL, 140 mg/dL or less 1 hour post meals, and 120 mg/dL or less 2 hours post meal.   -Report glucose readings weekly via [employer] every Thursday.  -Continue GDM meal plan with 3 meals and 3 snacks including recommended combination of carb, protein and fat per meal/snack.  -Please eat meal or snack every 2-3.5 hours while awake.  -No more than 8 to 10 hours of fasting overnight.  -Refer to [employer] MyPlate online as a reference.  -2nd/3rd trimester minimum total daily carbohydrates 175 grams paired with half grams in protein.   -Stay active if no restriction from your OB, walk up to 30 minutes a day.  -Always have glucose available to treat hypoglycemia. Use 15:15 rule.   -Refer to hypoglycemia patient education sheet. SMBG when experiencing signs and symptoms of hypoglycemia and prior to driving.   -Serial fetal growth ultrasounds.  -20 weeks detailed fetal growth ultrasound.  -22-24 weeks fetal echo.  -At 32 weeks gestation; NST twice a week and REBECCA weekly.   -Continue prenatal vitamin as recommended.  -Between 12 to 16 weeks gestation, starting baby aspirin as a preventative measure to pre-eclampsia.   -At 36 weeks gestation, stop baby aspirin.   -Continue follow-up with your OB and MFM as recommended.  -Stay in close contact with diabetes education team.    Thank you for choosing us for your  care today.  If you have any questions about your ultrasound or care, please do not hesitate to contact us or your primary obstetrician.        Some general instructions for your pregnancy  are:    Exercise: Aim for 150 minutes per week of regular exercise.  Walking is great!  Nutrition: Choose healthy sources of calcium, iron, and protein.  Avoid ultraprocessed foods and added sugar.  Learn about Preeclampsia: preeclampsia is a common, potentially serious high blood pressure complication in pregnancy.  A blood pressure of 140mmHg (systolic or top number) or 90mmHg (diastolic or bottom number) should be evaluated by your doctor.  Aspirin is sometimes prescribed in early pregnancy to prevent preeclampsia in women with risk factors - ask your obstetrician if you should be on this medication.  For more resources, visit:  https://[email or web address].[email or web address]  If you smoke, please try to quit completely but also try to reduce your smoking by as much as possible (as soon as possible).  Do not vape.  Please also avoid cannabis products.  Other warning signs to watch out for in pregnancy or postpartum: chest pain, obstructed breathing or shortness of breath, seizures, thoughts of hurting yourself or your baby, bleeding, a painful or swollen leg, fever, or headache (see [employer] POST-BIRTH Warning Signs campaign).  If these happen call 911.  Itching is also not normal in pregnancy and if you experience this, especially over your hands and feet, potentially worse at night, notify your doctors.

## 2024-10-09 ENCOUNTER — TELEMEDICINE (OUTPATIENT)
Facility: HOSPITAL | Age: 29
End: 2024-10-09
Payer: COMMERCIAL

## 2024-10-09 ENCOUNTER — LAB REQUISITION (OUTPATIENT)
Dept: LAB | Facility: HOSPITAL | Age: 29
End: 2024-10-09
Payer: COMMERCIAL

## 2024-10-09 VITALS — BODY MASS INDEX: 36.19 KG/M2 | WEIGHT: 212 LBS | HEIGHT: 64 IN

## 2024-10-09 DIAGNOSIS — E55.9 VITAMIN D DEFICIENCY: ICD-10-CM

## 2024-10-09 DIAGNOSIS — E03.9 HYPOTHYROIDISM, UNSPECIFIED TYPE: ICD-10-CM

## 2024-10-09 DIAGNOSIS — O99.281 HYPOTHYROIDISM AFFECTING PREGNANCY IN FIRST TRIMESTER: ICD-10-CM

## 2024-10-09 DIAGNOSIS — E11.65 PRE-EXISTING TYPE 2 DIABETES MELLITUS WITH HYPERGLYCEMIA DURING PREGNANCY IN SECOND TRIMESTER (HCC): Primary | ICD-10-CM

## 2024-10-09 DIAGNOSIS — Z3A.13 13 WEEKS GESTATION OF PREGNANCY: ICD-10-CM

## 2024-10-09 DIAGNOSIS — E66.01 CLASS 2 SEVERE OBESITY DUE TO EXCESS CALORIES WITH SERIOUS COMORBIDITY AND BODY MASS INDEX (BMI) OF 36.0 TO 36.9 IN ADULT (HCC): ICD-10-CM

## 2024-10-09 DIAGNOSIS — E03.9 HYPOTHYROIDISM AFFECTING PREGNANCY IN FIRST TRIMESTER: ICD-10-CM

## 2024-10-09 DIAGNOSIS — Z36.9 ENCOUNTER FOR ANTENATAL SCREENING, UNSPECIFIED: ICD-10-CM

## 2024-10-09 DIAGNOSIS — O24.112 PRE-EXISTING TYPE 2 DIABETES MELLITUS WITH HYPERGLYCEMIA DURING PREGNANCY IN SECOND TRIMESTER (HCC): Primary | ICD-10-CM

## 2024-10-09 DIAGNOSIS — E66.812 CLASS 2 SEVERE OBESITY DUE TO EXCESS CALORIES WITH SERIOUS COMORBIDITY AND BODY MASS INDEX (BMI) OF 36.0 TO 36.9 IN ADULT (HCC): ICD-10-CM

## 2024-10-09 PROCEDURE — 99213 OFFICE O/P EST LOW 20 MIN: CPT | Performed by: NURSE PRACTITIONER

## 2024-10-09 PROCEDURE — 87491 CHLMYD TRACH DNA AMP PROBE: CPT | Performed by: PHYSICIAN ASSISTANT

## 2024-10-09 PROCEDURE — 87591 N.GONORRHOEAE DNA AMP PROB: CPT | Performed by: PHYSICIAN ASSISTANT

## 2024-10-09 RX ORDER — LANCETS
EACH MISCELLANEOUS
Qty: 150 EACH | Refills: 8 | Status: SHIPPED | OUTPATIENT
Start: 2024-10-09

## 2024-10-09 NOTE — ASSESSMENT & PLAN NOTE
Orders:    Contour Next Test test strip; Test up to 6 times a day. T2DM and pregnancy.    Microlet Lancets MISC; Use up to 6 a day. T2DM and pregnancy.    Hemoglobin A1C; Standing

## 2024-10-09 NOTE — PROGRESS NOTES
Virtual Regular Visit  Name: Rabia Correa      : 1995      MRN: [id number]  Encounter Provider: CLAUDINE Zuniga  Encounter Date: 10/9/2024   Encounter department: [facility]    Verification of patient location:    Patient is located at Home in the following state in which I hold an active license [place]    Assessment & Plan  13 weeks gestation of pregnancy    Orders:    Contour Next Test test strip; Test up to 6 times a day. T2DM and pregnancy.    Microlet Lancets MISC; Use up to 6 a day. T2DM and pregnancy.    Hemoglobin A1C; Standing    Pre-existing type 2 diabetes mellitus with hyperglycemia during pregnancy in second trimester (HCC)  -A1c 6.1% close to goal. CMP within normal. UPCR 0.10.   -Repeat A1c with next TSH level.   -A1c goal is 5.6% or less.  -FBS <90; continue semglee 36 units daily.  -Continue Novolog 4-4-4 before meals 1-2-3.    -Continue Metformin 2000 mg daily.   -Follow up with dietitian as needed.   -Self monitoring blood glucose (SMBG) fasting; 2 hours after start of each meal and with hypoglycemia.   -Glucose goals: fasting 60-90 mg/dL, 140 mg/dL or less 1 hour post meals, and 120 mg/dL or less 2 hours post meal.   -Report glucose readings weekly via [employer] every Thursday.  -Continue GDM meal plan with 3 meals and 3 snacks including recommended combination of carb, protein and fat per meal/snack.  -Please eat meal or snack every 2-3.5 hours while awake.  -No more than 8 to 10 hours of fasting overnight.  -Refer to [employer] MyPlate online as a reference.  -2nd/3rd trimester minimum total daily carbohydrates 175 grams paired with half grams in protein.   -Stay active if no restriction from your OB, walk up to 30 minutes a day.  -Always have glucose available to treat hypoglycemia. Use 15:15 rule.   -Refer to hypoglycemia patient education sheet. SMBG when experiencing signs and symptoms of hypoglycemia and prior to driving.   -Serial fetal growth  ultrasounds.  -20 weeks detailed fetal growth ultrasound.  -22-24 weeks fetal echo.  -At 32 weeks gestation; NST twice a week and REBECCA weekly.   -Continue prenatal vitamin as recommended.  -Between 12 to 16 weeks gestation, starting baby aspirin as a preventative measure to pre-eclampsia.   -At 36 weeks gestation, stop baby aspirin.   -Continue follow-up with your OB and MFM as recommended.  -Stay in close contact with diabetes education team.  Lab Results   Component Value Date    HGBA1C 6.1 (H) 2024            Hypothyroidism, unspecified type         Vitamin D deficiency  -on prenatal vitamin and Vitamin D3 OTC daily.          Class 2 severe obesity due to excess calories with serious comorbidity and body mass index (BMI) of 36.0 to 36.9 in adult (HCC)  -First visit weight 208 lbs. BMI 35.70.  -Current weight 212 lbs.  -TWG 4 lbs.   -Recommended weight gain 11 to 20 lbs.  -Continue GDM meal plan and follow up with dietitian as needed.         Hypothyroidism affecting pregnancy in first trimester  -On Levothyroxine 75 mcg daily and takes correctly.  -Last TSH normal.  -Followed by endocrinology.              Encounter provider CLAUDINE Zuniga    The patient was identified by name and date of birth. Rabia Correa was informed that this is a telemedicine visit and that the visit is being conducted through the Epic Embedded platform. She agrees to proceed..  My office door was closed. No one else was in the room.  She acknowledged consent and understanding of privacy and security of the video platform. The patient has agreed to participate and understands they can discontinue the visit at any time.    Patient is aware this is a billable service.     History of Present Illness     Rabia Correa is a 29 y.o. female  13 4/7 weeks gestation who presents for T2DM follow up, on semglee 36 units daily, Novolog 4 units before meals and Metformin 2000 mg daily. Following GDM meal. Testing fasting and 2  hours post start of each meal and reporting via flowsheet. Glucose readings within normal. Walking 2 miles daily. Weight within goal. BP normal.  Hypothyroidism- on levothyroxine 75 mcg daily, last TSH normal, followed by endocrinology.  Vitamin D deficiency- on prenatal vitamin and OTC D3 daily.       Review of Systems   Constitutional:  Positive for fatigue. Negative for fever.   HENT:  Negative for congestion and trouble swallowing.    Eyes:  Negative for visual disturbance.   Respiratory:  Negative for cough and shortness of breath.    Cardiovascular:  Negative for chest pain, palpitations and leg swelling.   Gastrointestinal:  Positive for constipation (intermittently) and nausea. Negative for vomiting.   Endocrine: Negative for polydipsia, polyphagia and polyuria.   Genitourinary:  Negative for difficulty urinating and vaginal bleeding.   Musculoskeletal:  Negative for back pain.   Skin:  Negative for rash.   Neurological:  Negative for headaches.   Psychiatric/Behavioral:  Negative for sleep disturbance.      Medical History Reviewed by provider this encounter:  Tobacco  Allergies  Meds  Problems  Med Hx  Surg Hx  Fam Hx       Current Outpatient Medications on File Prior to Visit   Medication Sig Dispense Refill    Blood Glucose Monitoring Suppl (Contour Next EZ) w/Device KIT Dispense 1 kit. 1 kit 0    Butalbital-APAP-Caffeine -40 MG CAPS PRN      Cholecalciferol (VITAMIN D) 50 MCG (2000 UT) tablet Take 2,000 Units by mouth daily      insulin aspart (NovoLOG FlexPen) 100 UNIT/ML injection pen Inject 4 Units under the skin 2 (two) times a day before meals T2DM and pregnancy. To be titrated as directed. 15 mL 0    Insulin Pen Needle 31G X 5 MM MISC Inject under the skin 4 (four) times a day (before meals and at bedtime) Use one with each pen needle. 100 each 7    levothyroxine 75 mcg tablet Take 1 tablet (75 mcg total) by mouth daily in the early morning 90 tablet 1    loratadine (CLARITIN) 10 mg  "tablet Take 10 mg by mouth as needed      magnesium gluconate (MAGONATE) 500 mg tablet Take 500 mg by mouth daily      metFORMIN (GLUCOPHAGE-XR) 500 mg 24 hr tablet Take 4 tabs daily with food 360 tablet 3    Omega-3 Fatty Acids (fish oil) 1,000 mg Take 1,000 mg by mouth daily      ondansetron (ZOFRAN) 4 mg tablet       Prenatal Vit-Iron Carbonyl-FA (PRENATAL MULTIVITAMIN) TABS Take 1 tablet by mouth daily      Semglee, yfgn, 100 UNIT/ML SOPN Inject 0.36 mL (36 Units total) under the skin daily (4pm); Titrate as instructed 15 mL 0    [DISCONTINUED] Contour Next Test test strip Test up to 6 times a day. T2DM and pregnancy. 150 strip 8    [DISCONTINUED] Microlet Lancets MISC Use up to 6 a day. T2DM and pregnancy. 150 each 8     No current facility-administered medications on file prior to visit.      Social History     Tobacco Use    Smoking status: Never    Smokeless tobacco: Never   Vaping Use    Vaping status: Never Used   Substance and Sexual Activity    Alcohol use: Not Currently    Drug use: No    Sexual activity: Yes     Partners: Male         Objective     Ht 5' 4\" (1.626 m)   Wt 96.2 kg (212 lb)   LMP 06/30/2024   BMI 36.39 kg/m²   Weekly review of glucose readings. Refer to glucose flowsheet, glucose readings within goal.   Physical Exam  HENT:      Head: Normocephalic.      Nose: Nose normal.   Eyes:      Conjunctiva/sclera: Conjunctivae normal.   Pulmonary:      Effort: Pulmonary effort is normal.   Neurological:      Mental Status: She is alert and oriented to person, place, and time.   Psychiatric:         Mood and Affect: Mood normal.         Behavior: Behavior normal.         Thought Content: Thought content normal.         Judgment: Judgment normal.         Visit Time  Total Visit Duration: 11 minutes with patient and 10 minutes charting.         "

## 2024-10-09 NOTE — ASSESSMENT & PLAN NOTE
-First visit weight 208 lbs. BMI 35.70.  -Current weight 212 lbs.  -TWG 4 lbs.   -Recommended weight gain 11 to 20 lbs.  -Continue GDM meal plan and follow up with dietitian as needed.

## 2024-10-09 NOTE — ASSESSMENT & PLAN NOTE
-A1c 6.1% close to goal. CMP within normal. UPCR 0.10.   -Repeat A1c with next TSH level.   -A1c goal is 5.6% or less.  -FBS <90; continue semglee 36 units daily.  -Continue Novolog 4-4-4 before meals 1-2-3.    -Continue Metformin 2000 mg daily.   -Follow up with dietitian as needed.   -Self monitoring blood glucose (SMBG) fasting; 2 hours after start of each meal and with hypoglycemia.   -Glucose goals: fasting 60-90 mg/dL, 140 mg/dL or less 1 hour post meals, and 120 mg/dL or less 2 hours post meal.   -Report glucose readings weekly via [place] every Thursday.  -Continue GDM meal plan with 3 meals and 3 snacks including recommended combination of carb, protein and fat per meal/snack.  -Please eat meal or snack every 2-3.5 hours while awake.  -No more than 8 to 10 hours of fasting overnight.  -Refer to [employer] MyPlate online as a reference.  -2nd/3rd trimester minimum total daily carbohydrates 175 grams paired with half grams in protein.   -Stay active if no restriction from your OB, walk up to 30 minutes a day.  -Always have glucose available to treat hypoglycemia. Use 15:15 rule.   -Refer to hypoglycemia patient education sheet. SMBG when experiencing signs and symptoms of hypoglycemia and prior to driving.   -Serial fetal growth ultrasounds.  -20 weeks detailed fetal growth ultrasound.  -22-24 weeks fetal echo.  -At 32 weeks gestation; NST twice a week and REBECCA weekly.   -Continue prenatal vitamin as recommended.  -Between 12 to 16 weeks gestation, starting baby aspirin as a preventative measure to pre-eclampsia.   -At 36 weeks gestation, stop baby aspirin.   -Continue follow-up with your OB and MFM as recommended.  -Stay in close contact with diabetes education team.  Lab Results   Component Value Date    HGBA1C 6.1 (H) 09/20/2024

## 2024-10-10 DIAGNOSIS — O24.111 PREGNANCY WITH TYPE 2 DIABETES MELLITUS IN FIRST TRIMESTER: ICD-10-CM

## 2024-10-10 DIAGNOSIS — E11.65 PRE-EXISTING TYPE 2 DIABETES MELLITUS WITH HYPERGLYCEMIA DURING PREGNANCY IN SECOND TRIMESTER (HCC): ICD-10-CM

## 2024-10-10 DIAGNOSIS — O24.112 PRE-EXISTING TYPE 2 DIABETES MELLITUS WITH HYPERGLYCEMIA DURING PREGNANCY IN SECOND TRIMESTER (HCC): ICD-10-CM

## 2024-10-10 DIAGNOSIS — O99.281 HYPOTHYROIDISM AFFECTING PREGNANCY IN FIRST TRIMESTER: ICD-10-CM

## 2024-10-10 DIAGNOSIS — Z3A.10 10 WEEKS GESTATION OF PREGNANCY: ICD-10-CM

## 2024-10-10 DIAGNOSIS — E55.9 VITAMIN D DEFICIENCY: ICD-10-CM

## 2024-10-10 DIAGNOSIS — Z34.90 PREGNANCY, UNSPECIFIED GESTATIONAL AGE: ICD-10-CM

## 2024-10-10 DIAGNOSIS — E66.01 CLASS 2 SEVERE OBESITY DUE TO EXCESS CALORIES WITH SERIOUS COMORBIDITY AND BODY MASS INDEX (BMI) OF 35.0 TO 35.9 IN ADULT (HCC): ICD-10-CM

## 2024-10-10 DIAGNOSIS — E03.9 HYPOTHYROIDISM AFFECTING PREGNANCY IN FIRST TRIMESTER: ICD-10-CM

## 2024-10-10 DIAGNOSIS — E66.812 CLASS 2 SEVERE OBESITY DUE TO EXCESS CALORIES WITH SERIOUS COMORBIDITY AND BODY MASS INDEX (BMI) OF 35.0 TO 35.9 IN ADULT (HCC): ICD-10-CM

## 2024-10-10 LAB
C TRACH DNA SPEC QL NAA+PROBE: NEGATIVE
N GONORRHOEA DNA SPEC QL NAA+PROBE: NEGATIVE

## 2024-10-10 RX ORDER — INSULIN GLARGINE-YFGN 100 [IU]/ML
36 INJECTION, SOLUTION SUBCUTANEOUS DAILY
Qty: 15 ML | Refills: 0 | Status: SHIPPED | OUTPATIENT
Start: 2024-10-10 | End: 2025-05-03

## 2024-10-10 RX ORDER — INSULIN ASPART 100 [IU]/ML
4 INJECTION, SOLUTION INTRAVENOUS; SUBCUTANEOUS
Qty: 15 ML | Refills: 0 | Status: SHIPPED | OUTPATIENT
Start: 2024-10-10

## 2024-10-15 NOTE — TELEPHONE ENCOUNTER
Per Stella at [facility] patient is refusing the Life Vest and was verbally abusive towards her.  She will be returning the vest since it was already placed on patient.  Patient d/c and is to f/u with cardiology as outpatient per progress note.   Pt called in , stated that her RACHEL is 04/06/25, pt stated that her ultrasound is supposed to be every 2 weeks. I looked for a referral, did not see on file. Please kindly follow up.

## 2024-10-22 ENCOUNTER — OFFICE VISIT (OUTPATIENT)
Dept: FAMILY MEDICINE CLINIC | Facility: CLINIC | Age: 29
End: 2024-10-22
Payer: COMMERCIAL

## 2024-10-22 VITALS
DIASTOLIC BLOOD PRESSURE: 80 MMHG | SYSTOLIC BLOOD PRESSURE: 110 MMHG | HEIGHT: 67 IN | WEIGHT: 213 LBS | TEMPERATURE: 97.7 F | OXYGEN SATURATION: 97 % | HEART RATE: 67 BPM | BODY MASS INDEX: 33.43 KG/M2

## 2024-10-22 DIAGNOSIS — J01.10 ACUTE FRONTAL SINUSITIS, RECURRENCE NOT SPECIFIED: Primary | ICD-10-CM

## 2024-10-22 PROCEDURE — 99213 OFFICE O/P EST LOW 20 MIN: CPT

## 2024-10-22 RX ORDER — AMOXICILLIN 500 MG/1
500 CAPSULE ORAL EVERY 12 HOURS SCHEDULED
Qty: 14 CAPSULE | Refills: 0 | Status: SHIPPED | OUTPATIENT
Start: 2024-10-22 | End: 2024-10-29

## 2024-10-22 NOTE — PROGRESS NOTES
Ambulatory Visit  Name: Rabia Correa      : 1995      MRN: [id number]  Encounter Provider: Gigi Chao MD  Encounter Date: 10/22/2024   Encounter department: [facility]    Assessment & Plan  Acute frontal sinusitis, recurrence not specified  PLAN:  - Advised to keep hydrated and drink plenty of fluids, try Gatorade or Pedialyte   - Tylenol 8hr prn for fever, sore throat or body aches   - Start amoxicillin 500 mg q12 hrs x 7 days    - Can do nasal rinse    Orders:    amoxicillin (AMOXIL) 500 mg capsule; Take 1 capsule (500 mg total) by mouth every 12 (twelve) hours for 7 days       History of Present Illness     Rabia Correa is a very pleasant 29 y.o. female with pmhx of t2dm and hypothyroidism presenting today nasal pressure. Reports that she has been feeling sick for 3 weeks and started getting green nasal discharge and pressure in the last 4 days.     Sinus Problem  This is a new problem. The current episode started 1 to 4 weeks ago (3 weeks ago). There has been no fever. Associated symptoms include congestion, coughing, sinus pressure and sneezing. Pertinent negatives include no shortness of breath or sore throat. Past treatments include acetaminophen. The treatment provided mild relief.       History obtained from : patient  Review of Systems   HENT:  Positive for congestion, sinus pressure and sneezing. Negative for sore throat.    Respiratory:  Positive for cough. Negative for shortness of breath.      Current Outpatient Medications on File Prior to Visit   Medication Sig Dispense Refill    Blood Glucose Monitoring Suppl (Contour Next EZ) w/Device KIT Dispense 1 kit. 1 kit 0    Butalbital-APAP-Caffeine -40 MG CAPS PRN      Cholecalciferol (VITAMIN D) 50 MCG ( UT) tablet Take 2,000 Units by mouth daily      Contour Next Test test strip Test up to 6 times a day. T2DM and pregnancy. 150 strip 8    insulin aspart (NovoLOG FlexPen) 100 UNIT/ML  "injection pen Inject 4 Units under the skin 2 (two) times a day before meals T2DM and pregnancy. To be titrated as directed. 15 mL 0    Insulin Pen Needle 31G X 5 MM MISC Inject under the skin 4 (four) times a day (before meals and at bedtime) Use one with each pen needle. 100 each 7    levothyroxine 75 mcg tablet Take 1 tablet (75 mcg total) by mouth daily in the early morning 90 tablet 1    loratadine (CLARITIN) 10 mg tablet Take 10 mg by mouth as needed      magnesium gluconate (MAGONATE) 500 mg tablet Take 500 mg by mouth daily      metFORMIN (GLUCOPHAGE-XR) 500 mg 24 hr tablet Take 4 tabs daily with food 360 tablet 3    Microlet Lancets MISC Use up to 6 a day. T2DM and pregnancy. 150 each 8    Omega-3 Fatty Acids (fish oil) 1,000 mg Take 1,000 mg by mouth daily      ondansetron (ZOFRAN) 4 mg tablet       Prenatal Vit-Iron Carbonyl-FA (PRENATAL MULTIVITAMIN) TABS Take 1 tablet by mouth daily      baldo Reyes, 100 UNIT/ML SOPN Inject 0.36 mL (36 Units total) under the skin daily (4pm); Titrate as instructed 15 mL 0     No current facility-administered medications on file prior to visit.      Social History     Tobacco Use    Smoking status: Never    Smokeless tobacco: Never   Vaping Use    Vaping status: Never Used   Substance and Sexual Activity    Alcohol use: Not Currently    Drug use: No    Sexual activity: Yes     Partners: Male         Objective     /80 (BP Location: Left arm, Patient Position: Sitting, Cuff Size: Standard)   Pulse 67   Temp 97.7 °F (36.5 °C) (Temporal)   Ht 5' 7\" (1.702 m)   Wt 96.6 kg (213 lb)   LMP 06/30/2024   SpO2 97%   BMI 33.36 kg/m²     Physical Exam  Vitals and nursing note reviewed.   Constitutional:       General: She is not in acute distress.     Appearance: She is well-developed.   HENT:      Head: Normocephalic and atraumatic.      Right Ear: A middle ear effusion is present.      Left Ear: A middle ear effusion is present.      Nose: Congestion present.      " Right Sinus: Maxillary sinus tenderness and frontal sinus tenderness present.      Left Sinus: Maxillary sinus tenderness and frontal sinus tenderness present.   Eyes:      Conjunctiva/sclera: Conjunctivae normal.   Cardiovascular:      Rate and Rhythm: Normal rate and regular rhythm.      Heart sounds: No murmur heard.  Pulmonary:      Effort: Pulmonary effort is normal. No respiratory distress.      Breath sounds: Normal breath sounds.   Abdominal:      Palpations: Abdomen is soft.      Tenderness: There is no abdominal tenderness.   Musculoskeletal:         General: No swelling.      Cervical back: Neck supple.   Skin:     General: Skin is warm and dry.      Capillary Refill: Capillary refill takes less than 2 seconds.   Neurological:      Mental Status: She is alert.   Psychiatric:         Mood and Affect: Mood normal.

## 2024-10-25 NOTE — PATIENT INSTRUCTIONS

## 2024-10-29 ENCOUNTER — ROUTINE PRENATAL (OUTPATIENT)
Age: 29
End: 2024-10-29
Payer: COMMERCIAL

## 2024-10-29 VITALS
SYSTOLIC BLOOD PRESSURE: 116 MMHG | DIASTOLIC BLOOD PRESSURE: 70 MMHG | BODY MASS INDEX: 33.77 KG/M2 | WEIGHT: 215.6 LBS | HEART RATE: 97 BPM

## 2024-10-29 DIAGNOSIS — O24.112 PRE-EXISTING TYPE 2 DIABETES MELLITUS WITH HYPERGLYCEMIA DURING PREGNANCY IN SECOND TRIMESTER (HCC): Primary | ICD-10-CM

## 2024-10-29 DIAGNOSIS — E11.65 PRE-EXISTING TYPE 2 DIABETES MELLITUS WITH HYPERGLYCEMIA DURING PREGNANCY IN SECOND TRIMESTER (HCC): Primary | ICD-10-CM

## 2024-10-29 DIAGNOSIS — Z3A.16 16 WEEKS GESTATION OF PREGNANCY: ICD-10-CM

## 2024-10-29 PROCEDURE — 99213 OFFICE O/P EST LOW 20 MIN: CPT | Performed by: OBSTETRICS & GYNECOLOGY

## 2024-10-29 PROCEDURE — 76805 OB US >/= 14 WKS SNGL FETUS: CPT | Performed by: OBSTETRICS & GYNECOLOGY

## 2024-10-29 NOTE — LETTER
October 29, 2024     Angélica Mitchell DO  [address] [address]    Patient: Rabia Correa   YOB: 1995   Date of Visit: 10/29/2024       Dear Dr. Mitchell:    Thank you for referring Rabia Correa to me for evaluation. Below are my notes for this consultation.    If you have questions, please do not hesitate to call me. I look forward to following your patient along with you.         Sincerely,        Dustin Jaffe MD        CC: No Recipients    Dustin Jaffe MD  10/26/2024  6:21 PM  Sign when Signing Visit  Please refer to the [facility] ultrasound report in Ob Procedures for additional information regarding today's visit

## 2024-11-20 ENCOUNTER — APPOINTMENT (OUTPATIENT)
Dept: LAB | Facility: CLINIC | Age: 29
End: 2024-11-20
Payer: COMMERCIAL

## 2024-11-20 DIAGNOSIS — E03.9 MYXEDEMA HEART DISEASE: ICD-10-CM

## 2024-11-20 DIAGNOSIS — Z36.0 SCREENING FOR CHROMOSOMAL ANOMALIES BY AMNIOCENTESIS: ICD-10-CM

## 2024-11-20 DIAGNOSIS — Z3A.13 13 WEEKS GESTATION OF PREGNANCY: ICD-10-CM

## 2024-11-20 DIAGNOSIS — I51.9 MYXEDEMA HEART DISEASE: ICD-10-CM

## 2024-11-20 LAB
25(OH)D3 SERPL-MCNC: 50.5 NG/ML (ref 30–100)
EST. AVERAGE GLUCOSE BLD GHB EST-MCNC: 103 MG/DL
HBA1C MFR BLD: 5.2 %
T4 FREE SERPL-MCNC: 0.67 NG/DL (ref 0.61–1.12)
TSH SERPL DL<=0.05 MIU/L-ACNC: 1.5 UIU/ML (ref 0.45–4.5)

## 2024-11-20 PROCEDURE — 82105 ALPHA-FETOPROTEIN SERUM: CPT

## 2024-11-20 PROCEDURE — 84439 ASSAY OF FREE THYROXINE: CPT

## 2024-11-20 PROCEDURE — 83036 HEMOGLOBIN GLYCOSYLATED A1C: CPT

## 2024-11-20 PROCEDURE — 36415 COLL VENOUS BLD VENIPUNCTURE: CPT

## 2024-11-21 ENCOUNTER — RESULTS FOLLOW-UP (OUTPATIENT)
Facility: HOSPITAL | Age: 29
End: 2024-11-21

## 2024-11-22 ENCOUNTER — RESULTS FOLLOW-UP (OUTPATIENT)
Dept: ENDOCRINOLOGY | Facility: CLINIC | Age: 29
End: 2024-11-22

## 2024-11-22 LAB
2ND TRIMESTER 4 SCREEN SERPL-IMP: NORMAL
AFP ADJ MOM SERPL: 0.89
AFP INTERP AMN-IMP: NORMAL
AFP INTERP SERPL-IMP: NORMAL
AFP INTERP SERPL-IMP: NORMAL
AFP SERPL-MCNC: 30 NG/ML
AGE AT DELIVERY: 30.8 YR
GA METHOD: NORMAL
GA: 19.6 WEEKS
IDDM PATIENT QL: YES
MULTIPLE PREGNANCY: NO
NEURAL TUBE DEFECT RISK FETUS: 5529 %

## 2024-11-25 ENCOUNTER — ROUTINE PRENATAL (OUTPATIENT)
Dept: PERINATAL CARE | Facility: OTHER | Age: 29
End: 2024-11-25
Payer: COMMERCIAL

## 2024-11-25 VITALS
DIASTOLIC BLOOD PRESSURE: 66 MMHG | HEIGHT: 67 IN | BODY MASS INDEX: 34.21 KG/M2 | HEART RATE: 86 BPM | WEIGHT: 218 LBS | SYSTOLIC BLOOD PRESSURE: 118 MMHG

## 2024-11-25 DIAGNOSIS — E11.65 PRE-EXISTING TYPE 2 DIABETES MELLITUS WITH HYPERGLYCEMIA DURING PREGNANCY IN SECOND TRIMESTER (HCC): Primary | ICD-10-CM

## 2024-11-25 DIAGNOSIS — O24.112 PRE-EXISTING TYPE 2 DIABETES MELLITUS WITH HYPERGLYCEMIA DURING PREGNANCY IN SECOND TRIMESTER (HCC): Primary | ICD-10-CM

## 2024-11-25 DIAGNOSIS — O99.210 OBESITY IN PREGNANCY, ANTEPARTUM: ICD-10-CM

## 2024-11-25 DIAGNOSIS — Z36.3 ENCOUNTER FOR ANTENATAL SCREENING FOR MALFORMATIONS: ICD-10-CM

## 2024-11-25 DIAGNOSIS — E03.9 HYPOTHYROIDISM AFFECTING PREGNANCY IN SECOND TRIMESTER: ICD-10-CM

## 2024-11-25 DIAGNOSIS — Z36.86 ENCOUNTER FOR ANTENATAL SCREENING FOR CERVICAL LENGTH: ICD-10-CM

## 2024-11-25 DIAGNOSIS — E66.811 CLASS 1 OBESITY: ICD-10-CM

## 2024-11-25 DIAGNOSIS — Z3A.20 20 WEEKS GESTATION OF PREGNANCY: ICD-10-CM

## 2024-11-25 DIAGNOSIS — O99.282 HYPOTHYROIDISM AFFECTING PREGNANCY IN SECOND TRIMESTER: ICD-10-CM

## 2024-11-25 PROBLEM — Z34.90 PREGNANCY: Status: RESOLVED | Noted: 2024-08-08 | Resolved: 2024-11-25

## 2024-11-25 PROCEDURE — 76817 TRANSVAGINAL US OBSTETRIC: CPT | Performed by: STUDENT IN AN ORGANIZED HEALTH CARE EDUCATION/TRAINING PROGRAM

## 2024-11-25 PROCEDURE — 99214 OFFICE O/P EST MOD 30 MIN: CPT | Performed by: STUDENT IN AN ORGANIZED HEALTH CARE EDUCATION/TRAINING PROGRAM

## 2024-11-25 PROCEDURE — 76811 OB US DETAILED SNGL FETUS: CPT | Performed by: STUDENT IN AN ORGANIZED HEALTH CARE EDUCATION/TRAINING PROGRAM

## 2024-11-25 NOTE — LETTER
"2024     Angélica Mitchell,   [address] [facility]  [address] [place] [address]    Patient: Rabia Correa   YOB: 1995   Date of Visit: 2024       Dear Dr. Mitchell:    Thank you for referring Rabia Correa to me for evaluation. Below are my notes for this consultation.    If you have questions, please do not hesitate to call me. I look forward to following your patient along with you.         Sincerely,        Bryanna Chisholm MD        CC: No Recipients    Bryanna Chisholm MD  2024  3:40 PM  Sign when Signing Visit  [facility]: Ms. Correa was seen today for anatomic survey and cervical length screening ultrasound.  See ultrasound report under \"OB Procedures\" tab.      MDM:   I. Diagnoses/Problems addressed:  low  II.  Data:  moderate  III.  Risk of morbidity: Low    Please don't hesitate to contact our office with any concerns or questions.  -Bryanna Chisholm MD         "

## 2024-11-25 NOTE — PROGRESS NOTES
"[facility]: Ms. Correa was seen today for anatomic survey and cervical length screening ultrasound.  See ultrasound report under \"OB Procedures\" tab.      MDM:   I. Diagnoses/Problems addressed:  low  II.  Data:  moderate  III.  Risk of morbidity: Low    Please don't hesitate to contact our office with any concerns or questions.  -Bryanna Chisholm MD      "

## 2024-11-25 NOTE — PROGRESS NOTES
Ultrasound Probe Disinfection    A transvaginal ultrasound was performed.   Prior to use, disinfection was performed with High Level Disinfection Process ([employer]).  Probe serial number [id number]: [id number] was used.    Claudia VARGAS, ARNULFO  11/25/24  2:36 PM

## 2024-11-29 DIAGNOSIS — O24.111 PREGNANCY WITH TYPE 2 DIABETES MELLITUS IN FIRST TRIMESTER: Primary | ICD-10-CM

## 2024-11-29 DIAGNOSIS — Z3A.20 20 WEEKS GESTATION OF PREGNANCY: ICD-10-CM

## 2024-11-29 RX ORDER — INSULIN GLARGINE-YFGN 100 [IU]/ML
36 INJECTION, SOLUTION SUBCUTANEOUS DAILY
Qty: 15 ML | Refills: 0 | Status: SHIPPED | OUTPATIENT
Start: 2024-11-29 | End: 2025-06-22

## 2024-12-10 ENCOUNTER — ROUTINE PRENATAL (OUTPATIENT)
Dept: PERINATAL CARE | Facility: OTHER | Age: 29
End: 2024-12-10
Payer: COMMERCIAL

## 2024-12-10 VITALS
WEIGHT: 220 LBS | HEIGHT: 64 IN | SYSTOLIC BLOOD PRESSURE: 94 MMHG | HEART RATE: 86 BPM | BODY MASS INDEX: 37.56 KG/M2 | DIASTOLIC BLOOD PRESSURE: 68 MMHG

## 2024-12-10 DIAGNOSIS — Z3A.22 22 WEEKS GESTATION OF PREGNANCY: Primary | ICD-10-CM

## 2024-12-10 DIAGNOSIS — O24.112 PREGNANCY COMPLICATED BY PRE-EXISTING TYPE 2 DIABETES, SECOND TRIMESTER: ICD-10-CM

## 2024-12-10 PROCEDURE — 93325 DOPPLER ECHO COLOR FLOW MAPG: CPT | Performed by: OBSTETRICS & GYNECOLOGY

## 2024-12-10 PROCEDURE — 76827 ECHO EXAM OF FETAL HEART: CPT | Performed by: OBSTETRICS & GYNECOLOGY

## 2024-12-10 PROCEDURE — 76825 ECHO EXAM OF FETAL HEART: CPT | Performed by: OBSTETRICS & GYNECOLOGY

## 2024-12-10 NOTE — LETTER
December 10, 2024     Angélica Mitchell DO  [address] [address]    Patient: Rabia Correa   YOB: 1995   Date of Visit: 12/10/2024       Dear Dr. Mitchell:    Thank you for referring Rabia Correa to me for evaluation. Below are my notes for this consultation.    If you have questions, please do not hesitate to call me. I look forward to following your patient along with you.         Sincerely,        Dustin Jaffe MD        CC: No Recipients    Dustin Jaffe MD  12/10/2024  2:39 PM  Sign when Signing Visit  Please refer to the [facility] ultrasound report in Ob Procedures for additional information regarding today's visit

## 2024-12-30 DIAGNOSIS — O99.281 HYPOTHYROIDISM AFFECTING PREGNANCY IN FIRST TRIMESTER: ICD-10-CM

## 2024-12-30 DIAGNOSIS — E66.01 CLASS 2 SEVERE OBESITY DUE TO EXCESS CALORIES WITH SERIOUS COMORBIDITY AND BODY MASS INDEX (BMI) OF 35.0 TO 35.9 IN ADULT (HCC): ICD-10-CM

## 2024-12-30 DIAGNOSIS — Z34.90 PREGNANCY, UNSPECIFIED GESTATIONAL AGE: ICD-10-CM

## 2024-12-30 DIAGNOSIS — Z3A.20 20 WEEKS GESTATION OF PREGNANCY: ICD-10-CM

## 2024-12-30 DIAGNOSIS — E11.65 PRE-EXISTING TYPE 2 DIABETES MELLITUS WITH HYPERGLYCEMIA DURING PREGNANCY IN SECOND TRIMESTER (HCC): ICD-10-CM

## 2024-12-30 DIAGNOSIS — O24.112 PRE-EXISTING TYPE 2 DIABETES MELLITUS WITH HYPERGLYCEMIA DURING PREGNANCY IN SECOND TRIMESTER (HCC): ICD-10-CM

## 2024-12-30 DIAGNOSIS — E03.9 HYPOTHYROIDISM AFFECTING PREGNANCY IN FIRST TRIMESTER: ICD-10-CM

## 2024-12-30 DIAGNOSIS — E55.9 VITAMIN D DEFICIENCY: ICD-10-CM

## 2024-12-30 DIAGNOSIS — O24.111 PREGNANCY WITH TYPE 2 DIABETES MELLITUS IN FIRST TRIMESTER: ICD-10-CM

## 2024-12-30 DIAGNOSIS — E66.812 CLASS 2 SEVERE OBESITY DUE TO EXCESS CALORIES WITH SERIOUS COMORBIDITY AND BODY MASS INDEX (BMI) OF 35.0 TO 35.9 IN ADULT (HCC): ICD-10-CM

## 2024-12-31 RX ORDER — INSULIN ASPART 100 [IU]/ML
6 INJECTION, SOLUTION INTRAVENOUS; SUBCUTANEOUS
Qty: 15 ML | Refills: 0 | Status: SHIPPED | OUTPATIENT
Start: 2024-12-31

## 2024-12-31 RX ORDER — INSULIN GLARGINE-YFGN 100 [IU]/ML
36 INJECTION, SOLUTION SUBCUTANEOUS DAILY
Qty: 15 ML | Refills: 0 | Status: SHIPPED | OUTPATIENT
Start: 2024-12-31 | End: 2025-07-24

## 2025-01-17 PROBLEM — O24.113 PRE-EXISTING TYPE 2 DIABETES MELLITUS WITH HYPERGLYCEMIA DURING PREGNANCY IN THIRD TRIMESTER (HCC): Status: ACTIVE | Noted: 2022-09-28

## 2025-01-28 ENCOUNTER — ULTRASOUND (OUTPATIENT)
Dept: PERINATAL CARE | Facility: CLINIC | Age: 30
End: 2025-01-28
Payer: COMMERCIAL

## 2025-01-28 VITALS
WEIGHT: 224.8 LBS | OXYGEN SATURATION: 99 % | SYSTOLIC BLOOD PRESSURE: 98 MMHG | DIASTOLIC BLOOD PRESSURE: 60 MMHG | HEART RATE: 91 BPM | BODY MASS INDEX: 38.38 KG/M2 | HEIGHT: 64 IN

## 2025-01-28 DIAGNOSIS — Z36.89 ENCOUNTER FOR ULTRASOUND TO CHECK FETAL GROWTH: ICD-10-CM

## 2025-01-28 DIAGNOSIS — O24.113 PRE-EXISTING TYPE 2 DIABETES MELLITUS WITH HYPERGLYCEMIA DURING PREGNANCY IN THIRD TRIMESTER (HCC): ICD-10-CM

## 2025-01-28 DIAGNOSIS — E11.65 PRE-EXISTING TYPE 2 DIABETES MELLITUS WITH HYPERGLYCEMIA DURING PREGNANCY IN THIRD TRIMESTER (HCC): ICD-10-CM

## 2025-01-28 DIAGNOSIS — E03.9 HYPOTHYROIDISM AFFECTING PREGNANCY IN SECOND TRIMESTER: ICD-10-CM

## 2025-01-28 DIAGNOSIS — O99.282 HYPOTHYROIDISM AFFECTING PREGNANCY IN SECOND TRIMESTER: ICD-10-CM

## 2025-01-28 DIAGNOSIS — O99.210 OBESITY IN PREGNANCY, ANTEPARTUM: ICD-10-CM

## 2025-01-28 DIAGNOSIS — Z3A.29 29 WEEKS GESTATION OF PREGNANCY: Primary | ICD-10-CM

## 2025-01-28 PROCEDURE — 76816 OB US FOLLOW-UP PER FETUS: CPT | Performed by: STUDENT IN AN ORGANIZED HEALTH CARE EDUCATION/TRAINING PROGRAM

## 2025-01-28 PROCEDURE — 99214 OFFICE O/P EST MOD 30 MIN: CPT | Performed by: STUDENT IN AN ORGANIZED HEALTH CARE EDUCATION/TRAINING PROGRAM

## 2025-01-28 NOTE — LETTER
"2025     Angélica Mitchell,   [address] [facility]  [address] [place] [address]    Patient: Rabia Correa   YOB: 1995   Date of Visit: 2025       Dear Dr. Mitchell:    Thank you for referring Rabia Correa to me for evaluation. Below are my notes for this consultation.    If you have questions, please do not hesitate to call me. I look forward to following your patient along with you.         Sincerely,        Bryanna Chisholm MD        CC: No Recipients    Bryanna Chisholm MD  2025 12:17 PM  Sign when Signing Visit  [facility]: Ms. Correa was seen today for fetal growth assessment ultrasound.  See ultrasound report under \"OB Procedures\" tab.      MDM:   I. Diagnoses/Problems addressed:  low  II.  Data:  moderate  III.  Risk of morbidity: Low    Please don't hesitate to contact our office with any concerns or questions.  -Bryanna Chisholm MD      "

## 2025-01-28 NOTE — PROGRESS NOTES
"[facility]: Ms. Correa was seen today for fetal growth assessment ultrasound.  See ultrasound report under \"OB Procedures\" tab.      MDM:   I. Diagnoses/Problems addressed:  low  II.  Data:  moderate  III.  Risk of morbidity: Low    Please don't hesitate to contact our office with any concerns or questions.  -Bryanna Chisholm MD    "

## 2025-02-11 DIAGNOSIS — O24.111 PREGNANCY WITH TYPE 2 DIABETES MELLITUS IN FIRST TRIMESTER: ICD-10-CM

## 2025-02-11 DIAGNOSIS — Z3A.20 20 WEEKS GESTATION OF PREGNANCY: ICD-10-CM

## 2025-02-11 DIAGNOSIS — Z3A.13 13 WEEKS GESTATION OF PREGNANCY: ICD-10-CM

## 2025-02-12 RX ORDER — INSULIN GLARGINE-YFGN 100 [IU]/ML
36 INJECTION, SOLUTION SUBCUTANEOUS DAILY
Qty: 15 ML | Refills: 0 | Status: SHIPPED | OUTPATIENT
Start: 2025-02-12 | End: 2025-09-05

## 2025-02-17 ENCOUNTER — APPOINTMENT (OUTPATIENT)
Dept: LAB | Facility: CLINIC | Age: 30
End: 2025-02-17
Payer: COMMERCIAL

## 2025-02-17 ENCOUNTER — TELEPHONE (OUTPATIENT)
Age: 30
End: 2025-02-17

## 2025-02-17 DIAGNOSIS — O99.282 HYPOTHYROIDISM AFFECTING PREGNANCY IN SECOND TRIMESTER: ICD-10-CM

## 2025-02-17 DIAGNOSIS — O24.112 PRE-EXISTING TYPE 2 DIABETES MELLITUS WITH HYPERGLYCEMIA DURING PREGNANCY IN SECOND TRIMESTER (HCC): ICD-10-CM

## 2025-02-17 DIAGNOSIS — E03.9 HYPOTHYROIDISM AFFECTING PREGNANCY IN SECOND TRIMESTER: ICD-10-CM

## 2025-02-17 DIAGNOSIS — E11.65 PRE-EXISTING TYPE 2 DIABETES MELLITUS WITH HYPERGLYCEMIA DURING PREGNANCY IN SECOND TRIMESTER (HCC): Primary | ICD-10-CM

## 2025-02-17 DIAGNOSIS — O24.112 PRE-EXISTING TYPE 2 DIABETES MELLITUS WITH HYPERGLYCEMIA DURING PREGNANCY IN SECOND TRIMESTER (HCC): Primary | ICD-10-CM

## 2025-02-17 DIAGNOSIS — E03.9 ACQUIRED HYPOTHYROIDISM: Primary | ICD-10-CM

## 2025-02-17 DIAGNOSIS — Z36.89 SCREENING FOR PREGNANCY-ASSOCIATED PLASMA PROTEIN A: ICD-10-CM

## 2025-02-17 DIAGNOSIS — E11.65 PRE-EXISTING TYPE 2 DIABETES MELLITUS WITH HYPERGLYCEMIA DURING PREGNANCY IN SECOND TRIMESTER (HCC): ICD-10-CM

## 2025-02-17 DIAGNOSIS — Z3A.29 29 WEEKS GESTATION OF PREGNANCY: ICD-10-CM

## 2025-02-17 LAB
BASOPHILS # BLD AUTO: 0.03 THOUSANDS/ΜL (ref 0–0.1)
BASOPHILS NFR BLD AUTO: 0 % (ref 0–1)
EOSINOPHIL # BLD AUTO: 0.1 THOUSAND/ΜL (ref 0–0.61)
EOSINOPHIL NFR BLD AUTO: 1 % (ref 0–6)
ERYTHROCYTE [DISTWIDTH] IN BLOOD BY AUTOMATED COUNT: 14.6 % (ref 11.6–15.1)
EST. AVERAGE GLUCOSE BLD GHB EST-MCNC: 103 MG/DL
HBA1C MFR BLD: 5.2 %
HCT VFR BLD AUTO: 33.8 % (ref 34.8–46.1)
HGB BLD-MCNC: 11.3 G/DL (ref 11.5–15.4)
IMM GRANULOCYTES # BLD AUTO: 0.05 THOUSAND/UL (ref 0–0.2)
IMM GRANULOCYTES NFR BLD AUTO: 1 % (ref 0–2)
LYMPHOCYTES # BLD AUTO: 1.19 THOUSANDS/ΜL (ref 0.6–4.47)
LYMPHOCYTES NFR BLD AUTO: 16 % (ref 14–44)
MCH RBC QN AUTO: 28.8 PG (ref 26.8–34.3)
MCHC RBC AUTO-ENTMCNC: 33.4 G/DL (ref 31.4–37.4)
MCV RBC AUTO: 86 FL (ref 82–98)
MONOCYTES # BLD AUTO: 0.44 THOUSAND/ΜL (ref 0.17–1.22)
MONOCYTES NFR BLD AUTO: 6 % (ref 4–12)
NEUTROPHILS # BLD AUTO: 5.57 THOUSANDS/ΜL (ref 1.85–7.62)
NEUTS SEG NFR BLD AUTO: 76 % (ref 43–75)
NRBC BLD AUTO-RTO: 0 /100 WBCS
PLATELET # BLD AUTO: 220 THOUSANDS/UL (ref 149–390)
PMV BLD AUTO: 11 FL (ref 8.9–12.7)
RBC # BLD AUTO: 3.92 MILLION/UL (ref 3.81–5.12)
T4 FREE SERPL-MCNC: 0.62 NG/DL (ref 0.61–1.12)
TSH SERPL DL<=0.05 MIU/L-ACNC: 1.84 UIU/ML (ref 0.45–4.5)
WBC # BLD AUTO: 7.38 THOUSAND/UL (ref 4.31–10.16)

## 2025-02-17 PROCEDURE — 85025 COMPLETE CBC W/AUTO DIFF WBC: CPT

## 2025-02-17 PROCEDURE — 36415 COLL VENOUS BLD VENIPUNCTURE: CPT

## 2025-02-17 PROCEDURE — 84439 ASSAY OF FREE THYROXINE: CPT

## 2025-02-17 PROCEDURE — 84443 ASSAY THYROID STIM HORMONE: CPT

## 2025-02-17 PROCEDURE — 83036 HEMOGLOBIN GLYCOSYLATED A1C: CPT

## 2025-02-17 PROCEDURE — 86780 TREPONEMA PALLIDUM: CPT

## 2025-02-17 NOTE — TELEPHONE ENCOUNTER
Patient calling from the lab she is there to her blood work completed but notes no Hbg A1C has been ordered. Reviewed note per Dr. Chisholm with with plan to order Hgb A1C. Order entered.

## 2025-02-18 ENCOUNTER — RESULTS FOLLOW-UP (OUTPATIENT)
Facility: HOSPITAL | Age: 30
End: 2025-02-18

## 2025-02-18 LAB — TREPONEMA PALLIDUM IGG+IGM AB [PRESENCE] IN SERUM OR PLASMA BY IMMUNOASSAY: NORMAL

## 2025-02-24 ENCOUNTER — HOSPITAL ENCOUNTER (OUTPATIENT)
Facility: HOSPITAL | Age: 30
Discharge: HOME | End: 2025-02-24
Attending: OBSTETRICS & GYNECOLOGY | Admitting: OBSTETRICS & GYNECOLOGY
Payer: COMMERCIAL

## 2025-02-24 VITALS
RESPIRATION RATE: 18 BRPM | SYSTOLIC BLOOD PRESSURE: 114 MMHG | TEMPERATURE: 97.6 F | DIASTOLIC BLOOD PRESSURE: 75 MMHG | HEART RATE: 79 BPM

## 2025-02-24 PROCEDURE — 99202 OFFICE O/P NEW SF 15 MIN: CPT

## 2025-02-26 DIAGNOSIS — E66.01 CLASS 2 SEVERE OBESITY DUE TO EXCESS CALORIES WITH SERIOUS COMORBIDITY AND BODY MASS INDEX (BMI) OF 35.0 TO 35.9 IN ADULT (HCC): ICD-10-CM

## 2025-02-26 DIAGNOSIS — O24.112 PRE-EXISTING TYPE 2 DIABETES MELLITUS WITH HYPERGLYCEMIA DURING PREGNANCY IN SECOND TRIMESTER (HCC): ICD-10-CM

## 2025-02-26 DIAGNOSIS — E03.9 HYPOTHYROIDISM AFFECTING PREGNANCY IN FIRST TRIMESTER: ICD-10-CM

## 2025-02-26 DIAGNOSIS — E11.65 PRE-EXISTING TYPE 2 DIABETES MELLITUS WITH HYPERGLYCEMIA DURING PREGNANCY IN SECOND TRIMESTER (HCC): ICD-10-CM

## 2025-02-26 DIAGNOSIS — E66.812 CLASS 2 SEVERE OBESITY DUE TO EXCESS CALORIES WITH SERIOUS COMORBIDITY AND BODY MASS INDEX (BMI) OF 35.0 TO 35.9 IN ADULT (HCC): ICD-10-CM

## 2025-02-26 DIAGNOSIS — O99.281 HYPOTHYROIDISM AFFECTING PREGNANCY IN FIRST TRIMESTER: ICD-10-CM

## 2025-02-26 DIAGNOSIS — Z34.90 PREGNANCY, UNSPECIFIED GESTATIONAL AGE: ICD-10-CM

## 2025-02-26 DIAGNOSIS — E55.9 VITAMIN D DEFICIENCY: ICD-10-CM

## 2025-02-27 ENCOUNTER — ULTRASOUND (OUTPATIENT)
Dept: PERINATAL CARE | Facility: OTHER | Age: 30
End: 2025-02-27
Payer: COMMERCIAL

## 2025-02-27 VITALS
HEIGHT: 64 IN | DIASTOLIC BLOOD PRESSURE: 62 MMHG | WEIGHT: 228 LBS | SYSTOLIC BLOOD PRESSURE: 114 MMHG | BODY MASS INDEX: 38.93 KG/M2 | HEART RATE: 93 BPM

## 2025-02-27 DIAGNOSIS — Z3A.33 33 WEEKS GESTATION OF PREGNANCY: Primary | ICD-10-CM

## 2025-02-27 DIAGNOSIS — E11.65 PRE-EXISTING TYPE 2 DIABETES MELLITUS WITH HYPERGLYCEMIA DURING PREGNANCY IN THIRD TRIMESTER (HCC): ICD-10-CM

## 2025-02-27 DIAGNOSIS — O24.113 PRE-EXISTING TYPE 2 DIABETES MELLITUS WITH HYPERGLYCEMIA DURING PREGNANCY IN THIRD TRIMESTER (HCC): ICD-10-CM

## 2025-02-27 PROCEDURE — 59025 FETAL NON-STRESS TEST: CPT | Performed by: OBSTETRICS & GYNECOLOGY

## 2025-02-27 PROCEDURE — 76816 OB US FOLLOW-UP PER FETUS: CPT | Performed by: OBSTETRICS & GYNECOLOGY

## 2025-02-27 PROCEDURE — 99214 OFFICE O/P EST MOD 30 MIN: CPT | Performed by: OBSTETRICS & GYNECOLOGY

## 2025-02-27 RX ORDER — INSULIN ASPART 100 [IU]/ML
INJECTION, SOLUTION INTRAVENOUS; SUBCUTANEOUS
Qty: 15 ML | Refills: 1 | Status: SHIPPED | OUTPATIENT
Start: 2025-02-27

## 2025-02-27 NOTE — LETTER
NST sleeve cover sheet    Patient name: Rabia Correa  : 1995  MRN: [id number]    RACHEL: Estimated Date of Delivery: 25    Obstetrician: [facility]    Reason(s) for testing: Pre-est DM     Testing frequency:    _x_  2x/wk  ___  1x/wk  ___  Dopplers  ___  BPP?      Last growth scan: __________, _________, _________    Baby:      Male   /   Female   /   [place]             Baby Name: ___________________            IOL or  C/S: _____________________

## 2025-02-27 NOTE — PROGRESS NOTES
Repeat Non-Stress Testing:    Patient verbalizes +FM. Pt denies ALL:               Leaking of fluid   Contractions   Vaginal bleeding   Decreased fetal movement    Patient is performing daily kick counts. Patient has no questions or concerns.   NST strip reviewed by Dr. Brock in-person.

## 2025-02-28 NOTE — TELEPHONE ENCOUNTER
Patient requested a refill.  Reviewed her blood sugars and no changes are recommended.    Hannah Chavis MD

## 2025-03-03 ENCOUNTER — HOSPITAL ENCOUNTER (OUTPATIENT)
Facility: HOSPITAL | Age: 30
Discharge: HOME | End: 2025-03-03
Attending: OBSTETRICS & GYNECOLOGY | Admitting: OBSTETRICS & GYNECOLOGY
Payer: COMMERCIAL

## 2025-03-03 ENCOUNTER — HOSPITAL ENCOUNTER (OUTPATIENT)
Dept: LABOR AND DELIVERY | Facility: HOSPITAL | Age: 30
Discharge: HOME | End: 2025-03-03
Payer: COMMERCIAL

## 2025-03-03 PROCEDURE — 99212 OFFICE O/P EST SF 10 MIN: CPT

## 2025-03-06 NOTE — LACTATION NOTE
Bed: 33  Expected date:   Expected time:   Means of arrival:   Comments:  Dalia/SOB   This note was copied from a baby's chart  Met with mother to go over discharge breastfeeding booklet including the feeding log  Emphasized 8 or more (12) feedings in a 24 hour period, what to expect for the number of diapers per day of life and the progression of properties of the  stooling pattern  Reviewed breastfeeding and your lifestyle, storage and preparation of breast milk, how to keep you breast pump clean, the employed breastfeeding mother and paced bottle feeding handouts  Booklet included Breastfeeding Resources for after discharge including access to the number for the [address]  Mom requesting info on how to increase milk supply  LER handout provided and reviewed  Mother verbalized breastfeeding is going well  Enc to call for assistance as needed,phone # given

## 2025-03-10 ENCOUNTER — HOSPITAL ENCOUNTER (OUTPATIENT)
Facility: HOSPITAL | Age: 30
Discharge: HOME | End: 2025-03-10
Attending: OBSTETRICS & GYNECOLOGY | Admitting: OBSTETRICS & GYNECOLOGY
Payer: COMMERCIAL

## 2025-03-10 VITALS
RESPIRATION RATE: 20 BRPM | OXYGEN SATURATION: 98 % | HEART RATE: 80 BPM | SYSTOLIC BLOOD PRESSURE: 132 MMHG | TEMPERATURE: 97.6 F | DIASTOLIC BLOOD PRESSURE: 74 MMHG

## 2025-03-10 PROBLEM — O28.8 NST (NON-STRESS TEST) NONREACTIVE: Status: ACTIVE | Noted: 2025-03-10

## 2025-03-10 PROCEDURE — 99212 OFFICE O/P EST SF 10 MIN: CPT

## 2025-03-10 NOTE — QUICK NOTE
Rabia is a 30 yo  at 35w2d. She had an NST, which was reactive. No obstetric complaints. Bedside TAUS performed at patient request to evaluate fetal position - still breech.     NST: reactive   Baseline: 130 bpm  Variability: moderate  Accelerations: 15x15, present  Decelerations: absent     D/w Dr. Hall.    Bryanna Bai MD  OBGYN, PGY-I  03/10/25  8:08 AM

## 2025-03-19 ENCOUNTER — LAB REQUISITION (OUTPATIENT)
Dept: LAB | Facility: HOSPITAL | Age: 30
End: 2025-03-19
Payer: COMMERCIAL

## 2025-03-19 DIAGNOSIS — Z36.85 ENCOUNTER FOR ANTENATAL SCREENING FOR STREPTOCOCCUS B: ICD-10-CM

## 2025-03-19 PROCEDURE — 87150 DNA/RNA AMPLIFIED PROBE: CPT | Performed by: PHYSICIAN ASSISTANT

## 2025-03-21 LAB — GP B STREP DNA SPEC QL NAA+PROBE: NEGATIVE

## 2025-03-26 DIAGNOSIS — O24.111 PREGNANCY WITH TYPE 2 DIABETES MELLITUS IN FIRST TRIMESTER: ICD-10-CM

## 2025-03-26 DIAGNOSIS — Z3A.20 20 WEEKS GESTATION OF PREGNANCY: ICD-10-CM

## 2025-03-26 DIAGNOSIS — E03.9 HYPOTHYROIDISM, UNSPECIFIED TYPE: ICD-10-CM

## 2025-03-26 RX ORDER — INSULIN GLARGINE-YFGN 100 [IU]/ML
36 INJECTION, SOLUTION SUBCUTANEOUS DAILY
Qty: 15 ML | Refills: 0 | Status: SHIPPED | OUTPATIENT
Start: 2025-03-26 | End: 2025-05-07

## 2025-03-26 RX ORDER — LEVOTHYROXINE SODIUM 75 UG/1
75 TABLET ORAL
Qty: 90 TABLET | Refills: 0 | Status: SHIPPED | OUTPATIENT
Start: 2025-03-26

## 2025-03-26 NOTE — TELEPHONE ENCOUNTER
The pt called making her appt with the office and has only 4 days left of her Levothyroxine 75 mcg and would like that sent to hospitals pharmacy in [place]

## 2025-03-27 ENCOUNTER — ULTRASOUND (OUTPATIENT)
Dept: PERINATAL CARE | Facility: OTHER | Age: 30
End: 2025-03-27
Payer: COMMERCIAL

## 2025-03-27 VITALS
HEART RATE: 78 BPM | SYSTOLIC BLOOD PRESSURE: 112 MMHG | WEIGHT: 231 LBS | DIASTOLIC BLOOD PRESSURE: 60 MMHG | HEIGHT: 64 IN | BODY MASS INDEX: 39.44 KG/M2

## 2025-03-27 DIAGNOSIS — Z36.89 ENCOUNTER FOR ULTRASOUND TO CHECK FETAL GROWTH: ICD-10-CM

## 2025-03-27 DIAGNOSIS — E03.9 HYPOTHYROIDISM, UNSPECIFIED TYPE: ICD-10-CM

## 2025-03-27 DIAGNOSIS — O24.113 PRE-EXISTING TYPE 2 DIABETES MELLITUS WITH HYPERGLYCEMIA DURING PREGNANCY IN THIRD TRIMESTER (HCC): ICD-10-CM

## 2025-03-27 DIAGNOSIS — E11.65 PRE-EXISTING TYPE 2 DIABETES MELLITUS WITH HYPERGLYCEMIA DURING PREGNANCY IN THIRD TRIMESTER (HCC): ICD-10-CM

## 2025-03-27 DIAGNOSIS — O99.213 OBESITY AFFECTING PREGNANCY IN THIRD TRIMESTER, UNSPECIFIED OBESITY TYPE: ICD-10-CM

## 2025-03-27 DIAGNOSIS — Z3A.37 37 WEEKS GESTATION OF PREGNANCY: Primary | ICD-10-CM

## 2025-03-27 PROCEDURE — 76816 OB US FOLLOW-UP PER FETUS: CPT | Performed by: STUDENT IN AN ORGANIZED HEALTH CARE EDUCATION/TRAINING PROGRAM

## 2025-03-27 PROCEDURE — 76815 OB US LIMITED FETUS(S): CPT | Performed by: NURSE PRACTITIONER

## 2025-03-27 NOTE — PROGRESS NOTES
Repeat Non-Stress Testing:    Patient verbalizes +FM. Pt denies ALL:               Leaking of fluid   Contractions   Vaginal bleeding   Decreased fetal movement    Patient is performing daily kick counts. Patient has no questions or concerns.   NST strip reviewed by CLAUDINE Macario in-person.

## 2025-03-27 NOTE — PROGRESS NOTES
[facility]: Ms. Correa was seen today at 37w5d gestational age forNST-which I reviewed and fetal growth (see ultrasound report under OB procedures tab.  Onelia CHOW

## 2025-03-27 NOTE — LETTER
2025     Angélica Mitchell,   [address] [facility]  [address] [place] [address]    Patient: Rabia Correa   YOB: 1995   Date of Visit: 3/27/2025       Dear Dr. Mitchell:    Thank you for referring Rabia Correa to me for evaluation. Below are my notes for this consultation.    If you have questions, please do not hesitate to call me. I look forward to following your patient along with you.         Sincerely,        Bryanna Chisholm MD        CC: No Recipients    CLAUDINE Macario  3/27/2025 12:51 PM  Attested  [facility]: Ms. Correa was seen today at 37w5d gestational age forNST-which I reviewed and fetal growth (see ultrasound report under OB procedures tab.  Onelia CHOW  Attestation signed by Bryanna Chisholm MD at 3/27/2025 12:51 PM:  I was the collaborating physician for Rabia Correa. I acknowledge the AP's documentation and services provided. I was available in the office, if needed, for consultation.     The ultrasound was interpreted by myself (ROBERTO Chisholm). I counseled the patient. CLAUDINE Armenta reviewed the NST.    Bryanna Chisholm MD 25

## 2025-04-06 ENCOUNTER — HOSPITAL ENCOUNTER (OUTPATIENT)
Dept: LABOR AND DELIVERY | Facility: HOSPITAL | Age: 30
Discharge: HOME | End: 2025-04-06
Payer: COMMERCIAL

## 2025-04-06 ENCOUNTER — HOSPITAL ENCOUNTER (INPATIENT)
Facility: HOSPITAL | Age: 30
LOS: 2 days | Discharge: HOME | End: 2025-04-08
Attending: OBSTETRICS & GYNECOLOGY | Admitting: OBSTETRICS & GYNECOLOGY
Payer: COMMERCIAL

## 2025-04-06 ENCOUNTER — ANESTHESIA (INPATIENT)
Dept: ANESTHESIOLOGY | Facility: HOSPITAL | Age: 30
End: 2025-04-06
Payer: COMMERCIAL

## 2025-04-06 ENCOUNTER — ANESTHESIA EVENT (INPATIENT)
Dept: ANESTHESIOLOGY | Facility: HOSPITAL | Age: 30
End: 2025-04-06
Payer: COMMERCIAL

## 2025-04-06 PROBLEM — Z28.39 RUBELLA NON-IMMUNE STATUS, ANTEPARTUM: Status: ACTIVE | Noted: 2025-04-06

## 2025-04-06 PROBLEM — Z3A.39 39 WEEKS GESTATION OF PREGNANCY: Status: ACTIVE | Noted: 2025-04-06

## 2025-04-06 PROBLEM — Z34.90 ENCOUNTER FOR ELECTIVE INDUCTION OF LABOR: Status: ACTIVE | Noted: 2025-04-06

## 2025-04-06 PROBLEM — O09.899 RUBELLA NON-IMMUNE STATUS, ANTEPARTUM: Status: ACTIVE | Noted: 2025-04-06

## 2025-04-06 LAB
ABO GROUP BLD: NORMAL
ALBUMIN SERPL BCG-MCNC: 3.7 G/DL (ref 3.5–5)
ALP SERPL-CCNC: 129 U/L (ref 34–104)
ALT SERPL W P-5'-P-CCNC: 16 U/L (ref 7–52)
ANION GAP SERPL CALCULATED.3IONS-SCNC: 11 MMOL/L (ref 4–13)
AST SERPL W P-5'-P-CCNC: 17 U/L (ref 13–39)
BASOPHILS # BLD AUTO: 0.02 THOUSANDS/ÂΜL (ref 0–0.1)
BASOPHILS NFR BLD AUTO: 0 % (ref 0–1)
BILIRUB SERPL-MCNC: 0.35 MG/DL (ref 0.2–1)
BLD GP AB SCN SERPL QL: NEGATIVE
BUN SERPL-MCNC: 10 MG/DL (ref 5–25)
CALCIUM SERPL-MCNC: 9.1 MG/DL (ref 8.4–10.2)
CHLORIDE SERPL-SCNC: 105 MMOL/L (ref 96–108)
CO2 SERPL-SCNC: 19 MMOL/L (ref 21–32)
CREAT SERPL-MCNC: 0.57 MG/DL (ref 0.6–1.3)
EOSINOPHIL # BLD AUTO: 0.04 THOUSAND/ÂΜL (ref 0–0.61)
EOSINOPHIL NFR BLD AUTO: 1 % (ref 0–6)
ERYTHROCYTE [DISTWIDTH] IN BLOOD BY AUTOMATED COUNT: 14.6 % (ref 11.6–15.1)
GFR SERPL CREATININE-BSD FRML MDRD: 124 ML/MIN/1.73SQ M
GLUCOSE SERPL-MCNC: 103 MG/DL (ref 65–140)
GLUCOSE SERPL-MCNC: 125 MG/DL (ref 65–140)
GLUCOSE SERPL-MCNC: 128 MG/DL (ref 65–140)
GLUCOSE SERPL-MCNC: 60 MG/DL (ref 65–140)
GLUCOSE SERPL-MCNC: 60 MG/DL (ref 65–140)
GLUCOSE SERPL-MCNC: 78 MG/DL (ref 65–140)
GLUCOSE SERPL-MCNC: 83 MG/DL (ref 65–140)
GLUCOSE SERPL-MCNC: 84 MG/DL (ref 65–140)
GLUCOSE SERPL-MCNC: 92 MG/DL (ref 65–140)
HCT VFR BLD AUTO: 34.8 % (ref 34.8–46.1)
HGB BLD-MCNC: 11.7 G/DL (ref 11.5–15.4)
IMM GRANULOCYTES # BLD AUTO: 0.01 THOUSAND/UL (ref 0–0.2)
IMM GRANULOCYTES NFR BLD AUTO: 0 % (ref 0–2)
LYMPHOCYTES # BLD AUTO: 1.05 THOUSANDS/ÂΜL (ref 0.6–4.47)
LYMPHOCYTES NFR BLD AUTO: 17 % (ref 14–44)
MCH RBC QN AUTO: 28.3 PG (ref 26.8–34.3)
MCHC RBC AUTO-ENTMCNC: 33.6 G/DL (ref 31.4–37.4)
MCV RBC AUTO: 84 FL (ref 82–98)
MONOCYTES # BLD AUTO: 0.3 THOUSAND/ÂΜL (ref 0.17–1.22)
MONOCYTES NFR BLD AUTO: 5 % (ref 4–12)
NEUTROPHILS # BLD AUTO: 4.64 THOUSANDS/ÂΜL (ref 1.85–7.62)
NEUTS SEG NFR BLD AUTO: 77 % (ref 43–75)
NRBC BLD AUTO-RTO: 0 /100 WBCS
PLATELET # BLD AUTO: 204 THOUSANDS/UL (ref 149–390)
PMV BLD AUTO: 10.3 FL (ref 8.9–12.7)
POTASSIUM SERPL-SCNC: 3.7 MMOL/L (ref 3.5–5.3)
PROT SERPL-MCNC: 6.7 G/DL (ref 6.4–8.4)
RBC # BLD AUTO: 4.14 MILLION/UL (ref 3.81–5.12)
RH BLD: POSITIVE
SODIUM SERPL-SCNC: 135 MMOL/L (ref 135–147)
SPECIMEN EXPIRATION DATE: NORMAL
WBC # BLD AUTO: 6.06 THOUSAND/UL (ref 4.31–10.16)

## 2025-04-06 PROCEDURE — NC001 PR NO CHARGE: Performed by: OBSTETRICS & GYNECOLOGY

## 2025-04-06 PROCEDURE — 86901 BLOOD TYPING SEROLOGIC RH(D): CPT

## 2025-04-06 PROCEDURE — 86780 TREPONEMA PALLIDUM: CPT

## 2025-04-06 PROCEDURE — 80053 COMPREHEN METABOLIC PANEL: CPT

## 2025-04-06 PROCEDURE — 82948 REAGENT STRIP/BLOOD GLUCOSE: CPT

## 2025-04-06 PROCEDURE — 4A1HXCZ MONITORING OF PRODUCTS OF CONCEPTION, CARDIAC RATE, EXTERNAL APPROACH: ICD-10-PCS | Performed by: OBSTETRICS & GYNECOLOGY

## 2025-04-06 PROCEDURE — 10907ZC DRAINAGE OF AMNIOTIC FLUID, THERAPEUTIC FROM PRODUCTS OF CONCEPTION, VIA NATURAL OR ARTIFICIAL OPENING: ICD-10-PCS | Performed by: OBSTETRICS & GYNECOLOGY

## 2025-04-06 PROCEDURE — 86850 RBC ANTIBODY SCREEN: CPT

## 2025-04-06 PROCEDURE — 3E033VJ INTRODUCTION OF OTHER HORMONE INTO PERIPHERAL VEIN, PERCUTANEOUS APPROACH: ICD-10-PCS | Performed by: OBSTETRICS & GYNECOLOGY

## 2025-04-06 PROCEDURE — 86900 BLOOD TYPING SEROLOGIC ABO: CPT

## 2025-04-06 PROCEDURE — 85025 COMPLETE CBC W/AUTO DIFF WBC: CPT

## 2025-04-06 RX ORDER — ROPIVACAINE HYDROCHLORIDE 2 MG/ML
INJECTION, SOLUTION EPIDURAL; INFILTRATION; PERINEURAL CONTINUOUS PRN
Status: DISCONTINUED | OUTPATIENT
Start: 2025-04-06 | End: 2025-04-07 | Stop reason: HOSPADM

## 2025-04-06 RX ORDER — LIDOCAINE HYDROCHLORIDE AND EPINEPHRINE 15; 5 MG/ML; UG/ML
INJECTION, SOLUTION EPIDURAL
Status: COMPLETED | OUTPATIENT
Start: 2025-04-06 | End: 2025-04-06

## 2025-04-06 RX ORDER — OXYTOCIN/RINGER'S LACTATE 30/500 ML
1-30 PLASTIC BAG, INJECTION (ML) INTRAVENOUS
Status: DISCONTINUED | OUTPATIENT
Start: 2025-04-06 | End: 2025-04-07

## 2025-04-06 RX ORDER — ACETAMINOPHEN 325 MG/1
975 TABLET ORAL EVERY 8 HOURS PRN
Status: DISCONTINUED | OUTPATIENT
Start: 2025-04-06 | End: 2025-04-07

## 2025-04-06 RX ORDER — CALCIUM CARBONATE 500 MG/1
1000 TABLET, CHEWABLE ORAL 3 TIMES DAILY PRN
Status: DISCONTINUED | OUTPATIENT
Start: 2025-04-06 | End: 2025-04-07

## 2025-04-06 RX ORDER — ONDANSETRON 2 MG/ML
4 INJECTION INTRAMUSCULAR; INTRAVENOUS EVERY 6 HOURS PRN
Status: DISCONTINUED | OUTPATIENT
Start: 2025-04-06 | End: 2025-04-07

## 2025-04-06 RX ORDER — ROPIVACAINE HYDROCHLORIDE 5 MG/ML
INJECTION, SOLUTION EPIDURAL; INFILTRATION; PERINEURAL AS NEEDED
Status: DISCONTINUED | OUTPATIENT
Start: 2025-04-06 | End: 2025-04-07 | Stop reason: HOSPADM

## 2025-04-06 RX ORDER — SODIUM CHLORIDE 9 MG/ML
100 INJECTION, SOLUTION INTRAVENOUS CONTINUOUS
Status: DISCONTINUED | OUTPATIENT
Start: 2025-04-06 | End: 2025-04-07

## 2025-04-06 RX ORDER — BUPIVACAINE HYDROCHLORIDE 2.5 MG/ML
30 INJECTION, SOLUTION EPIDURAL; INFILTRATION; INTRACAUDAL; PERINEURAL ONCE AS NEEDED
Status: DISCONTINUED | OUTPATIENT
Start: 2025-04-06 | End: 2025-04-07

## 2025-04-06 RX ADMIN — ROPIVACAINE HYDROCHLORIDE 5 ML: 5 INJECTION, SOLUTION EPIDURAL; INFILTRATION; PERINEURAL at 18:18

## 2025-04-06 RX ADMIN — ONDANSETRON 4 MG: 2 INJECTION INTRAMUSCULAR; INTRAVENOUS at 16:21

## 2025-04-06 RX ADMIN — Medication 2 MILLI-UNITS/MIN: at 09:45

## 2025-04-06 RX ADMIN — ROPIVACAINE HYDROCHLORIDE 10 ML/HR: 2 INJECTION EPIDURAL; INFILTRATION; PERINEURAL at 18:19

## 2025-04-06 RX ADMIN — LIDOCAINE HYDROCHLORIDE AND EPINEPHRINE 3 ML: 15; 5 INJECTION, SOLUTION EPIDURAL; INFILTRATION; INTRACAUDAL; PERINEURAL at 18:14

## 2025-04-06 RX ADMIN — SODIUM CHLORIDE 100 ML/HR: 0.9 INJECTION, SOLUTION INTRAVENOUS at 17:25

## 2025-04-06 RX ADMIN — ONDANSETRON 4 MG: 2 INJECTION INTRAMUSCULAR; INTRAVENOUS at 21:59

## 2025-04-06 NOTE — ANESTHESIA PREPROCEDURE EVALUATION
Procedure:  LABOR ANALGESIA    Relevant Problems   ENDO   (+) Hypothyroidism   (+) Hypothyroidism affecting pregnancy in second trimester   (+) Type 2 diabetes mellitus without complication, without long-term current use of insulin ([facility])      GYN   (+) 39 weeks gestation of pregnancy      Obstetrics/Gynecology   (+) Maternal varicella, non-immune   (+) Obesity in pregnancy, antepartum   (+) Pre-existing type 2 diabetes mellitus with hyperglycemia during pregnancy in third trimester ([facility])   (+) Rubella non-immune status, antepartum      Other   (+) Class 2 severe obesity due to excess calories with serious comorbidity and body mass index (BMI) of 36.0 to 36.9 in adult ([facility])   (+) Encounter for elective induction of labor        Physical Exam    Airway    Mallampati score: II         Dental   No notable dental hx     Cardiovascular  Cardiovascular exam normal    Pulmonary  Pulmonary exam normal     Other Findings  post-pubertal.      Anesthesia Plan  ASA Score- 2     Anesthesia Type- epidural with ASA Monitors.         Additional Monitors:     Airway Plan:            Plan Factors-Exercise tolerance (METS): >4 METS.    Chart reviewed.   Existing labs reviewed.     Patient is not a current smoker.              Induction-     Postoperative Plan-     Perioperative Resuscitation Plan - Level 1 - Full Code.       Informed Consent- Anesthetic plan and risks discussed with patient.        NPO Status:  No vitals data found for the desired time range.

## 2025-04-06 NOTE — OB LABOR/OXYTOCIN SAFETY PROGRESS
Oxytocin Safety Progress Check Note - Rabia Correa 29 y.o. female MRN: [id number]    Unit/Bed#: -01 Encounter: [id number]    Dose (carley-units/min) Oxytocin: 22 carley-units/min  Contraction Frequency (minutes): 2 - 4  Contraction Intensity: Mild/Moderate  Uterine Activity Characteristics: Irritability  Cervical Dilation: 4        Cervical Effacement: 80  Fetal Station: -2  Baseline Rate (FHR): 125 bpm  Fetal Heart Rate (FHT): 153 BPM  FHR Category: Cat 1               Vital Signs:   Vitals:    04/06/25 1322   BP: 112/78   Pulse: 80       Notes/comments:   Feeling more ctx.  AROM- clear.  Continue pitocin augmentation.      Angélica Mitchell DO 4/6/2025 2:04 PM

## 2025-04-06 NOTE — OB LABOR/OXYTOCIN SAFETY PROGRESS
Oxytocin Safety Progress Check Note - Rabia Correa 29 y.o. female MRN: [id number]    Unit/Bed#: -01 Encounter: [id number]    Dose (carley-units/min) Oxytocin: 24 carley-units/min  Contraction Frequency (minutes): 1 - 3  Contraction Intensity: Moderate  Uterine Activity Characteristics: Irritability  Cervical Dilation: 4        Cervical Effacement: 80  Fetal Station: -2  Baseline Rate (FHR): 125 bpm  Fetal Heart Rate (FHT): 153 BPM  FHR Category: I               Vital Signs:   Vitals:    04/06/25 1349   BP: 129/74   Pulse: 76       Notes/comments:   Patient is feeling contractions intensify.  Patient requested SVE to aid in epidural timing decision.  SVE unchanged as above.  FHT has been category 1.  There are times of [place] artifact but overall there has always been moderate variability and spontaneous accelerations with no concerns for fetal wellbeing.  Continue Pitocin titration. Dr. Justin aware.     Aimee Tanner MD 4/6/2025 3:22 PM

## 2025-04-06 NOTE — ANESTHESIA PROCEDURE NOTES
Epidural Block    Patient location during procedure: OB/L&D  Start time: 4/6/2025 4:14 PM  Reason for block: primary anesthetic  Staffing  Performed by: Su Pearson MD  Authorized by: Su Pearson MD    Preanesthetic Checklist  Completed: patient identified, IV checked, site marked, risks and benefits discussed, surgical consent, monitors and equipment checked, pre-op evaluation and timeout performed  Epidural  Patient position: sitting  Prep: Betadine  Sedation Level: no sedation  Patient monitoring: heart rate and continuous pulse oximetry  Approach: midline  Location: lumbar, L3-4  Injection technique: WEST saline  Needle  Needle type: Tuohy   Needle gauge: 18 G  Needle insertion depth: 8 cm  Catheter type: multi-orifice  Catheter size: 20 G  Catheter at skin depth: 13.5 cm  Catheter securement method: clear occlusive dressing and stabilization device  Test dose: negativelidocaine-epinephrine (XYLOCAINE-MPF/EPINEPHRINE) 1.5 %-1:200,000 injection 3 mL - Epidural   3 mL - 4/6/2025 6:14:00 PM  Assessment  Sensory level: T10  Number of attempts: 1negative aspiration for CSF, negative aspiration for heme and no paresthesia on injection  patient tolerated the procedure well with no immediate complications

## 2025-04-06 NOTE — ASSESSMENT & PLAN NOTE
Offer MMR post partum    Visit Information    Have you changed or started any medications since your last visit including any over-the-counter medicines, vitamins, or herbal medicines? no   Are you having any side effects from any of your medications? -  no  Have you stopped taking any of your medications? Is so, why? -  yes - see med list    Have you seen any other physician or provider since your last visit? No  Have you had any other diagnostic tests since your last visit? No  Have you been seen in the emergency room and/or had an admission to a hospital since we last saw you? No  Have you had your routine dental cleaning in the past 6 months? yes - 11/2017    Have you activated your [employer] account? If not, what are your barriers?  Yes     Patient Care Team:  Jackie Delcid CNP as PCP - General (Family Nurse Practitioner)    Medical History Review  Past Medical, Family, and Social History reviewed and does contribute to the patient presenting condition    Health Maintenance   Topic Date Due    Pneumococcal med risk (1 of 1 - PPSV23) 05/21/2005    Flu vaccine (1) 02/13/2018 (Originally 9/1/2017)    Cervical cancer screen  02/24/2019    DTaP/Tdap/Td vaccine (2 - Td) 04/28/2026    HIV screen  Completed

## 2025-04-06 NOTE — H&P
H&P - OB/GYN   Name: Rabia Correa 29 y.o. female I MRN: [id number]  Unit/Bed#: -01 I Date of Admission: 2025   Date of Service: 2025 I Hospital Day: 0     Assessment & Plan  Encounter for elective induction of labor    Hypothyroidism  Continue home levothyroxine   Pre-existing type 2 diabetes mellitus with hyperglycemia during pregnancy in third trimester ([facility])  Lab Results   Component Value Date    HGBA1C 5.2 2025       Recent Labs     25  0916   POCGLU 125       Blood Sugar Average: Last 72 hrs:  (P) 125    GDM protocol   Initial BS elevated, but it was close to her last meal  Will recheck   Consider insulin if persistently > 110   39 weeks gestation of pregnancy  Admit to OBGYN   Clear liquid diet   F/u T&S, CBC, RPR   IVF LR 125cc/hr   Continuous fetal monitoring and tocometry   Analgesia at maternal request   Vertex by TAUS  Induction plan pitocin    Rubella non-immune status, antepartum  Offer MMR post partum     History of Present Illness   Patient of: [facility]  Brief Summary: Rabia Correa  with an RACHEL of 2025, by Ultrasound at 39w1d presenting for elective induction of labor. She feels well and has no questions or concerns.     Chief Complaint: none    HPI:  Contractions: None.   Leakage of fluid: None.   Bleeding: None.   Fetal movement: present.    Pregnancy complications:  T2DM, hypothryoidism .     Review of Systems   Constitutional:  Negative for chills and fever.   HENT:  Negative for congestion, rhinorrhea, sneezing and sore throat.    Eyes:  Negative for photophobia and visual disturbance.   Respiratory:  Negative for cough and shortness of breath.    Cardiovascular:  Negative for chest pain.   Gastrointestinal:  Negative for diarrhea, nausea and vomiting.   Genitourinary:  Negative for dysuria and frequency.   Neurological:  Negative for dizziness, numbness and headaches.   Psychiatric/Behavioral: Negative.         Historical  Information   Social History     Tobacco Use    Smoking status: Never    Smokeless tobacco: Never   Vaping Use    Vaping status: Never Used   Substance and Sexual Activity    Alcohol use: Not Currently    Drug use: No    Sexual activity: Yes     Partners: Male       Current Facility-Administered Medications:     acetaminophen (TYLENOL) tablet 975 mg, Q8H PRN    bupivacaine (PF) (MARCAINE) 0.25 % injection 30 mL, Once PRN    calcium carbonate (TUMS) chewable tablet 1,000 mg, TID PRN    levothyroxine tablet 75 mcg, Early Morning    ondansetron (ZOFRAN) injection 4 mg, Q6H PRN    oxytocin (PITOCIN) 30 Units in lactated ringers 500 mL infusion, Titrated, Last Rate: 2 carley-units/min (25 0945)  OB History    Para Term  AB Living   3 2 2   2   SAB IAB Ectopic Multiple Live Births      0 2      # Outcome Date GA Lbr Raymundo/2nd Weight Sex Type Anes PTL Lv   3 Current            2 Term 23 39w2d / 01:03 2948 g (6 lb 8 oz) F Vag-Spont EPI N CRISELDA   1 Term 21 39w0d / 00:10 2875 g (6 lb 5.4 oz) M Vag-Spont EPI N CRISELDA       Objective :  HR:  [90] 90  BP: (132)/(85) 132/85    Physical Exam  Vitals and nursing note reviewed.   Constitutional:       General: She is not in acute distress.     Appearance: She is well-developed.   HENT:      Head: Normocephalic and atraumatic.   Eyes:      Conjunctiva/sclera: Conjunctivae normal.   Cardiovascular:      Rate and Rhythm: Normal rate and regular rhythm.      Pulses: Normal pulses.   Pulmonary:      Effort: Pulmonary effort is normal. No respiratory distress.   Abdominal:      Palpations: Abdomen is soft.      Tenderness: There is no abdominal tenderness.      Comments: Gravid   Genitourinary:     Comments: External genitalia normal  SVE as below  Musculoskeletal:         General: No swelling.      Cervical back: Neck supple.   Skin:     General: Skin is warm and dry.      Capillary Refill: Capillary refill takes less than 2 seconds.   Neurological:      Mental  "Status: She is alert.   Psychiatric:         Mood and Affect: Mood normal.          Cervical Exam  3/50/-4   Contractions:  Contraction Frequency (minutes): novi irritability  Contraction Duration (seconds): 30  Fetal heart rate  Baseline Rate (FHR): 150 bpm  Variability: Moderate  Accelerations: 15 x 15 or greater  Decelerations: Variable  FHR Category: Category I    Lab Results: I have reviewed the following results:  Strep Grp B PCR   Date Value Ref Range Status   03/19/2025 Negative Negative Final     Comment:           Strep Group B Ag   Date Value Ref Range Status   01/07/2021 Positive (A) Negative, Unknown, None, Pending Final     RPR   Date Value Ref Range Status   09/15/2022 Non-Reactive Non-Reactive Final     Hepatitis B Surface Ag   Date Value Ref Range Status   09/20/2024 Non-reactive Non-Reactive Final     No components found for: \"EXTHEPBSAG\"  Hepatitis C Ab   Date Value Ref Range Status   09/20/2024 Non-reactive Non-Reactive Final     RUBELLA IGG QUANTITATION   Date Value Ref Range Status   11/30/2015 11.2 IU/mL Final     Comment:     Rubella IgG     <5.0 IU/ml       Negative; not immune      5.0-9.9 IU/ml   Equivocal     >9.9 IU/ml       Positive; immune  The above 1 analytes were performed by [address] [address]       Rubella IgG Quant   Date Value Ref Range Status   09/20/2024 <10.0 (L) >14.9 IU/mL Final     No results found for: \"EXTRUBELIGGQ\"  HIV-1/HIV-2 Ab   Date Value Ref Range Status   09/15/2022 Non-Reactive Non-Reactive Final     No components found for: \"DARLDI5IL\"  N gonorrhoeae, DNA Probe   Date Value Ref Range Status   10/09/2024 Negative Negative Final   10/26/2022 Negative Negative Final   09/26/2022 Invalid (A) Negative Final     Comment:     A result for this sample could not be determined due to interfering substances: mucous, clots, blood, personal lubricants, or gel. Patient will be credited. Resubmit another sample for testing if clinically indicated. " "  06/06/2016 N. gonorrhoeae Amplified DNA Negative N. gonorrhoeae Amplified DNA Negative Final     Chlamydia, DNA Probe   Date Value Ref Range Status   06/06/2016 C. trachomatis Amplified DNA Negative C. trachomatis Amplified DNA Negative Final     Chlamydia trachomatis, DNA Probe   Date Value Ref Range Status   10/09/2024 Negative Negative Final   10/26/2022 Negative Negative Final   09/26/2022 Invalid (A) Negative Final     Comment:     A result for this sample could not be determined due to interfering substances: mucous, clots, blood, personal lubricants, or gel. Patient will be credited. Resubmit another sample for testing if clinically indicated.       Type & Screen:  ABO Grouping   Date Value Ref Range Status   04/06/2025 A  Final     Rh Factor   Date Value Ref Range Status   04/06/2025 Positive  Final     No results found for: \"ANTIBODYSCR\"  Specimen Expiration Date   Date Value Ref Range Status   04/06/2025 [id number]  Final       Aimee Tanner MD  PGY-2 OB/GYN  "

## 2025-04-06 NOTE — NURSING NOTE
Rabia ate eggs benedict prior to coming for her induction.  First blood sugar was taken prematurely resulting at 125.  Will recheck in 1 hour.

## 2025-04-06 NOTE — OB LABOR/OXYTOCIN SAFETY PROGRESS
Oxytocin Safety Progress Check Note - Rabia Correa 29 y.o. female MRN: [id number]    Unit/Bed#: -01 Encounter: [id number]    Dose (carley-units/min) Oxytocin: 14 carley-units/min  Contraction Frequency (minutes): 3-5  Contraction Intensity: Moderate  Uterine Activity Characteristics: Irritability  Cervical Dilation: 4-5        Cervical Effacement: 80  Fetal Station: -2  Baseline Rate (FHR): 120 bpm  Fetal Heart Rate (FHT): 155 BPM  FHR Category: Cat 1               Vital Signs:   Vitals:    04/06/25 1846   BP: 110/56   Pulse: 62   SpO2:        Notes/comments:   Feeling more pressure and shaky.  Continue current course.      Angélica Mitchell DO 4/6/2025 7:40 PM

## 2025-04-06 NOTE — OB LABOR/OXYTOCIN SAFETY PROGRESS
Oxytocin Safety Progress Check Note - Rabia Correa 29 y.o. female MRN: [id number]    Unit/Bed#: -01 Encounter: [id number]    Dose (carley-units/min) Oxytocin: 12 carley-units/min  Contraction Frequency (minutes): 3-5  Contraction Intensity: Moderate  Uterine Activity Characteristics: Irritability  Cervical Dilation: 4        Cervical Effacement: 80  Fetal Station: -2  Baseline Rate (FHR): 125 bpm  Fetal Heart Rate (FHT): 153 BPM  FHR Category: 1               Vital Signs:   Vitals:    04/06/25 1808   BP: 119/79   Pulse: 72       Notes/comments:   SVE unchanged. Category I tracing. Continue pitocin titration. D/w Dr. Margoth Mooney MD 4/6/2025 6:09 PM

## 2025-04-06 NOTE — ASSESSMENT & PLAN NOTE
Admit to OBGYN   Clear liquid diet   F/u T&S, CBC, RPR   IVF LR 125cc/hr   Continuous fetal monitoring and tocometry   Analgesia at maternal request   Vertex by TAUS  Induction plan pitocin

## 2025-04-06 NOTE — ASSESSMENT & PLAN NOTE
Lab Results   Component Value Date    HGBA1C 5.2 02/17/2025       Recent Labs     04/06/25  0916   POCGLU 125       Blood Sugar Average: Last 72 hrs:  (P) 125    GDM protocol   Initial BS elevated, but it was close to her last meal  Will recheck   Consider insulin if persistently > 110

## 2025-04-07 PROBLEM — Z34.90 ENCOUNTER FOR ELECTIVE INDUCTION OF LABOR: Status: RESOLVED | Noted: 2025-04-06 | Resolved: 2025-04-07

## 2025-04-07 PROBLEM — O13.9 GESTATIONAL HYPERTENSION, ANTEPARTUM: Status: ACTIVE | Noted: 2025-04-07

## 2025-04-07 LAB
BASE EXCESS BLDCOA CALC-SCNC: -2.6 MMOL/L (ref 3–11)
BASE EXCESS BLDCOV CALC-SCNC: -1.8 MMOL/L (ref 1–9)
GLUCOSE SERPL-MCNC: 90 MG/DL (ref 65–140)
HCO3 BLDCOA-SCNC: 24 MMOL/L (ref 17.3–27.3)
HCO3 BLDCOV-SCNC: 21.5 MMOL/L (ref 12.2–28.6)
O2 CT VFR BLDCOA CALC: 14.2 ML/DL
OXYHGB MFR BLDCOA: 57.4 %
OXYHGB MFR BLDCOV: 89.5 %
PCO2 BLDCOA: 47.7 MM[HG] (ref 30–60)
PCO2 BLDCOV: 33 MM HG (ref 27–43)
PH BLDCOA: 7.32 [PH] (ref 7.23–7.43)
PH BLDCOV: 7.43 [PH] (ref 7.19–7.49)
PO2 BLDCOA: 23.2 MM HG (ref 5–25)
PO2 BLDCOV: 43.7 MM HG (ref 15–45)
SAO2 % BLDCOV: 20.8 ML/DL
TREPONEMA PALLIDUM IGG+IGM AB [PRESENCE] IN SERUM OR PLASMA BY IMMUNOASSAY: NORMAL

## 2025-04-07 PROCEDURE — 82948 REAGENT STRIP/BLOOD GLUCOSE: CPT

## 2025-04-07 PROCEDURE — 82805 BLOOD GASES W/O2 SATURATION: CPT | Performed by: OBSTETRICS & GYNECOLOGY

## 2025-04-07 PROCEDURE — NC001 PR NO CHARGE: Performed by: OBSTETRICS & GYNECOLOGY

## 2025-04-07 RX ORDER — METOCLOPRAMIDE HYDROCHLORIDE 5 MG/ML
10 INJECTION INTRAMUSCULAR; INTRAVENOUS EVERY 6 HOURS PRN
Status: DISCONTINUED | OUTPATIENT
Start: 2025-04-07 | End: 2025-04-07

## 2025-04-07 RX ORDER — DIPHENHYDRAMINE HCL 25 MG
25 TABLET ORAL EVERY 6 HOURS PRN
Status: DISCONTINUED | OUTPATIENT
Start: 2025-04-07 | End: 2025-04-08 | Stop reason: HOSPADM

## 2025-04-07 RX ORDER — ONDANSETRON 2 MG/ML
4 INJECTION INTRAMUSCULAR; INTRAVENOUS EVERY 8 HOURS PRN
Status: DISCONTINUED | OUTPATIENT
Start: 2025-04-07 | End: 2025-04-08 | Stop reason: HOSPADM

## 2025-04-07 RX ORDER — POLYETHYLENE GLYCOL 3350 17 G/17G
17 POWDER, FOR SOLUTION ORAL DAILY
Status: DISCONTINUED | OUTPATIENT
Start: 2025-04-07 | End: 2025-04-08 | Stop reason: HOSPADM

## 2025-04-07 RX ORDER — ACETAMINOPHEN 325 MG/1
650 TABLET ORAL EVERY 6 HOURS
Status: DISCONTINUED | OUTPATIENT
Start: 2025-04-07 | End: 2025-04-08 | Stop reason: HOSPADM

## 2025-04-07 RX ORDER — IBUPROFEN 600 MG/1
600 TABLET, FILM COATED ORAL EVERY 6 HOURS
Status: DISCONTINUED | OUTPATIENT
Start: 2025-04-07 | End: 2025-04-08 | Stop reason: HOSPADM

## 2025-04-07 RX ORDER — CALCIUM CARBONATE 500 MG/1
1000 TABLET, CHEWABLE ORAL DAILY PRN
Status: DISCONTINUED | OUTPATIENT
Start: 2025-04-07 | End: 2025-04-08 | Stop reason: HOSPADM

## 2025-04-07 RX ORDER — OXYTOCIN/RINGER'S LACTATE 30/500 ML
250 PLASTIC BAG, INJECTION (ML) INTRAVENOUS ONCE
Status: DISCONTINUED | OUTPATIENT
Start: 2025-04-07 | End: 2025-04-08 | Stop reason: HOSPADM

## 2025-04-07 RX ORDER — SIMETHICONE 80 MG
80 TABLET,CHEWABLE ORAL 4 TIMES DAILY PRN
Status: DISCONTINUED | OUTPATIENT
Start: 2025-04-07 | End: 2025-04-08 | Stop reason: HOSPADM

## 2025-04-07 RX ORDER — BENZOCAINE/MENTHOL 6 MG-10 MG
1 LOZENGE MUCOUS MEMBRANE DAILY PRN
Status: DISCONTINUED | OUTPATIENT
Start: 2025-04-07 | End: 2025-04-08 | Stop reason: HOSPADM

## 2025-04-07 RX ADMIN — ACETAMINOPHEN 650 MG: 325 TABLET, FILM COATED ORAL at 12:16

## 2025-04-07 RX ADMIN — ACETAMINOPHEN 650 MG: 325 TABLET, FILM COATED ORAL at 16:26

## 2025-04-07 RX ADMIN — METOCLOPRAMIDE 10 MG: 5 INJECTION, SOLUTION INTRAMUSCULAR; INTRAVENOUS at 00:44

## 2025-04-07 RX ADMIN — POLYETHYLENE GLYCOL 3350 17 G: 17 POWDER, FOR SOLUTION ORAL at 07:52

## 2025-04-07 RX ADMIN — ACETAMINOPHEN 650 MG: 325 TABLET, FILM COATED ORAL at 06:00

## 2025-04-07 RX ADMIN — LEVOTHYROXINE SODIUM 75 MCG: 0.05 TABLET ORAL at 06:01

## 2025-04-07 RX ADMIN — BENZOCAINE AND LEVOMENTHOL 1 APPLICATION: 200; 5 SPRAY TOPICAL at 05:00

## 2025-04-07 RX ADMIN — ACETAMINOPHEN 650 MG: 325 TABLET, FILM COATED ORAL at 22:37

## 2025-04-07 RX ADMIN — IBUPROFEN 600 MG: 600 TABLET, FILM COATED ORAL at 20:09

## 2025-04-07 RX ADMIN — IBUPROFEN 600 MG: 600 TABLET, FILM COATED ORAL at 02:04

## 2025-04-07 RX ADMIN — IBUPROFEN 600 MG: 600 TABLET, FILM COATED ORAL at 07:47

## 2025-04-07 RX ADMIN — IBUPROFEN 600 MG: 600 TABLET, FILM COATED ORAL at 14:04

## 2025-04-07 NOTE — PROGRESS NOTES
Progress Note - OB/GYN   Name: Rabia Correa 29 y.o. female I MRN: [id number]  Unit/Bed#: -01 I Date of Admission: 2025   Date of Service: 2025 I Hospital Day: 1    Assessment & Plan  Hypothyroidism  Continue home levothyroxine   Pre-existing type 2 diabetes mellitus with hyperglycemia during pregnancy in third trimester ([facility])  Lab Results   Component Value Date    HGBA1C 5.2 2025     Recent Labs     25  1906 25  0002   POCGLU 60* 83 78 90     Blood Sugar Average: Last 72 hrs:  (P) [id number]    2hr GTT postpartum   (spontaneous vaginal delivery)  Lochia WNL   Recovering well   Appropriate bowel and bladder function   Pain well controlled   Tolerating diet   Breastfeeding   Ambulating without issues   No lower extremity tenderness  GBS neg   Rh pos      Rubella non-immune status, antepartum  Offer MMR post partum     OB Post-Partum Progress Note  Subjective   Post delivery. Patient is doing well. Lochia WNL. Pain well controlled.     Pain: yes, cramping, improved with meds  Tolerating PO: yes  Voiding: yes  Flatus: yes  BM: no  Ambulating: yes  Breastfeeding:  yees  Chest pain: no  Shortness of breath: no  Leg pain: no  Lochia: WNL    Objective :  Temp:  [97.5 °F (36.4 °C)-98.3 °F (36.8 °C)] 98.1 °F (36.7 °C)  HR:  [58-92] 67  BP: (102-153)/() 114/71  Resp:  [18] 18  SpO2:  [98 %-100 %] 98 %  O2 Device: None (Room air)    Physical Exam:   GEN: Rabia Correa appears well, alert and oriented x 3, pleasant and cooperative   CARDIO: RRR, no murmurs or rubs  RESP:  CTAB, no wheezes or rales  ABDOMEN: soft, no tenderness, no distention, fundus firm below umbilicus  EXTREMITIES: SCDs on, enon tender, no erythema, b/l Margaux's sign negativ      Lab Results: I have reviewed the following results:  Lab Results   Component Value Date    WBC 6.06 2025    HGB 11.7 2025    HCT 34.8 2025    MCV 84 2025      04/06/2025       Aimee Tanner MD  PGY-2 OB/GYN

## 2025-04-07 NOTE — PLAN OF CARE
Problem: PAIN - ADULT  Goal: Verbalizes/displays adequate comfort level or baseline comfort level  Description: Interventions:- Encourage patient to monitor pain and request assistance- Assess pain using appropriate pain scale- Administer analgesics based on type and severity of pain and evaluate response- Implement non-pharmacological measures as appropriate and evaluate response- Consider cultural and social influences on pain and pain management- Notify physician/advanced practitioner if interventions unsuccessful or patient reports new pain  Outcome: Progressing     Problem: INFECTION - ADULT  Goal: Absence or prevention of progression during hospitalization  Description: INTERVENTIONS:- Assess and monitor for signs and symptoms of infection- Monitor lab/diagnostic results- Monitor all insertion sites, i.e. indwelling lines, tubes, and drains- Monitor endotracheal if appropriate and nasal secretions for changes in amount and color- [place] appropriate cooling/warming therapies per order- Administer medications as ordered- Instruct and encourage patient and family to use good hand hygiene technique- Identify and instruct in appropriate isolation precautions for identified infection/condition  Outcome: Progressing  Goal: Absence of fever/infection during neutropenic period  Description: INTERVENTIONS:- Monitor WBC  Outcome: Progressing     Problem: SAFETY ADULT  Goal: Patient will remain free of falls  Description: INTERVENTIONS:- Educate patient/family on patient safety including physical limitations- Instruct patient to call for assistance with activity - Consult OT/PT to assist with strengthening/mobility - Keep Call bell within reach- Keep bed low and locked with side rails adjusted as appropriate- Keep care items and personal belongings within reach- Initiate and maintain comfort rounds- Make Fall Risk Sign visible to staff- Offer Toileting every    Hours, in advance of need- Initiate/Maintain   alarm-  Obtain necessary fall risk management equipment:     - Apply yellow socks and bracelet for high fall risk patients- Consider moving patient to room near nurses station  Outcome: Progressing  Goal: Maintain or return to baseline ADL function  Description: INTERVENTIONS:-  Assess patient's ability to carry out ADLs; assess patient's baseline for ADL function and identify physical deficits which impact ability to perform ADLs (bathing, care of mouth/teeth, toileting, grooming, dressing, etc.)- Assess/evaluate cause of self-care deficits - Assess range of motion- Assess patient's mobility; develop plan if impaired- Assess patient's need for assistive devices and provide as appropriate- Encourage maximum independence but intervene and supervise when necessary- Involve family in performance of ADLs- Assess for home care needs following discharge - Consider OT consult to assist with ADL evaluation and planning for discharge- Provide patient education as appropriate  Outcome: Progressing  Goal: Maintains/Returns to pre admission functional level  Description: INTERVENTIONS:- Perform AM-PAC 6 Click Basic Mobility/ Daily Activity assessment daily.- Set and communicate daily mobility goal to care team and patient/family/caregiver. - Collaborate with rehabilitation services on mobility goals if consulted- Perform Range of Motion    times a day.- Reposition patient every    hours.- Dangle patient    times a day- Stand patient    times a day- Ambulate patient    times a day- Out of bed to chair      times a day - Out of bed for meals    times a day- Out of bed for toileting- Record patient progress and toleration of activity level   Outcome: Progressing     Problem: Knowledge Deficit  Goal: Patient/family/caregiver demonstrates understanding of disease process, treatment plan, medications, and discharge instructions  Description: Complete learning assessment and assess knowledge base.Interventions:- Provide teaching at level of  understanding- Provide teaching via preferred learning methods  Outcome: Progressing     Problem: DISCHARGE PLANNING  Goal: Discharge to home or other facility with appropriate resources  Description: INTERVENTIONS:- Identify barriers to discharge w/patient and caregiver- Arrange for needed discharge resources and transportation as appropriate- Identify discharge learning needs (meds, wound care, etc.)- Arrange for interpretive services to assist at discharge as needed- Refer to Case Management Department for coordinating discharge planning if the patient needs post-hospital services based on physician/advanced practitioner order or complex needs related to functional status, cognitive ability, or social support system  Outcome: Progressing     Problem: PAIN - ADULT  Goal: Verbalizes/displays adequate comfort level or baseline comfort level  Description: Interventions:- Encourage patient to monitor pain and request assistance- Assess pain using appropriate pain scale- Administer analgesics based on type and severity of pain and evaluate response- Implement non-pharmacological measures as appropriate and evaluate response- Consider cultural and social influences on pain and pain management- Notify physician/advanced practitioner if interventions unsuccessful or patient reports new pain  Outcome: Progressing     Problem: INFECTION - ADULT  Goal: Absence or prevention of progression during hospitalization  Description: INTERVENTIONS:- Assess and monitor for signs and symptoms of infection- Monitor lab/diagnostic results- Monitor all insertion sites, i.e. indwelling lines, tubes, and drains- Monitor endotracheal if appropriate and nasal secretions for changes in amount and color- [place] appropriate cooling/warming therapies per order- Administer medications as ordered- Instruct and encourage patient and family to use good hand hygiene technique- Identify and instruct in appropriate isolation precautions for  identified infection/condition  Outcome: Progressing  Goal: Absence of fever/infection during neutropenic period  Description: INTERVENTIONS:- Monitor WBC  Outcome: Progressing     Problem: SAFETY ADULT  Goal: Patient will remain free of falls  Description: INTERVENTIONS:- Educate patient/family on patient safety including physical limitations- Instruct patient to call for assistance with activity - Consult OT/PT to assist with strengthening/mobility - Keep Call bell within reach- Keep bed low and locked with side rails adjusted as appropriate- Keep care items and personal belongings within reach- Initiate and maintain comfort rounds- Make Fall Risk Sign visible to staff- Offer Toileting every    Hours, in advance of need- Initiate/Maintain   alarm- Obtain necessary fall risk management equipment:   - Apply yellow socks and bracelet for high fall risk patients- Consider moving patient to room near nurses station  Outcome: Progressing  Goal: Maintain or return to baseline ADL function  Description: INTERVENTIONS:-  Assess patient's ability to carry out ADLs; assess patient's baseline for ADL function and identify physical deficits which impact ability to perform ADLs (bathing, care of mouth/teeth, toileting, grooming, dressing, etc.)- Assess/evaluate cause of self-care deficits - Assess range of motion- Assess patient's mobility; develop plan if impaired- Assess patient's need for assistive devices and provide as appropriate- Encourage maximum independence but intervene and supervise when necessary- Involve family in performance of ADLs- Assess for home care needs following discharge - Consider OT consult to assist with ADL evaluation and planning for discharge- Provide patient education as appropriate  Outcome: Progressing  Goal: Maintains/Returns to pre admission functional level  Description: INTERVENTIONS:- Perform AM-PAC 6 Click Basic Mobility/ Daily Activity assessment daily.- Set and communicate daily mobility  goal to care team and patient/family/caregiver. - Collaborate with rehabilitation services on mobility goals if consulted- Perform Range of Motion    times a day.- Reposition patient every    hours.- Dangle patient    times a day- Stand patient      times a day- Ambulate patient    times a day- Out of bed to chair    times a day - Out of bed for meals    times a day- Out of bed for toileting- Record patient progress and toleration of activity level   Outcome: Progressing     Problem: Knowledge Deficit  Goal: Patient/family/caregiver demonstrates understanding of disease process, treatment plan, medications, and discharge instructions  Description: Complete learning assessment and assess knowledge base.Interventions:- Provide teaching at level of understanding- Provide teaching via preferred learning methods  Outcome: Progressing     Problem: DISCHARGE PLANNING  Goal: Discharge to home or other facility with appropriate resources  Description: INTERVENTIONS:- Identify barriers to discharge w/patient and caregiver- Arrange for needed discharge resources and transportation as appropriate- Identify discharge learning needs (meds, wound care, etc.)- Arrange for interpretive services to assist at discharge as needed- Refer to Case Management Department for coordinating discharge planning if the patient needs post-hospital services based on physician/advanced practitioner order or complex needs related to functional status, cognitive ability, or social support system  Outcome: Progressing     Problem: PAIN - ADULT  Goal: Verbalizes/displays adequate comfort level or baseline comfort level  Description: Interventions:- Encourage patient to monitor pain and request assistance- Assess pain using appropriate pain scale- Administer analgesics based on type and severity of pain and evaluate response- Implement non-pharmacological measures as appropriate and evaluate response- Consider cultural and social influences on pain and  pain management- Notify physician/advanced practitioner if interventions unsuccessful or patient reports new pain  Outcome: Progressing     Problem: INFECTION - ADULT  Goal: Absence or prevention of progression during hospitalization  Description: INTERVENTIONS:- Assess and monitor for signs and symptoms of infection- Monitor lab/diagnostic results- Monitor all insertion sites, i.e. indwelling lines, tubes, and drains- Monitor endotracheal if appropriate and nasal secretions for changes in amount and color- [place] appropriate cooling/warming therapies per order- Administer medications as ordered- Instruct and encourage patient and family to use good hand hygiene technique- Identify and instruct in appropriate isolation precautions for identified infection/condition  Outcome: Progressing  Goal: Absence of fever/infection during neutropenic period  Description: INTERVENTIONS:- Monitor WBC  Outcome: Progressing     Problem: SAFETY ADULT  Goal: Patient will remain free of falls  Description: INTERVENTIONS:- Educate patient/family on patient safety including physical limitations- Instruct patient to call for assistance with activity - Consult OT/PT to assist with strengthening/mobility - Keep Call bell within reach- Keep bed low and locked with side rails adjusted as appropriate- Keep care items and personal belongings within reach- Initiate and maintain comfort rounds- Make Fall Risk Sign visible to staff- Offer Toileting every    Hours, in advance of need- Initiate/Maintain   alarm- Obtain necessary fall risk management equipment:   - Apply yellow socks and bracelet for high fall risk patients- Consider moving patient to room near nurses station  Outcome: Progressing  Goal: Maintain or return to baseline ADL function  Description: INTERVENTIONS:-  Assess patient's ability to carry out ADLs; assess patient's baseline for ADL function and identify physical deficits which impact ability to perform ADLs (bathing, care  of mouth/teeth, toileting, grooming, dressing, etc.)- Assess/evaluate cause of self-care deficits - Assess range of motion- Assess patient's mobility; develop plan if impaired- Assess patient's need for assistive devices and provide as appropriate- Encourage maximum independence but intervene and supervise when necessary- Involve family in performance of ADLs- Assess for home care needs following discharge - Consider OT consult to assist with ADL evaluation and planning for discharge- Provide patient education as appropriate  Outcome: Progressing  Goal: Maintains/Returns to pre admission functional level  Description: INTERVENTIONS:- Perform AM-PAC 6 Click Basic Mobility/ Daily Activity assessment daily.- Set and communicate daily mobility goal to care team and patient/family/caregiver. - Collaborate with rehabilitation services on mobility goals if consulted- Perform Range of Motion    times a day.- Reposition patient every    hours.- Dangle patient    times a day- Stand patient    times a day- Ambulate patient    times a day- Out of bed to chair      times a day - Out of bed for meals      times a day- Out of bed for toileting- Record patient progress and toleration of activity level   Outcome: Progressing     Problem: Knowledge Deficit  Goal: Patient/family/caregiver demonstrates understanding of disease process, treatment plan, medications, and discharge instructions  Description: Complete learning assessment and assess knowledge base.Interventions:- Provide teaching at level of understanding- Provide teaching via preferred learning methods  Outcome: Progressing     Problem: DISCHARGE PLANNING  Goal: Discharge to home or other facility with appropriate resources  Description: INTERVENTIONS:- Identify barriers to discharge w/patient and caregiver- Arrange for needed discharge resources and transportation as appropriate- Identify discharge learning needs (meds, wound care, etc.)- Arrange for interpretive services  to assist at discharge as needed- Refer to Case Management Department for coordinating discharge planning if the patient needs post-hospital services based on physician/advanced practitioner order or complex needs related to functional status, cognitive ability, or social support system  Outcome: Progressing     Problem: PAIN - ADULT  Goal: Verbalizes/displays adequate comfort level or baseline comfort level  Description: Interventions:- Encourage patient to monitor pain and request assistance- Assess pain using appropriate pain scale- Administer analgesics based on type and severity of pain and evaluate response- Implement non-pharmacological measures as appropriate and evaluate response- Consider cultural and social influences on pain and pain management- Notify physician/advanced practitioner if interventions unsuccessful or patient reports new pain  Outcome: Progressing     Problem: INFECTION - ADULT  Goal: Absence or prevention of progression during hospitalization  Description: INTERVENTIONS:- Assess and monitor for signs and symptoms of infection- Monitor lab/diagnostic results- Monitor all insertion sites, i.e. indwelling lines, tubes, and drains- Monitor endotracheal if appropriate and nasal secretions for changes in amount and color- [place] appropriate cooling/warming therapies per order- Administer medications as ordered- Instruct and encourage patient and family to use good hand hygiene technique- Identify and instruct in appropriate isolation precautions for identified infection/condition  Outcome: Progressing  Goal: Absence of fever/infection during neutropenic period  Description: INTERVENTIONS:- Monitor WBC  Outcome: Progressing     Problem: SAFETY ADULT  Goal: Patient will remain free of falls  Description: INTERVENTIONS:- Educate patient/family on patient safety including physical limitations- Instruct patient to call for assistance with activity - Consult OT/PT to assist with  strengthening/mobility - Keep Call bell within reach- Keep bed low and locked with side rails adjusted as appropriate- Keep care items and personal belongings within reach- Initiate and maintain comfort rounds- Make Fall Risk Sign visible to staff- Offer Toileting every    Hours, in advance of need- Initiate/Maintain   alarm- Obtain necessary fall risk management equipment:   - Apply yellow socks and bracelet for high fall risk patients- Consider moving patient to room near nurses station  Outcome: Progressing  Goal: Maintain or return to baseline ADL function  Description: INTERVENTIONS:-  Assess patient's ability to carry out ADLs; assess patient's baseline for ADL function and identify physical deficits which impact ability to perform ADLs (bathing, care of mouth/teeth, toileting, grooming, dressing, etc.)- Assess/evaluate cause of self-care deficits - Assess range of motion- Assess patient's mobility; develop plan if impaired- Assess patient's need for assistive devices and provide as appropriate- Encourage maximum independence but intervene and supervise when necessary- Involve family in performance of ADLs- Assess for home care needs following discharge - Consider OT consult to assist with ADL evaluation and planning for discharge- Provide patient education as appropriate  Outcome: Progressing  Goal: Maintains/Returns to pre admission functional level  Description: INTERVENTIONS:- Perform AM-PAC 6 Click Basic Mobility/ Daily Activity assessment daily.- Set and communicate daily mobility goal to care team and patient/family/caregiver. - Collaborate with rehabilitation services on mobility goals if consulted- Perform Range of Motion    times a day.- Reposition patient every    hours.- Dangle patient    times a day- Stand patient    times a day- Ambulate patient    times a day- Out of bed to chair    times a day - Out of bed for meals    times a day- Out of bed for toileting- Record patient progress and  toleration of activity level   Outcome: Progressing     Problem: Knowledge Deficit  Goal: Patient/family/caregiver demonstrates understanding of disease process, treatment plan, medications, and discharge instructions  Description: Complete learning assessment and assess knowledge base.Interventions:- Provide teaching at level of understanding- Provide teaching via preferred learning methods  Outcome: Progressing     Problem: DISCHARGE PLANNING  Goal: Discharge to home or other facility with appropriate resources  Description: INTERVENTIONS:- Identify barriers to discharge w/patient and caregiver- Arrange for needed discharge resources and transportation as appropriate- Identify discharge learning needs (meds, wound care, etc.)- Arrange for interpretive services to assist at discharge as needed- Refer to Case Management Department for coordinating discharge planning if the patient needs post-hospital services based on physician/advanced practitioner order or complex needs related to functional status, cognitive ability, or social support system  Outcome: Progressing     Problem: PAIN - ADULT  Goal: Verbalizes/displays adequate comfort level or baseline comfort level  Description: Interventions:- Encourage patient to monitor pain and request assistance- Assess pain using appropriate pain scale- Administer analgesics based on type and severity of pain and evaluate response- Implement non-pharmacological measures as appropriate and evaluate response- Consider cultural and social influences on pain and pain management- Notify physician/advanced practitioner if interventions unsuccessful or patient reports new pain  Outcome: Progressing     Problem: INFECTION - ADULT  Goal: Absence or prevention of progression during hospitalization  Description: INTERVENTIONS:- Assess and monitor for signs and symptoms of infection- Monitor lab/diagnostic results- Monitor all insertion sites, i.e. indwelling lines, tubes, and drains-  Monitor endotracheal if appropriate and nasal secretions for changes in amount and color- [place] appropriate cooling/warming therapies per order- Administer medications as ordered- Instruct and encourage patient and family to use good hand hygiene technique- Identify and instruct in appropriate isolation precautions for identified infection/condition  Outcome: Progressing  Goal: Absence of fever/infection during neutropenic period  Description: INTERVENTIONS:- Monitor WBC  Outcome: Progressing     Problem: SAFETY ADULT  Goal: Patient will remain free of falls  Description: INTERVENTIONS:- Educate patient/family on patient safety including physical limitations- Instruct patient to call for assistance with activity - Consult OT/PT to assist with strengthening/mobility - Keep Call bell within reach- Keep bed low and locked with side rails adjusted as appropriate- Keep care items and personal belongings within reach- Initiate and maintain comfort rounds- Make Fall Risk Sign visible to staff- Offer Toileting every    Hours, in advance of need- Initiate/Maintain     alarm- Obtain necessary fall risk management equipment:   - Apply yellow socks and bracelet for high fall risk patients- Consider moving patient to room near nurses station  Outcome: Progressing  Goal: Maintain or return to baseline ADL function  Description: INTERVENTIONS:-  Assess patient's ability to carry out ADLs; assess patient's baseline for ADL function and identify physical deficits which impact ability to perform ADLs (bathing, care of mouth/teeth, toileting, grooming, dressing, etc.)- Assess/evaluate cause of self-care deficits - Assess range of motion- Assess patient's mobility; develop plan if impaired- Assess patient's need for assistive devices and provide as appropriate- Encourage maximum independence but intervene and supervise when necessary- Involve family in performance of ADLs- Assess for home care needs following discharge - Consider  OT consult to assist with ADL evaluation and planning for discharge- Provide patient education as appropriate  Outcome: Progressing  Goal: Maintains/Returns to pre admission functional level  Description: INTERVENTIONS:- Perform AM-PAC 6 Click Basic Mobility/ Daily Activity assessment daily.- Set and communicate daily mobility goal to care team and patient/family/caregiver. - Collaborate with rehabilitation services on mobility goals if consulted- Perform Range of Motion    times a day.- Reposition patient every    hours.- Dangle patient    times a day- Stand patient      times a day- Ambulate patient    times a day- Out of bed to chair    times a day - Out of bed for meals    times a day- Out of bed for toileting- Record patient progress and toleration of activity level   Outcome: Progressing     Problem: Knowledge Deficit  Goal: Patient/family/caregiver demonstrates understanding of disease process, treatment plan, medications, and discharge instructions  Description: Complete learning assessment and assess knowledge base.Interventions:- Provide teaching at level of understanding- Provide teaching via preferred learning methods  Outcome: Progressing     Problem: DISCHARGE PLANNING  Goal: Discharge to home or other facility with appropriate resources  Description: INTERVENTIONS:- Identify barriers to discharge w/patient and caregiver- Arrange for needed discharge resources and transportation as appropriate- Identify discharge learning needs (meds, wound care, etc.)- Arrange for interpretive services to assist at discharge as needed- Refer to Case Management Department for coordinating discharge planning if the patient needs post-hospital services based on physician/advanced practitioner order or complex needs related to functional status, cognitive ability, or social support system  Outcome: Progressing

## 2025-04-07 NOTE — PLAN OF CARE
Problem: PAIN - ADULT  Goal: Verbalizes/displays adequate comfort level or baseline comfort level  Description: Interventions:- Encourage patient to monitor pain and request assistance- Assess pain using appropriate pain scale- Administer analgesics based on type and severity of pain and evaluate response- Implement non-pharmacological measures as appropriate and evaluate response- Consider cultural and social influences on pain and pain management- Notify physician/advanced practitioner if interventions unsuccessful or patient reports new pain  Outcome: Progressing     Problem: INFECTION - ADULT  Goal: Absence or prevention of progression during hospitalization  Description: INTERVENTIONS:- Assess and monitor for signs and symptoms of infection- Monitor lab/diagnostic results- Monitor all insertion sites, i.e. indwelling lines, tubes, and drains- Monitor endotracheal if appropriate and nasal secretions for changes in amount and color- [place] appropriate cooling/warming therapies per order- Administer medications as ordered- Instruct and encourage patient and family to use good hand hygiene technique- Identify and instruct in appropriate isolation precautions for identified infection/condition  Outcome: Progressing  Goal: Absence of fever/infection during neutropenic period  Description: INTERVENTIONS:- Monitor WBC  Outcome: Progressing     Problem: SAFETY ADULT  Goal: Patient will remain free of falls  Description: INTERVENTIONS:- Educate patient/family on patient safety including physical limitations- Instruct patient to call for assistance with activity - Consult OT/PT to assist with strengthening/mobility - Keep Call bell within reach- Keep bed low and locked with side rails adjusted as appropriate- Keep care items and personal belongings within reach- Initiate and maintain comfort rounds- Make Fall Risk Sign visible to staff- Offer Toileting every  Hours, in advance of need- Initiate/Maintain alarm- Obtain  necessary fall risk management equipment: - Apply yellow socks and bracelet for high fall risk patients- Consider moving patient to room near nurses station  Outcome: Progressing  Goal: Maintain or return to baseline ADL function  Description: INTERVENTIONS:-  Assess patient's ability to carry out ADLs; assess patient's baseline for ADL function and identify physical deficits which impact ability to perform ADLs (bathing, care of mouth/teeth, toileting, grooming, dressing, etc.)- Assess/evaluate cause of self-care deficits - Assess range of motion- Assess patient's mobility; develop plan if impaired- Assess patient's need for assistive devices and provide as appropriate- Encourage maximum independence but intervene and supervise when necessary- Involve family in performance of ADLs- Assess for home care needs following discharge - Consider OT consult to assist with ADL evaluation and planning for discharge- Provide patient education as appropriate  Outcome: Progressing  Goal: Maintains/Returns to pre admission functional level  Description: INTERVENTIONS:- Perform AM-PAC 6 Click Basic Mobility/ Daily Activity assessment daily.- Set and communicate daily mobility goal to care team and patient/family/caregiver. - Collaborate with rehabilitation services on mobility goals if consulted- Perform Range of Motion  times a day.- Reposition patient every  hours.- Dangle patient  times a day- Stand patient  times a day- Ambulate patient  times a day- Out of bed to chair  times a day - Out of bed for meals  times a day- Out of bed for toileting- Record patient progress and toleration of activity level   Outcome: Progressing     Problem: Knowledge Deficit  Goal: Patient/family/caregiver demonstrates understanding of disease process, treatment plan, medications, and discharge instructions  Description: Complete learning assessment and assess knowledge base.Interventions:- Provide teaching at level of understanding- Provide  teaching via preferred learning methods  Outcome: Progressing     Problem: DISCHARGE PLANNING  Goal: Discharge to home or other facility with appropriate resources  Description: INTERVENTIONS:- Identify barriers to discharge w/patient and caregiver- Arrange for needed discharge resources and transportation as appropriate- Identify discharge learning needs (meds, wound care, etc.)- Arrange for interpretive services to assist at discharge as needed- Refer to Case Management Department for coordinating discharge planning if the patient needs post-hospital services based on physician/advanced practitioner order or complex needs related to functional status, cognitive ability, or social support system  Outcome: Progressing     Problem: POSTPARTUM  Goal: Experiences normal postpartum course  Description: INTERVENTIONS:- Monitor maternal vital signs- Assess uterine involution and lochia  Outcome: Progressing  Goal: Appropriate maternal -  bonding  Description: INTERVENTIONS:- Identify family support- Assess for appropriate maternal/infant bonding -Encourage maternal/infant bonding opportunities- Referral to  or  as needed  Outcome: Progressing  Goal: Establishment of infant feeding pattern  Description: INTERVENTIONS:- Assess breast/bottle feeding- Refer to lactation as needed  Outcome: Progressing

## 2025-04-07 NOTE — DISCHARGE SUMMARY
Discharge Summary - OB/GYN   Name: Rabia Correa 29 y.o. female I MRN: [id number]  Unit/Bed#: -01 I Date of Admission: 2025   Date of Service: 2025 I Hospital Day: 1    ADMISSION  Patient of:  [facility]  Admission Date: 2025   Admitting Attending: Dr. Justin  Admitting Diagnoses:   39 weeks gestation  T2DM  Hypothyroidism  Rubella Non-immune    DELIVERY  Delivery Method: Vaginal, Spontaneous   Delivery Date and Time: 2025 12:50 AM  Delivery Attending: Dr. Mitchell    DISCHARGE  Discharge Date: 2024  Discharge Attending: Dr. Okeefe  Discharge Diagnosis:   Same, Delivered    Clinical course: Admission to Delivery  Rabia Correa is now a 29 y.o. [id number] 39w2d who presented to labor and delivery for elective induction of labor. Her pregnancy was notable for preexisting T2DM, hypothyroidism, and rubella non-immune status. On exam, she was noted to be 3/50/-4.  She was induced with pitocin. She received an epidural and amniotomy was performed for clear fluid. She progressed to complete cervical dilation and began pushing.     Delivery  Route of Delivery: Vaginal, Spontaneous    Anesthesia: Epidural  QBL: 140mL    Delivery: Vaginal, Spontaneous at 2025 12:50 AM  Laceration: None    Baby's Weight:  ;      Apgar scores: 8  and 9  at 1 and 5 minutes, respectively    Clinical Course: Post-Delivery:  The post delivery course was unremarkable.    On the day of discharge, the patient was ambulating, voiding spontaneously, tolerating oral intake, and hemodynamically stable. She was able to reasonably perform all ADLs. She had appropriate bowel function. Pain was well-controlled. She was discharged home on postpartum/postop day #1 without complications. Patient was instructed to follow up with her OB as an outpatient and was given appropriate warnings to call her provider with problems or concerns.    Pertinent lab findings included:   Blood type A+.     Last three Hgb  values:  Lab Results   Component Value Date    HGB 11.7 04/06/2025    HGB 11.3 (L) 02/17/2025    HGB 11.6 09/20/2024        Problem-specific follow-up plans included the following:  See progress note on the day of discharge    Discharge med list:  Contraception: declined     Medication List      CONTINUE taking these medications     Contour Next EZ w/Device Kit; Dispense 1 kit.   Insulin Pen Needle 31G X 5 MM Misc; Inject under the skin 4 (four) times   a day (before meals and at bedtime) Use one with each pen needle.   Microlet Lancets Misc; Use up to 6 a day. T2DM and pregnancy.     ASK your doctor about these medications     Contour Next Test test strip; Generic drug: glucose blood; Test up to 6   times a day. T2DM and pregnancy.   fish oil 1,000 mg   insulin aspart 100 UNIT/ML injection pen; Commonly known as: NovoLOG   FlexPen; 6 u before breakfast and 8 u before lunch and dinner for T2DM and   pregnancy. To be titrated as directed.   levothyroxine 75 mcg tablet; Take 1 tablet (75 mcg total) by mouth daily   in the early morning   loratadine 10 mg tablet; Commonly known as: CLARITIN   magnesium gluconate 500 mg tablet; Commonly known as: MAGONATE   metFORMIN 500 mg 24 hr tablet; Commonly known as: GLUCOPHAGE-XR; Take 4   tabs daily with food   ondansetron 4 mg tablet; Commonly known as: ZOFRAN   prenatal multivitamin Tabs   Semglee (yfgn) 100 UNIT/ML Sopn; Generic drug: Insulin Glargine-yfgn;   Inject 0.36 mL (36 Units total) under the skin daily (4pm); Titrate as   instructed   Vitamin D 50 MCG (2000 UT) tablet       Condition at discharge:   good     Disposition:   Home    Planned Readmission:   No    Aimee Tanner MD  PGY-2 OB/GYN

## 2025-04-07 NOTE — NURSING NOTE
Discharge education completed with patient. [facility] magnet given and reviewed. Pt verbalized understanding. No questions at this time.

## 2025-04-07 NOTE — OB LABOR/OXYTOCIN SAFETY PROGRESS
Oxytocin Safety Progress Check Note - Rabia Correa 29 y.o. female MRN: [id number]    Unit/Bed#: -01 Encounter: [id number]    Dose (carley-units/min) Oxytocin: 28 carley-units/min  Contraction Frequency (minutes): 1.5-3  Contraction Intensity: Moderate/Strong  Uterine Activity Characteristics: Coupling  Cervical Dilation: 10  Dilation Complete Date: 04/07/25  Dilation Complete Time: 0044  Cervical Effacement: 100  Fetal Station: 1  Baseline Rate (FHR): 130 bpm  Fetal Heart Rate (FHT): 155 BPM  FHR Category: 1               Vital Signs:   Vitals:    04/07/25 0033   BP: 129/72   Pulse: 61   Temp:    SpO2:        Notes/comments:   SVE as above, patient complete. Will begin pushing. Dr. Justin aware      Bailey Mooney MD 4/7/2025 12:46 AM

## 2025-04-07 NOTE — L&D DELIVERY NOTE
Vaginal Delivery Summary - OB/GYN   Rabia Correa 29 y.o. female MRN: [id number]  Unit/Bed#: -01 Encounter: [id number]    Pre-delivery Diagnosis:   Pregnancy at 39 weeks gestation   Type II Diabetes Mellitus  Hypothyroidism  Rubella non-immune    Post-delivery Diagnosis:   Same, delivered    Procedure: Spontaneous Vaginal Delivery     Attending Physician: Dr. Mitchell  Resident Physician: Dr. Bailey Mooney    Anesthesia: Epidural    QBL: 140 cc  Admission H.7 g/dL  Admission platelets: 204 thousands/uL    Complications: none apparent    Specimens:   1. Arterial and venous cord gases  2. Cord blood  3. Segment of umbilical cord  4. Placenta to storage     Findings:  1. Viable female on 2025 at 12:50 AM, with APGARS of 8  and 9  at 1 and 5 minutes respectively. Weight pending at time of dictation for skin to skin bonding.  2. Spontaneous delivery of intact placenta at 0056  3. No lacerations    Gases:  Umbilical Artery  Recent Labs     25  0051   PHCART 7.320   BECART -2.6*       Umbilical Vein  Recent Labs     25  0051   PHCVEN 7.431   BECVEN -1.8*         Brief history and labor course:  Rabia Correa is now a 29 y.o. [id number] 39w2d who presented to labor and delivery for elective induction of labor. Her pregnancy was notable for preexisting T2DM, hypothyroidism, and rubella non-immune status. On exam, she was noted to be 3/50/-4.  She was induced with pitocin. She received an epidural and amniotomy was performed for clear fluid. She remained unchanged at 4/80/-2 for several hours and an IUPC was placed, and an FSE was placed due to difficulty keeping baby on the monitor. She progressed to complete cervical dilation and began pushing.    Description of delivery:    After pushing for less than 1 minute, Rabia delivered a viable female , wt pending as mother is doing skin to skin bonding. The fetal vertex delivered MICHEL position spontaneously. There was no nuchal  cord. The anterior shoulder delivered atraumatically with maternal expulsive forces and the assistance of gentle downward traction. The posterior shoulder delivered with maternal expulsive forces and the assistance of gentle upward traction. The remainder of the fetus delivered spontaneously.     Upon delivery, the infant was placed on the mothers abdomen and the cord was doubly clamped and cut. Delayed cord clamping was achieved.The infant was noted to cry spontaneously and was moving all extremities appropriately. There was no evidence for injury. Nurse resuscitators evaluating the . Arterial and venous cord blood gases and cord blood was collected for analysis. These were promptly sent to the lab. In the immediate post-partum, active management of the 3rd stage of labor was performed with massage, the administration of 30 units of IV pitocin, and gentle traction on the umbilical cord. The placenta delivered spontaneously and was noted to have a centrally inserted 3 vessel cord.  The placenta was sent to storage.    The vagina, cervix, perineum, and rectum were inspected and noted to be intact.    Bimanual exam revealed minimal clots and good uterine tone.  The fundus was firm and at the level of the umbilicus.  At the conclusion of the procedure, all needle, sponge, and instrument counts were noted to be correct. Patient tolerated the procedure well and was allowed to recover in labor and delivery room with family and  before being transferred to the post-partum floor. Dr. Mitchell was present and participated in all key portions of the case.    Disposition:  The patient and the  both tolerated the procedure well and are recovering in labor and delivery room       Bailey Mooney MD  OB/GYN PGY-2  2025  1:15 AM

## 2025-04-07 NOTE — ANESTHESIA POSTPROCEDURE EVALUATION
Post-Op Assessment Note    CV Status:  Stable    Pain management: adequate      Post-op block assessment: no complications   Mental Status:  Awake   Hydration Status:  Stable   PONV Controlled:  Controlled   Airway Patency:  Patent     Post Op Vitals Reviewed: Yes    No anethesia notable event occurred.    Staff: Anesthesiologist   Comments: catheter remoeved bu OB staff.      /54   Pulse 79   Temp 98 °F (36.7 °C) (Oral)   Resp 18   LMP 06/30/2024   SpO2 100%   Breastfeeding Yes       Last Filed PACU Vitals:  Vitals Value Taken Time   Temp     Pulse     BP     Resp     SpO2

## 2025-04-07 NOTE — ASSESSMENT & PLAN NOTE
Lab Results   Component Value Date    HGBA1C 5.2 02/17/2025     Recent Labs     04/06/25  1906 04/06/25 2000 04/06/25 2202 04/07/25  0002   POCGLU 60* 83 78 90     Blood Sugar Average: Last 72 hrs:  (P) [id number]    2hr GTT postpartum

## 2025-04-07 NOTE — OB LABOR/OXYTOCIN SAFETY PROGRESS
Oxytocin Safety Progress Check Note - Rabia Correa 29 y.o. female MRN: [id number]    Unit/Bed#: -01 Encounter: [id number]    Dose (carley-units/min) Oxytocin: 26 carley-units/min  Contraction Frequency (minutes): 1-4  Contraction Intensity: Moderate/Strong  Uterine Activity Characteristics: Coupling  Cervical Dilation: 4-5        Cervical Effacement: 80  Fetal Station: -2  Baseline Rate (FHR): 120 bpm  Fetal Heart Rate (FHT): 155 BPM  FHR Category: 1               Vital Signs:   Vitals:    04/06/25 2303   BP: 108/67   Pulse: 72   Temp:    SpO2:        Notes/comments:   SVE as above, FSE replaced. Category I tracing. Continue frequent repositioning. Pitocin at 26. D/w Dr. Margoth Mooney MD 4/7/2025 12:00 AM

## 2025-04-07 NOTE — ASSESSMENT & PLAN NOTE
Lochia WNL   Recovering well   Appropriate bowel and bladder function   Pain well controlled   Tolerating diet   Breastfeeding   Ambulating without issues   No lower extremity tenderness  GBS neg   Rh pos

## 2025-04-07 NOTE — PLAN OF CARE
Problem: Knowledge Deficit  Goal: Verbalizes understanding of labor plan  Description: Assess patient/family/caregiver's baseline knowledge level and ability to understand information.  Provide education via patient/family/caregiver's preferred learning method at appropriate level of understanding. 1. Provide teaching at level of understanding.2. Provide teaching via preferred learning method(s).  Outcome: Completed     Problem: Labor & Delivery  Goal: Manages discomfort  Description: Assess and monitor for signs and symptoms of discomfort.  Assess patient's pain level regularly and per hospital policy.  Administer medications as ordered. Support use of nonpharmacological methods to help control pain such as distraction, imagery, relaxation, and application of heat and cold.  Collaborate with interdisciplinary team and patient to determine appropriate pain management plan.1. Include patient in decisions related to comfort.2. Offer non-pharmacological pain management interventions.3. Report ineffective pain management to physician.  Outcome: Completed  Goal: Patient vital signs are stable  Description: 1. Assess vital signs - vaginal delivery.  Outcome: Completed

## 2025-04-07 NOTE — OB LABOR/OXYTOCIN SAFETY PROGRESS
Oxytocin Safety Progress Check Note - Rabia Correa 29 y.o. female MRN: [id number]    Unit/Bed#: -01 Encounter: [id number]    Dose (carley-units/min) Oxytocin: 22 carley-units/min  Contraction Frequency (minutes): 2-3  Contraction Intensity: Moderate/Strong  Uterine Activity Characteristics: Irritability  Cervical Dilation: 4-5        Cervical Effacement: 80  Fetal Station: -2  Baseline Rate (FHR): 120 bpm  Fetal Heart Rate (FHT): 155 BPM  FHR Category: 1               Vital Signs:   Vitals:    04/06/25 2202   BP: 137/84   Pulse: 82   Temp: 98.3 °F (36.8 °C)   SpO2:        Notes/comments:   SVE as above, unchanged. IUPC placed, FSE still in place. Category I tracing. Continue pitocin titration. D/w Dr. Margoth Mooney MD 4/6/2025 10:20 PM

## 2025-04-08 VITALS
OXYGEN SATURATION: 100 % | HEART RATE: 67 BPM | RESPIRATION RATE: 16 BRPM | HEIGHT: 64 IN | BODY MASS INDEX: 40.46 KG/M2 | TEMPERATURE: 97.8 F | SYSTOLIC BLOOD PRESSURE: 111 MMHG | WEIGHT: 237 LBS | DIASTOLIC BLOOD PRESSURE: 70 MMHG

## 2025-04-08 LAB
DME PARACHUTE DELIVERY DATE ACTUAL: NORMAL
DME PARACHUTE DELIVERY DATE REQUESTED: NORMAL
DME PARACHUTE ITEM DESCRIPTION: NORMAL
DME PARACHUTE ORDER STATUS: NORMAL
DME PARACHUTE SUPPLIER NAME: NORMAL
DME PARACHUTE SUPPLIER PHONE: NORMAL

## 2025-04-08 PROCEDURE — 90707 MMR VACCINE SC: CPT

## 2025-04-08 PROCEDURE — NC001 PR NO CHARGE: Performed by: OBSTETRICS & GYNECOLOGY

## 2025-04-08 PROCEDURE — 90716 VAR VACCINE LIVE SUBQ: CPT

## 2025-04-08 RX ORDER — BENZOCAINE/MENTHOL 6 MG-10 MG
1 LOZENGE MUCOUS MEMBRANE DAILY PRN
Start: 2025-04-08

## 2025-04-08 RX ORDER — SIMETHICONE 80 MG
80 TABLET,CHEWABLE ORAL 4 TIMES DAILY PRN
Start: 2025-04-08

## 2025-04-08 RX ORDER — CALCIUM CARBONATE 500 MG/1
1000 TABLET, CHEWABLE ORAL DAILY PRN
Start: 2025-04-08

## 2025-04-08 RX ORDER — ACETAMINOPHEN 325 MG/1
975 TABLET ORAL EVERY 6 HOURS
Start: 2025-04-08

## 2025-04-08 RX ORDER — POLYETHYLENE GLYCOL 3350 17 G/17G
17 POWDER, FOR SOLUTION ORAL DAILY
Start: 2025-04-08

## 2025-04-08 RX ORDER — IBUPROFEN 200 MG
600 TABLET ORAL EVERY 6 HOURS PRN
Start: 2025-04-08 | End: 2025-04-13

## 2025-04-08 RX ADMIN — VARICELLA VIRUS VACCINE LIVE 0.5 ML: 1350 INJECTION, POWDER, LYOPHILIZED, FOR SUSPENSION SUBCUTANEOUS at 10:39

## 2025-04-08 RX ADMIN — MEASLES, MUMPS, AND RUBELLA VIRUS VACCINE LIVE 0.5 ML: 1000; 12500; 1000 INJECTION, POWDER, LYOPHILIZED, FOR SUSPENSION SUBCUTANEOUS at 09:10

## 2025-04-08 RX ADMIN — LEVOTHYROXINE SODIUM 75 MCG: 0.05 TABLET ORAL at 06:00

## 2025-04-08 RX ADMIN — IBUPROFEN 600 MG: 600 TABLET, FILM COATED ORAL at 09:10

## 2025-04-08 RX ADMIN — ACETAMINOPHEN 650 MG: 325 TABLET, FILM COATED ORAL at 04:31

## 2025-04-08 RX ADMIN — IBUPROFEN 600 MG: 600 TABLET, FILM COATED ORAL at 03:04

## 2025-04-08 NOTE — UTILIZATION REVIEW
"Mother and baby discharged 2025     NOTIFICATION OF INPATIENT ADMISSION   MATERNITY/DELIVERY AUTHORIZATION REQUEST   SERVICING FACILITY:   [facility] hospital  [facility] - L&D, [place], NICU  [address] [place] [address]  Tax ID: [id number]  NPI: [id number] ATTENDING PROVIDER:  Attending Name and NPI#: Angélica Mitchell Do [[id number]]  Address: [address] [place] [address]  Phone: [phone]     ADMISSION INFORMATION:  Place of Service: Inpatient [facility]  Place of Service Code: 21  Inpatient Admission Date/Time: 25  8:39 AM  Discharge Date/Time: 2025 11:43 AM  Admitting Diagnosis Code/Description:  Encounter for full-term uncomplicated delivery [O80]   Mother: Rabia Correa 1995 Estimated Date of Delivery: 25  Delivering clinician: Angélica Mitchell   OB History          3    Para   3    Term   3            AB        Living   3         SAB        IAB        Ectopic        Multiple   0    Live Births   3                Name & MRN:   Information for the patient's :  Sunny, Baby Girl (Rabia) [[id number]]    Delivery Information:  Sex: female  Delivered 2025 12:50 AM by Vaginal, Spontaneous; Gestational Age: 39w2d    [place] Measurements:  Weight: 6 lb 10.2 oz (3010 g);  Height: 19.5\"    APGAR 1 minute 5 minutes 10 minutes   Totals: 8 9       UTILIZATION REVIEW CONTACT:  Leticia Noland, Utilization   Network Utilization Review Department  Phone: [phone]  Fax [phone]  Email: [email or web address]  Contact for approvals/pending authorizations, clinical reviews, and discharge.   PHYSICIAN ADVISORY SERVICES:  Medical Necessity Denial & Arlj-xi-Vxnl Review  Phone: [phone]  Fax: [phone]  Email: [email or web address]   DISCHARGE SUPPORT TEAM:  For Patients Discharge Needs & Updates  Phone: [phone] opt. 2 Fax: [phone]  Email: " [email or web address]

## 2025-04-08 NOTE — CASE MANAGEMENT
Case Management Progress Note    Patient name Rabia Correa  Location /-01 MRN [id number]  : 1995 Date 2025       LOS (days): 2  Geometric Mean LOS (GMLOS) (days):   Days to GMLOS:        OBJECTIVE:        Current admission status: Inpatient  Preferred Pharmacy:   [facility] [place], [place] - [address]  [facility],  [address] [facility],  First [address] [facility] [address]  Phone: [phone] Fax: [phone]    [id number] - [place], [place] - [address] [place] [address]  [address] [place] [address]  Phone: [phone] Fax: [phone]    [id number] - [place], [place] - [address] [facility]  [address] [facility] [address]  Phone: [phone] Fax: [phone]    Primary Care Provider: No primary care provider on file.    Primary Insurance: CAPITAL  Secondary Insurance:     PROGRESS NOTE:    CM received consult for breast pump. MOB requesting Zomee Z2  pump. CM placed order via [facility]. Order approved. CM delivered pump to MOB's room. Delivery ticket signed and placed in bin for DME liaison.

## 2025-04-08 NOTE — PLAN OF CARE
Problem: PAIN - ADULT  Goal: Verbalizes/displays adequate comfort level or baseline comfort level  Description: Interventions:- Encourage patient to monitor pain and request assistance- Assess pain using appropriate pain scale- Administer analgesics based on type and severity of pain and evaluate response- Implement non-pharmacological measures as appropriate and evaluate response- Consider cultural and social influences on pain and pain management- Notify physician/advanced practitioner if interventions unsuccessful or patient reports new pain  4/8/2025 1126 by Lurdes Villafuerte RN  Outcome: Completed  4/8/2025 0747 by Lurdes Villafuerte RN  Outcome: Progressing     Problem: INFECTION - ADULT  Goal: Absence or prevention of progression during hospitalization  Description: INTERVENTIONS:- Assess and monitor for signs and symptoms of infection- Monitor lab/diagnostic results- Monitor all insertion sites, i.e. indwelling lines, tubes, and drains- Monitor endotracheal if appropriate and nasal secretions for changes in amount and color- [place] appropriate cooling/warming therapies per order- Administer medications as ordered- Instruct and encourage patient and family to use good hand hygiene technique- Identify and instruct in appropriate isolation precautions for identified infection/condition  4/8/2025 1126 by Lurdes Villafuerte RN  Outcome: Completed  4/8/2025 0747 by Lurdes Villafuerte RN  Outcome: Progressing  Goal: Absence of fever/infection during neutropenic period  Description: INTERVENTIONS:- Monitor WBC  4/8/2025 1126 by Lurdes Villafuerte RN  Outcome: Completed  4/8/2025 0747 by Lurdes Villafuerte RN  Outcome: Progressing     Problem: SAFETY ADULT  Goal: Patient will remain free of falls  Description: INTERVENTIONS:- Educate patient/family on patient safety including physical limitations- Instruct patient to call for assistance with activity - Consult OT/PT to assist with strengthening/mobility - Keep Call bell within reach- Keep bed low and locked  with side rails adjusted as appropriate- Keep care items and personal belongings within reach- Initiate and maintain comfort rounds- Make Fall Risk Sign visible to staff- Offer Toileting patients- Consider moving patient to room near nurses station  4/8/2025 1126 by Lurdes Villafuerte RN  Outcome: Completed  4/8/2025 0747 by Lurdes Villafuerte RN  Outcome: Progressing  Goal: Maintain or return to baseline ADL function  Description: INTERVENTIONS:-  Assess patient's ability to carry out ADLs; assess patient's baseline for ADL function and identify physical deficits which impact ability to perform ADLs (bathing, care of mouth/teeth, toileting, grooming, dressing, etc.)- Assess/evaluate cause of self-care deficits - Assess range of motion- Assess patient's mobility; develop plan if impaired- Assess patient's need for assistive devices and provide as appropriate- Encourage maximum independence but intervene and supervise when necessary- Involve family in performance of ADLs- Assess for home care needs following discharge - Consider OT consult to assist with ADL evaluation and planning for discharge- Provide patient education as appropriate  4/8/2025 1126 by Lurdes Villafuerte RN  Outcome: Completed  4/8/2025 0747 by Lurdes Villafuerte RN  Outcome: Progressing  Goal: Maintains/Returns to pre admission functional level  Description: INTERVENTIONS:- Perform AM-PAC 6 Click Basic Mobility/ Daily Activity assessment daily.- Set and communicate daily mobility goal to care team and patient/family/caregiver. - Collaborate with rehabilitation services on mobility goals if consultedday- Out of bed for toileting- Record patient progress and toleration of activity level   4/8/2025 1126 by Lurdes Villafuerte RN  Outcome: Completed  4/8/2025 0747 by Lurdes Villafuerte RN  Outcome: Progressing     Problem: Knowledge Deficit  Goal: Patient/family/caregiver demonstrates understanding of disease process, treatment plan, medications, and discharge instructions  Description: Complete  learning assessment and assess knowledge base.Interventions:- Provide teaching at level of understanding- Provide teaching via preferred learning methods  2025 by Lurdes Villafuerte RN  Outcome: Completed  2025 by Lurdes Villafuerte RN  Outcome: Progressing     Problem: POSTPARTUM  Goal: Experiences normal postpartum course  Description: INTERVENTIONS:- Monitor maternal vital signs- Assess uterine involution and lochia  2025 by Lurdes Villafuerte RN  Outcome: Completed  2025 by Lurdes Villafuerte RN  Outcome: Progressing  Goal: Appropriate maternal -  bonding  Description: INTERVENTIONS:- Identify family support- Assess for appropriate maternal/infant bonding -Encourage maternal/infant bonding opportunities- Referral to  or  as needed  2025 by Lurdes Villafuerte RN  Outcome: Completed  2025 by Lurdes Villafuerte RN  Outcome: Progressing  Goal: Establishment of infant feeding pattern  Description: INTERVENTIONS:- Assess breast/bottle feeding- Refer to lactation as needed  2025 by Lurdes Villafuerte RN  Outcome: Completed  2025 by Lurdes Villafuerte RN  Outcome: Progressing     Problem: DISCHARGE PLANNING  Goal: Discharge to home or other facility with appropriate resources  Description: INTERVENTIONS:- Identify barriers to discharge w/patient and caregiver- Arrange for needed discharge resources and transportation as appropriate- Identify discharge learning needs (meds, wound care, etc.)- Arrange for interpretive services to assist at discharge as needed- Refer to Case Management Department for coordinating discharge planning if the patient needs post-hospital services based on physician/advanced practitioner order or complex needs related to functional status, cognitive ability, or social support system  2025 by Lurdes Villafuerte RN  Outcome: Completed  2025 by Lurdes Villafuerte RN  Outcome: Progressing

## 2025-04-08 NOTE — PLAN OF CARE
Problem: PAIN - ADULT  Goal: Verbalizes/displays adequate comfort level or baseline comfort level  Description: Interventions:- Encourage patient to monitor pain and request assistance- Assess pain using appropriate pain scale- Administer analgesics based on type and severity of pain and evaluate response- Implement non-pharmacological measures as appropriate and evaluate response- Consider cultural and social influences on pain and pain management- Notify physician/advanced practitioner if interventions unsuccessful or patient reports new pain  Outcome: Progressing     Problem: INFECTION - ADULT  Goal: Absence or prevention of progression during hospitalization  Description: INTERVENTIONS:- Assess and monitor for signs and symptoms of infection- Monitor lab/diagnostic results- Monitor all insertion sites, i.e. indwelling lines, tubes, and drains- Monitor endotracheal if appropriate and nasal secretions for changes in amount and color- [place] appropriate cooling/warming therapies per order- Administer medications as ordered- Instruct and encourage patient and family to use good hand hygiene technique- Identify and instruct in appropriate isolation precautions for identified infection/condition  Outcome: Progressing  Goal: Absence of fever/infection during neutropenic period  Description: INTERVENTIONS:- Monitor WBC  Outcome: Progressing     Problem: SAFETY ADULT  Goal: Patient will remain free of falls  Description: INTERVENTIONS:- Educate patient/family on patient safety including physical limitations- Instruct patient to call for assistance with activity -Keep Call bell within reach- Keep bed low and locked with side rails adjusted as appropriate- Keep care items and personal belongings within reach- Ipatients- Outcome: Progressing  Goal: Maintain or return to baseline ADL function  Description: INTERVENTIONS:-  Assess patient's ability to carry out ADLs; assess patient's baseline for ADL function and  identify physical deficits which impact ability to perform ADLs (bathing, care of mouth/teeth, toileting, grooming, dressing, etc.)- Assess/evaluate cause of self-care deficits - Assess range of motion- Assess patient's mobility; develop plan if impaired- Assess patient's need for assistive devices and provide as appropriate- Encourage maximum independence but intervene and supervise when necessary- Involve family in performance of ADLs- Assess for home care needs following discharge - Consider OT consult to assist with ADL evaluation and planning for discharge- Provide patient education as appropriate  Outcome: Progressing  Goal: Maintains/Returns to pre admission functional level  Description: INTERVENTIONS:- Perform AM-PAC 6 Click Basic Mobility/ Daily Activity assessment daily.- Set and communicate daily mobility goal to care team and patient/family/caregiver. - Collaborate with rehabilitation services on mobility goals if cOutcome: Progressing     Problem: Knowledge Deficit  Goal: Patient/family/caregiver demonstrates understanding of disease process, treatment plan, medications, and discharge instructions  Description: Complete learning assessment and assess knowledge base.Interventions:- Provide teaching at level of understanding- Provide teaching via preferred learning methods  Outcome: Progressing     Problem: DISCHARGE PLANNING  Goal: Discharge to home or other facility with appropriate resources  Description: INTERVENTIONS:- Identify barriers to discharge w/patient and caregiver- Arrange for needed discharge resources and transportation as appropriate- Identify discharge learning needs (meds, wound care, etc.)- Arrange for interpretive services to assist at discharge as needed- Refer to Case Management Department for coordinating discharge planning if the patient needs post-hospital services based on physician/advanced practitioner order or complex needs related to functional status, cognitive ability, or  social support system  Outcome: Progressing     Problem: Knowledge Deficit  Goal: Patient/family/caregiver demonstrates understanding of disease process, treatment plan, medications, and discharge instructions  Description: Complete learning assessment and assess knowledge base.Interventions:- Provide teaching at level of understanding- Provide teaching via preferred learning methods  Outcome: Progressing     Problem: POSTPARTUM  Goal: Experiences normal postpartum course  Description: INTERVENTIONS:- Monitor maternal vital signs- Assess uterine involution and lochia  Outcome: Progressing  Goal: Appropriate maternal -  bonding  Description: INTERVENTIONS:- Identify family support- Assess for appropriate maternal/infant bonding -Encourage maternal/infant bonding opportunities- Referral to  or  as needed  Outcome: Progressing  Goal: Establishment of infant feeding pattern  Description: INTERVENTIONS:- Assess breast/bottle feeding- Refer to lactation as needed  Outcome: Progressing

## 2025-04-15 LAB — PLACENTA IN STORAGE: NORMAL

## 2025-06-27 DIAGNOSIS — E11.9 TYPE 2 DIABETES MELLITUS WITHOUT COMPLICATION, WITHOUT LONG-TERM CURRENT USE OF INSULIN (HCC): ICD-10-CM

## 2025-06-27 RX ORDER — METFORMIN HYDROCHLORIDE 500 MG/1
TABLET, EXTENDED RELEASE ORAL
Qty: 120 TABLET | Refills: 0 | Status: SHIPPED | OUTPATIENT
Start: 2025-06-27

## 2025-07-03 ENCOUNTER — OFFICE VISIT (OUTPATIENT)
Dept: INTERNAL MEDICINE CLINIC | Facility: OTHER | Age: 30
End: 2025-07-03
Payer: COMMERCIAL

## 2025-07-03 VITALS
OXYGEN SATURATION: 98 % | WEIGHT: 224 LBS | BODY MASS INDEX: 38.24 KG/M2 | HEIGHT: 64 IN | SYSTOLIC BLOOD PRESSURE: 118 MMHG | TEMPERATURE: 97.9 F | HEART RATE: 89 BPM | DIASTOLIC BLOOD PRESSURE: 80 MMHG

## 2025-07-03 DIAGNOSIS — Z13.220 SCREENING FOR LIPID DISORDERS: ICD-10-CM

## 2025-07-03 DIAGNOSIS — E03.9 HYPOTHYROIDISM, UNSPECIFIED TYPE: ICD-10-CM

## 2025-07-03 DIAGNOSIS — Z76.89 ENCOUNTER TO ESTABLISH CARE: ICD-10-CM

## 2025-07-03 DIAGNOSIS — R11.0 NAUSEA: ICD-10-CM

## 2025-07-03 DIAGNOSIS — B35.1 ONYCHOMYCOSIS: ICD-10-CM

## 2025-07-03 DIAGNOSIS — Z13.0 SCREENING FOR DEFICIENCY ANEMIA: ICD-10-CM

## 2025-07-03 DIAGNOSIS — E55.9 VITAMIN D DEFICIENCY: ICD-10-CM

## 2025-07-03 DIAGNOSIS — E11.9 TYPE 2 DIABETES MELLITUS WITHOUT COMPLICATION, WITHOUT LONG-TERM CURRENT USE OF INSULIN (HCC): Primary | ICD-10-CM

## 2025-07-03 PROBLEM — E11.65 PRE-EXISTING TYPE 2 DIABETES MELLITUS WITH HYPERGLYCEMIA DURING PREGNANCY IN THIRD TRIMESTER (HCC): Status: RESOLVED | Noted: 2022-09-28 | Resolved: 2025-07-03

## 2025-07-03 PROBLEM — O09.899 RUBELLA NON-IMMUNE STATUS, ANTEPARTUM: Status: RESOLVED | Noted: 2025-04-06 | Resolved: 2025-07-03

## 2025-07-03 PROBLEM — O09.899 MATERNAL VARICELLA, NON-IMMUNE: Status: RESOLVED | Noted: 2023-04-02 | Resolved: 2025-07-03

## 2025-07-03 PROBLEM — O24.113 PRE-EXISTING TYPE 2 DIABETES MELLITUS WITH HYPERGLYCEMIA DURING PREGNANCY IN THIRD TRIMESTER (HCC): Status: RESOLVED | Noted: 2022-09-28 | Resolved: 2025-07-03

## 2025-07-03 PROBLEM — Z28.39 MATERNAL VARICELLA, NON-IMMUNE: Status: RESOLVED | Noted: 2023-04-02 | Resolved: 2025-07-03

## 2025-07-03 PROBLEM — O28.8 NST (NON-STRESS TEST) NONREACTIVE: Status: RESOLVED | Noted: 2025-03-10 | Resolved: 2025-07-03

## 2025-07-03 PROBLEM — O13.9 GESTATIONAL HYPERTENSION, ANTEPARTUM: Status: RESOLVED | Noted: 2025-04-07 | Resolved: 2025-07-03

## 2025-07-03 PROBLEM — O99.210 OBESITY IN PREGNANCY, ANTEPARTUM: Status: RESOLVED | Noted: 2024-11-25 | Resolved: 2025-07-03

## 2025-07-03 PROBLEM — O99.282 HYPOTHYROIDISM AFFECTING PREGNANCY IN SECOND TRIMESTER: Status: RESOLVED | Noted: 2024-08-07 | Resolved: 2025-07-03

## 2025-07-03 PROBLEM — Z28.39 RUBELLA NON-IMMUNE STATUS, ANTEPARTUM: Status: RESOLVED | Noted: 2025-04-06 | Resolved: 2025-07-03

## 2025-07-03 PROCEDURE — 99214 OFFICE O/P EST MOD 30 MIN: CPT | Performed by: NURSE PRACTITIONER

## 2025-07-03 RX ORDER — ONDANSETRON 4 MG/1
4 TABLET, FILM COATED ORAL EVERY 8 HOURS PRN
Qty: 20 TABLET | Refills: 1 | Status: SHIPPED | OUTPATIENT
Start: 2025-07-03

## 2025-07-03 NOTE — PROGRESS NOTES
Name: Rabia Correa      : 1995      MRN: [id number]  Encounter Provider: CLAUDINE Zuluaga  Encounter Date: 7/3/2025   Encounter department: [facility] [place]  :  Assessment & Plan  Type 2 diabetes mellitus without complication, without long-term current use of insulin ([facility])    Lab Results   Component Value Date    HGBA1C 5.2 2025     - controlled on Metformin XR 2000mg daily   - follows with endocrinology   - due for diabetic eye exam, pt will schedule    Orders:    Albumin / creatinine urine ratio; Future    Hemoglobin A1C; Future    Comprehensive metabolic panel; Future    Onychomycosis  - using OTC topical lacquer   - follow up with podiatry   Orders:    Ambulatory Referral to Podiatry; Future    Hypothyroidism, unspecified type  Currently on levothyroxine 75 mcg daily  Follows with endocrinology        Vitamin D deficiency  - managed on vitamin D3 26921 IU due to breastfeeding        Nausea  - uses zofran prn   Orders:    ondansetron (ZOFRAN) 4 mg tablet; Take 1 tablet (4 mg total) by mouth every 8 (eight) hours as needed for nausea or vomiting    Screening for lipid disorders    Orders:    Lipid Panel with Direct LDL reflex; Future    Screening for deficiency anemia    Orders:    CBC and differential; Future    Encounter to establish care  Medical hx reviewed with pt in detail  Currently 3 months postpartum, update routine labs as ordered              History of Present Illness   HPI    Patient presents today to [facility] care with our office  She works as a NICU nurse at [employer]     She is currently 3 months postpartum from a , uncomplicated. She was on insulin due to her DM2.   She is currently breastfeeding.   4 yr old son and 2 yr old daughter. Uncomplicated pregnancies.     PMH significant for:    Hypothyroidism - managed on levothyroxine 75mcg daily. Follows with endocrine     DM2 - diagnosed around age 24. She is controlled on  "metformin XR 2000mg daily with dinner. She was on insulin during her pregnancy.  Hba1c 5.2    Follows with endocrine     Vitamin D deficiency - she is currently on vitamin D3 58068 IU daily.       Review of Systems   Constitutional:  Negative for activity change, appetite change, chills and fever.   Psychiatric/Behavioral:  Negative for dysphoric mood. The patient is not nervous/anxious.        Objective   /80 (BP Location: Left arm, Patient Position: Sitting, Cuff Size: Large)   Pulse 89   Temp 97.9 °F (36.6 °C) (Temporal)   Ht 5' 4\" (1.626 m)   Wt 102 kg (224 lb)   SpO2 98%   BMI 38.45 kg/m²      Physical Exam  Vitals reviewed.   Constitutional:       General: She is not in acute distress.     Appearance: Normal appearance. She is obese. She is not diaphoretic.   HENT:      Head: Normocephalic and atraumatic.     Eyes:      Extraocular Movements: Extraocular movements intact.      Conjunctiva/sclera: Conjunctivae normal.      Pupils: Pupils are equal, round, and reactive to light.       Cardiovascular:      Rate and Rhythm: Normal rate and regular rhythm.      Heart sounds: Normal heart sounds. No murmur heard.  Pulmonary:      Effort: Pulmonary effort is normal. No respiratory distress.      Breath sounds: Normal breath sounds. No wheezing, rhonchi or rales.     Neurological:      Mental Status: She is alert and oriented to person, place, and time. Mental status is at baseline.     Psychiatric:         Mood and Affect: Mood normal.         Behavior: Behavior normal.         Thought Content: Thought content normal.         Judgment: Judgment normal.         "

## 2025-07-03 NOTE — PROGRESS NOTES
Diabetic Foot Exam    Patient's shoes and socks removed.    Right Foot/Ankle   Right Foot Inspection  Skin Exam: skin normal and skin intact. No dry skin, no warmth, no callus, no erythema, no maceration, no abnormal color, no pre-ulcer, no ulcer and no callus.     Toe Exam: ROM and strength within normal limits.     Sensory   Vibration: intact  Monofilament testing: intact    Vascular  Capillary refills: < 3 seconds  The right DP pulse is 2+. The right PT pulse is 2+.     Left Foot/Ankle  Left Foot Inspection  Skin Exam: skin normal and skin intact. No dry skin, no warmth, no erythema, no maceration, normal color, no pre-ulcer, no ulcer and no callus.     Toe Exam: ROM and strength within normal limits.     Sensory   Vibration: intact  Monofilament testing: intact    Vascular  Capillary refills: < 3 seconds  The left DP pulse is 2+. The left PT pulse is 2+.     Assign Risk Category  No deformity present  No loss of protective sensation  No weak pulses  Risk: 0

## 2025-07-03 NOTE — ASSESSMENT & PLAN NOTE
Lab Results   Component Value Date    HGBA1C 5.2 02/17/2025     - controlled on Metformin XR 2000mg daily   - follows with endocrinology   - due for diabetic eye exam, pt will schedule    Orders:    Albumin / creatinine urine ratio; Future    Hemoglobin A1C; Future    Comprehensive metabolic panel; Future

## 2025-07-03 NOTE — ASSESSMENT & PLAN NOTE
- managed on vitamin D3 12341 IU due to breastfeeding         [id number]: Dr. Hector Quinones (Ritchie Evans (oncoming nurse) by Wanda Galaviz RN (offgoing nurse). Report included the following information SBAR, Kardex, ED Summary, Procedure Summary, Intake/Output, MAR, Recent Results, Med Rec Status, Cardiac Rhythm NSR-SB, Alarm Parameters  and Quality Measures.

## 2025-07-14 ENCOUNTER — APPOINTMENT (OUTPATIENT)
Dept: LAB | Facility: CLINIC | Age: 30
End: 2025-07-14
Payer: COMMERCIAL

## 2025-07-14 DIAGNOSIS — Z13.220 SCREENING FOR LIPID DISORDERS: ICD-10-CM

## 2025-07-14 DIAGNOSIS — Z13.0 SCREENING FOR DEFICIENCY ANEMIA: ICD-10-CM

## 2025-07-14 DIAGNOSIS — E11.9 TYPE 2 DIABETES MELLITUS WITHOUT COMPLICATION, WITHOUT LONG-TERM CURRENT USE OF INSULIN (HCC): ICD-10-CM

## 2025-07-14 DIAGNOSIS — E03.9 ACQUIRED HYPOTHYROIDISM: ICD-10-CM

## 2025-07-14 LAB
ALBUMIN SERPL BCG-MCNC: 4.6 G/DL (ref 3.5–5)
ALP SERPL-CCNC: 68 U/L (ref 34–104)
ALT SERPL W P-5'-P-CCNC: 83 U/L (ref 7–52)
ANION GAP SERPL CALCULATED.3IONS-SCNC: 12 MMOL/L (ref 4–13)
AST SERPL W P-5'-P-CCNC: 51 U/L (ref 13–39)
BASOPHILS # BLD AUTO: 0.04 THOUSANDS/ÂΜL (ref 0–0.1)
BASOPHILS NFR BLD AUTO: 1 % (ref 0–1)
BILIRUB SERPL-MCNC: 0.79 MG/DL (ref 0.2–1)
BUN SERPL-MCNC: 11 MG/DL (ref 5–25)
CALCIUM SERPL-MCNC: 9.5 MG/DL (ref 8.4–10.2)
CHLORIDE SERPL-SCNC: 102 MMOL/L (ref 96–108)
CHOLEST SERPL-MCNC: 227 MG/DL (ref ?–200)
CO2 SERPL-SCNC: 25 MMOL/L (ref 21–32)
CREAT SERPL-MCNC: 0.56 MG/DL (ref 0.6–1.3)
CREAT UR-MCNC: 71.9 MG/DL
EOSINOPHIL # BLD AUTO: 0.12 THOUSAND/ÂΜL (ref 0–0.61)
EOSINOPHIL NFR BLD AUTO: 3 % (ref 0–6)
ERYTHROCYTE [DISTWIDTH] IN BLOOD BY AUTOMATED COUNT: 13.4 % (ref 11.6–15.1)
GFR SERPL CREATININE-BSD FRML MDRD: 124 ML/MIN/1.73SQ M
GLUCOSE P FAST SERPL-MCNC: 113 MG/DL (ref 65–99)
HCT VFR BLD AUTO: 37.2 % (ref 34.8–46.1)
HDLC SERPL-MCNC: 61 MG/DL
HGB BLD-MCNC: 12.4 G/DL (ref 11.5–15.4)
IMM GRANULOCYTES # BLD AUTO: 0.01 THOUSAND/UL (ref 0–0.2)
IMM GRANULOCYTES NFR BLD AUTO: 0 % (ref 0–2)
LDLC SERPL CALC-MCNC: 132 MG/DL (ref 0–100)
LYMPHOCYTES # BLD AUTO: 1.59 THOUSANDS/ÂΜL (ref 0.6–4.47)
LYMPHOCYTES NFR BLD AUTO: 34 % (ref 14–44)
MCH RBC QN AUTO: 27.3 PG (ref 26.8–34.3)
MCHC RBC AUTO-ENTMCNC: 33.3 G/DL (ref 31.4–37.4)
MCV RBC AUTO: 82 FL (ref 82–98)
MICROALBUMIN UR-MCNC: 15.4 MG/L
MICROALBUMIN/CREAT 24H UR: 21 MG/G CREATININE (ref 0–30)
MONOCYTES # BLD AUTO: 0.46 THOUSAND/ÂΜL (ref 0.17–1.22)
MONOCYTES NFR BLD AUTO: 10 % (ref 4–12)
NEUTROPHILS # BLD AUTO: 2.48 THOUSANDS/ÂΜL (ref 1.85–7.62)
NEUTS SEG NFR BLD AUTO: 52 % (ref 43–75)
NRBC BLD AUTO-RTO: 0 /100 WBCS
PLATELET # BLD AUTO: 283 THOUSANDS/UL (ref 149–390)
PMV BLD AUTO: 10.3 FL (ref 8.9–12.7)
POTASSIUM SERPL-SCNC: 4.1 MMOL/L (ref 3.5–5.3)
PROT SERPL-MCNC: 7.5 G/DL (ref 6.4–8.4)
RBC # BLD AUTO: 4.55 MILLION/UL (ref 3.81–5.12)
SODIUM SERPL-SCNC: 139 MMOL/L (ref 135–147)
T4 FREE SERPL-MCNC: 0.91 NG/DL (ref 0.61–1.12)
TRIGL SERPL-MCNC: 170 MG/DL (ref ?–150)
TSH SERPL DL<=0.05 MIU/L-ACNC: 0.87 UIU/ML (ref 0.45–4.5)
WBC # BLD AUTO: 4.7 THOUSAND/UL (ref 4.31–10.16)

## 2025-07-14 PROCEDURE — 80061 LIPID PANEL: CPT

## 2025-07-14 PROCEDURE — 80053 COMPREHEN METABOLIC PANEL: CPT

## 2025-07-14 PROCEDURE — 83036 HEMOGLOBIN GLYCOSYLATED A1C: CPT

## 2025-07-14 PROCEDURE — 82043 UR ALBUMIN QUANTITATIVE: CPT

## 2025-07-14 PROCEDURE — 84439 ASSAY OF FREE THYROXINE: CPT

## 2025-07-14 PROCEDURE — 85025 COMPLETE CBC W/AUTO DIFF WBC: CPT

## 2025-07-14 PROCEDURE — 82570 ASSAY OF URINE CREATININE: CPT

## 2025-07-14 PROCEDURE — 84443 ASSAY THYROID STIM HORMONE: CPT

## 2025-07-14 PROCEDURE — 36415 COLL VENOUS BLD VENIPUNCTURE: CPT

## 2025-07-15 LAB
EST. AVERAGE GLUCOSE BLD GHB EST-MCNC: 134 MG/DL
HBA1C MFR BLD: 6.3 %

## 2025-07-17 ENCOUNTER — OFFICE VISIT (OUTPATIENT)
Dept: ENDOCRINOLOGY | Facility: CLINIC | Age: 30
End: 2025-07-17
Payer: COMMERCIAL

## 2025-07-17 VITALS
WEIGHT: 218.6 LBS | DIASTOLIC BLOOD PRESSURE: 80 MMHG | HEIGHT: 64 IN | SYSTOLIC BLOOD PRESSURE: 120 MMHG | BODY MASS INDEX: 37.32 KG/M2

## 2025-07-17 DIAGNOSIS — E55.9 VITAMIN D DEFICIENCY: ICD-10-CM

## 2025-07-17 DIAGNOSIS — E11.9 TYPE 2 DIABETES MELLITUS WITHOUT COMPLICATION, WITHOUT LONG-TERM CURRENT USE OF INSULIN (HCC): Primary | ICD-10-CM

## 2025-07-17 DIAGNOSIS — E78.5 HYPERLIPIDEMIA, UNSPECIFIED HYPERLIPIDEMIA TYPE: ICD-10-CM

## 2025-07-17 DIAGNOSIS — E03.9 HYPOTHYROIDISM, UNSPECIFIED TYPE: ICD-10-CM

## 2025-07-17 PROCEDURE — 99214 OFFICE O/P EST MOD 30 MIN: CPT | Performed by: PHYSICIAN ASSISTANT

## 2025-07-17 RX ORDER — LEVOTHYROXINE SODIUM 75 UG/1
75 TABLET ORAL
Qty: 90 TABLET | Refills: 2 | Status: SHIPPED | OUTPATIENT
Start: 2025-07-17

## 2025-07-17 RX ORDER — METFORMIN HYDROCHLORIDE 500 MG/1
2000 TABLET, EXTENDED RELEASE ORAL
Qty: 360 TABLET | Refills: 2 | Status: SHIPPED | OUTPATIENT
Start: 2025-07-17

## 2025-07-17 NOTE — ASSESSMENT & PLAN NOTE
Lab Results   Component Value Date    HGBA1C 6.3 (H) 07/14/2025   Current HbA1c represents acceptable control. Blood sugars unavailable for review  Continue current regimen  Advised to adhere to diabetic diet, and recommended staying active/exercising routinely.  Discussed symptoms and treatment of hypoglycemia.    Recommended routine follow-up with Ophthalmology and foot exams.   Ordered blood work to complete prior to next visit.    Orders:  •  metFORMIN (GLUCOPHAGE-XR) 500 mg 24 hr tablet; Take 4 tablets (2,000 mg total) by mouth daily with dinner Take 4 tabs daily with food  •  Hemoglobin A1C; Future  •  Comprehensive metabolic panel; Future  •  Lipid panel; Future

## 2025-07-17 NOTE — PROGRESS NOTES
Name: Rabia Correa      : 1995      MRN: [id number]  Encounter Provider: Rashida Abbott PA-C  Encounter Date: 2025   Encounter department: [facility] [place]    Chief Complaint   Patient presents with   • Diabetes Type 2   • Hypothyroidism   :  Assessment & Plan  Type 2 diabetes mellitus without complication, without long-term current use of insulin (HCC)    Lab Results   Component Value Date    HGBA1C 6.3 (H) 2025   Current HbA1c represents acceptable control. Blood sugars unavailable for review  Continue current regimen  Advised to adhere to diabetic diet, and recommended staying active/exercising routinely.  Discussed symptoms and treatment of hypoglycemia.    Recommended routine follow-up with Ophthalmology and foot exams.   Ordered blood work to complete prior to next visit.    Orders:  •  metFORMIN (GLUCOPHAGE-XR) 500 mg 24 hr tablet; Take 4 tablets (2,000 mg total) by mouth daily with dinner Take 4 tabs daily with food  •  Hemoglobin A1C; Future  •  Comprehensive metabolic panel; Future  •  Lipid panel; Future    Hypothyroidism, unspecified type  TSH: 0.872  T4: 0.91  Maintained on levothyroxine 75mcg daily, continue  Orders:  •  levothyroxine 75 mcg tablet; Take 1 tablet (75 mcg total) by mouth daily in the early morning    Vitamin D deficiency  Continue supplementation       Hyperlipidemia, unspecified hyperlipidemia type    Patient just getting back into workout routine  Re-evaluate at next visit               History of Present Illness {?Quick Links Encounters * My Last Note * Last Note in Specialty * Snapshot * Since Last Visit * History :52285}  History of Present Illness  The patient is a 30-year-old female with hypothyroidism and type 2 diabetes, presenting for a follow-up visit.     She reports feeling well overall and has recently given birth on 2025. She is adjusting to her new routine, which includes a sleep  "pattern of 6 hours of uninterrupted sleep followed by shorter rest periods. She manages her self-care at night when her  is home. Although she is not monitoring her blood sugar levels, she is aware of the symptoms and has not experienced any episodes of excessive sweating, lightheadedness, or dizziness since her pregnancy ended. During her pregnancy, she was on insulin therapy, taking 4 units with meals and 20 units of long-acting insulin.    She is due for an eye exam and plans to schedule it soon. She requests a lab recheck in 6 months and has resumed her workout routine. Her current medications include metformin 1000 mg with dinner, levothyroxine 75 mcg on an empty stomach in the morning, and vitamin D 10,000 IU for breastfeeding.          Current diabetic regimen:   Metformin XR 2000mg daily with dinner      Review of Systems as per HPI         Medical History Reviewed by provider this encounter:  Problems     .    Objective {?Quick Links Trend Vitals * Enter New Vitals * Results Review * Timeline (Adult) * Labs * Imaging * Cardiology * Procedures * Lung Cancer Screening * Surgical eConsent :93042}  /80 (BP Location: Left arm, Patient Position: Sitting, Cuff Size: Adult)   Ht 5' 4\" (1.626 m)   Wt 99.2 kg (218 lb 9.6 oz)   BMI 37.52 kg/m²      Body mass index is 37.52 kg/m².  Wt Readings from Last 3 Encounters:   07/17/25 99.2 kg (218 lb 9.6 oz)   07/03/25 102 kg (224 lb)   04/07/25 108 kg (237 lb)     Physical Exam  General Appearance: Well, no acute distress.  Vital signs: Within normal limits.  HEENT: Normocephalic and atraumatic.  Eyes: No scleral icterus. Conjunctivae normal.  Respiratory: Pulmonary effort is normal.  Neurological: Alert.  Psychiatric: Attention normal.    Last Eye Exam: Not on file  Last Foot Exam: 07/03/2025  Health Maintenance   Topic Date Due   • Diabetic Eye Exam  12/05/2021   • Diabetic Foot Exam  07/03/2026       Results    Labs: I have reviewed pertinent labs " including:   Lab Results   Component Value Date    HGBA1C 6.3 (H) 07/14/2025    HGBA1C 5.2 02/17/2025    HGBA1C 5.2 11/20/2024      Lab Results   Component Value Date    CREATININE 0.56 (L) 07/14/2025    CREATININE 0.57 (L) 04/06/2025    CREATININE 0.46 (L) 09/20/2024    BUN 11 07/14/2025    K 4.1 07/14/2025     07/14/2025    CO2 25 07/14/2025      eGFR   Date Value Ref Range Status   07/14/2025 124 ml/min/1.73sq m Final   11/20/2016 >60.0 ml/min/1.73sq m Final      HDL, Direct   Date Value Ref Range Status   07/14/2025 61 >=50 mg/dL Final     Comment:     HDL Cholesterol:       Low     <41 mg/dL     Triglycerides   Date Value Ref Range Status   07/14/2025 170 (H) See Comment mg/dL Final     Comment:     Triglyceride:     0-9Y            <75mg/dL     10Y-17Y         <90 mg/dL       >=18Y     Normal          <150 mg/dL     Borderline High 150-199 mg/dL     High            200-499 mg/dL        Very High       >499 mg/dL    Specimen collection should occur prior to Metamizole administration due to the potential for falsely depressed results.      ALT   Date Value Ref Range Status   07/14/2025 83 (H) 7 - 52 U/L Final     Comment:     Specimen collection should occur prior to Sulfasalazine administration due to the potential for falsely depressed results.      AST   Date Value Ref Range Status   07/14/2025 51 (H) 13 - 39 U/L Final     Alkaline Phosphatase   Date Value Ref Range Status   07/14/2025 68 34 - 104 U/L Final      Lab Results   Component Value Date    [id number] 0.872 07/14/2025      Lab Results   Component Value Date    FREET4 0.91 07/14/2025        There are no Patient Instructions on file for this visit.    Discussed with the patient and all questioned fully answered. She will call me if any problems arise.

## 2025-07-17 NOTE — ASSESSMENT & PLAN NOTE
TSH: 0.872  T4: 0.91  Maintained on levothyroxine 75mcg daily, continue  Orders:  •  levothyroxine 75 mcg tablet; Take 1 tablet (75 mcg total) by mouth daily in the early morning